# Patient Record
Sex: MALE | Race: WHITE | NOT HISPANIC OR LATINO | Employment: OTHER | RURAL
[De-identification: names, ages, dates, MRNs, and addresses within clinical notes are randomized per-mention and may not be internally consistent; named-entity substitution may affect disease eponyms.]

---

## 2017-01-17 ENCOUNTER — OFFICE VISIT (OUTPATIENT)
Dept: FAMILY MEDICINE CLINIC | Facility: CLINIC | Age: 72
End: 2017-01-17

## 2017-01-17 ENCOUNTER — TELEPHONE (OUTPATIENT)
Dept: FAMILY MEDICINE CLINIC | Facility: CLINIC | Age: 72
End: 2017-01-17

## 2017-01-17 VITALS
BODY MASS INDEX: 24.72 KG/M2 | TEMPERATURE: 97.8 F | WEIGHT: 163.1 LBS | DIASTOLIC BLOOD PRESSURE: 76 MMHG | HEART RATE: 80 BPM | SYSTOLIC BLOOD PRESSURE: 123 MMHG | HEIGHT: 68 IN

## 2017-01-17 DIAGNOSIS — M54.42 CHRONIC LEFT-SIDED LOW BACK PAIN WITH LEFT-SIDED SCIATICA: Primary | ICD-10-CM

## 2017-01-17 DIAGNOSIS — E11.9 TYPE 2 DIABETES MELLITUS WITHOUT COMPLICATION, WITHOUT LONG-TERM CURRENT USE OF INSULIN (HCC): ICD-10-CM

## 2017-01-17 DIAGNOSIS — G89.29 CHRONIC LEFT-SIDED LOW BACK PAIN WITH LEFT-SIDED SCIATICA: Primary | ICD-10-CM

## 2017-01-17 PROCEDURE — 99213 OFFICE O/P EST LOW 20 MIN: CPT | Performed by: FAMILY MEDICINE

## 2017-01-17 RX ORDER — NAPROXEN 500 MG/1
500 TABLET ORAL 2 TIMES DAILY WITH MEALS
Qty: 60 TABLET | Refills: 3 | Status: SHIPPED | OUTPATIENT
Start: 2017-01-17 | End: 2017-02-13

## 2017-01-17 NOTE — TELEPHONE ENCOUNTER
Spoke with [facility] pharmacy they stated patient has several refills left didn't need sent at this time.

## 2017-01-17 NOTE — MR AVS SNAPSHOT
Jean Noriega   1/17/2017 1:20 PM   Office Visit    Dept Phone:  [phone]   Encounter #:  [id number]    Provider:  Demetrius Wilkerson MD   Department:  [facility] FAMILY MEDICINE                Your Full Care Plan              Today's Medication Changes          These changes are accurate as of: 1/17/17  2:51 PM.  If you have any questions, ask your nurse or doctor.               New Medication(s)Ordered:     naproxen 500 MG tablet   Commonly known as:  NAPROSYN   Take 1 tablet by mouth 2 (Two) Times a Day With Meals.   Started by:  Demetrius Wilkerson MD         Medication(s)that have changed:     metFORMIN 1000 MG tablet   Commonly known as:  GLUCOPHAGE   Take 1 tablet by mouth 2 (Two) Times a Day With Meals.   What changed:  when to take this   Changed by:  Demetrius Wilkerson MD         Stop taking medication(s)listed here:     nabumetone 750 MG tablet   Commonly known as:  RELAFEN   Stopped by:  Demetrius Wilkerson MD                Where to Get Your Medications      These medications were sent to [place] [id number] [place] [address] [place] [address]     Phone:  [phone]     metFORMIN 1000 MG tablet    naproxen 500 MG tablet                  Your Updated Medication List          This list is accurate as of: 1/17/17  2:51 PM.  Always use your most recent med list.                ACCU-CHEK COMPACT PLUS CONTROL VI       ACCU-CHEK SOFTCLIX LANCETS lancets       aspirin 81 MG EC tablet       atorvastatin 20 MG tablet   Commonly known as:  LIPITOR   Take 1 tablet by mouth Daily.       cyclobenzaprine 5 MG tablet   Commonly known as:  FLEXERIL   Take 1 tablet by mouth Daily.       metFORMIN 1000 MG tablet   Commonly known as:  GLUCOPHAGE   Take 1 tablet by mouth 2 (Two) Times a Day With Meals.       naproxen 500 MG tablet   Commonly known as:  NAPROSYN   Take 1 tablet by  "mouth 2 (Two) Times a Day With Meals.       pantoprazole 40 MG EC tablet   Commonly known as:  PROTONIX       ZENPEP 47183 UNITS capsule delayed-release particles capsule   Generic drug:  pancrelipase (Lip-Prot-Amyl)               You Were Diagnosed With        Codes Comments    Chronic left-sided low back pain with left-sided sciatica    -  Primary ICD-10-CM: M54.42, G89.29  ICD-9-CM: 724.2, 724.3, 338.29     Type 2 diabetes mellitus without complication, without long-term current use of insulin     ICD-10-CM: E11.9  ICD-9-CM: 250.00       Instructions     None    Patient Instructions History      Upcoming Appointments     Visit Type Date Time Department    OFFICE VISIT 1/17/2017  1:20 PM E  [facility]    LAB 2/6/2017  9:00 AM E  [facility]    OFFICE VISIT 2/13/2017 11:00 AM [facility]      MyChart Signup     Our records indicate that you have declined [employer] signup. If you would like to sign up for [employer], please email [email or web address] or call [phone] to obtain an activation code.             Other Info from Your Visit           Your Appointments     Feb 06, 2017  9:00 AM EST   Lab with LAB DALIA VENTURA   [facility] (--)    [address] [address]   [phone]            Feb 13, 2017 11:00 AM EST   Office Visit with Demetrius Wilkerson MD   [facility] (--)    [address] [place] [id number]   [phone]           Arrive 15 minutes prior to appointment.              Allergies     Prednisone Intolerance     Patient stated ran glucose high and rapid heart rate.      Reason for Visit     Back Pain Persistent    Med Refill Metformin      Vital Signs     Blood Pressure Pulse Temperature Height Weight Body Mass Index    123/76 (BP Location: Left arm, Patient Position: Sitting) 80 97.8 °F (36.6 °C) (Oral) 68\" (172.7 cm) 163 lb 1.6 oz (74 kg) 24.8 " kg/m2    Smoking Status                   Former Smoker           Problems and Diagnoses Noted     Lumbago with sciatica    Type 2 diabetes mellitus without complication, without long-term current use of insulin

## 2017-01-17 NOTE — PROGRESS NOTES
"Zander Noriega is a 71 y.o. male.     History of Present Illness persistence of the low left back discomfort.  Minimal radiating down to the hip.  Said no respiratory CDV GI  changes.  Does not feel like the Relafen was helpful.  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  Compliant with medications.    The following portions of the patient's history were reviewed and updated as appropriate: allergies, past family history, past medical history, past social history, past surgical history and problem list.    Review of Systems see the history of present illness    Objective   Physical Exam   Constitutional: He is oriented to person, place, and time. He appears well-developed and well-nourished.   HENT:   Head: Normocephalic.   Eyes: Conjunctivae and EOM are normal. Pupils are equal, round, and reactive to light.   Neck: Neck supple.   Cardiovascular: Normal rate, regular rhythm, normal heart sounds and intact distal pulses.    No murmur heard.  Pulmonary/Chest: Effort normal and breath sounds normal.   Abdominal: Soft. Bowel sounds are normal. There is no tenderness. There is no guarding.   Musculoskeletal: Normal range of motion. He exhibits no edema.   Tender along the left paralumbar soft tissue down toward the hip.  Skin was negative without rash.  Straight leg raise negative.  Reflexes symmetrical.   Neurological: He is alert and oriented to person, place, and time.   Psychiatric: He has a normal mood and affect.   Vitals reviewed.    Visit Vitals   • /76 (BP Location: Left arm, Patient Position: Sitting)   • Pulse 80   • Temp 97.8 °F (36.6 °C) (Oral)   • Ht 68\" (172.7 cm)   • Wt 163 lb 1.6 oz (74 kg)   • BMI 24.8 kg/m2     Assessment/Plan   Problems Addressed this Visit        Endocrine    Type 2 diabetes mellitus without complication, without long-term current use of insulin    Relevant Medications    metFORMIN (GLUCOPHAGE) 1000 MG tablet       Nervous and " Auditory    Low back pain with sciatica - Primary    Relevant Orders    MRI Lumbar Spine Without Contrast        Interestingly I note a MRI from 11 2014 that did show some degenerative changes and disc pathology.  You have done well since then but have not had complete resolution.  Today will try Naprosyn twice daily.  Will ask for soft tissue imaging.  Stay as active as comfortably possible.  Continue all other modalities regarding diabetes with heart healthy diabetic diet.  Follow-up as scheduled for metabolic screening.

## 2017-01-20 ENCOUNTER — HOSPITAL ENCOUNTER (OUTPATIENT)
Dept: MRI IMAGING | Facility: HOSPITAL | Age: 72
Discharge: HOME | End: 2017-01-20
Admitting: FAMILY MEDICINE

## 2017-01-20 DIAGNOSIS — M54.42 CHRONIC LEFT-SIDED LOW BACK PAIN WITH LEFT-SIDED SCIATICA: ICD-10-CM

## 2017-01-20 DIAGNOSIS — G89.29 CHRONIC LEFT-SIDED LOW BACK PAIN WITH LEFT-SIDED SCIATICA: ICD-10-CM

## 2017-01-20 PROCEDURE — 72148 MRI LUMBAR SPINE W/O DYE: CPT | Performed by: RADIOLOGY

## 2017-01-20 PROCEDURE — 72148 MRI LUMBAR SPINE W/O DYE: CPT

## 2017-01-20 RX ORDER — CYCLOBENZAPRINE HCL 5 MG
5 TABLET ORAL DAILY
Qty: 30 TABLET | Refills: 1 | Status: SHIPPED | OUTPATIENT
Start: 2017-01-20 | End: 2017-03-16 | Stop reason: SDUPTHER

## 2017-01-23 DIAGNOSIS — G89.29 CHRONIC LEFT-SIDED LOW BACK PAIN WITH LEFT-SIDED SCIATICA: Primary | ICD-10-CM

## 2017-01-23 DIAGNOSIS — M54.42 CHRONIC LEFT-SIDED LOW BACK PAIN WITH LEFT-SIDED SCIATICA: Primary | ICD-10-CM

## 2017-02-02 ENCOUNTER — APPOINTMENT (OUTPATIENT)
Dept: PHYSICAL THERAPY | Facility: HOSPITAL | Age: 72
End: 2017-02-02

## 2017-02-06 ENCOUNTER — LAB (OUTPATIENT)
Dept: FAMILY MEDICINE CLINIC | Facility: CLINIC | Age: 72
End: 2017-02-06

## 2017-02-06 DIAGNOSIS — E78.2 MIXED HYPERLIPIDEMIA: ICD-10-CM

## 2017-02-06 DIAGNOSIS — E11.9 TYPE 2 DIABETES MELLITUS WITHOUT COMPLICATION, WITHOUT LONG-TERM CURRENT USE OF INSULIN (HCC): ICD-10-CM

## 2017-02-06 LAB
ALBUMIN SERPL-MCNC: 4 G/DL (ref 3.4–4.8)
ALBUMIN/GLOB SERPL: 1.6 G/DL (ref 1.5–2.5)
ALP SERPL-CCNC: 105 U/L (ref 46–116)
ALT SERPL W P-5'-P-CCNC: 46 U/L (ref 10–44)
ANION GAP SERPL CALCULATED.3IONS-SCNC: 2.3 MMOL/L (ref 3.6–11.2)
AST SERPL-CCNC: 29 U/L (ref 10–34)
BASOPHILS # BLD AUTO: 0.03 10*3/MM3 (ref 0–0.3)
BASOPHILS NFR BLD AUTO: 0.4 % (ref 0–2)
BILIRUB SERPL-MCNC: 0.4 MG/DL (ref 0.2–1.8)
BUN BLD-MCNC: 15 MG/DL (ref 7–21)
BUN/CREAT SERPL: 11.5 (ref 7–25)
CALCIUM SPEC-SCNC: 9.2 MG/DL (ref 7.7–10)
CHLORIDE SERPL-SCNC: 105 MMOL/L (ref 99–112)
CHOLEST SERPL-MCNC: 108 MG/DL (ref 0–200)
CO2 SERPL-SCNC: 30.7 MMOL/L (ref 24.3–31.9)
CREAT BLD-MCNC: 1.3 MG/DL (ref 0.43–1.29)
DEPRECATED RDW RBC AUTO: 45.1 FL (ref 37–54)
EOSINOPHIL # BLD AUTO: 0.17 10*3/MM3 (ref 0–0.7)
EOSINOPHIL NFR BLD AUTO: 2.2 % (ref 0–7)
ERYTHROCYTE [DISTWIDTH] IN BLOOD BY AUTOMATED COUNT: 13 % (ref 11.5–14.5)
GFR SERPL CREATININE-BSD FRML MDRD: 55 ML/MIN/1.73
GLOBULIN UR ELPH-MCNC: 2.5 GM/DL
GLUCOSE BLD-MCNC: 219 MG/DL (ref 70–110)
HBA1C MFR BLD: 8.2 % (ref 4.5–5.7)
HCT VFR BLD AUTO: 43.9 % (ref 42–52)
HDLC SERPL-MCNC: 44 MG/DL (ref 60–100)
HGB BLD-MCNC: 14 G/DL (ref 14–18)
IMM GRANULOCYTES # BLD: 0.04 10*3/MM3 (ref 0–0.03)
IMM GRANULOCYTES NFR BLD: 0.5 % (ref 0–0.5)
LDLC SERPL CALC-MCNC: 14 MG/DL (ref 0–100)
LDLC/HDLC SERPL: 0.32 {RATIO}
LYMPHOCYTES # BLD AUTO: 2.49 10*3/MM3 (ref 1–3)
LYMPHOCYTES NFR BLD AUTO: 32.6 % (ref 16–46)
MCH RBC QN AUTO: 31.2 PG (ref 27–33)
MCHC RBC AUTO-ENTMCNC: 31.9 G/DL (ref 33–37)
MCV RBC AUTO: 97.8 FL (ref 80–94)
MONOCYTES # BLD AUTO: 0.86 10*3/MM3 (ref 0.1–0.9)
MONOCYTES NFR BLD AUTO: 11.3 % (ref 0–12)
NEUTROPHILS # BLD AUTO: 4.04 10*3/MM3 (ref 1.4–6.5)
NEUTROPHILS NFR BLD AUTO: 53 % (ref 40–75)
OSMOLALITY SERPL CALC.SUM OF ELEC: 283.2 MOSM/KG (ref 273–305)
PLATELET # BLD AUTO: 237 10*3/MM3 (ref 130–400)
PMV BLD AUTO: 10.9 FL (ref 6–10)
POTASSIUM BLD-SCNC: 5.3 MMOL/L (ref 3.5–5.3)
PROT SERPL-MCNC: 6.5 G/DL (ref 6–8)
RBC # BLD AUTO: 4.49 10*6/MM3 (ref 4.7–6.1)
SODIUM BLD-SCNC: 138 MMOL/L (ref 135–153)
TRIGL SERPL-MCNC: 250 MG/DL (ref 0–150)
VLDLC SERPL-MCNC: 50 MG/DL
WBC NRBC COR # BLD: 7.63 10*3/MM3 (ref 4.5–12.5)

## 2017-02-06 PROCEDURE — 80053 COMPREHEN METABOLIC PANEL: CPT | Performed by: FAMILY MEDICINE

## 2017-02-06 PROCEDURE — 85025 COMPLETE CBC W/AUTO DIFF WBC: CPT | Performed by: FAMILY MEDICINE

## 2017-02-06 PROCEDURE — 36415 COLL VENOUS BLD VENIPUNCTURE: CPT | Performed by: FAMILY MEDICINE

## 2017-02-06 PROCEDURE — 83036 HEMOGLOBIN GLYCOSYLATED A1C: CPT | Performed by: FAMILY MEDICINE

## 2017-02-06 PROCEDURE — 80061 LIPID PANEL: CPT | Performed by: FAMILY MEDICINE

## 2017-02-09 ENCOUNTER — TELEPHONE (OUTPATIENT)
Dept: FAMILY MEDICINE CLINIC | Facility: CLINIC | Age: 72
End: 2017-02-09

## 2017-02-09 NOTE — TELEPHONE ENCOUNTER
PATIENT CALLED AND STATED HE WAS SET UP FOR AN APPOINTMENT AT [place] [facility]. HE HAD ANOTHER APPOINTMENT THE SAME DAY AND HE TRIED TO RESCHEDULE BUT WAS INSTRUCTED IT WOULD BE INTO MARCH. HE WOULD LIKE A PT REFERRAL TO [facility] IN [place]

## 2017-02-13 ENCOUNTER — OFFICE VISIT (OUTPATIENT)
Dept: FAMILY MEDICINE CLINIC | Facility: CLINIC | Age: 72
End: 2017-02-13

## 2017-02-13 VITALS
TEMPERATURE: 97.6 F | HEART RATE: 82 BPM | HEIGHT: 68 IN | SYSTOLIC BLOOD PRESSURE: 123 MMHG | WEIGHT: 161.7 LBS | DIASTOLIC BLOOD PRESSURE: 68 MMHG | BODY MASS INDEX: 24.51 KG/M2

## 2017-02-13 DIAGNOSIS — E11.9 TYPE 2 DIABETES MELLITUS WITHOUT COMPLICATION, WITHOUT LONG-TERM CURRENT USE OF INSULIN (HCC): Primary | ICD-10-CM

## 2017-02-13 DIAGNOSIS — G89.29 CHRONIC LEFT-SIDED LOW BACK PAIN WITH LEFT-SIDED SCIATICA: ICD-10-CM

## 2017-02-13 DIAGNOSIS — M54.42 CHRONIC LEFT-SIDED LOW BACK PAIN WITH LEFT-SIDED SCIATICA: ICD-10-CM

## 2017-02-13 PROCEDURE — 99213 OFFICE O/P EST LOW 20 MIN: CPT | Performed by: FAMILY MEDICINE

## 2017-02-13 RX ORDER — SULFAMETHOXAZOLE AND TRIMETHOPRIM 800; 160 MG/1; MG/1
TABLET ORAL
COMMUNITY
Start: 2017-02-02 | End: 2017-02-17

## 2017-02-13 RX ORDER — ATORVASTATIN CALCIUM 20 MG/1
10 TABLET, FILM COATED ORAL DAILY
Qty: 30 TABLET | Refills: 6 | Status: ON HOLD
Start: 2017-02-13 | End: 2017-03-19

## 2017-02-13 NOTE — PROGRESS NOTES
"Subjective   Jean Noriega is a 71 y.o. male.     History of Present Illness follow-up on recent labs.  Somewhat intolerant to 20 mg a atorvastatin.  Blood sugar has been staying a little higher less compliant with diet and activity.  Back pain is persistent has not started physical therapy yet.  Did have visit with urologist and diagnosed with prostatitis and is now on antibiotic may need to have follow-up there.  No other new problems.    The following portions of the patient's history were reviewed and updated as appropriate: allergies, current medications, past family history, past social history, past surgical history and problem list.    Review of Systems see the history of present illness    Objective   Physical Exam   Constitutional: He is oriented to person, place, and time. He appears well-developed and well-nourished.   Neurological: He is alert and oriented to person, place, and time.   Psychiatric: He has a normal mood and affect.   Vitals reviewed.    Visit Vitals   • /68 (BP Location: Left arm, Patient Position: Sitting)   • Pulse 82   • Temp 97.6 °F (36.4 °C) (Oral)   • Ht 68\" (172.7 cm)   • Wt 161 lb 11.2 oz (73.3 kg)   • BMI 24.59 kg/m2     Assessment/Plan   Jean was seen today for diabetes and back pain.    Diagnoses and all orders for this visit:    Type 2 diabetes mellitus without complication, without long-term current use of insulin  -     Comprehensive Metabolic Panel; Future  -     Hemoglobin A1c; Future    Chronic left-sided low back pain with left-sided sciatica    Other orders  -     atorvastatin (LIPITOR) 20 MG tablet; Take 0.5 tablets by mouth Daily.     We reviewed the recent metabolic parameters.  At this time I would decrease Lipitor to a half tablet daily.  Must get aggressive with diabetes control is not been good.  Keep follow-up with urologist as suggested.  Keep follow-up with physical therapy.  I think that will be the best approach for the back.  See us in a few months " routinely to monitor metabolic status.  Notify us in the meantime if problems.

## 2017-02-17 ENCOUNTER — OFFICE VISIT (OUTPATIENT)
Dept: FAMILY MEDICINE CLINIC | Facility: CLINIC | Age: 72
End: 2017-02-17

## 2017-02-17 ENCOUNTER — HOSPITAL ENCOUNTER (OUTPATIENT)
Dept: CT IMAGING | Facility: HOSPITAL | Age: 72
Discharge: HOME | End: 2017-02-17
Admitting: NURSE PRACTITIONER

## 2017-02-17 VITALS
TEMPERATURE: 97 F | DIASTOLIC BLOOD PRESSURE: 73 MMHG | WEIGHT: 163.3 LBS | HEART RATE: 70 BPM | OXYGEN SATURATION: 96 % | BODY MASS INDEX: 24.75 KG/M2 | HEIGHT: 68 IN | SYSTOLIC BLOOD PRESSURE: 134 MMHG

## 2017-02-17 DIAGNOSIS — R31.9 HEMATURIA: ICD-10-CM

## 2017-02-17 DIAGNOSIS — R10.9 LEFT FLANK PAIN: ICD-10-CM

## 2017-02-17 DIAGNOSIS — Z85.51 HISTORY OF BLADDER CANCER: ICD-10-CM

## 2017-02-17 DIAGNOSIS — R30.0 DYSURIA: Primary | ICD-10-CM

## 2017-02-17 DIAGNOSIS — R30.0 DYSURIA: ICD-10-CM

## 2017-02-17 LAB
BILIRUB BLD-MCNC: ABNORMAL MG/DL
CLARITY, POC: CLEAR
COLOR UR: ABNORMAL
GLUCOSE UR STRIP-MCNC: ABNORMAL MG/DL
KETONES UR QL: NEGATIVE
LEUKOCYTE EST, POC: NEGATIVE
NITRITE UR-MCNC: NEGATIVE MG/ML
PH UR: 6 [PH] (ref 5–8)
PROT UR STRIP-MCNC: NEGATIVE MG/DL
RBC # UR STRIP: ABNORMAL /UL
SP GR UR: 1.01 (ref 1–1.03)
UROBILINOGEN UR QL: ABNORMAL

## 2017-02-17 PROCEDURE — 74176 CT ABD & PELVIS W/O CONTRAST: CPT | Performed by: RADIOLOGY

## 2017-02-17 PROCEDURE — 74176 CT ABD & PELVIS W/O CONTRAST: CPT

## 2017-02-17 PROCEDURE — 99213 OFFICE O/P EST LOW 20 MIN: CPT | Performed by: NURSE PRACTITIONER

## 2017-02-17 PROCEDURE — 81002 URINALYSIS NONAUTO W/O SCOPE: CPT | Performed by: NURSE PRACTITIONER

## 2017-02-17 PROCEDURE — 87086 URINE CULTURE/COLONY COUNT: CPT | Performed by: NURSE PRACTITIONER

## 2017-02-17 RX ORDER — TAMSULOSIN HYDROCHLORIDE 0.4 MG/1
1 CAPSULE ORAL NIGHTLY
Qty: 30 CAPSULE | Refills: 0 | Status: SHIPPED | OUTPATIENT
Start: 2017-02-17 | End: 2017-03-17

## 2017-02-17 RX ORDER — SULFAMETHOXAZOLE AND TRIMETHOPRIM 800; 160 MG/1; MG/1
1 TABLET ORAL 2 TIMES DAILY
Qty: 20 TABLET | Refills: 0 | Status: SHIPPED | OUTPATIENT
Start: 2017-02-17 | End: 2017-02-27

## 2017-02-17 RX ORDER — NAPROXEN 500 MG/1
500 TABLET ORAL 2 TIMES DAILY WITH MEALS
COMMUNITY
End: 2017-03-17

## 2017-02-17 NOTE — PROGRESS NOTES
"Zander Noriega is a 72 y.o. male.   Chief Complaint   Patient presents with   • Back Pain     History of Present Illness     The patient presents today with c/o left flank pain. This has been intermittent for several months. The pain became severe last night and he was unable to sleep. Also noticed blood in his urine this morning. Has had no urgency, burning, or frequency. Denies fever and chills. He has a history of bladder cancer. Does see a neurologist in [place], Dr. Lawson. He did take some naproxen this morning which helped to decrease the pain. Has no known history of renal stones. Denies nausea, vomiting, and diarrhea. He does have chronic back pain and has had a recent MRI to evaluate this. However, he reports this pain has been much different than his usual chronic back pain. Overall, this is persistent.     The following portions of the patient's history were reviewed and updated as appropriate: allergies, current medications, past family history, past medical history, past social history, past surgical history and problem list.  Visit Vitals   • /73 (BP Location: Left arm, Patient Position: Sitting)   • Pulse 70   • Temp 97 °F (36.1 °C) (Oral)   • Ht 68\" (172.7 cm)   • Wt 163 lb 4.8 oz (74.1 kg)   • SpO2 96%  Comment: Room air   • BMI 24.83 kg/m2     Review of Systems   Constitutional: Negative for chills, fatigue and fever.   HENT: Negative for congestion, ear pain, rhinorrhea and sore throat.    Respiratory: Negative for cough, shortness of breath and wheezing.    Cardiovascular: Negative for chest pain, palpitations and leg swelling.   Gastrointestinal: Negative for abdominal pain, diarrhea, nausea and vomiting.   Genitourinary: Positive for flank pain and hematuria. Negative for decreased urine volume, difficulty urinating, dysuria, frequency, scrotal swelling and urgency.   Musculoskeletal: Positive for back pain. Negative for myalgias.   Skin: Negative for rash and wound. "   Neurological: Negative for dizziness, light-headedness and headaches.   Psychiatric/Behavioral: Negative for sleep disturbance. The patient is not nervous/anxious.        Objective   Physical Exam   Constitutional: He is oriented to person, place, and time. He appears well-developed and well-nourished.   HENT:   Head: Normocephalic and atraumatic.   Cardiovascular: Normal rate, regular rhythm and normal heart sounds.    Pulmonary/Chest: Effort normal and breath sounds normal.   Abdominal: Soft. Bowel sounds are normal.   Musculoskeletal: Normal range of motion.   Left CVA tenderness noted     Neurological: He is alert and oriented to person, place, and time.   Skin: Skin is warm and dry.   Psychiatric: He has a normal mood and affect. His behavior is normal.       Assessment/Plan   Jean was seen today for back pain.    Diagnoses and all orders for this visit:    Dysuria  -     POC Urinalysis Dipstick  -     CT abdomen pelvis stone protocol; Future  -     tamsulosin (FLOMAX) 0.4 MG capsule 24 hr capsule; Take 1 capsule by mouth Every Night.  -     sulfamethoxazole-trimethoprim (BACTRIM DS,SEPTRA DS) 800-160 MG per tablet; Take 1 tablet by mouth 2 (Two) Times a Day for 10 days.  -     Urine Culture    Left flank pain  -     CT abdomen pelvis stone protocol; Future  -     tamsulosin (FLOMAX) 0.4 MG capsule 24 hr capsule; Take 1 capsule by mouth Every Night.  -     sulfamethoxazole-trimethoprim (BACTRIM DS,SEPTRA DS) 800-160 MG per tablet; Take 1 tablet by mouth 2 (Two) Times a Day for 10 days.  -     Urine Culture    Hematuria  -     CT abdomen pelvis stone protocol; Future  -     tamsulosin (FLOMAX) 0.4 MG capsule 24 hr capsule; Take 1 capsule by mouth Every Night.  -     sulfamethoxazole-trimethoprim (BACTRIM DS,SEPTRA DS) 800-160 MG per tablet; Take 1 tablet by mouth 2 (Two) Times a Day for 10 days.  -     Urine Culture    History of bladder cancer      Urinalysis results noted. Will start Bactrim and Flomax.  Markedly increase fluid intake. Will order Stat CT with renal stone protocol to rule out renal stone. Will obtain lab. He has deferred referral to the ED today. Will send urine for culture. Concerning signs and symptoms that would prompt urgent evaluation discussed. Patient voiced understanding.

## 2017-02-19 LAB — BACTERIA SPEC AEROBE CULT: NO GROWTH

## 2017-03-16 RX ORDER — CYCLOBENZAPRINE HCL 5 MG
5 TABLET ORAL DAILY
Qty: 30 TABLET | Refills: 1 | Status: SHIPPED | OUTPATIENT
Start: 2017-03-16 | End: 2017-05-25 | Stop reason: SDUPTHER

## 2017-03-16 NOTE — TELEPHONE ENCOUNTER
NEEDS REFILL ON CYCLOBENZAPRINE SENT TO [facility]. ALSO WANTED TO LET DR BROWNE HIS BACK PAIN WAS A KIDNEY STONE. HAD TO HAVE STENT PUT IN AND THEN  WAS FINALLY ABLE TO HAVE REMOVED Monday  AND IS FEELING A LOT BETTER.  HIS NUMBER  [id number].

## 2017-03-17 ENCOUNTER — HOSPITAL ENCOUNTER (OUTPATIENT)
Dept: CT IMAGING | Facility: HOSPITAL | Age: 72
Discharge: HOME | End: 2017-03-17
Admitting: NURSE PRACTITIONER

## 2017-03-17 ENCOUNTER — OFFICE VISIT (OUTPATIENT)
Dept: FAMILY MEDICINE CLINIC | Facility: CLINIC | Age: 72
End: 2017-03-17

## 2017-03-17 ENCOUNTER — HOSPITAL ENCOUNTER (EMERGENCY)
Facility: HOSPITAL | Age: 72
Discharge: HOME | End: 2017-03-17
Attending: EMERGENCY MEDICINE | Admitting: EMERGENCY MEDICINE

## 2017-03-17 VITALS
TEMPERATURE: 97.7 F | WEIGHT: 147 LBS | DIASTOLIC BLOOD PRESSURE: 63 MMHG | HEIGHT: 69 IN | HEART RATE: 63 BPM | OXYGEN SATURATION: 98 % | RESPIRATION RATE: 20 BRPM | SYSTOLIC BLOOD PRESSURE: 157 MMHG | BODY MASS INDEX: 21.77 KG/M2

## 2017-03-17 VITALS
OXYGEN SATURATION: 99 % | TEMPERATURE: 97.1 F | BODY MASS INDEX: 22.84 KG/M2 | SYSTOLIC BLOOD PRESSURE: 128 MMHG | WEIGHT: 150.7 LBS | HEART RATE: 104 BPM | HEIGHT: 68 IN | DIASTOLIC BLOOD PRESSURE: 66 MMHG

## 2017-03-17 DIAGNOSIS — R17 JAUNDICE: Primary | ICD-10-CM

## 2017-03-17 DIAGNOSIS — R79.89 ELEVATED LFTS: ICD-10-CM

## 2017-03-17 DIAGNOSIS — R73.9 HYPERGLYCEMIA: ICD-10-CM

## 2017-03-17 DIAGNOSIS — R63.4 UNINTENTIONAL WEIGHT LOSS: ICD-10-CM

## 2017-03-17 DIAGNOSIS — R17 JAUNDICE: ICD-10-CM

## 2017-03-17 DIAGNOSIS — K21.9 GASTROESOPHAGEAL REFLUX DISEASE WITHOUT ESOPHAGITIS: ICD-10-CM

## 2017-03-17 DIAGNOSIS — R11.0 NAUSEA: ICD-10-CM

## 2017-03-17 DIAGNOSIS — D50.0 ANEMIA DUE TO BLOOD LOSS: ICD-10-CM

## 2017-03-17 DIAGNOSIS — E87.1 HYPONATREMIA: ICD-10-CM

## 2017-03-17 DIAGNOSIS — E11.9 TYPE 2 DIABETES MELLITUS WITHOUT COMPLICATION, WITHOUT LONG-TERM CURRENT USE OF INSULIN (HCC): ICD-10-CM

## 2017-03-17 LAB
ALBUMIN SERPL-MCNC: 3.7 G/DL (ref 3.4–4.8)
ALBUMIN SERPL-MCNC: 3.8 G/DL (ref 3.2–4.8)
ALBUMIN/GLOB SERPL: 1.3 G/DL (ref 1.5–2.5)
ALBUMIN/GLOB SERPL: 1.4 G/DL (ref 1.5–2.5)
ALP SERPL-CCNC: 738 U/L (ref 25–100)
ALP SERPL-CCNC: 751 U/L (ref 40–129)
ALT SERPL W P-5'-P-CCNC: 165 U/L (ref 7–40)
ALT SERPL W P-5'-P-CCNC: 185 U/L (ref 10–44)
AMYLASE SERPL-CCNC: 47 U/L (ref 28–100)
ANION GAP SERPL CALCULATED.3IONS-SCNC: 5.9 MMOL/L (ref 3.6–11.2)
ANION GAP SERPL CALCULATED.3IONS-SCNC: 6 MMOL/L (ref 3–11)
APTT PPP: 26 SECONDS (ref 24.4–31)
AST SERPL-CCNC: 100 U/L (ref 0–33)
AST SERPL-CCNC: 133 U/L (ref 10–34)
BASOPHILS # BLD AUTO: 0.02 10*3/MM3 (ref 0–0.3)
BASOPHILS # BLD AUTO: 0.04 10*3/MM3 (ref 0–0.2)
BASOPHILS NFR BLD AUTO: 0.3 % (ref 0–2)
BASOPHILS NFR BLD AUTO: 0.6 % (ref 0–1)
BILIRUB CONJ SERPL-MCNC: 3.3 MG/DL (ref 0–0.2)
BILIRUB SERPL-MCNC: 4.1 MG/DL (ref 0.3–1.2)
BILIRUB SERPL-MCNC: 5 MG/DL (ref 0.2–1.8)
BILIRUB UR QL STRIP: NEGATIVE
BUN BLD-MCNC: 11 MG/DL (ref 7–21)
BUN BLD-MCNC: 12 MG/DL (ref 9–23)
BUN/CREAT SERPL: 10.8 (ref 7–25)
BUN/CREAT SERPL: 12 (ref 7–25)
CALCIUM SPEC-SCNC: 9.3 MG/DL (ref 7.7–10)
CALCIUM SPEC-SCNC: 9.7 MG/DL (ref 8.7–10.4)
CHLORIDE SERPL-SCNC: 95 MMOL/L (ref 99–109)
CHLORIDE SERPL-SCNC: 95 MMOL/L (ref 99–112)
CLARITY UR: CLEAR
CO2 SERPL-SCNC: 28.1 MMOL/L (ref 24.3–31.9)
CO2 SERPL-SCNC: 29 MMOL/L (ref 20–31)
COLOR UR: YELLOW
CREAT BLD-MCNC: 1 MG/DL (ref 0.6–1.3)
CREAT BLD-MCNC: 1.02 MG/DL (ref 0.43–1.29)
DEPRECATED RDW RBC AUTO: 50.3 FL (ref 37–54)
DEPRECATED RDW RBC AUTO: 50.5 FL (ref 37–54)
DEVELOPER EXPIRATION DATE: NORMAL
DEVELOPER LOT NUMBER: NORMAL
EOSINOPHIL # BLD AUTO: 0.06 10*3/MM3 (ref 0–0.7)
EOSINOPHIL # BLD AUTO: 0.07 10*3/MM3 (ref 0.1–0.3)
EOSINOPHIL NFR BLD AUTO: 0.9 % (ref 0–7)
EOSINOPHIL NFR BLD AUTO: 1 % (ref 0–3)
ERYTHROCYTE [DISTWIDTH] IN BLOOD BY AUTOMATED COUNT: 13.9 % (ref 11.3–14.5)
ERYTHROCYTE [DISTWIDTH] IN BLOOD BY AUTOMATED COUNT: 14 % (ref 11.5–14.5)
EXPIRATION DATE: NORMAL
FECAL OCCULT BLOOD SCREEN, POC: NEGATIVE
FERRITIN SERPL-MCNC: 355 NG/ML (ref 21.9–321.7)
FOLATE SERPL-MCNC: >24 NG/ML (ref 5.4–20)
GFR SERPL CREATININE-BSD FRML MDRD: 72 ML/MIN/1.73
GFR SERPL CREATININE-BSD FRML MDRD: 74 ML/MIN/1.73
GLOBULIN UR ELPH-MCNC: 2.8 GM/DL
GLOBULIN UR ELPH-MCNC: 2.8 GM/DL
GLUCOSE BLD-MCNC: 305 MG/DL (ref 70–100)
GLUCOSE BLD-MCNC: 405 MG/DL (ref 70–110)
GLUCOSE UR STRIP-MCNC: ABNORMAL MG/DL
HAV IGM SERPL QL IA: NORMAL
HBV SURFACE AG SERPL QL IA: NORMAL
HCT VFR BLD AUTO: 33.8 % (ref 42–52)
HCT VFR BLD AUTO: 34.4 % (ref 38.9–50.9)
HCV AB SER DONR QL: NORMAL
HGB BLD-MCNC: 10.9 G/DL (ref 14–18)
HGB BLD-MCNC: 11.4 G/DL (ref 13.1–17.5)
HGB UR QL STRIP.AUTO: NEGATIVE
IMM GRANULOCYTES # BLD: 0.22 10*3/MM3 (ref 0–0.03)
IMM GRANULOCYTES # BLD: 0.28 10*3/MM3 (ref 0–0.03)
IMM GRANULOCYTES NFR BLD: 3.2 % (ref 0–0.5)
IMM GRANULOCYTES NFR BLD: 3.9 % (ref 0–0.6)
INR PPP: 0.91 (ref 0.8–1.1)
IRON 24H UR-MRATE: 86 MCG/DL (ref 53–167)
IRON SATN MFR SERPL: 35 %
KETONES UR QL STRIP: NEGATIVE
LEUKOCYTE ESTERASE UR QL STRIP.AUTO: NEGATIVE
LIPASE SERPL-CCNC: 15 U/L (ref 6–51)
LIPASE SERPL-CCNC: 16 U/L (ref 13–60)
LYMPHOCYTES # BLD AUTO: 1.34 10*3/MM3 (ref 1–3)
LYMPHOCYTES # BLD AUTO: 1.94 10*3/MM3 (ref 0.6–4.8)
LYMPHOCYTES NFR BLD AUTO: 19.3 % (ref 16–46)
LYMPHOCYTES NFR BLD AUTO: 26.8 % (ref 24–44)
Lab: NORMAL
MCH RBC QN AUTO: 31.8 PG (ref 27–33)
MCH RBC QN AUTO: 32.9 PG (ref 27–31)
MCHC RBC AUTO-ENTMCNC: 32.2 G/DL (ref 33–37)
MCHC RBC AUTO-ENTMCNC: 33.1 G/DL (ref 32–36)
MCV RBC AUTO: 98.5 FL (ref 80–94)
MCV RBC AUTO: 99.4 FL (ref 80–99)
MONOCYTES # BLD AUTO: 0.72 10*3/MM3 (ref 0–1)
MONOCYTES # BLD AUTO: 0.95 10*3/MM3 (ref 0.1–0.9)
MONOCYTES NFR BLD AUTO: 13.7 % (ref 0–12)
MONOCYTES NFR BLD AUTO: 9.9 % (ref 0–12)
NEGATIVE CONTROL: NEGATIVE
NEUTROPHILS # BLD AUTO: 4.19 10*3/MM3 (ref 1.5–8.3)
NEUTROPHILS # BLD AUTO: 4.36 10*3/MM3 (ref 1.4–6.5)
NEUTROPHILS NFR BLD AUTO: 57.8 % (ref 41–71)
NEUTROPHILS NFR BLD AUTO: 62.6 % (ref 40–75)
NITRITE UR QL STRIP: NEGATIVE
OSMOLALITY SERPL CALC.SUM OF ELEC: 275.4 MOSM/KG (ref 273–305)
PH UR STRIP.AUTO: 5.5 [PH] (ref 5–8)
PLATELET # BLD AUTO: 380 10*3/MM3 (ref 130–400)
PLATELET # BLD AUTO: 406 10*3/MM3 (ref 150–450)
PMV BLD AUTO: 10.7 FL (ref 6–12)
PMV BLD AUTO: 11.6 FL (ref 6–10)
POSITIVE CONTROL: POSITIVE
POTASSIUM BLD-SCNC: 4 MMOL/L (ref 3.5–5.3)
POTASSIUM BLD-SCNC: 4.1 MMOL/L (ref 3.5–5.5)
PROT SERPL-MCNC: 6.5 G/DL (ref 6–8)
PROT SERPL-MCNC: 6.6 G/DL (ref 5.7–8.2)
PROT UR QL STRIP: NEGATIVE
PROTHROMBIN TIME: 10 SECONDS (ref 9.8–11.9)
RBC # BLD AUTO: 3.43 10*6/MM3 (ref 4.7–6.1)
RBC # BLD AUTO: 3.46 10*6/MM3 (ref 4.2–5.76)
SODIUM BLD-SCNC: 129 MMOL/L (ref 135–153)
SODIUM BLD-SCNC: 130 MMOL/L (ref 132–146)
SP GR UR STRIP: 1.02 (ref 1–1.03)
TIBC SERPL-MCNC: 245 MCG/DL (ref 241–421)
UROBILINOGEN UR QL STRIP: ABNORMAL
WBC NRBC COR # BLD: 6.95 10*3/MM3 (ref 4.5–12.5)
WBC NRBC COR # BLD: 7.24 10*3/MM3 (ref 3.5–10.8)

## 2017-03-17 PROCEDURE — 99214 OFFICE O/P EST MOD 30 MIN: CPT | Performed by: NURSE PRACTITIONER

## 2017-03-17 PROCEDURE — 96360 HYDRATION IV INFUSION INIT: CPT

## 2017-03-17 PROCEDURE — 85025 COMPLETE CBC W/AUTO DIFF WBC: CPT | Performed by: EMERGENCY MEDICINE

## 2017-03-17 PROCEDURE — 81003 URINALYSIS AUTO W/O SCOPE: CPT | Performed by: EMERGENCY MEDICINE

## 2017-03-17 PROCEDURE — 74177 CT ABD & PELVIS W/CONTRAST: CPT | Performed by: RADIOLOGY

## 2017-03-17 PROCEDURE — 36415 COLL VENOUS BLD VENIPUNCTURE: CPT | Performed by: NURSE PRACTITIONER

## 2017-03-17 PROCEDURE — 99283 EMERGENCY DEPT VISIT LOW MDM: CPT

## 2017-03-17 PROCEDURE — 83690 ASSAY OF LIPASE: CPT | Performed by: EMERGENCY MEDICINE

## 2017-03-17 PROCEDURE — 80053 COMPREHEN METABOLIC PANEL: CPT | Performed by: EMERGENCY MEDICINE

## 2017-03-17 RX ORDER — HYDROCODONE BITARTRATE AND ACETAMINOPHEN 5; 325 MG/1; MG/1
TABLET ORAL
COMMUNITY
Start: 2017-02-18 | End: 2017-03-17

## 2017-03-17 RX ORDER — SODIUM CHLORIDE 0.9 % (FLUSH) 0.9 %
10 SYRINGE (ML) INJECTION AS NEEDED
Status: DISCONTINUED | OUTPATIENT
Start: 2017-03-17 | End: 2017-03-18 | Stop reason: HOSPADM

## 2017-03-17 RX ORDER — NITROFURANTOIN 25; 75 MG/1; MG/1
CAPSULE ORAL
COMMUNITY
Start: 2017-03-04 | End: 2017-03-17

## 2017-03-17 RX ORDER — CIPROFLOXACIN 500 MG/1
TABLET, FILM COATED ORAL
COMMUNITY
Start: 2017-03-09 | End: 2017-03-17

## 2017-03-17 RX ADMIN — SODIUM CHLORIDE 1000 ML: 9 INJECTION, SOLUTION INTRAVENOUS at 21:58

## 2017-03-17 RX ADMIN — IOPAMIDOL 100 ML: 612 INJECTION, SOLUTION INTRAVENOUS at 13:31

## 2017-03-17 NOTE — PROGRESS NOTES
"Zander Noriega is a 72 y.o. male.   Chief Complaint   Patient presents with   • Rash   • Jaundice     History of Present Illness     The patient presents today with c/o Jaundice. This started one week ago and has progressively worsened. Has had associated chills and malaise. Has had an erythematous rash on his chest with associated pruritus. Has been nauseated with decreased appetite. Denies vomiting and constipation. Has had some diarrhea. Denies abdominal pain. Has lost 14 lbs over the past month. He has a history of duodenal cancer and bladder cancer. He does see his oncologist, routinely. He had a recent CT of the chest, that was unremarkable. Also had a recent CT stone protocol, demonstrating a left renal stone and hydronephrosis. He ultimately had stent placement and removal and passed the stone. He reports a large amount of hematuria during this time. He reports he has had a whipple and cholecystectomy. He has taken Lipitor, but discontinued the medication when he noticed the jaundice. Has avoided tylenol and alcohol. He has been diabetic since the whipple procedure. His glucose has been elevated over the past few weeks. Overall, this is worsening.    The following portions of the patient's history were reviewed and updated as appropriate: allergies, current medications, past family history, past medical history, past social history, past surgical history and problem list.  Visit Vitals   • /66 (BP Location: Left arm, Patient Position: Sitting)   • Pulse 104   • Temp 97.1 °F (36.2 °C) (Oral)   • Ht 68\" (172.7 cm)   • Wt 150 lb 11.2 oz (68.4 kg)   • SpO2 99%   • BMI 22.91 kg/m2     Review of Systems   Constitutional: Positive for appetite change, chills, fatigue and unexpected weight change. Negative for fever.   HENT: Negative for congestion, ear pain, rhinorrhea and sore throat.    Respiratory: Negative for cough, shortness of breath and wheezing.    Cardiovascular: Negative for chest pain, " palpitations and leg swelling.   Gastrointestinal: Positive for diarrhea and nausea. Negative for abdominal pain, blood in stool and vomiting.   Genitourinary: Positive for hematuria. Negative for dysuria.   Musculoskeletal: Negative for back pain and myalgias.   Skin: Positive for color change and rash. Negative for wound.   Neurological: Negative for dizziness, light-headedness and headaches.   Psychiatric/Behavioral: Negative for sleep disturbance. The patient is not nervous/anxious.        Objective   Physical Exam   Constitutional: He is oriented to person, place, and time. He appears well-developed and well-nourished.   HENT:   Head: Normocephalic and atraumatic.   Mouth/Throat: Oropharynx is clear and moist.   Eyes: Pupils are equal, round, and reactive to light. Scleral icterus is present.   Cardiovascular: Normal rate, regular rhythm and normal heart sounds.    Pulmonary/Chest: Effort normal and breath sounds normal.   Abdominal: Soft. Bowel sounds are normal. He exhibits no distension and no mass. There is no tenderness. There is no guarding.   Musculoskeletal: Normal range of motion.   Neurological: He is alert and oriented to person, place, and time.   Skin: Skin is warm and dry.   Yellow discoloration of skin consistent with jaundice   Psychiatric: He has a normal mood and affect. His behavior is normal.       Assessment/Plan   Jean was seen today for rash and jaundice.    Diagnoses and all orders for this visit:    Jaundice  -     CBC & Differential  -     Comprehensive Metabolic Panel  -     Iron Profile  -     Folate  -     Ferritin  -     Amylase  -     Lipase  -     Hepatitis A Antibody, IgM  -     APTT  -     Protime-INR  -     Cancel: Hepatic Function Panel  -     CT Abdomen Pelvis With Contrast; Future  -     CBC Auto Differential  -     Bilirubin, Direct; Future  -     Bilirubin, Direct  -     Osmolality, Calculated; Future  -     Osmolality, Calculated    Unintentional weight loss  -     CBC &  Differential  -     Comprehensive Metabolic Panel  -     Iron Profile  -     Folate  -     Ferritin  -     Amylase  -     Lipase  -     Hepatitis A Antibody, IgM  -     APTT  -     Protime-INR  -     Cancel: Hepatic Function Panel  -     CT Abdomen Pelvis With Contrast; Future  -     CBC Auto Differential  -     Bilirubin, Direct; Future  -     Bilirubin, Direct  -     Osmolality, Calculated; Future  -     Osmolality, Calculated    Nausea  -     CBC & Differential  -     Comprehensive Metabolic Panel  -     Iron Profile  -     Folate  -     Ferritin  -     Amylase  -     Lipase  -     Hepatitis A Antibody, IgM  -     APTT  -     Protime-INR  -     Cancel: Hepatic Function Panel  -     CBC Auto Differential  -     Bilirubin, Direct; Future  -     Bilirubin, Direct  -     Osmolality, Calculated; Future  -     Osmolality, Calculated    Type 2 diabetes mellitus without complication, without long-term current use of insulin  -     Iron Profile  -     Ferritin    Gastroesophageal reflux disease without esophagitis  -     Iron Profile  -     Ferritin      I have discussed this with Dr. Wilkerson. The symptoms are very concerning. He does have a PMH of cancer. Will order lab workup as noted. Will order CT abdomen and pelvis with contrast. We have discussed evaluation in the ED, he has deferred. Avoid Statin, Tylenol, and alcohol. Monitor glucose at home. Concerning signs and symptoms that would prompt urgent evaluation discussed. Patient voiced understanding.  Follow up pending.

## 2017-03-18 ENCOUNTER — APPOINTMENT (OUTPATIENT)
Dept: CT IMAGING | Facility: HOSPITAL | Age: 72
End: 2017-03-18

## 2017-03-18 ENCOUNTER — APPOINTMENT (OUTPATIENT)
Dept: GENERAL RADIOLOGY | Facility: HOSPITAL | Age: 72
End: 2017-03-18

## 2017-03-18 ENCOUNTER — HOSPITAL ENCOUNTER (EMERGENCY)
Facility: HOSPITAL | Age: 72
Discharge: LEFT BEFORE DISCHARGE | End: 2017-03-18
Attending: EMERGENCY MEDICINE | Admitting: EMERGENCY MEDICINE

## 2017-03-18 VITALS
TEMPERATURE: 98.7 F | HEART RATE: 85 BPM | BODY MASS INDEX: 21.77 KG/M2 | DIASTOLIC BLOOD PRESSURE: 73 MMHG | OXYGEN SATURATION: 98 % | WEIGHT: 147 LBS | HEIGHT: 69 IN | RESPIRATION RATE: 18 BRPM | SYSTOLIC BLOOD PRESSURE: 131 MMHG

## 2017-03-18 DIAGNOSIS — R79.89 ELEVATED LFTS: ICD-10-CM

## 2017-03-18 DIAGNOSIS — R17 SCLERAL ICTERUS: ICD-10-CM

## 2017-03-18 DIAGNOSIS — R17 JAUNDICE: Primary | ICD-10-CM

## 2017-03-18 LAB
ALBUMIN SERPL-MCNC: 3.3 G/DL (ref 3.4–4.8)
ALBUMIN/GLOB SERPL: 1.3 G/DL (ref 1.5–2.5)
ALP SERPL-CCNC: 697 U/L (ref 40–129)
ALT SERPL W P-5'-P-CCNC: 158 U/L (ref 10–44)
AMYLASE SERPL-CCNC: 41 U/L (ref 28–100)
ANION GAP SERPL CALCULATED.3IONS-SCNC: 6.9 MMOL/L (ref 3.6–11.2)
AST SERPL-CCNC: 154 U/L (ref 10–34)
BASOPHILS # BLD AUTO: 0.02 10*3/MM3 (ref 0–0.3)
BASOPHILS NFR BLD AUTO: 0.1 % (ref 0–2)
BILIRUB SERPL-MCNC: 4.9 MG/DL (ref 0.2–1.8)
BILIRUB UR QL STRIP: ABNORMAL
BUN BLD-MCNC: 15 MG/DL (ref 7–21)
BUN/CREAT SERPL: 12.7 (ref 7–25)
CALCIUM SPEC-SCNC: 8.5 MG/DL (ref 7.7–10)
CHLORIDE SERPL-SCNC: 97 MMOL/L (ref 99–112)
CLARITY UR: CLEAR
CO2 SERPL-SCNC: 25.1 MMOL/L (ref 24.3–31.9)
COLOR UR: ABNORMAL
CREAT BLD-MCNC: 1.18 MG/DL (ref 0.43–1.29)
D-LACTATE SERPL-SCNC: 1.2 MMOL/L (ref 0.5–2)
DEPRECATED RDW RBC AUTO: 50.3 FL (ref 37–54)
EOSINOPHIL # BLD AUTO: 0 10*3/MM3 (ref 0–0.7)
EOSINOPHIL NFR BLD AUTO: 0 % (ref 0–7)
ERYTHROCYTE [DISTWIDTH] IN BLOOD BY AUTOMATED COUNT: 14.5 % (ref 11.5–14.5)
GFR SERPL CREATININE-BSD FRML MDRD: 61 ML/MIN/1.73
GLOBULIN UR ELPH-MCNC: 2.6 GM/DL
GLUCOSE BLD-MCNC: 383 MG/DL (ref 70–110)
GLUCOSE BLDC GLUCOMTR-MCNC: 253 MG/DL (ref 70–130)
GLUCOSE UR STRIP-MCNC: ABNORMAL MG/DL
HCT VFR BLD AUTO: 34.6 % (ref 42–52)
HGB BLD-MCNC: 10.9 G/DL (ref 14–18)
HGB UR QL STRIP.AUTO: NEGATIVE
HOLD SPECIMEN: NORMAL
HOLD SPECIMEN: NORMAL
IMM GRANULOCYTES # BLD: 0.18 10*3/MM3 (ref 0–0.03)
IMM GRANULOCYTES NFR BLD: 0.9 % (ref 0–0.5)
KETONES UR QL STRIP: NEGATIVE
LEUKOCYTE ESTERASE UR QL STRIP.AUTO: NEGATIVE
LIPASE SERPL-CCNC: 14 U/L (ref 13–60)
LYMPHOCYTES # BLD AUTO: 0.69 10*3/MM3 (ref 1–3)
LYMPHOCYTES NFR BLD AUTO: 3.4 % (ref 16–46)
MCH RBC QN AUTO: 31.4 PG (ref 27–33)
MCHC RBC AUTO-ENTMCNC: 31.5 G/DL (ref 33–37)
MCV RBC AUTO: 99.7 FL (ref 80–94)
MONOCYTES # BLD AUTO: 1.71 10*3/MM3 (ref 0.1–0.9)
MONOCYTES NFR BLD AUTO: 8.4 % (ref 0–12)
NEUTROPHILS # BLD AUTO: 17.78 10*3/MM3 (ref 1.4–6.5)
NEUTROPHILS NFR BLD AUTO: 87.2 % (ref 40–75)
NITRITE UR QL STRIP: NEGATIVE
OSMOLALITY SERPL CALC.SUM OF ELEC: 275.6 MOSM/KG (ref 273–305)
PH UR STRIP.AUTO: 6.5 [PH] (ref 5–8)
PLATELET # BLD AUTO: 372 10*3/MM3 (ref 130–400)
PMV BLD AUTO: 10.7 FL (ref 6–10)
POTASSIUM BLD-SCNC: 4.1 MMOL/L (ref 3.5–5.3)
PROT SERPL-MCNC: 5.9 G/DL (ref 6–8)
PROT UR QL STRIP: NEGATIVE
RBC # BLD AUTO: 3.47 10*6/MM3 (ref 4.7–6.1)
SODIUM BLD-SCNC: 129 MMOL/L (ref 135–153)
SP GR UR STRIP: 1.02 (ref 1–1.03)
UROBILINOGEN UR QL STRIP: ABNORMAL
WBC NRBC COR # BLD: 20.38 10*3/MM3 (ref 4.5–12.5)
WHOLE BLOOD HOLD SPECIMEN: NORMAL
WHOLE BLOOD HOLD SPECIMEN: NORMAL

## 2017-03-18 PROCEDURE — 71020 XR CHEST 2 VW: CPT | Performed by: RADIOLOGY

## 2017-03-18 PROCEDURE — 74176 CT ABD & PELVIS W/O CONTRAST: CPT | Performed by: RADIOLOGY

## 2017-03-18 RX ORDER — SODIUM CHLORIDE 0.9 % (FLUSH) 0.9 %
10 SYRINGE (ML) INJECTION AS NEEDED
Status: DISCONTINUED | OUTPATIENT
Start: 2017-03-18 | End: 2017-03-19 | Stop reason: HOSPADM

## 2017-03-18 RX ORDER — SODIUM CHLORIDE 9 MG/ML
125 INJECTION, SOLUTION INTRAVENOUS CONTINUOUS
Status: DISCONTINUED | OUTPATIENT
Start: 2017-03-18 | End: 2017-03-19 | Stop reason: HOSPADM

## 2017-03-18 RX ADMIN — HUMAN INSULIN 8 UNITS: 100 INJECTION, SOLUTION SUBCUTANEOUS at 21:19

## 2017-03-18 RX ADMIN — VANCOMYCIN HYDROCHLORIDE 1250 MG: 5 INJECTION, POWDER, LYOPHILIZED, FOR SOLUTION INTRAVENOUS at 19:25

## 2017-03-18 RX ADMIN — TAZOBACTAM SODIUM AND PIPERACILLIN SODIUM 4.5 G: .5; 4 INJECTION, POWDER, LYOPHILIZED, FOR SOLUTION INTRAVENOUS at 18:48

## 2017-03-18 RX ADMIN — SODIUM CHLORIDE 500 ML: 9 INJECTION, SOLUTION INTRAVENOUS at 17:57

## 2017-03-18 RX ADMIN — SODIUM CHLORIDE 125 ML/HR: 9 INJECTION, SOLUTION INTRAVENOUS at 17:58

## 2017-03-18 NOTE — ED PROVIDER NOTES
Subjective   HPI Comments: 72 y.o. male presents to the ED w/ c/o jaundice starting about 1 week ago. Pt with recent left-sided kidney stone. Had stent placed by Dr. Pedro 3 weeks ago, with removal of stone 4 days ago. He did have some hematuria following this procedure. Over the last week he has had progressive diffuse jaundice with yellowing of sclera. He has had diffuse pruritis with erythematous rash across the chest and abdomen. Seen by PCP today and had negative CT A/P. Had blood work showing elevated LFTs, elevated bilirubin, hyponatremia, hyperglycemia, and anemia, and was advised to present to the ED for further evaluation.    Pt was placed on Pyridium last week which he took for 2 days, prior to onset of current symptoms.    Prior history of bladder cancer and duodenal cancer, in remission since 2006. He is seen by oncology regularly.    Patient is a 72 y.o. male presenting with general illness.   History provided by:  Patient and spouse  Illness   Location:  Diffuse Jaundice  Severity:  Moderate  Onset quality:  Gradual  Duration:  1 week  Timing:  Constant  Chronicity:  New  Relieved by:  Nothing  Worsened by:  Nothing  Ineffective treatments:  None tried  Associated symptoms: rash    Associated symptoms: no abdominal pain and no fever    Rash:     Location:  Chest and abdomen    Quality: itchiness      Severity:  Mild    Onset quality:  Gradual      Review of Systems   Constitutional: Negative for fever.   Gastrointestinal: Negative for abdominal pain.   Genitourinary: Positive for hematuria.   Skin: Positive for color change (jaundice) and rash.   All other systems reviewed and are negative.      Past Medical History   Diagnosis Date   • Cancer    • Diabetes mellitus    • Hyperlipidemia    • Kidney stone    • Pneumonia        Allergies   Allergen Reactions   • Prednisone      Patient stated ran glucose high and rapid heart rate.       Past Surgical History   Procedure Laterality Date   • Hernia  repair     • Whipple procedure         Family History   Problem Relation Age of Onset   • Heart disease Father    • Hypertension Brother    • Diabetes Brother        Social History     Social History   • Marital status:      Spouse name: N/A   • Number of children: N/A   • Years of education: N/A     Social History Main Topics   • Smoking status: Former Smoker     Quit date: 1975   • Smokeless tobacco: Former User   • Alcohol use No   • Drug use: No   • Sexual activity: Not Asked     Other Topics Concern   • None     Social History Narrative         Objective   Physical Exam   Constitutional: He is oriented to person, place, and time. He appears well-developed and well-nourished. No distress.   HENT:   Head: Normocephalic and atraumatic.   Eyes: Pupils are equal, round, and reactive to light. Scleral icterus is present.   Neck: Normal range of motion. Neck supple.   Cardiovascular: Normal rate, regular rhythm and normal heart sounds.    Pulmonary/Chest: Effort normal and breath sounds normal. No respiratory distress. He exhibits no tenderness.   Abdominal: Soft.   Genitourinary: Rectal exam shows guaiac negative stool.   Genitourinary Comments: Normal rectal exam. Normal rectal tone.   Musculoskeletal: He exhibits no edema.   Neurological: He is alert and oriented to person, place, and time.   Skin: Skin is warm and dry. He is not diaphoretic.   Jaundiced.   Psychiatric: He has a normal mood and affect. His behavior is normal.   Nursing note and vitals reviewed.      Procedures         ED Course  ED Course       Recent Results (from the past 24 hour(s))   Comprehensive Metabolic Panel    Collection Time: 03/17/17 11:41 AM   Result Value Ref Range    Glucose 405 (H) 70 - 110 mg/dL    BUN 11 7 - 21 mg/dL    Creatinine 1.02 0.43 - 1.29 mg/dL    Sodium 129 (L) 135 - 153 mmol/L    Potassium 4.0 3.5 - 5.3 mmol/L    Chloride 95 (L) 99 - 112 mmol/L    CO2 28.1 24.3 - 31.9 mmol/L    Calcium 9.3 7.7 - 10.0 mg/dL     Total Protein 6.5 6.0 - 8.0 g/dL    Albumin 3.70 3.40 - 4.80 g/dL    ALT (SGPT) 185 (H) 10 - 44 U/L    AST (SGOT) 133 (H) 10 - 34 U/L    Alkaline Phosphatase 751 (H) 40 - 129 U/L    Total Bilirubin 5.0 (H) 0.2 - 1.8 mg/dL    eGFR Non African Amer 72 >60 mL/min/1.73    Globulin 2.8 gm/dL    A/G Ratio 1.3 (L) 1.5 - 2.5 g/dL    BUN/Creatinine Ratio 10.8 7.0 - 25.0    Anion Gap 5.9 3.6 - 11.2 mmol/L   Iron Profile    Collection Time: 03/17/17 11:41 AM   Result Value Ref Range    Iron 86 53 - 167 mcg/dL    TIBC 245 241 - 421 mcg/dL    Iron Saturation 35 %   Folate    Collection Time: 03/17/17 11:41 AM   Result Value Ref Range    Folate >24.00 (H) 5.40 - 20.00 ng/mL   Ferritin    Collection Time: 03/17/17 11:41 AM   Result Value Ref Range    Ferritin 355.00 (H) 21.90 - 321.70 ng/mL   Amylase    Collection Time: 03/17/17 11:41 AM   Result Value Ref Range    Amylase 47 28 - 100 U/L   Lipase    Collection Time: 03/17/17 11:41 AM   Result Value Ref Range    Lipase 16 13 - 60 U/L   Hepatitis A Antibody, IgM    Collection Time: 03/17/17 11:41 AM   Result Value Ref Range    Hep A IgM Non-Reactive Non-Reactive   APTT    Collection Time: 03/17/17 11:41 AM   Result Value Ref Range    PTT 26.0 24.4 - 31.0 seconds   Protime-INR    Collection Time: 03/17/17 11:41 AM   Result Value Ref Range    Protime 10.0 9.8 - 11.9 Seconds    INR 0.91 0.80 - 1.10   CBC Auto Differential    Collection Time: 03/17/17 11:41 AM   Result Value Ref Range    WBC 6.95 4.50 - 12.50 10*3/mm3    RBC 3.43 (L) 4.70 - 6.10 10*6/mm3    Hemoglobin 10.9 (L) 14.0 - 18.0 g/dL    Hematocrit 33.8 (L) 42.0 - 52.0 %    MCV 98.5 (H) 80.0 - 94.0 fL    MCH 31.8 27.0 - 33.0 pg    MCHC 32.2 (L) 33.0 - 37.0 g/dL    RDW 14.0 11.5 - 14.5 %    RDW-SD 50.5 37.0 - 54.0 fl    MPV 11.6 (H) 6.0 - 10.0 fL    Platelets 380 130 - 400 10*3/mm3    Neutrophil % 62.6 40.0 - 75.0 %    Lymphocyte % 19.3 16.0 - 46.0 %    Monocyte % 13.7 (H) 0.0 - 12.0 %    Eosinophil % 0.9 0.0 - 7.0 %     Basophil % 0.3 0.0 - 2.0 %    Immature Grans % 3.2 (H) 0.0 - 0.5 %    Neutrophils, Absolute 4.36 1.40 - 6.50 10*3/mm3    Lymphocytes, Absolute 1.34 1.00 - 3.00 10*3/mm3    Monocytes, Absolute 0.95 (H) 0.10 - 0.90 10*3/mm3    Eosinophils, Absolute 0.06 0.00 - 0.70 10*3/mm3    Basophils, Absolute 0.02 0.00 - 0.30 10*3/mm3    Immature Grans, Absolute 0.22 (H) 0.00 - 0.03 10*3/mm3   Bilirubin, Direct    Collection Time: 03/17/17 11:41 AM   Result Value Ref Range    Bilirubin, Direct 3.3 (H) 0.0 - 0.2 mg/dL   Osmolality, Calculated    Collection Time: 03/17/17 11:41 AM   Result Value Ref Range    Osmolality Calc 275.4 273.0 - 305.0 mOsm/kg   Hepatitis B Surface Antigen    Collection Time: 03/17/17 11:41 AM   Result Value Ref Range    Hepatitis B Surface Ag Non-Reactive Non-Reactive   Hepatitis C Antibody    Collection Time: 03/17/17 11:41 AM   Result Value Ref Range    Hepatitis C Ab Non-Reactive Non-Reactive   Urinalysis With / Culture If Indicated    Collection Time: 03/17/17  7:54 PM   Result Value Ref Range    Color, UA Yellow Yellow, Straw    Appearance, UA Clear Clear    pH, UA 5.5 5.0 - 8.0    Specific Gravity, UA 1.019 1.001 - 1.030    Glucose, UA >=1000 mg/dL (3+) (A) Negative    Ketones, UA Negative Negative    Bilirubin, UA Negative Negative    Blood, UA Negative Negative    Protein, UA Negative Negative    Leuk Esterase, UA Negative Negative    Nitrite, UA Negative Negative    Urobilinogen, UA 1.0 E.U./dL 0.2 - 1.0 E.U./dL   Comprehensive Metabolic Panel    Collection Time: 03/17/17  8:01 PM   Result Value Ref Range    Glucose 305 (H) 70 - 100 mg/dL    BUN 12 9 - 23 mg/dL    Creatinine 1.00 0.60 - 1.30 mg/dL    Sodium 130 (L) 132 - 146 mmol/L    Potassium 4.1 3.5 - 5.5 mmol/L    Chloride 95 (L) 99 - 109 mmol/L    CO2 29.0 20.0 - 31.0 mmol/L    Calcium 9.7 8.7 - 10.4 mg/dL    Total Protein 6.6 5.7 - 8.2 g/dL    Albumin 3.80 3.20 - 4.80 g/dL    ALT (SGPT) 165 (H) 7 - 40 U/L    AST (SGOT) 100 (H) 0 - 33 U/L     Alkaline Phosphatase 738 (H) 25 - 100 U/L    Total Bilirubin 4.1 (H) 0.3 - 1.2 mg/dL    eGFR Non African Amer 74 >60 mL/min/1.73    Globulin 2.8 gm/dL    A/G Ratio 1.4 (L) 1.5 - 2.5 g/dL    BUN/Creatinine Ratio 12.0 7.0 - 25.0    Anion Gap 6.0 3.0 - 11.0 mmol/L   Lipase    Collection Time: 03/17/17  8:01 PM   Result Value Ref Range    Lipase 15 6 - 51 U/L   CBC Auto Differential    Collection Time: 03/17/17  8:01 PM   Result Value Ref Range    WBC 7.24 3.50 - 10.80 10*3/mm3    RBC 3.46 (L) 4.20 - 5.76 10*6/mm3    Hemoglobin 11.4 (L) 13.1 - 17.5 g/dL    Hematocrit 34.4 (L) 38.9 - 50.9 %    MCV 99.4 (H) 80.0 - 99.0 fL    MCH 32.9 (H) 27.0 - 31.0 pg    MCHC 33.1 32.0 - 36.0 g/dL    RDW 13.9 11.3 - 14.5 %    RDW-SD 50.3 37.0 - 54.0 fl    MPV 10.7 6.0 - 12.0 fL    Platelets 406 150 - 450 10*3/mm3    Neutrophil % 57.8 41.0 - 71.0 %    Lymphocyte % 26.8 24.0 - 44.0 %    Monocyte % 9.9 0.0 - 12.0 %    Eosinophil % 1.0 0.0 - 3.0 %    Basophil % 0.6 0.0 - 1.0 %    Immature Grans % 3.9 (H) 0.0 - 0.6 %    Neutrophils, Absolute 4.19 1.50 - 8.30 10*3/mm3    Lymphocytes, Absolute 1.94 0.60 - 4.80 10*3/mm3    Monocytes, Absolute 0.72 0.00 - 1.00 10*3/mm3    Eosinophils, Absolute 0.07 (L) 0.10 - 0.30 10*3/mm3    Basophils, Absolute 0.04 0.00 - 0.20 10*3/mm3    Immature Grans, Absolute 0.28 (H) 0.00 - 0.03 10*3/mm3   POCT Occult Blood, stool    Collection Time: 03/17/17  9:41 PM   Result Value Ref Range    Fecal Occult Blood Negative Negative    Lot Number [id number]     Expiration Date 12/18     DEVELOPER LOT NUMBER [id number]     DEVELOPER EXPIRATION DATE 8/19     Positive Control Positive Positive    Negative Control Negative Negative     Note: In addition to lab results from this visit, the labs listed above may include labs taken at another facility or during a different encounter within the last 24 hours. Please correlate lab times with ED admission and discharge times for further clarification of the services performed during this  visit.    No orders to display     Vitals:    03/17/17 2039 03/17/17 2040 03/17/17 2109 03/17/17 2158   BP: 126/77  129/72 156/73   Pulse: 66 72 66 66   Resp:       Temp:       TempSrc:       SpO2: 96% 96% 95% 96%   Weight:       Height:         Medications   sodium chloride 0.9 % flush 10 mL (not administered)   sodium chloride 0.9 % bolus 1,000 mL (1,000 mL Intravenous New Bag 3/17/17 2158)     ECG/EMG Results (last 24 hours)     ** No results found for the last 24 hours. **                      MDM  Number of Diagnoses or Management Options  Anemia due to blood loss: new and requires workup  Elevated LFTs: new and requires workup  Hyperglycemia: new and requires workup  Hyponatremia: new and requires workup  Jaundice: new and requires workup  Diagnosis management comments: Patient has jaundice with scleral icterus, but besides mild itching, has not other compalints. Abdomen soft and nontender. Recent medication use, including pyridium could be contributor.     Patient has anemia compared to 1 month ago, but patient was passing significant amount of blood when he had a ureteral stone, but this has been resolved and patient is no longer passing blood in urine, and occult stool test here today is negative.     CT abdomen pelvis performed in [place] showed no acute abnormalities, including no mass in liver. Gallbladder previously removed. Abdomen nontender.     Hyponatremia is mild, have given IV fluids.     Hyperglycemia is chronic, have given IV fluids, advise patient to check blood sugar on regular basis, followup outpatient for further treatment.     Discussed admission with patient but he prefers to followup outpatient and appears and reports feeling well. Other studies that may be of benefit shoulde be performed outpatient.     Patient advised to followup with GI, Dr. Cuellar, and heme/once, Dr. Olson.     [place] home.        Amount and/or Complexity of Data Reviewed  Clinical lab tests: ordered and reviewed  Tests  in the radiology section of CPT®: ordered and reviewed  Decide to obtain previous medical records or to obtain history from someone other than the patient: yes  Obtain history from someone other than the patient: yes  Review and summarize past medical records: yes  Discuss the patient with other providers: yes  Independent visualization of images, tracings, or specimens: yes    Patient Progress  Patient progress: stable      Final diagnoses:   Jaundice   Hyponatremia   Hyperglycemia   Elevated LFTs   Anemia due to blood loss       Documentation assistance provided by isabel Garvin.  Information recorded by the scribe was done at my direction and has been verified and validated by me.     Mic Garvin  03/17/17 2151       Mic Garvin  03/17/17 2203       Marylu Belcher MD  03/17/17 2227

## 2017-03-19 ENCOUNTER — APPOINTMENT (OUTPATIENT)
Dept: GENERAL RADIOLOGY | Facility: HOSPITAL | Age: 72
End: 2017-03-19

## 2017-03-19 ENCOUNTER — TELEPHONE (OUTPATIENT)
Dept: EMERGENCY DEPT | Facility: HOSPITAL | Age: 72
End: 2017-03-19

## 2017-03-19 ENCOUNTER — HOSPITAL ENCOUNTER (INPATIENT)
Facility: HOSPITAL | Age: 72
LOS: 3 days | Discharge: HOME | End: 2017-03-22
Attending: EMERGENCY MEDICINE | Admitting: INTERNAL MEDICINE

## 2017-03-19 DIAGNOSIS — R78.81 BACTEREMIA: Primary | ICD-10-CM

## 2017-03-19 DIAGNOSIS — E11.9 TYPE 2 DIABETES MELLITUS WITHOUT COMPLICATION, WITHOUT LONG-TERM CURRENT USE OF INSULIN (HCC): ICD-10-CM

## 2017-03-19 PROBLEM — A41.9 SEPSIS: Status: ACTIVE | Noted: 2017-03-19

## 2017-03-19 LAB
ALBUMIN SERPL-MCNC: 3.4 G/DL (ref 3.4–4.8)
ALBUMIN/GLOB SERPL: 1.4 G/DL (ref 1.5–2.5)
ALP SERPL-CCNC: 597 U/L (ref 40–129)
ALT SERPL W P-5'-P-CCNC: 116 U/L (ref 10–44)
AMPHET+METHAMPHET UR QL: NEGATIVE
ANION GAP SERPL CALCULATED.3IONS-SCNC: 4.2 MMOL/L (ref 3.6–11.2)
AST SERPL-CCNC: 73 U/L (ref 10–34)
BARBITURATES UR QL SCN: NEGATIVE
BASOPHILS # BLD AUTO: 0.04 10*3/MM3 (ref 0–0.3)
BASOPHILS NFR BLD AUTO: 0.3 % (ref 0–2)
BENZODIAZ UR QL SCN: NEGATIVE
BILIRUB SERPL-MCNC: 4.5 MG/DL (ref 0.2–1.8)
BILIRUB UR QL STRIP: ABNORMAL
BUN BLD-MCNC: 14 MG/DL (ref 7–21)
BUN/CREAT SERPL: 15.4 (ref 7–25)
CALCIUM SPEC-SCNC: 8.8 MG/DL (ref 7.7–10)
CANNABINOIDS SERPL QL: NEGATIVE
CHLORIDE SERPL-SCNC: 102 MMOL/L (ref 99–112)
CLARITY UR: CLEAR
CO2 SERPL-SCNC: 26.8 MMOL/L (ref 24.3–31.9)
COCAINE UR QL: NEGATIVE
COLOR UR: ABNORMAL
CREAT BLD-MCNC: 0.91 MG/DL (ref 0.43–1.29)
CRP SERPL-MCNC: 11.28 MG/DL (ref 0–0.99)
D-LACTATE SERPL-SCNC: 0.8 MMOL/L (ref 0.5–2)
DEPRECATED RDW RBC AUTO: 49.8 FL (ref 37–54)
EOSINOPHIL # BLD AUTO: 0.05 10*3/MM3 (ref 0–0.7)
EOSINOPHIL NFR BLD AUTO: 0.4 % (ref 0–7)
ERYTHROCYTE [DISTWIDTH] IN BLOOD BY AUTOMATED COUNT: 13.8 % (ref 11.5–14.5)
FOLATE SERPL-MCNC: 15.98 NG/ML (ref 5.4–20)
GFR SERPL CREATININE-BSD FRML MDRD: 82 ML/MIN/1.73
GLOBULIN UR ELPH-MCNC: 2.5 GM/DL
GLUCOSE BLD-MCNC: 285 MG/DL (ref 70–110)
GLUCOSE BLDC GLUCOMTR-MCNC: 241 MG/DL (ref 70–130)
GLUCOSE BLDC GLUCOMTR-MCNC: 253 MG/DL (ref 70–130)
GLUCOSE BLDC GLUCOMTR-MCNC: 282 MG/DL (ref 70–130)
GLUCOSE UR STRIP-MCNC: ABNORMAL MG/DL
HBA1C MFR BLD: 7.8 % (ref 4.5–5.7)
HCT VFR BLD AUTO: 32.8 % (ref 42–52)
HGB BLD-MCNC: 10 G/DL (ref 14–18)
HGB UR QL STRIP.AUTO: NEGATIVE
IMM GRANULOCYTES # BLD: 0.26 10*3/MM3 (ref 0–0.03)
IMM GRANULOCYTES NFR BLD: 1.9 % (ref 0–0.5)
INR PPP: 0.95 (ref 0.8–1.1)
KETONES UR QL STRIP: ABNORMAL
LEUKOCYTE ESTERASE UR QL STRIP.AUTO: NEGATIVE
LYMPHOCYTES # BLD AUTO: 1.65 10*3/MM3 (ref 1–3)
LYMPHOCYTES NFR BLD AUTO: 11.8 % (ref 16–46)
MAGNESIUM SERPL-MCNC: 1.8 MG/DL (ref 1.7–2.6)
MCH RBC QN AUTO: 31.2 PG (ref 27–33)
MCHC RBC AUTO-ENTMCNC: 30.5 G/DL (ref 33–37)
MCV RBC AUTO: 102.2 FL (ref 80–94)
METHADONE UR QL SCN: NEGATIVE
MONOCYTES # BLD AUTO: 1.38 10*3/MM3 (ref 0.1–0.9)
MONOCYTES NFR BLD AUTO: 9.9 % (ref 0–12)
NEUTROPHILS # BLD AUTO: 10.61 10*3/MM3 (ref 1.4–6.5)
NEUTROPHILS NFR BLD AUTO: 75.7 % (ref 40–75)
NITRITE UR QL STRIP: NEGATIVE
OPIATES UR QL: NEGATIVE
OSMOLALITY SERPL CALC.SUM OF ELEC: 277.2 MOSM/KG (ref 273–305)
PCP UR QL SCN: NEGATIVE
PH UR STRIP.AUTO: 6 [PH] (ref 5–8)
PHOSPHATE SERPL-MCNC: 3.1 MG/DL (ref 2.7–4.5)
PLATELET # BLD AUTO: 395 10*3/MM3 (ref 130–400)
PMV BLD AUTO: 11.1 FL (ref 6–10)
POTASSIUM BLD-SCNC: 4.2 MMOL/L (ref 3.5–5.3)
PROPOXYPH UR QL: NEGATIVE
PROT SERPL-MCNC: 5.9 G/DL (ref 6–8)
PROT UR QL STRIP: NEGATIVE
PROTHROMBIN TIME: 10.5 SECONDS (ref 9.8–11.9)
RBC # BLD AUTO: 3.21 10*6/MM3 (ref 4.7–6.1)
SODIUM BLD-SCNC: 133 MMOL/L (ref 135–153)
SP GR UR STRIP: 1.02 (ref 1–1.03)
TSH SERPL DL<=0.05 MIU/L-ACNC: 0.74 MIU/ML (ref 0.55–4.78)
UROBILINOGEN UR QL STRIP: ABNORMAL
VIT B12 BLD-MCNC: 492 PG/ML (ref 211–911)
WBC NRBC COR # BLD: 13.99 10*3/MM3 (ref 4.5–12.5)

## 2017-03-19 PROCEDURE — 87086 URINE CULTURE/COLONY COUNT: CPT | Performed by: PHYSICIAN ASSISTANT

## 2017-03-19 PROCEDURE — 25010000002 PIPERACILLIN-TAZOBACTAM: Performed by: PHYSICIAN ASSISTANT

## 2017-03-19 PROCEDURE — 80307 DRUG TEST PRSMV CHEM ANLYZR: CPT | Performed by: PHYSICIAN ASSISTANT

## 2017-03-19 PROCEDURE — 86140 C-REACTIVE PROTEIN: CPT | Performed by: PHYSICIAN ASSISTANT

## 2017-03-19 PROCEDURE — 81003 URINALYSIS AUTO W/O SCOPE: CPT | Performed by: PHYSICIAN ASSISTANT

## 2017-03-19 PROCEDURE — 84100 ASSAY OF PHOSPHORUS: CPT | Performed by: PHYSICIAN ASSISTANT

## 2017-03-19 PROCEDURE — 63710000001 INSULIN REGULAR HUMAN PER 5 UNITS: Performed by: PHYSICIAN ASSISTANT

## 2017-03-19 PROCEDURE — 85610 PROTHROMBIN TIME: CPT | Performed by: PHYSICIAN ASSISTANT

## 2017-03-19 PROCEDURE — 82607 VITAMIN B-12: CPT | Performed by: PHYSICIAN ASSISTANT

## 2017-03-19 PROCEDURE — 63710000001 INSULIN ASPART PER 5 UNITS: Performed by: PHYSICIAN ASSISTANT

## 2017-03-19 PROCEDURE — 36415 COLL VENOUS BLD VENIPUNCTURE: CPT

## 2017-03-19 PROCEDURE — 83605 ASSAY OF LACTIC ACID: CPT | Performed by: PHYSICIAN ASSISTANT

## 2017-03-19 PROCEDURE — 83735 ASSAY OF MAGNESIUM: CPT | Performed by: PHYSICIAN ASSISTANT

## 2017-03-19 PROCEDURE — 93010 ELECTROCARDIOGRAM REPORT: CPT | Performed by: INTERNAL MEDICINE

## 2017-03-19 PROCEDURE — 82962 GLUCOSE BLOOD TEST: CPT

## 2017-03-19 PROCEDURE — 82746 ASSAY OF FOLIC ACID SERUM: CPT | Performed by: PHYSICIAN ASSISTANT

## 2017-03-19 PROCEDURE — 83036 HEMOGLOBIN GLYCOSYLATED A1C: CPT | Performed by: FAMILY MEDICINE

## 2017-03-19 PROCEDURE — 25010000002 VANCOMYCIN PER 500 MG: Performed by: PHYSICIAN ASSISTANT

## 2017-03-19 PROCEDURE — 99223 1ST HOSP IP/OBS HIGH 75: CPT | Performed by: INTERNAL MEDICINE

## 2017-03-19 PROCEDURE — 84443 ASSAY THYROID STIM HORMONE: CPT | Performed by: PHYSICIAN ASSISTANT

## 2017-03-19 PROCEDURE — 93005 ELECTROCARDIOGRAM TRACING: CPT | Performed by: PHYSICIAN ASSISTANT

## 2017-03-19 PROCEDURE — 85025 COMPLETE CBC W/AUTO DIFF WBC: CPT | Performed by: PHYSICIAN ASSISTANT

## 2017-03-19 PROCEDURE — 99284 EMERGENCY DEPT VISIT MOD MDM: CPT

## 2017-03-19 PROCEDURE — 80053 COMPREHEN METABOLIC PANEL: CPT | Performed by: PHYSICIAN ASSISTANT

## 2017-03-19 PROCEDURE — 87040 BLOOD CULTURE FOR BACTERIA: CPT | Performed by: PHYSICIAN ASSISTANT

## 2017-03-19 RX ORDER — SODIUM CHLORIDE 0.9 % (FLUSH) 0.9 %
1-10 SYRINGE (ML) INJECTION AS NEEDED
Status: DISCONTINUED | OUTPATIENT
Start: 2017-03-19 | End: 2017-03-22 | Stop reason: HOSPADM

## 2017-03-19 RX ORDER — NITROGLYCERIN 0.4 MG/1
0.4 TABLET SUBLINGUAL
Status: DISCONTINUED | OUTPATIENT
Start: 2017-03-19 | End: 2017-03-22 | Stop reason: HOSPADM

## 2017-03-19 RX ORDER — CALCIUM CARBONATE 200(500)MG
1 TABLET,CHEWABLE ORAL 3 TIMES DAILY PRN
Status: DISCONTINUED | OUTPATIENT
Start: 2017-03-19 | End: 2017-03-22 | Stop reason: HOSPADM

## 2017-03-19 RX ORDER — NICOTINE POLACRILEX 4 MG
15 LOZENGE BUCCAL
Status: DISCONTINUED | OUTPATIENT
Start: 2017-03-19 | End: 2017-03-22 | Stop reason: HOSPADM

## 2017-03-19 RX ORDER — HEPARIN SODIUM 5000 [USP'U]/ML
5000 INJECTION, SOLUTION INTRAVENOUS; SUBCUTANEOUS EVERY 12 HOURS SCHEDULED
Status: DISCONTINUED | OUTPATIENT
Start: 2017-03-19 | End: 2017-03-22 | Stop reason: HOSPADM

## 2017-03-19 RX ORDER — CYCLOBENZAPRINE HCL 10 MG
5 TABLET ORAL NIGHTLY
Status: DISCONTINUED | OUTPATIENT
Start: 2017-03-19 | End: 2017-03-22 | Stop reason: HOSPADM

## 2017-03-19 RX ORDER — MULTIVITAMIN
1 TABLET ORAL DAILY
Status: DISCONTINUED | OUTPATIENT
Start: 2017-03-19 | End: 2017-03-22 | Stop reason: HOSPADM

## 2017-03-19 RX ORDER — PANTOPRAZOLE SODIUM 40 MG/1
40 TABLET, DELAYED RELEASE ORAL
Status: DISCONTINUED | OUTPATIENT
Start: 2017-03-19 | End: 2017-03-22 | Stop reason: HOSPADM

## 2017-03-19 RX ORDER — SODIUM CHLORIDE 9 MG/ML
75 INJECTION, SOLUTION INTRAVENOUS CONTINUOUS
Status: DISCONTINUED | OUTPATIENT
Start: 2017-03-19 | End: 2017-03-20

## 2017-03-19 RX ORDER — DEXTROSE MONOHYDRATE 25 G/50ML
25 INJECTION, SOLUTION INTRAVENOUS
Status: DISCONTINUED | OUTPATIENT
Start: 2017-03-19 | End: 2017-03-22 | Stop reason: HOSPADM

## 2017-03-19 RX ADMIN — HUMAN INSULIN 5 UNITS: 100 INJECTION, SOLUTION SUBCUTANEOUS at 14:29

## 2017-03-19 RX ADMIN — PANTOPRAZOLE SODIUM 40 MG: 40 TABLET, DELAYED RELEASE ORAL at 20:57

## 2017-03-19 RX ADMIN — CYCLOBENZAPRINE HYDROCHLORIDE 5 MG: 10 TABLET, FILM COATED ORAL at 22:22

## 2017-03-19 RX ADMIN — VANCOMYCIN HYDROCHLORIDE 1500 MG: 5 INJECTION, POWDER, LYOPHILIZED, FOR SOLUTION INTRAVENOUS at 14:26

## 2017-03-19 RX ADMIN — PIPERACILLIN SODIUM,TAZOBACTAM SODIUM 3.38 G: 3; .375 INJECTION, POWDER, FOR SOLUTION INTRAVENOUS at 21:02

## 2017-03-19 RX ADMIN — SODIUM CHLORIDE 75 ML/HR: 9 INJECTION, SOLUTION INTRAVENOUS at 18:26

## 2017-03-19 RX ADMIN — INSULIN ASPART 3 UNITS: 100 INJECTION, SOLUTION INTRAVENOUS; SUBCUTANEOUS at 21:20

## 2017-03-19 RX ADMIN — Medication 1 TABLET: at 20:57

## 2017-03-19 RX ADMIN — INSULIN ASPART 4 UNITS: 100 INJECTION, SOLUTION INTRAVENOUS; SUBCUTANEOUS at 17:42

## 2017-03-19 RX ADMIN — SODIUM CHLORIDE 1000 ML: 9 INJECTION, SOLUTION INTRAVENOUS at 13:08

## 2017-03-19 RX ADMIN — PIPERACILLIN SODIUM,TAZOBACTAM SODIUM 3.38 G: 3; .375 INJECTION, POWDER, FOR SOLUTION INTRAVENOUS at 13:54

## 2017-03-20 LAB
ALBUMIN SERPL-MCNC: 3 G/DL (ref 3.4–4.8)
ALBUMIN/GLOB SERPL: 1.3 G/DL (ref 1.5–2.5)
ALP SERPL-CCNC: 540 U/L (ref 40–129)
ALT SERPL W P-5'-P-CCNC: 108 U/L (ref 10–44)
ANION GAP SERPL CALCULATED.3IONS-SCNC: 3.4 MMOL/L (ref 3.6–11.2)
AST SERPL-CCNC: 122 U/L (ref 10–34)
BASOPHILS # BLD AUTO: 0.03 10*3/MM3 (ref 0–0.3)
BASOPHILS NFR BLD AUTO: 0.3 % (ref 0–2)
BILIRUB SERPL-MCNC: 4.6 MG/DL (ref 0.2–1.8)
BUN BLD-MCNC: 11 MG/DL (ref 7–21)
BUN/CREAT SERPL: 14.1 (ref 7–25)
CALCIUM SPEC-SCNC: 8.3 MG/DL (ref 7.7–10)
CHLORIDE SERPL-SCNC: 106 MMOL/L (ref 99–112)
CO2 SERPL-SCNC: 24.6 MMOL/L (ref 24.3–31.9)
CREAT BLD-MCNC: 0.78 MG/DL (ref 0.43–1.29)
CRP SERPL-MCNC: 9.03 MG/DL (ref 0–0.99)
DEPRECATED RDW RBC AUTO: 48.8 FL (ref 37–54)
EOSINOPHIL # BLD AUTO: 0.06 10*3/MM3 (ref 0–0.7)
EOSINOPHIL NFR BLD AUTO: 0.6 % (ref 0–7)
ERYTHROCYTE [DISTWIDTH] IN BLOOD BY AUTOMATED COUNT: 13.8 % (ref 11.5–14.5)
GFR SERPL CREATININE-BSD FRML MDRD: 98 ML/MIN/1.73
GGT SERPL-CCNC: 1141 U/L (ref 11–50)
GLOBULIN UR ELPH-MCNC: 2.3 GM/DL
GLUCOSE BLD-MCNC: 229 MG/DL (ref 70–110)
GLUCOSE BLDC GLUCOMTR-MCNC: 220 MG/DL (ref 70–130)
GLUCOSE BLDC GLUCOMTR-MCNC: 238 MG/DL (ref 70–130)
GLUCOSE BLDC GLUCOMTR-MCNC: 241 MG/DL (ref 70–130)
GLUCOSE BLDC GLUCOMTR-MCNC: 276 MG/DL (ref 70–130)
HCT VFR BLD AUTO: 30.2 % (ref 42–52)
HGB BLD-MCNC: 9.4 G/DL (ref 14–18)
IMM GRANULOCYTES # BLD: 0.27 10*3/MM3 (ref 0–0.03)
IMM GRANULOCYTES NFR BLD: 2.9 % (ref 0–0.5)
INR PPP: 0.94 (ref 0.8–1.1)
LYMPHOCYTES # BLD AUTO: 1.76 10*3/MM3 (ref 1–3)
LYMPHOCYTES NFR BLD AUTO: 18.7 % (ref 16–46)
MAGNESIUM SERPL-MCNC: 1.9 MG/DL (ref 1.7–2.6)
MCH RBC QN AUTO: 31.4 PG (ref 27–33)
MCHC RBC AUTO-ENTMCNC: 31.1 G/DL (ref 33–37)
MCV RBC AUTO: 101 FL (ref 80–94)
MONOCYTES # BLD AUTO: 1.17 10*3/MM3 (ref 0.1–0.9)
MONOCYTES NFR BLD AUTO: 12.4 % (ref 0–12)
NEUTROPHILS # BLD AUTO: 6.13 10*3/MM3 (ref 1.4–6.5)
NEUTROPHILS NFR BLD AUTO: 65.1 % (ref 40–75)
OSMOLALITY SERPL CALC.SUM OF ELEC: 274.9 MOSM/KG (ref 273–305)
PLATELET # BLD AUTO: 400 10*3/MM3 (ref 130–400)
PMV BLD AUTO: 11.1 FL (ref 6–10)
POTASSIUM BLD-SCNC: 3.9 MMOL/L (ref 3.5–5.3)
PROT SERPL-MCNC: 5.3 G/DL (ref 6–8)
PROTHROMBIN TIME: 10.4 SECONDS (ref 9.8–11.9)
RBC # BLD AUTO: 2.99 10*6/MM3 (ref 4.7–6.1)
SODIUM BLD-SCNC: 134 MMOL/L (ref 135–153)
WBC NRBC COR # BLD: 9.42 10*3/MM3 (ref 4.5–12.5)

## 2017-03-20 PROCEDURE — 83735 ASSAY OF MAGNESIUM: CPT | Performed by: INTERNAL MEDICINE

## 2017-03-20 PROCEDURE — 25010000002 PIPERACILLIN-TAZOBACTAM: Performed by: PHYSICIAN ASSISTANT

## 2017-03-20 PROCEDURE — 82977 ASSAY OF GGT: CPT | Performed by: INTERNAL MEDICINE

## 2017-03-20 PROCEDURE — 85610 PROTHROMBIN TIME: CPT | Performed by: INTERNAL MEDICINE

## 2017-03-20 PROCEDURE — 99222 1ST HOSP IP/OBS MODERATE 55: CPT | Performed by: INTERNAL MEDICINE

## 2017-03-20 PROCEDURE — 25010000002 VANCOMYCIN PER 500 MG: Performed by: PHYSICIAN ASSISTANT

## 2017-03-20 PROCEDURE — 25010000002 HEPARIN (PORCINE) PER 1000 UNITS: Performed by: INTERNAL MEDICINE

## 2017-03-20 PROCEDURE — 94799 UNLISTED PULMONARY SVC/PX: CPT

## 2017-03-20 PROCEDURE — 86140 C-REACTIVE PROTEIN: CPT | Performed by: PHYSICIAN ASSISTANT

## 2017-03-20 PROCEDURE — 82962 GLUCOSE BLOOD TEST: CPT

## 2017-03-20 PROCEDURE — 80053 COMPREHEN METABOLIC PANEL: CPT | Performed by: PHYSICIAN ASSISTANT

## 2017-03-20 PROCEDURE — 99233 SBSQ HOSP IP/OBS HIGH 50: CPT | Performed by: INTERNAL MEDICINE

## 2017-03-20 PROCEDURE — 63710000001 INSULIN ASPART PER 5 UNITS: Performed by: PHYSICIAN ASSISTANT

## 2017-03-20 PROCEDURE — 85025 COMPLETE CBC W/AUTO DIFF WBC: CPT | Performed by: PHYSICIAN ASSISTANT

## 2017-03-20 RX ORDER — FAMOTIDINE 20 MG/1
20 TABLET, FILM COATED ORAL NIGHTLY
Status: DISCONTINUED | OUTPATIENT
Start: 2017-03-20 | End: 2017-03-22 | Stop reason: HOSPADM

## 2017-03-20 RX ORDER — ALUMINA, MAGNESIA, AND SIMETHICONE 2400; 2400; 240 MG/30ML; MG/30ML; MG/30ML
10 SUSPENSION ORAL EVERY 6 HOURS PRN
Status: DISCONTINUED | OUTPATIENT
Start: 2017-03-20 | End: 2017-03-22 | Stop reason: HOSPADM

## 2017-03-20 RX ORDER — TRAZODONE HYDROCHLORIDE 50 MG/1
50 TABLET ORAL NIGHTLY PRN
Status: DISCONTINUED | OUTPATIENT
Start: 2017-03-20 | End: 2017-03-22 | Stop reason: HOSPADM

## 2017-03-20 RX ORDER — ASPIRIN 325 MG
325 TABLET, DELAYED RELEASE (ENTERIC COATED) ORAL ONCE
Status: COMPLETED | OUTPATIENT
Start: 2017-03-20 | End: 2017-03-20

## 2017-03-20 RX ADMIN — INSULIN ASPART 4 UNITS: 100 INJECTION, SOLUTION INTRAVENOUS; SUBCUTANEOUS at 11:53

## 2017-03-20 RX ADMIN — INSULIN ASPART 3 UNITS: 100 INJECTION, SOLUTION INTRAVENOUS; SUBCUTANEOUS at 21:24

## 2017-03-20 RX ADMIN — INSULIN ASPART 3 UNITS: 100 INJECTION, SOLUTION INTRAVENOUS; SUBCUTANEOUS at 17:06

## 2017-03-20 RX ADMIN — FAMOTIDINE 20 MG: 20 TABLET, FILM COATED ORAL at 21:24

## 2017-03-20 RX ADMIN — INSULIN ASPART 3 UNITS: 100 INJECTION, SOLUTION INTRAVENOUS; SUBCUTANEOUS at 09:06

## 2017-03-20 RX ADMIN — Medication 1 TABLET: at 09:05

## 2017-03-20 RX ADMIN — PANTOPRAZOLE SODIUM 40 MG: 40 TABLET, DELAYED RELEASE ORAL at 06:41

## 2017-03-20 RX ADMIN — PANTOPRAZOLE SODIUM 40 MG: 40 TABLET, DELAYED RELEASE ORAL at 17:06

## 2017-03-20 RX ADMIN — SODIUM CHLORIDE 75 ML/HR: 9 INJECTION, SOLUTION INTRAVENOUS at 09:05

## 2017-03-20 RX ADMIN — CYCLOBENZAPRINE HYDROCHLORIDE 5 MG: 10 TABLET, FILM COATED ORAL at 21:25

## 2017-03-20 RX ADMIN — PIPERACILLIN SODIUM,TAZOBACTAM SODIUM 3.38 G: 3; .375 INJECTION, POWDER, FOR SOLUTION INTRAVENOUS at 09:05

## 2017-03-20 RX ADMIN — TRAZODONE HYDROCHLORIDE 50 MG: 50 TABLET ORAL at 21:25

## 2017-03-20 RX ADMIN — PIPERACILLIN SODIUM,TAZOBACTAM SODIUM 3.38 G: 3; .375 INJECTION, POWDER, FOR SOLUTION INTRAVENOUS at 21:24

## 2017-03-20 RX ADMIN — Medication 1 TABLET: at 00:33

## 2017-03-20 RX ADMIN — VANCOMYCIN HYDROCHLORIDE 1250 MG: 5 INJECTION, POWDER, LYOPHILIZED, FOR SOLUTION INTRAVENOUS at 06:40

## 2017-03-20 RX ADMIN — PIPERACILLIN SODIUM,TAZOBACTAM SODIUM 3.38 G: 3; .375 INJECTION, POWDER, FOR SOLUTION INTRAVENOUS at 05:00

## 2017-03-20 RX ADMIN — PIPERACILLIN SODIUM,TAZOBACTAM SODIUM 3.38 G: 3; .375 INJECTION, POWDER, FOR SOLUTION INTRAVENOUS at 17:06

## 2017-03-20 RX ADMIN — ASPIRIN 325 MG: 325 TABLET, COATED ORAL at 10:35

## 2017-03-21 ENCOUNTER — APPOINTMENT (OUTPATIENT)
Dept: MRI IMAGING | Facility: HOSPITAL | Age: 72
End: 2017-03-21

## 2017-03-21 LAB
ALBUMIN SERPL-MCNC: 2.8 G/DL (ref 3.4–4.8)
ALBUMIN/GLOB SERPL: 1.1 G/DL (ref 1.5–2.5)
ALP SERPL-CCNC: 600 U/L (ref 40–129)
ALT SERPL W P-5'-P-CCNC: 156 U/L (ref 10–44)
ANION GAP SERPL CALCULATED.3IONS-SCNC: 3.9 MMOL/L (ref 3.6–11.2)
AST SERPL-CCNC: 186 U/L (ref 10–34)
BACTERIA SPEC AEROBE CULT: ABNORMAL
BACTERIA SPEC AEROBE CULT: ABNORMAL
BACTERIA SPEC AEROBE CULT: NO GROWTH
BACTERIA SPEC AEROBE CULT: NO GROWTH
BASOPHILS # BLD AUTO: 0.04 10*3/MM3 (ref 0–0.3)
BASOPHILS NFR BLD AUTO: 0.6 % (ref 0–2)
BILIRUB SERPL-MCNC: 4.2 MG/DL (ref 0.2–1.8)
BUN BLD-MCNC: 11 MG/DL (ref 7–21)
BUN/CREAT SERPL: 12.1 (ref 7–25)
CALCIUM SPEC-SCNC: 8.4 MG/DL (ref 7.7–10)
CHLORIDE SERPL-SCNC: 107 MMOL/L (ref 99–112)
CO2 SERPL-SCNC: 26.1 MMOL/L (ref 24.3–31.9)
CREAT BLD-MCNC: 0.91 MG/DL (ref 0.43–1.29)
CRP SERPL-MCNC: 4.86 MG/DL (ref 0–0.99)
DEPRECATED RDW RBC AUTO: 48.9 FL (ref 37–54)
EOSINOPHIL # BLD AUTO: 0.09 10*3/MM3 (ref 0–0.7)
EOSINOPHIL NFR BLD AUTO: 1.2 % (ref 0–7)
ERYTHROCYTE [DISTWIDTH] IN BLOOD BY AUTOMATED COUNT: 13.9 % (ref 11.5–14.5)
GFR SERPL CREATININE-BSD FRML MDRD: 82 ML/MIN/1.73
GLOBULIN UR ELPH-MCNC: 2.5 GM/DL
GLUCOSE BLD-MCNC: 140 MG/DL (ref 70–110)
GLUCOSE BLDC GLUCOMTR-MCNC: 122 MG/DL (ref 70–130)
GLUCOSE BLDC GLUCOMTR-MCNC: 136 MG/DL (ref 70–130)
GLUCOSE BLDC GLUCOMTR-MCNC: 322 MG/DL (ref 70–130)
GLUCOSE BLDC GLUCOMTR-MCNC: 412 MG/DL (ref 70–130)
GRAM STN SPEC: ABNORMAL
GRAM STN SPEC: ABNORMAL
HCT VFR BLD AUTO: 29.7 % (ref 42–52)
HGB BLD-MCNC: 9.3 G/DL (ref 14–18)
IMM GRANULOCYTES # BLD: 0.24 10*3/MM3 (ref 0–0.03)
IMM GRANULOCYTES NFR BLD: 3.3 % (ref 0–0.5)
ISOLATED FROM: ABNORMAL
ISOLATED FROM: ABNORMAL
LYMPHOCYTES # BLD AUTO: 2.17 10*3/MM3 (ref 1–3)
LYMPHOCYTES NFR BLD AUTO: 29.8 % (ref 16–46)
MCH RBC QN AUTO: 31.5 PG (ref 27–33)
MCHC RBC AUTO-ENTMCNC: 31.3 G/DL (ref 33–37)
MCV RBC AUTO: 100.7 FL (ref 80–94)
MONOCYTES # BLD AUTO: 0.79 10*3/MM3 (ref 0.1–0.9)
MONOCYTES NFR BLD AUTO: 10.9 % (ref 0–12)
NEUTROPHILS # BLD AUTO: 3.94 10*3/MM3 (ref 1.4–6.5)
NEUTROPHILS NFR BLD AUTO: 54.2 % (ref 40–75)
OSMOLALITY SERPL CALC.SUM OF ELEC: 275.5 MOSM/KG (ref 273–305)
PLATELET # BLD AUTO: 418 10*3/MM3 (ref 130–400)
PMV BLD AUTO: 10.3 FL (ref 6–10)
POTASSIUM BLD-SCNC: 3.8 MMOL/L (ref 3.5–5.3)
PROT SERPL-MCNC: 5.3 G/DL (ref 6–8)
RBC # BLD AUTO: 2.95 10*6/MM3 (ref 4.7–6.1)
SODIUM BLD-SCNC: 137 MMOL/L (ref 135–153)
WBC NRBC COR # BLD: 7.27 10*3/MM3 (ref 4.5–12.5)

## 2017-03-21 PROCEDURE — 63710000001 INSULIN ASPART PER 5 UNITS: Performed by: PHYSICIAN ASSISTANT

## 2017-03-21 PROCEDURE — 86140 C-REACTIVE PROTEIN: CPT | Performed by: INTERNAL MEDICINE

## 2017-03-21 PROCEDURE — 74181 MRI ABDOMEN W/O CONTRAST: CPT | Performed by: RADIOLOGY

## 2017-03-21 PROCEDURE — 85025 COMPLETE CBC W/AUTO DIFF WBC: CPT | Performed by: INTERNAL MEDICINE

## 2017-03-21 PROCEDURE — 80053 COMPREHEN METABOLIC PANEL: CPT | Performed by: INTERNAL MEDICINE

## 2017-03-21 PROCEDURE — 74183 MRI ABD W/O CNTR FLWD CNTR: CPT

## 2017-03-21 PROCEDURE — 25010000002 CEFTRIAXONE: Performed by: INTERNAL MEDICINE

## 2017-03-21 PROCEDURE — 99233 SBSQ HOSP IP/OBS HIGH 50: CPT | Performed by: INTERNAL MEDICINE

## 2017-03-21 PROCEDURE — 25010000002 HEPARIN (PORCINE) PER 1000 UNITS: Performed by: INTERNAL MEDICINE

## 2017-03-21 PROCEDURE — 25010000002 PIPERACILLIN-TAZOBACTAM: Performed by: PHYSICIAN ASSISTANT

## 2017-03-21 PROCEDURE — 99231 SBSQ HOSP IP/OBS SF/LOW 25: CPT | Performed by: INTERNAL MEDICINE

## 2017-03-21 PROCEDURE — 82962 GLUCOSE BLOOD TEST: CPT

## 2017-03-21 PROCEDURE — 94799 UNLISTED PULMONARY SVC/PX: CPT

## 2017-03-21 RX ADMIN — INSULIN ASPART 5 UNITS: 100 INJECTION, SOLUTION INTRAVENOUS; SUBCUTANEOUS at 20:28

## 2017-03-21 RX ADMIN — PANTOPRAZOLE SODIUM 40 MG: 40 TABLET, DELAYED RELEASE ORAL at 17:17

## 2017-03-21 RX ADMIN — TRAZODONE HYDROCHLORIDE 50 MG: 50 TABLET ORAL at 21:47

## 2017-03-21 RX ADMIN — FAMOTIDINE 20 MG: 20 TABLET, FILM COATED ORAL at 20:27

## 2017-03-21 RX ADMIN — CEFTRIAXONE 2 G: 2 INJECTION, POWDER, FOR SOLUTION INTRAMUSCULAR; INTRAVENOUS at 10:34

## 2017-03-21 RX ADMIN — INSULIN ASPART 7 UNITS: 100 INJECTION, SOLUTION INTRAVENOUS; SUBCUTANEOUS at 17:17

## 2017-03-21 RX ADMIN — HEPARIN SODIUM 5000 UNITS: 5000 INJECTION, SOLUTION INTRAVENOUS; SUBCUTANEOUS at 20:27

## 2017-03-21 RX ADMIN — CYCLOBENZAPRINE HYDROCHLORIDE 5 MG: 10 TABLET, FILM COATED ORAL at 21:47

## 2017-03-21 RX ADMIN — PIPERACILLIN SODIUM,TAZOBACTAM SODIUM 3.38 G: 3; .375 INJECTION, POWDER, FOR SOLUTION INTRAVENOUS at 04:07

## 2017-03-22 VITALS
OXYGEN SATURATION: 96 % | RESPIRATION RATE: 18 BRPM | BODY MASS INDEX: 21.77 KG/M2 | HEIGHT: 69 IN | DIASTOLIC BLOOD PRESSURE: 64 MMHG | HEART RATE: 63 BPM | TEMPERATURE: 97.9 F | WEIGHT: 147 LBS | SYSTOLIC BLOOD PRESSURE: 131 MMHG

## 2017-03-22 LAB
ALBUMIN SERPL-MCNC: 2.9 G/DL (ref 3.4–4.8)
ALBUMIN/GLOB SERPL: 1.2 G/DL (ref 1.5–2.5)
ALP SERPL-CCNC: 577 U/L (ref 40–129)
ALT SERPL W P-5'-P-CCNC: 117 U/L (ref 10–44)
ANION GAP SERPL CALCULATED.3IONS-SCNC: 3.5 MMOL/L (ref 3.6–11.2)
AST SERPL-CCNC: 133 U/L (ref 10–34)
BASOPHILS # BLD AUTO: 0.08 10*3/MM3 (ref 0–0.3)
BASOPHILS NFR BLD AUTO: 1.1 % (ref 0–2)
BILIRUB SERPL-MCNC: 3.2 MG/DL (ref 0.2–1.8)
BUN BLD-MCNC: 10 MG/DL (ref 7–21)
BUN/CREAT SERPL: 11.5 (ref 7–25)
CALCIUM SPEC-SCNC: 8.7 MG/DL (ref 7.7–10)
CHLORIDE SERPL-SCNC: 105 MMOL/L (ref 99–112)
CO2 SERPL-SCNC: 25.5 MMOL/L (ref 24.3–31.9)
CREAT BLD-MCNC: 0.87 MG/DL (ref 0.43–1.29)
CREAT BLDA-MCNC: 0.8 MG/DL (ref 0.6–1.3)
CRP SERPL-MCNC: 3.29 MG/DL (ref 0–0.99)
DEPRECATED RDW RBC AUTO: 52.7 FL (ref 37–54)
EOSINOPHIL # BLD AUTO: 0.11 10*3/MM3 (ref 0–0.7)
EOSINOPHIL NFR BLD AUTO: 1.5 % (ref 0–7)
ERYTHROCYTE [DISTWIDTH] IN BLOOD BY AUTOMATED COUNT: 14.3 % (ref 11.5–14.5)
GFR SERPL CREATININE-BSD FRML MDRD: 87 ML/MIN/1.73
GLOBULIN UR ELPH-MCNC: 2.5 GM/DL
GLUCOSE BLD-MCNC: 294 MG/DL (ref 70–110)
GLUCOSE BLDC GLUCOMTR-MCNC: 211 MG/DL (ref 70–130)
GLUCOSE BLDC GLUCOMTR-MCNC: 218 MG/DL (ref 70–130)
HCT VFR BLD AUTO: 32.3 % (ref 42–52)
HGB BLD-MCNC: 10 G/DL (ref 14–18)
IMM GRANULOCYTES # BLD: 0.32 10*3/MM3 (ref 0–0.03)
IMM GRANULOCYTES NFR BLD: 4.4 % (ref 0–0.5)
LYMPHOCYTES # BLD AUTO: 2.53 10*3/MM3 (ref 1–3)
LYMPHOCYTES NFR BLD AUTO: 34.8 % (ref 16–46)
MCH RBC QN AUTO: 31.4 PG (ref 27–33)
MCHC RBC AUTO-ENTMCNC: 31 G/DL (ref 33–37)
MCV RBC AUTO: 101.6 FL (ref 80–94)
MONOCYTES # BLD AUTO: 0.72 10*3/MM3 (ref 0.1–0.9)
MONOCYTES NFR BLD AUTO: 9.9 % (ref 0–12)
NEUTROPHILS # BLD AUTO: 3.5 10*3/MM3 (ref 1.4–6.5)
NEUTROPHILS NFR BLD AUTO: 48.3 % (ref 40–75)
OSMOLALITY SERPL CALC.SUM OF ELEC: 278.1 MOSM/KG (ref 273–305)
PLATELET # BLD AUTO: 432 10*3/MM3 (ref 130–400)
PMV BLD AUTO: 10.7 FL (ref 6–10)
POTASSIUM BLD-SCNC: 4.4 MMOL/L (ref 3.5–5.3)
PROT SERPL-MCNC: 5.4 G/DL (ref 6–8)
RBC # BLD AUTO: 3.18 10*6/MM3 (ref 4.7–6.1)
SODIUM BLD-SCNC: 134 MMOL/L (ref 135–153)
WBC NRBC COR # BLD: 7.26 10*3/MM3 (ref 4.5–12.5)

## 2017-03-22 PROCEDURE — 86140 C-REACTIVE PROTEIN: CPT | Performed by: INTERNAL MEDICINE

## 2017-03-22 PROCEDURE — 63710000001 INSULIN ASPART PER 5 UNITS: Performed by: PHYSICIAN ASSISTANT

## 2017-03-22 PROCEDURE — 80053 COMPREHEN METABOLIC PANEL: CPT | Performed by: INTERNAL MEDICINE

## 2017-03-22 PROCEDURE — 85025 COMPLETE CBC W/AUTO DIFF WBC: CPT | Performed by: INTERNAL MEDICINE

## 2017-03-22 PROCEDURE — 25010000002 CEFTRIAXONE: Performed by: INTERNAL MEDICINE

## 2017-03-22 PROCEDURE — 82962 GLUCOSE BLOOD TEST: CPT

## 2017-03-22 PROCEDURE — 99239 HOSP IP/OBS DSCHRG MGMT >30: CPT | Performed by: INTERNAL MEDICINE

## 2017-03-22 RX ORDER — CEFPODOXIME PROXETIL 200 MG/1
200 TABLET, FILM COATED ORAL EVERY 12 HOURS
Qty: 20 TABLET | Refills: 0 | Status: SHIPPED | OUTPATIENT
Start: 2017-03-22 | End: 2017-04-03 | Stop reason: HOSPADM

## 2017-03-22 RX ADMIN — INSULIN ASPART 3 UNITS: 100 INJECTION, SOLUTION INTRAVENOUS; SUBCUTANEOUS at 08:31

## 2017-03-22 RX ADMIN — PANTOPRAZOLE SODIUM 40 MG: 40 TABLET, DELAYED RELEASE ORAL at 08:33

## 2017-03-22 RX ADMIN — Medication 1 TABLET: at 08:33

## 2017-03-22 RX ADMIN — INSULIN ASPART 3 UNITS: 100 INJECTION, SOLUTION INTRAVENOUS; SUBCUTANEOUS at 12:40

## 2017-03-22 RX ADMIN — CEFTRIAXONE 2 G: 2 INJECTION, POWDER, FOR SOLUTION INTRAMUSCULAR; INTRAVENOUS at 10:56

## 2017-03-24 LAB
BACTERIA SPEC AEROBE CULT: NORMAL
BACTERIA SPEC AEROBE CULT: NORMAL

## 2017-03-27 ENCOUNTER — OFFICE VISIT (OUTPATIENT)
Dept: FAMILY MEDICINE CLINIC | Facility: CLINIC | Age: 72
End: 2017-03-27

## 2017-03-27 VITALS
WEIGHT: 148 LBS | DIASTOLIC BLOOD PRESSURE: 71 MMHG | OXYGEN SATURATION: 97 % | TEMPERATURE: 97.2 F | HEIGHT: 69 IN | HEART RATE: 71 BPM | SYSTOLIC BLOOD PRESSURE: 116 MMHG | BODY MASS INDEX: 21.92 KG/M2

## 2017-03-27 DIAGNOSIS — E11.9 TYPE 2 DIABETES MELLITUS WITHOUT COMPLICATION, WITHOUT LONG-TERM CURRENT USE OF INSULIN (HCC): ICD-10-CM

## 2017-03-27 DIAGNOSIS — R30.0 DYSURIA: ICD-10-CM

## 2017-03-27 DIAGNOSIS — R17 JAUNDICE: Primary | ICD-10-CM

## 2017-03-27 LAB
ALBUMIN SERPL-MCNC: 4 G/DL (ref 3.4–4.8)
ALP SERPL-CCNC: 623 U/L (ref 40–129)
ALT SERPL W P-5'-P-CCNC: 116 U/L (ref 10–44)
ANION GAP SERPL CALCULATED.3IONS-SCNC: 4.7 MMOL/L (ref 3.6–11.2)
AST SERPL-CCNC: 63 U/L (ref 10–34)
BASOPHILS # BLD AUTO: 0.03 10*3/MM3 (ref 0–0.3)
BASOPHILS NFR BLD AUTO: 0.4 % (ref 0–2)
BILIRUB CONJ SERPL-MCNC: 2.7 MG/DL (ref 0–0.2)
BILIRUB INDIRECT SERPL-MCNC: 1 MG/DL
BILIRUB SERPL-MCNC: 3.7 MG/DL (ref 0.2–1.8)
BILIRUB UR QL STRIP: ABNORMAL
BUN BLD-MCNC: 13 MG/DL (ref 7–21)
BUN/CREAT SERPL: 15.5 (ref 7–25)
CALCIUM SPEC-SCNC: 9.6 MG/DL (ref 7.7–10)
CHLORIDE SERPL-SCNC: 98 MMOL/L (ref 99–112)
CLARITY UR: CLEAR
CO2 SERPL-SCNC: 29.3 MMOL/L (ref 24.3–31.9)
COLOR UR: ABNORMAL
CREAT BLD-MCNC: 0.84 MG/DL (ref 0.43–1.29)
CRP SERPL-MCNC: 4.84 MG/DL (ref 0–0.99)
DEPRECATED RDW RBC AUTO: 58.3 FL (ref 37–54)
EOSINOPHIL # BLD AUTO: 0.11 10*3/MM3 (ref 0–0.7)
EOSINOPHIL NFR BLD AUTO: 1.4 % (ref 0–7)
ERYTHROCYTE [DISTWIDTH] IN BLOOD BY AUTOMATED COUNT: 15.9 % (ref 11.5–14.5)
GFR SERPL CREATININE-BSD FRML MDRD: 90 ML/MIN/1.73
GLUCOSE BLD-MCNC: 379 MG/DL (ref 70–110)
GLUCOSE UR STRIP-MCNC: ABNORMAL MG/DL
HCT VFR BLD AUTO: 34 % (ref 42–52)
HGB BLD-MCNC: 11 G/DL (ref 14–18)
HGB UR QL STRIP.AUTO: NEGATIVE
IMM GRANULOCYTES # BLD: 0.13 10*3/MM3 (ref 0–0.03)
IMM GRANULOCYTES NFR BLD: 1.6 % (ref 0–0.5)
KETONES UR QL STRIP: NEGATIVE
LEUKOCYTE ESTERASE UR QL STRIP.AUTO: NEGATIVE
LYMPHOCYTES # BLD AUTO: 1.75 10*3/MM3 (ref 1–3)
LYMPHOCYTES NFR BLD AUTO: 22.2 % (ref 16–46)
MCH RBC QN AUTO: 32.5 PG (ref 27–33)
MCHC RBC AUTO-ENTMCNC: 32.4 G/DL (ref 33–37)
MCV RBC AUTO: 100.6 FL (ref 80–94)
MONOCYTES # BLD AUTO: 0.9 10*3/MM3 (ref 0.1–0.9)
MONOCYTES NFR BLD AUTO: 11.4 % (ref 0–12)
NEUTROPHILS # BLD AUTO: 4.98 10*3/MM3 (ref 1.4–6.5)
NEUTROPHILS NFR BLD AUTO: 63 % (ref 40–75)
NITRITE UR QL STRIP: NEGATIVE
OSMOLALITY SERPL CALC.SUM OF ELEC: 280.2 MOSM/KG (ref 273–305)
PH UR STRIP.AUTO: 5.5 [PH] (ref 5–8)
PLATELET # BLD AUTO: 364 10*3/MM3 (ref 130–400)
PMV BLD AUTO: 11.1 FL (ref 6–10)
POTASSIUM BLD-SCNC: 4.6 MMOL/L (ref 3.5–5.3)
PROT SERPL-MCNC: 6.6 G/DL (ref 6–8)
PROT UR QL STRIP: NEGATIVE
RBC # BLD AUTO: 3.38 10*6/MM3 (ref 4.7–6.1)
SODIUM BLD-SCNC: 132 MMOL/L (ref 135–153)
SP GR UR STRIP: >=1.03 (ref 1–1.03)
UROBILINOGEN UR QL STRIP: ABNORMAL
WBC NRBC COR # BLD: 7.9 10*3/MM3 (ref 4.5–12.5)

## 2017-03-27 PROCEDURE — 80076 HEPATIC FUNCTION PANEL: CPT | Performed by: FAMILY MEDICINE

## 2017-03-27 PROCEDURE — 36415 COLL VENOUS BLD VENIPUNCTURE: CPT | Performed by: FAMILY MEDICINE

## 2017-03-27 PROCEDURE — 99214 OFFICE O/P EST MOD 30 MIN: CPT | Performed by: FAMILY MEDICINE

## 2017-03-27 PROCEDURE — 85025 COMPLETE CBC W/AUTO DIFF WBC: CPT | Performed by: FAMILY MEDICINE

## 2017-03-27 PROCEDURE — 86140 C-REACTIVE PROTEIN: CPT | Performed by: FAMILY MEDICINE

## 2017-03-27 PROCEDURE — 80048 BASIC METABOLIC PNL TOTAL CA: CPT | Performed by: FAMILY MEDICINE

## 2017-03-27 PROCEDURE — 81003 URINALYSIS AUTO W/O SCOPE: CPT | Performed by: FAMILY MEDICINE

## 2017-03-27 NOTE — PROGRESS NOTES
Subjective   Jean Noriega is a 72 y.o. male.     History of Present Illness follow-up regarding recent hospitalization secondary to jaundice/sepsis.  Appears to have been precipitated somewhat by the renal stone and the stent placement and removal.  Had evaluation at ER in [place] then evaluation at ER locally then retrieved back to ER locally secondary to Escherichia coli bacteremia.  Was admitted and treated with IV antibiotics now on course of Cefpodoxime.  During the admission monitored blood sugar of course as well as hepatic function enzymes.  Had imaging done including MRCP which unfortunately was unrewarding.  GI consultation was obtained and was not enthusiastic to attempt ERCP secondary to the unknown post surgical anatomy.  Will be following with primary gastroenterologist soon.  Has urologist available also.  No definitive reason for the jaundice but of course the possibilities of obstruction cholestasis from medications inflammatory or infectious etiology was all evaluated and considered.  The decision it seems was to proceed with outpatient observation and follow-up in an outpatient setting.  Has had a couple of episodes of non-persistent dysuria.  No frequency.  No fever chills.  Appetite is improving.  Energy is slowly improving.   Has had no nausea vomiting diarrhea.  No chest pain shortness of breath.  Has resumed the metformin.  Blood sugar is better but still staying a little elevated.  Has had the persistent weight loss of course but that has stabilized.  The following portions of the patient's history were reviewed and updated as appropriate: allergies, current medications, past medical history, past social history, past surgical history and problem list.    Review of Systems see the history of present illness    Objective   Physical Exam   Constitutional: He is oriented to person, place, and time. He appears well-developed and well-nourished.   HENT:   Head: Normocephalic.   Right Ear:  "External ear normal.   Left Ear: External ear normal.   Mouth/Throat: Oropharynx is clear and moist.   Eyes: Conjunctivae and EOM are normal. Pupils are equal, round, and reactive to light.   Sclera still mildly icteric   Neck: Normal range of motion. Neck supple. No tracheal deviation present. No thyromegaly present.   Cardiovascular: Normal rate, regular rhythm, normal heart sounds and intact distal pulses.    No murmur heard.  Pulmonary/Chest: Effort normal and breath sounds normal.   Abdominal: Soft. There is no tenderness.   Musculoskeletal: He exhibits no edema.   Lymphadenopathy:     He has no cervical adenopathy.   Neurological: He is alert and oriented to person, place, and time.   Skin: Skin is warm and dry.   Psychiatric: He has a normal mood and affect.   Vitals reviewed.    /71 (BP Location: Left arm, Patient Position: Sitting, Cuff Size: Adult)  Pulse 71  Temp 97.2 °F (36.2 °C) (Oral)   Ht 69\" (175.3 cm)  Wt 148 lb (67.1 kg)  SpO2 97%  BMI 21.86 kg/m2  Assessment/Plan   Jean was seen today for follow-up.    Diagnoses and all orders for this visit:    Jaundice  -     CBC & Differential  -     Hepatic Function Panel  -     C-reactive Protein  -     CBC Auto Differential    Type 2 diabetes mellitus without complication, without long-term current use of insulin  -     Basic Metabolic Panel    Dysuria  -     CBC & Differential  -     Urinalysis With / Culture If Indicated  -     C-reactive Protein  -     CBC Auto Differential      It seems you have improved.  Hopefully that will continue.  Keep follow-up of course with GI.  Today will check labs and notify you of results.  Continue to liberalize diet.  I expect you to have poor energy for a while.  Will ask you to recheck here in 2 months or as needed.  Further recommendations pending of course.  Do not hesitate to follow-up if symptoms change recur or new problems.         "

## 2017-03-28 RX ORDER — GLIMEPIRIDE 1 MG/1
0.5 TABLET ORAL
Qty: 15 TABLET | Refills: 3 | Status: SHIPPED | OUTPATIENT
Start: 2017-03-28 | End: 2017-04-24 | Stop reason: SDUPTHER

## 2017-03-29 ENCOUNTER — TELEPHONE (OUTPATIENT)
Dept: FAMILY MEDICINE CLINIC | Facility: CLINIC | Age: 72
End: 2017-03-29

## 2017-03-29 NOTE — TELEPHONE ENCOUNTER
SPOKE WITH DR BROWNE AND DR ANNE OFFICE AS WELL AS WIFE.  PT WILL KEEP APPT HERE TOMORROW WITH DR BROWNE AND CATIA AT DR RIVAS OFFICE WILL CHECK WITH HIM AND SEE IF HE CAN SEE HIM SOONER AND CALL US BACK

## 2017-03-30 ENCOUNTER — APPOINTMENT (OUTPATIENT)
Dept: CT IMAGING | Facility: HOSPITAL | Age: 72
End: 2017-03-30

## 2017-03-30 ENCOUNTER — HOSPITAL ENCOUNTER (INPATIENT)
Facility: HOSPITAL | Age: 72
LOS: 4 days | Discharge: HOME | End: 2017-04-03
Attending: EMERGENCY MEDICINE | Admitting: INTERNAL MEDICINE

## 2017-03-30 DIAGNOSIS — D72.829 LEUKOCYTOSIS, UNSPECIFIED TYPE: ICD-10-CM

## 2017-03-30 DIAGNOSIS — R10.9 ACUTE ABDOMINAL PAIN: Primary | ICD-10-CM

## 2017-03-30 DIAGNOSIS — R79.89 ELEVATED LIVER FUNCTION TESTS: ICD-10-CM

## 2017-03-30 DIAGNOSIS — R17 JAUNDICE: ICD-10-CM

## 2017-03-30 PROBLEM — D64.9 ANEMIA: Status: ACTIVE | Noted: 2017-03-30

## 2017-03-30 PROBLEM — Z87.442 HISTORY OF NEPHROLITHIASIS: Status: ACTIVE | Noted: 2017-03-30

## 2017-03-30 PROBLEM — R63.4 UNINTENDED WEIGHT LOSS: Status: ACTIVE | Noted: 2017-03-30

## 2017-03-30 PROBLEM — R78.81 BACTEREMIA DUE TO ESCHERICHIA COLI: Status: ACTIVE | Noted: 2017-03-30

## 2017-03-30 PROBLEM — B96.20 BACTEREMIA DUE TO ESCHERICHIA COLI: Status: ACTIVE | Noted: 2017-03-30

## 2017-03-30 PROBLEM — R50.9 PERSISTENT FEVER: Status: ACTIVE | Noted: 2017-03-30

## 2017-03-30 LAB
ALBUMIN SERPL-MCNC: 3.8 G/DL (ref 3.2–4.8)
ALBUMIN/GLOB SERPL: 1.3 G/DL (ref 1.5–2.5)
ALP SERPL-CCNC: 646 U/L (ref 25–100)
ALT SERPL W P-5'-P-CCNC: 109 U/L (ref 7–40)
AMYLASE SERPL-CCNC: 47 U/L (ref 30–118)
ANION GAP SERPL CALCULATED.3IONS-SCNC: 8 MMOL/L (ref 3–11)
AST SERPL-CCNC: 96 U/L (ref 0–33)
BASOPHILS # BLD AUTO: 0.02 10*3/MM3 (ref 0–0.2)
BASOPHILS NFR BLD AUTO: 0.1 % (ref 0–1)
BILIRUB CONJ SERPL-MCNC: 7 MG/DL (ref 0–0.2)
BILIRUB SERPL-MCNC: 9.4 MG/DL (ref 0.3–1.2)
BILIRUB UR QL STRIP: ABNORMAL
BUN BLD-MCNC: 12 MG/DL (ref 9–23)
BUN/CREAT SERPL: 13.3 (ref 7–25)
CALCIUM SPEC-SCNC: 9.3 MG/DL (ref 8.7–10.4)
CHLORIDE SERPL-SCNC: 97 MMOL/L (ref 99–109)
CLARITY UR: CLEAR
CO2 SERPL-SCNC: 27 MMOL/L (ref 20–31)
COLOR UR: ABNORMAL
CREAT BLD-MCNC: 0.9 MG/DL (ref 0.6–1.3)
CRP SERPL-MCNC: 73.8 MG/DL (ref 0–10)
D-LACTATE SERPL-SCNC: 1.4 MMOL/L (ref 0.5–2)
DEPRECATED RDW RBC AUTO: 56.2 FL (ref 37–54)
EOSINOPHIL # BLD AUTO: 0.01 10*3/MM3 (ref 0.1–0.3)
EOSINOPHIL NFR BLD AUTO: 0 % (ref 0–3)
ERYTHROCYTE [DISTWIDTH] IN BLOOD BY AUTOMATED COUNT: 15.5 % (ref 11.3–14.5)
ERYTHROCYTE [SEDIMENTATION RATE] IN BLOOD: 38 MM/HR (ref 0–20)
GFR SERPL CREATININE-BSD FRML MDRD: 83 ML/MIN/1.73
GLOBULIN UR ELPH-MCNC: 2.9 GM/DL
GLUCOSE BLD-MCNC: 319 MG/DL (ref 70–100)
GLUCOSE BLDC GLUCOMTR-MCNC: 193 MG/DL (ref 70–130)
GLUCOSE BLDC GLUCOMTR-MCNC: 240 MG/DL (ref 70–130)
GLUCOSE BLDC GLUCOMTR-MCNC: 284 MG/DL (ref 70–130)
GLUCOSE UR STRIP-MCNC: ABNORMAL MG/DL
HCT VFR BLD AUTO: 34.8 % (ref 38.9–50.9)
HGB BLD-MCNC: 11.8 G/DL (ref 13.1–17.5)
HGB UR QL STRIP.AUTO: NEGATIVE
HOLD SPECIMEN: NORMAL
IMM GRANULOCYTES # BLD: 0.11 10*3/MM3 (ref 0–0.03)
IMM GRANULOCYTES NFR BLD: 0.5 % (ref 0–0.6)
INR PPP: 0.99
KETONES UR QL STRIP: ABNORMAL
LDH SERPL-CCNC: 169 U/L (ref 120–246)
LEUKOCYTE ESTERASE UR QL STRIP.AUTO: NEGATIVE
LIPASE SERPL-CCNC: 14 U/L (ref 6–51)
LYMPHOCYTES # BLD AUTO: 0.99 10*3/MM3 (ref 0.6–4.8)
LYMPHOCYTES NFR BLD AUTO: 4.2 % (ref 24–44)
MCH RBC QN AUTO: 33.5 PG (ref 27–31)
MCHC RBC AUTO-ENTMCNC: 33.9 G/DL (ref 32–36)
MCV RBC AUTO: 98.9 FL (ref 80–99)
MONOCYTES # BLD AUTO: 1.93 10*3/MM3 (ref 0–1)
MONOCYTES NFR BLD AUTO: 8.1 % (ref 0–12)
NEUTROPHILS # BLD AUTO: 20.68 10*3/MM3 (ref 1.5–8.3)
NEUTROPHILS NFR BLD AUTO: 87.1 % (ref 41–71)
NITRITE UR QL STRIP: NEGATIVE
PH UR STRIP.AUTO: 6 [PH] (ref 5–8)
PLATELET # BLD AUTO: 300 10*3/MM3 (ref 150–450)
PMV BLD AUTO: 11.3 FL (ref 6–12)
POTASSIUM BLD-SCNC: 4.5 MMOL/L (ref 3.5–5.5)
PROCALCITONIN SERPL-MCNC: 6.63 NG/ML
PROT SERPL-MCNC: 6.7 G/DL (ref 5.7–8.2)
PROT UR QL STRIP: NEGATIVE
PROTHROMBIN TIME: 10.8 SECONDS (ref 9.6–11.5)
RBC # BLD AUTO: 3.52 10*6/MM3 (ref 4.2–5.76)
SODIUM BLD-SCNC: 132 MMOL/L (ref 132–146)
SP GR UR STRIP: 1.04 (ref 1–1.03)
UROBILINOGEN UR QL STRIP: ABNORMAL
WBC NRBC COR # BLD: 23.74 10*3/MM3 (ref 3.5–10.8)
WHOLE BLOOD HOLD SPECIMEN: NORMAL
WHOLE BLOOD HOLD SPECIMEN: NORMAL

## 2017-03-30 PROCEDURE — 63710000001 INSULIN LISPRO (HUMAN) PER 5 UNITS: Performed by: NURSE PRACTITIONER

## 2017-03-30 PROCEDURE — 99221 1ST HOSP IP/OBS SF/LOW 40: CPT | Performed by: INTERNAL MEDICINE

## 2017-03-30 PROCEDURE — 99284 EMERGENCY DEPT VISIT MOD MDM: CPT

## 2017-03-30 PROCEDURE — 85652 RBC SED RATE AUTOMATED: CPT | Performed by: EMERGENCY MEDICINE

## 2017-03-30 PROCEDURE — 82962 GLUCOSE BLOOD TEST: CPT

## 2017-03-30 PROCEDURE — 63710000001 INSULIN REGULAR HUMAN PER 5 UNITS: Performed by: EMERGENCY MEDICINE

## 2017-03-30 PROCEDURE — 81003 URINALYSIS AUTO W/O SCOPE: CPT | Performed by: EMERGENCY MEDICINE

## 2017-03-30 PROCEDURE — 83615 LACTATE (LD) (LDH) ENZYME: CPT | Performed by: EMERGENCY MEDICINE

## 2017-03-30 PROCEDURE — 25010000002 PIPERACILLIN SOD-TAZOBACTAM PER 1 G: Performed by: NURSE PRACTITIONER

## 2017-03-30 PROCEDURE — 25010000002 PIPERACILLIN-TAZOBACTAM: Performed by: EMERGENCY MEDICINE

## 2017-03-30 PROCEDURE — 82248 BILIRUBIN DIRECT: CPT | Performed by: NURSE PRACTITIONER

## 2017-03-30 PROCEDURE — 25010000002 HEPARIN (PORCINE) PER 1000 UNITS: Performed by: NURSE PRACTITIONER

## 2017-03-30 PROCEDURE — 25010000002 HYDROMORPHONE PER 4 MG: Performed by: EMERGENCY MEDICINE

## 2017-03-30 PROCEDURE — 99232 SBSQ HOSP IP/OBS MODERATE 35: CPT | Performed by: RADIOLOGY

## 2017-03-30 PROCEDURE — 82150 ASSAY OF AMYLASE: CPT | Performed by: EMERGENCY MEDICINE

## 2017-03-30 PROCEDURE — 83690 ASSAY OF LIPASE: CPT | Performed by: EMERGENCY MEDICINE

## 2017-03-30 PROCEDURE — 87150 DNA/RNA AMPLIFIED PROBE: CPT | Performed by: EMERGENCY MEDICINE

## 2017-03-30 PROCEDURE — 74177 CT ABD & PELVIS W/CONTRAST: CPT

## 2017-03-30 PROCEDURE — 85025 COMPLETE CBC W/AUTO DIFF WBC: CPT | Performed by: EMERGENCY MEDICINE

## 2017-03-30 PROCEDURE — 84145 PROCALCITONIN (PCT): CPT | Performed by: EMERGENCY MEDICINE

## 2017-03-30 PROCEDURE — 25010000002 ONDANSETRON PER 1 MG: Performed by: EMERGENCY MEDICINE

## 2017-03-30 PROCEDURE — 85610 PROTHROMBIN TIME: CPT | Performed by: EMERGENCY MEDICINE

## 2017-03-30 PROCEDURE — 83605 ASSAY OF LACTIC ACID: CPT | Performed by: EMERGENCY MEDICINE

## 2017-03-30 PROCEDURE — 99223 1ST HOSP IP/OBS HIGH 75: CPT | Performed by: INTERNAL MEDICINE

## 2017-03-30 PROCEDURE — 80053 COMPREHEN METABOLIC PANEL: CPT | Performed by: EMERGENCY MEDICINE

## 2017-03-30 PROCEDURE — 87040 BLOOD CULTURE FOR BACTERIA: CPT | Performed by: EMERGENCY MEDICINE

## 2017-03-30 PROCEDURE — 0 IOPAMIDOL PER 1 ML: Performed by: EMERGENCY MEDICINE

## 2017-03-30 PROCEDURE — 25010000002 VANCOMYCIN HCL IN NACL 1.25-0.9 GM/250ML-% SOLUTION: Performed by: EMERGENCY MEDICINE

## 2017-03-30 PROCEDURE — 71275 CT ANGIOGRAPHY CHEST: CPT

## 2017-03-30 PROCEDURE — 87186 SC STD MICRODIL/AGAR DIL: CPT | Performed by: EMERGENCY MEDICINE

## 2017-03-30 PROCEDURE — 86140 C-REACTIVE PROTEIN: CPT | Performed by: EMERGENCY MEDICINE

## 2017-03-30 RX ORDER — HYDROMORPHONE HYDROCHLORIDE 1 MG/ML
0.5 INJECTION, SOLUTION INTRAMUSCULAR; INTRAVENOUS; SUBCUTANEOUS EVERY 4 HOURS PRN
Status: DISCONTINUED | OUTPATIENT
Start: 2017-03-30 | End: 2017-04-03 | Stop reason: HOSPADM

## 2017-03-30 RX ORDER — ONDANSETRON 2 MG/ML
4 INJECTION INTRAMUSCULAR; INTRAVENOUS ONCE
Status: COMPLETED | OUTPATIENT
Start: 2017-03-30 | End: 2017-03-30

## 2017-03-30 RX ORDER — NICOTINE POLACRILEX 4 MG
15 LOZENGE BUCCAL
Status: DISCONTINUED | OUTPATIENT
Start: 2017-03-30 | End: 2017-04-03 | Stop reason: HOSPADM

## 2017-03-30 RX ORDER — PANTOPRAZOLE SODIUM 40 MG/10ML
40 INJECTION, POWDER, LYOPHILIZED, FOR SOLUTION INTRAVENOUS
Status: DISCONTINUED | OUTPATIENT
Start: 2017-03-31 | End: 2017-04-03 | Stop reason: HOSPADM

## 2017-03-30 RX ORDER — BISACODYL 10 MG
10 SUPPOSITORY, RECTAL RECTAL DAILY PRN
Status: DISCONTINUED | OUTPATIENT
Start: 2017-03-30 | End: 2017-04-03 | Stop reason: HOSPADM

## 2017-03-30 RX ORDER — HYDROMORPHONE HYDROCHLORIDE 1 MG/ML
0.5 INJECTION, SOLUTION INTRAMUSCULAR; INTRAVENOUS; SUBCUTANEOUS
Status: DISCONTINUED | OUTPATIENT
Start: 2017-03-30 | End: 2017-03-30

## 2017-03-30 RX ORDER — ACETAMINOPHEN 325 MG/1
650 TABLET ORAL EVERY 6 HOURS PRN
Status: DISCONTINUED | OUTPATIENT
Start: 2017-03-30 | End: 2017-04-03 | Stop reason: HOSPADM

## 2017-03-30 RX ORDER — CYCLOBENZAPRINE HCL 10 MG
5 TABLET ORAL ONCE
Status: COMPLETED | OUTPATIENT
Start: 2017-03-30 | End: 2017-03-31

## 2017-03-30 RX ORDER — NALOXONE HCL 0.4 MG/ML
0.4 VIAL (ML) INJECTION
Status: DISCONTINUED | OUTPATIENT
Start: 2017-03-30 | End: 2017-04-03 | Stop reason: HOSPADM

## 2017-03-30 RX ORDER — VANCOMYCIN HYDROCHLORIDE
20 ONCE
Status: COMPLETED | OUTPATIENT
Start: 2017-03-30 | End: 2017-03-30

## 2017-03-30 RX ORDER — SIMETHICONE 80 MG
80 TABLET,CHEWABLE ORAL 4 TIMES DAILY PRN
Status: DISCONTINUED | OUTPATIENT
Start: 2017-03-30 | End: 2017-04-03 | Stop reason: HOSPADM

## 2017-03-30 RX ORDER — SODIUM CHLORIDE 0.9 % (FLUSH) 0.9 %
10 SYRINGE (ML) INJECTION AS NEEDED
Status: DISCONTINUED | OUTPATIENT
Start: 2017-03-30 | End: 2017-04-03 | Stop reason: HOSPADM

## 2017-03-30 RX ORDER — HEPARIN SODIUM 5000 [USP'U]/ML
5000 INJECTION, SOLUTION INTRAVENOUS; SUBCUTANEOUS EVERY 8 HOURS SCHEDULED
Status: DISPENSED | OUTPATIENT
Start: 2017-03-30 | End: 2017-03-31

## 2017-03-30 RX ORDER — BISACODYL 5 MG/1
5 TABLET, DELAYED RELEASE ORAL DAILY PRN
Status: DISCONTINUED | OUTPATIENT
Start: 2017-03-30 | End: 2017-04-03 | Stop reason: HOSPADM

## 2017-03-30 RX ORDER — VANCOMYCIN HYDROCHLORIDE
1250 EVERY 24 HOURS
Status: DISCONTINUED | OUTPATIENT
Start: 2017-03-31 | End: 2017-04-01

## 2017-03-30 RX ORDER — SODIUM CHLORIDE 9 MG/ML
75 INJECTION, SOLUTION INTRAVENOUS CONTINUOUS
Status: ACTIVE | OUTPATIENT
Start: 2017-03-31 | End: 2017-03-31

## 2017-03-30 RX ORDER — SENNA AND DOCUSATE SODIUM 50; 8.6 MG/1; MG/1
2 TABLET, FILM COATED ORAL 2 TIMES DAILY
Status: DISCONTINUED | OUTPATIENT
Start: 2017-03-30 | End: 2017-04-03 | Stop reason: HOSPADM

## 2017-03-30 RX ORDER — HYDROCODONE BITARTRATE AND ACETAMINOPHEN 5; 325 MG/1; MG/1
1 TABLET ORAL EVERY 4 HOURS PRN
Status: DISCONTINUED | OUTPATIENT
Start: 2017-03-30 | End: 2017-04-03 | Stop reason: HOSPADM

## 2017-03-30 RX ORDER — DEXTROSE MONOHYDRATE 25 G/50ML
25 INJECTION, SOLUTION INTRAVENOUS
Status: DISCONTINUED | OUTPATIENT
Start: 2017-03-30 | End: 2017-04-03 | Stop reason: HOSPADM

## 2017-03-30 RX ORDER — ONDANSETRON 2 MG/ML
4 INJECTION INTRAMUSCULAR; INTRAVENOUS EVERY 6 HOURS PRN
Status: DISCONTINUED | OUTPATIENT
Start: 2017-03-30 | End: 2017-04-03 | Stop reason: HOSPADM

## 2017-03-30 RX ORDER — SODIUM CHLORIDE 0.9 % (FLUSH) 0.9 %
1-10 SYRINGE (ML) INJECTION AS NEEDED
Status: DISCONTINUED | OUTPATIENT
Start: 2017-03-30 | End: 2017-04-03 | Stop reason: HOSPADM

## 2017-03-30 RX ADMIN — HEPARIN SODIUM 5000 UNITS: 5000 INJECTION, SOLUTION INTRAVENOUS; SUBCUTANEOUS at 13:29

## 2017-03-30 RX ADMIN — INSULIN HUMAN 10 UNITS: 100 INJECTION, SOLUTION PARENTERAL at 12:32

## 2017-03-30 RX ADMIN — HYDROMORPHONE HYDROCHLORIDE 0.5 MG: 1 INJECTION, SOLUTION INTRAMUSCULAR; INTRAVENOUS; SUBCUTANEOUS at 10:36

## 2017-03-30 RX ADMIN — VANCOMYCIN HYDROCHLORIDE 1250 MG: 1 INJECTION, POWDER, LYOPHILIZED, FOR SOLUTION INTRAVENOUS at 12:16

## 2017-03-30 RX ADMIN — INSULIN LISPRO 4 UNITS: 100 INJECTION, SOLUTION INTRAVENOUS; SUBCUTANEOUS at 20:47

## 2017-03-30 RX ADMIN — Medication 2 TABLET: at 17:10

## 2017-03-30 RX ADMIN — IOPAMIDOL 95 ML: 755 INJECTION, SOLUTION INTRAVENOUS at 11:25

## 2017-03-30 RX ADMIN — HYDROMORPHONE HYDROCHLORIDE 0.5 MG: 1 INJECTION, SOLUTION INTRAMUSCULAR; INTRAVENOUS; SUBCUTANEOUS at 12:12

## 2017-03-30 RX ADMIN — INSULIN LISPRO 2 UNITS: 100 INJECTION, SOLUTION INTRAVENOUS; SUBCUTANEOUS at 16:52

## 2017-03-30 RX ADMIN — ACETAMINOPHEN 650 MG: 325 TABLET, FILM COATED ORAL at 18:06

## 2017-03-30 RX ADMIN — SODIUM CHLORIDE 1947 ML: 9 INJECTION, SOLUTION INTRAVENOUS at 10:24

## 2017-03-30 RX ADMIN — ONDANSETRON 4 MG: 2 INJECTION INTRAMUSCULAR; INTRAVENOUS at 10:26

## 2017-03-30 RX ADMIN — TAZOBACTAM SODIUM AND PIPERACILLIN SODIUM 4.5 G: .5; 4 INJECTION, POWDER, LYOPHILIZED, FOR SOLUTION INTRAVENOUS at 11:40

## 2017-03-30 RX ADMIN — TAZOBACTAM SODIUM AND PIPERACILLIN SODIUM 3.38 G: 375; 3 INJECTION, SOLUTION INTRAVENOUS at 17:10

## 2017-03-31 LAB
ALBUMIN SERPL-MCNC: 3 G/DL (ref 3.2–4.8)
ALBUMIN/GLOB SERPL: 1.2 G/DL (ref 1.5–2.5)
ALP SERPL-CCNC: 435 U/L (ref 25–100)
ALT SERPL W P-5'-P-CCNC: 68 U/L (ref 7–40)
ANION GAP SERPL CALCULATED.3IONS-SCNC: 7 MMOL/L (ref 3–11)
AST SERPL-CCNC: 54 U/L (ref 0–33)
BACTERIA BLD CULT: ABNORMAL
BASOPHILS # BLD MANUAL: 0 10*3/MM3 (ref 0–0.2)
BASOPHILS NFR BLD AUTO: 0 % (ref 0–1)
BILIRUB SERPL-MCNC: 10.2 MG/DL (ref 0.3–1.2)
BUN BLD-MCNC: 11 MG/DL (ref 9–23)
BUN/CREAT SERPL: 13.8 (ref 7–25)
CALCIUM SPEC-SCNC: 8.7 MG/DL (ref 8.7–10.4)
CHLORIDE SERPL-SCNC: 104 MMOL/L (ref 99–109)
CO2 SERPL-SCNC: 23 MMOL/L (ref 20–31)
CREAT BLD-MCNC: 0.8 MG/DL (ref 0.6–1.3)
DEPRECATED RDW RBC AUTO: 57.8 FL (ref 37–54)
EOSINOPHIL # BLD MANUAL: 0 10*3/MM3 (ref 0.1–0.3)
EOSINOPHIL NFR BLD MANUAL: 0 % (ref 0–3)
ERYTHROCYTE [DISTWIDTH] IN BLOOD BY AUTOMATED COUNT: 15.8 % (ref 11.3–14.5)
GFR SERPL CREATININE-BSD FRML MDRD: 96 ML/MIN/1.73
GLOBULIN UR ELPH-MCNC: 2.6 GM/DL
GLUCOSE BLD-MCNC: 147 MG/DL (ref 70–100)
GLUCOSE BLDC GLUCOMTR-MCNC: 104 MG/DL (ref 70–130)
GLUCOSE BLDC GLUCOMTR-MCNC: 120 MG/DL (ref 70–130)
GLUCOSE BLDC GLUCOMTR-MCNC: 198 MG/DL (ref 70–130)
GLUCOSE BLDC GLUCOMTR-MCNC: 289 MG/DL (ref 70–130)
HCT VFR BLD AUTO: 29.7 % (ref 38.9–50.9)
HGB BLD-MCNC: 10.2 G/DL (ref 13.1–17.5)
INR PPP: 1.09
LARGE PLATELETS: ABNORMAL
LYMPHOCYTES # BLD MANUAL: 1.7 10*3/MM3 (ref 0.6–4.8)
LYMPHOCYTES NFR BLD MANUAL: 11 % (ref 0–12)
LYMPHOCYTES NFR BLD MANUAL: 12 % (ref 24–44)
MCH RBC QN AUTO: 34.1 PG (ref 27–31)
MCHC RBC AUTO-ENTMCNC: 34.3 G/DL (ref 32–36)
MCV RBC AUTO: 99.3 FL (ref 80–99)
MONOCYTES # BLD AUTO: 1.56 10*3/MM3 (ref 0–1)
NEUTROPHILS # BLD AUTO: 10.91 10*3/MM3 (ref 1.5–8.3)
NEUTROPHILS NFR BLD MANUAL: 77 % (ref 41–71)
PLATELET # BLD AUTO: 219 10*3/MM3 (ref 150–450)
PMV BLD AUTO: 11.2 FL (ref 6–12)
POTASSIUM BLD-SCNC: 3.9 MMOL/L (ref 3.5–5.5)
PROT SERPL-MCNC: 5.6 G/DL (ref 5.7–8.2)
PROTHROMBIN TIME: 11.9 SECONDS (ref 9.6–11.5)
RBC # BLD AUTO: 2.99 10*6/MM3 (ref 4.2–5.76)
SODIUM BLD-SCNC: 134 MMOL/L (ref 132–146)
STOMATOCYTES BLD QL SMEAR: ABNORMAL
WBC MORPH BLD: NORMAL
WBC NRBC COR # BLD: 14.17 10*3/MM3 (ref 3.5–10.8)

## 2017-03-31 PROCEDURE — C1729 CATH, DRAINAGE: HCPCS | Performed by: RADIOLOGY

## 2017-03-31 PROCEDURE — 99232 SBSQ HOSP IP/OBS MODERATE 35: CPT | Performed by: INTERNAL MEDICINE

## 2017-03-31 PROCEDURE — 0F9940Z DRAINAGE OF COMMON BILE DUCT WITH DRAINAGE DEVICE, PERCUTANEOUS ENDOSCOPIC APPROACH: ICD-10-PCS | Performed by: RADIOLOGY

## 2017-03-31 PROCEDURE — C1894 INTRO/SHEATH, NON-LASER: HCPCS | Performed by: RADIOLOGY

## 2017-03-31 PROCEDURE — 25010000002 HYDROMORPHONE PER 4 MG: Performed by: NURSE PRACTITIONER

## 2017-03-31 PROCEDURE — 25010000002 MIDAZOLAM PER 1 MG: Performed by: RADIOLOGY

## 2017-03-31 PROCEDURE — 85027 COMPLETE CBC AUTOMATED: CPT | Performed by: NURSE PRACTITIONER

## 2017-03-31 PROCEDURE — 80053 COMPREHEN METABOLIC PANEL: CPT | Performed by: NURSE PRACTITIONER

## 2017-03-31 PROCEDURE — C1887 CATHETER, GUIDING: HCPCS | Performed by: RADIOLOGY

## 2017-03-31 PROCEDURE — 82962 GLUCOSE BLOOD TEST: CPT

## 2017-03-31 PROCEDURE — 85610 PROTHROMBIN TIME: CPT | Performed by: NURSE PRACTITIONER

## 2017-03-31 PROCEDURE — C1894 INTRO/SHEATH, NON-LASER: HCPCS

## 2017-03-31 PROCEDURE — 25010000002 VANCOMYCIN HCL IN NACL 1.25-0.9 GM/250ML-% SOLUTION

## 2017-03-31 PROCEDURE — 4A02X4A MEASUREMENT OF CARDIAC ELECTRICAL ACTIVITY, GUIDANCE, EXTERNAL APPROACH: ICD-10-PCS | Performed by: INTERNAL MEDICINE

## 2017-03-31 PROCEDURE — 85007 BL SMEAR W/DIFF WBC COUNT: CPT | Performed by: NURSE PRACTITIONER

## 2017-03-31 PROCEDURE — 02HV33Z INSERTION OF INFUSION DEVICE INTO SUPERIOR VENA CAVA, PERCUTANEOUS APPROACH: ICD-10-PCS | Performed by: INTERNAL MEDICINE

## 2017-03-31 PROCEDURE — C1751 CATH, INF, PER/CENT/MIDLINE: HCPCS

## 2017-03-31 PROCEDURE — BF10YZZ FLUOROSCOPY OF BILE DUCTS USING OTHER CONTRAST: ICD-10-PCS | Performed by: RADIOLOGY

## 2017-03-31 PROCEDURE — 25010000002 FENTANYL CITRATE (PF) 100 MCG/2ML SOLUTION: Performed by: RADIOLOGY

## 2017-03-31 PROCEDURE — 0 IODIXANOL PER 1 ML: Performed by: RADIOLOGY

## 2017-03-31 PROCEDURE — C1769 GUIDE WIRE: HCPCS | Performed by: RADIOLOGY

## 2017-03-31 PROCEDURE — 25010000002 PIPERACILLIN SOD-TAZOBACTAM PER 1 G: Performed by: NURSE PRACTITIONER

## 2017-03-31 PROCEDURE — G0108 DIAB MANAGE TRN  PER INDIV: HCPCS

## 2017-03-31 RX ORDER — FENTANYL CITRATE 50 UG/ML
INJECTION, SOLUTION INTRAMUSCULAR; INTRAVENOUS AS NEEDED
Status: DISCONTINUED | OUTPATIENT
Start: 2017-03-31 | End: 2017-03-31 | Stop reason: HOSPADM

## 2017-03-31 RX ORDER — IODIXANOL 320 MG/ML
INJECTION, SOLUTION INTRAVASCULAR AS NEEDED
Status: DISCONTINUED | OUTPATIENT
Start: 2017-03-31 | End: 2017-03-31 | Stop reason: HOSPADM

## 2017-03-31 RX ORDER — DIPHENHYDRAMINE HYDROCHLORIDE, ZINC ACETATE 2; .1 G/100G; G/100G
CREAM TOPICAL 3 TIMES DAILY PRN
Status: DISCONTINUED | OUTPATIENT
Start: 2017-03-31 | End: 2017-04-03 | Stop reason: HOSPADM

## 2017-03-31 RX ORDER — LIDOCAINE HYDROCHLORIDE 10 MG/ML
INJECTION, SOLUTION INFILTRATION; PERINEURAL AS NEEDED
Status: DISCONTINUED | OUTPATIENT
Start: 2017-03-31 | End: 2017-03-31 | Stop reason: HOSPADM

## 2017-03-31 RX ORDER — SODIUM CHLORIDE 0.9 % (FLUSH) 0.9 %
10-20 SYRINGE (ML) INJECTION AS NEEDED
Status: DISCONTINUED | OUTPATIENT
Start: 2017-03-31 | End: 2017-04-03 | Stop reason: HOSPADM

## 2017-03-31 RX ORDER — HYDROXYZINE HYDROCHLORIDE 25 MG/1
25 TABLET, FILM COATED ORAL 2 TIMES DAILY PRN
Status: DISCONTINUED | OUTPATIENT
Start: 2017-03-31 | End: 2017-04-03 | Stop reason: HOSPADM

## 2017-03-31 RX ORDER — MIDAZOLAM HYDROCHLORIDE 1 MG/ML
INJECTION INTRAMUSCULAR; INTRAVENOUS AS NEEDED
Status: DISCONTINUED | OUTPATIENT
Start: 2017-03-31 | End: 2017-03-31 | Stop reason: HOSPADM

## 2017-03-31 RX ADMIN — CYCLOBENZAPRINE HYDROCHLORIDE 5 MG: 10 TABLET, FILM COATED ORAL at 00:18

## 2017-03-31 RX ADMIN — INSULIN LISPRO 2 UNITS: 100 INJECTION, SOLUTION INTRAVENOUS; SUBCUTANEOUS at 11:49

## 2017-03-31 RX ADMIN — DIPHENHYDRAMINE HYDROCHLORIDE, ZINC ACETATE 1 APPLICATION: 2; .1 CREAM TOPICAL at 21:55

## 2017-03-31 RX ADMIN — VANCOMYCIN HYDROCHLORIDE 1250 MG: 1 INJECTION, POWDER, LYOPHILIZED, FOR SOLUTION INTRAVENOUS at 06:15

## 2017-03-31 RX ADMIN — HYDROXYZINE HYDROCHLORIDE 25 MG: 25 TABLET, FILM COATED ORAL at 21:55

## 2017-03-31 RX ADMIN — PANTOPRAZOLE SODIUM 40 MG: 40 INJECTION, POWDER, FOR SOLUTION INTRAVENOUS at 06:15

## 2017-03-31 RX ADMIN — INSULIN LISPRO 4 UNITS: 100 INJECTION, SOLUTION INTRAVENOUS; SUBCUTANEOUS at 20:11

## 2017-03-31 RX ADMIN — TAZOBACTAM SODIUM AND PIPERACILLIN SODIUM 3.38 G: 375; 3 INJECTION, SOLUTION INTRAVENOUS at 20:06

## 2017-03-31 RX ADMIN — TAZOBACTAM SODIUM AND PIPERACILLIN SODIUM 3.38 G: 375; 3 INJECTION, SOLUTION INTRAVENOUS at 00:21

## 2017-03-31 RX ADMIN — SODIUM CHLORIDE 75 ML/HR: 9 INJECTION, SOLUTION INTRAVENOUS at 00:19

## 2017-03-31 RX ADMIN — TAZOBACTAM SODIUM AND PIPERACILLIN SODIUM 3.38 G: 375; 3 INJECTION, SOLUTION INTRAVENOUS at 08:29

## 2017-03-31 RX ADMIN — TAZOBACTAM SODIUM AND PIPERACILLIN SODIUM 3.38 G: 375; 3 INJECTION, SOLUTION INTRAVENOUS at 11:52

## 2017-03-31 RX ADMIN — HYDROCODONE BITARTRATE AND ACETAMINOPHEN 1 TABLET: 5; 325 TABLET ORAL at 23:14

## 2017-03-31 RX ADMIN — HYDROMORPHONE HYDROCHLORIDE 0.5 MG: 1 INJECTION, SOLUTION INTRAMUSCULAR; INTRAVENOUS; SUBCUTANEOUS at 20:19

## 2017-03-31 RX ADMIN — HYDROCODONE BITARTRATE AND ACETAMINOPHEN 1 TABLET: 5; 325 TABLET ORAL at 17:37

## 2017-04-01 LAB
ALBUMIN SERPL-MCNC: 3.2 G/DL (ref 3.2–4.8)
ALBUMIN/GLOB SERPL: 1.2 G/DL (ref 1.5–2.5)
ALP SERPL-CCNC: 407 U/L (ref 25–100)
ALT SERPL W P-5'-P-CCNC: 80 U/L (ref 7–40)
ANION GAP SERPL CALCULATED.3IONS-SCNC: 6 MMOL/L (ref 3–11)
AST SERPL-CCNC: 95 U/L (ref 0–33)
BILIRUB SERPL-MCNC: 8.6 MG/DL (ref 0.3–1.2)
BUN BLD-MCNC: 9 MG/DL (ref 9–23)
BUN/CREAT SERPL: 11.3 (ref 7–25)
CALCIUM SPEC-SCNC: 9.4 MG/DL (ref 8.7–10.4)
CHLORIDE SERPL-SCNC: 100 MMOL/L (ref 99–109)
CO2 SERPL-SCNC: 26 MMOL/L (ref 20–31)
CREAT BLD-MCNC: 0.8 MG/DL (ref 0.6–1.3)
DEPRECATED RDW RBC AUTO: 54.5 FL (ref 37–54)
ERYTHROCYTE [DISTWIDTH] IN BLOOD BY AUTOMATED COUNT: 15.3 % (ref 11.3–14.5)
GFR SERPL CREATININE-BSD FRML MDRD: 96 ML/MIN/1.73
GLOBULIN UR ELPH-MCNC: 2.6 GM/DL
GLUCOSE BLD-MCNC: 164 MG/DL (ref 70–100)
GLUCOSE BLDC GLUCOMTR-MCNC: 142 MG/DL (ref 70–130)
GLUCOSE BLDC GLUCOMTR-MCNC: 199 MG/DL (ref 70–130)
GLUCOSE BLDC GLUCOMTR-MCNC: 239 MG/DL (ref 70–130)
GLUCOSE BLDC GLUCOMTR-MCNC: 314 MG/DL (ref 70–130)
HCT VFR BLD AUTO: 30.7 % (ref 38.9–50.9)
HGB BLD-MCNC: 10.6 G/DL (ref 13.1–17.5)
MCH RBC QN AUTO: 33.8 PG (ref 27–31)
MCHC RBC AUTO-ENTMCNC: 34.5 G/DL (ref 32–36)
MCV RBC AUTO: 97.8 FL (ref 80–99)
PLATELET # BLD AUTO: 228 10*3/MM3 (ref 150–450)
PMV BLD AUTO: 11 FL (ref 6–12)
POTASSIUM BLD-SCNC: 3.7 MMOL/L (ref 3.5–5.5)
PROT SERPL-MCNC: 5.8 G/DL (ref 5.7–8.2)
RBC # BLD AUTO: 3.14 10*6/MM3 (ref 4.2–5.76)
SODIUM BLD-SCNC: 132 MMOL/L (ref 132–146)
VANCOMYCIN TROUGH SERPL-MCNC: 4.5 MCG/ML (ref 10–20)
WBC NRBC COR # BLD: 7.69 10*3/MM3 (ref 3.5–10.8)

## 2017-04-01 PROCEDURE — 80202 ASSAY OF VANCOMYCIN: CPT

## 2017-04-01 PROCEDURE — 25010000002 PIPERACILLIN SOD-TAZOBACTAM PER 1 G: Performed by: NURSE PRACTITIONER

## 2017-04-01 PROCEDURE — 99231 SBSQ HOSP IP/OBS SF/LOW 25: CPT | Performed by: RADIOLOGY

## 2017-04-01 PROCEDURE — 25010000002 VANCOMYCIN HCL IN NACL 1.25-0.9 GM/250ML-% SOLUTION

## 2017-04-01 PROCEDURE — 80053 COMPREHEN METABOLIC PANEL: CPT | Performed by: INTERNAL MEDICINE

## 2017-04-01 PROCEDURE — 25010000002 HYDROMORPHONE PER 4 MG: Performed by: NURSE PRACTITIONER

## 2017-04-01 PROCEDURE — 82962 GLUCOSE BLOOD TEST: CPT

## 2017-04-01 PROCEDURE — 85027 COMPLETE CBC AUTOMATED: CPT | Performed by: INTERNAL MEDICINE

## 2017-04-01 PROCEDURE — 25010000002 VANCOMYCIN PER 500 MG

## 2017-04-01 PROCEDURE — 99233 SBSQ HOSP IP/OBS HIGH 50: CPT | Performed by: INTERNAL MEDICINE

## 2017-04-01 RX ORDER — VANCOMYCIN HYDROCHLORIDE 1 G/200ML
1000 INJECTION, SOLUTION INTRAVENOUS EVERY 12 HOURS
Status: DISCONTINUED | OUTPATIENT
Start: 2017-04-01 | End: 2017-04-03

## 2017-04-01 RX ORDER — HEPARIN SODIUM 5000 [USP'U]/ML
5000 INJECTION, SOLUTION INTRAVENOUS; SUBCUTANEOUS EVERY 12 HOURS SCHEDULED
Status: DISCONTINUED | OUTPATIENT
Start: 2017-04-02 | End: 2017-04-03 | Stop reason: HOSPADM

## 2017-04-01 RX ADMIN — PANTOPRAZOLE SODIUM 40 MG: 40 INJECTION, POWDER, FOR SOLUTION INTRAVENOUS at 05:45

## 2017-04-01 RX ADMIN — Medication 2 TABLET: at 17:32

## 2017-04-01 RX ADMIN — TAZOBACTAM SODIUM AND PIPERACILLIN SODIUM 3.38 G: 375; 3 INJECTION, SOLUTION INTRAVENOUS at 17:31

## 2017-04-01 RX ADMIN — HYDROCODONE BITARTRATE AND ACETAMINOPHEN 1 TABLET: 5; 325 TABLET ORAL at 18:31

## 2017-04-01 RX ADMIN — TAZOBACTAM SODIUM AND PIPERACILLIN SODIUM 3.38 G: 375; 3 INJECTION, SOLUTION INTRAVENOUS at 13:07

## 2017-04-01 RX ADMIN — INSULIN LISPRO 5 UNITS: 100 INJECTION, SOLUTION INTRAVENOUS; SUBCUTANEOUS at 20:55

## 2017-04-01 RX ADMIN — VANCOMYCIN HYDROCHLORIDE 1000 MG: 1 INJECTION, SOLUTION INTRAVENOUS at 18:15

## 2017-04-01 RX ADMIN — INSULIN LISPRO 4 UNITS: 100 INJECTION, SOLUTION INTRAVENOUS; SUBCUTANEOUS at 13:07

## 2017-04-01 RX ADMIN — HYDROCODONE BITARTRATE AND ACETAMINOPHEN 1 TABLET: 5; 325 TABLET ORAL at 06:27

## 2017-04-01 RX ADMIN — HYDROCODONE BITARTRATE AND ACETAMINOPHEN 1 TABLET: 5; 325 TABLET ORAL at 02:56

## 2017-04-01 RX ADMIN — HYDROXYZINE HYDROCHLORIDE 25 MG: 25 TABLET, FILM COATED ORAL at 05:51

## 2017-04-01 RX ADMIN — HYDROMORPHONE HYDROCHLORIDE 0.5 MG: 1 INJECTION, SOLUTION INTRAMUSCULAR; INTRAVENOUS; SUBCUTANEOUS at 05:45

## 2017-04-01 RX ADMIN — TAZOBACTAM SODIUM AND PIPERACILLIN SODIUM 3.38 G: 375; 3 INJECTION, SOLUTION INTRAVENOUS at 02:48

## 2017-04-01 RX ADMIN — TAZOBACTAM SODIUM AND PIPERACILLIN SODIUM 3.38 G: 375; 3 INJECTION, SOLUTION INTRAVENOUS at 05:45

## 2017-04-01 RX ADMIN — HYDROCODONE BITARTRATE AND ACETAMINOPHEN 1 TABLET: 5; 325 TABLET ORAL at 13:10

## 2017-04-01 RX ADMIN — DIPHENHYDRAMINE HYDROCHLORIDE, ZINC ACETATE: 2; .1 CREAM TOPICAL at 05:51

## 2017-04-01 RX ADMIN — TAZOBACTAM SODIUM AND PIPERACILLIN SODIUM 3.38 G: 375; 3 INJECTION, SOLUTION INTRAVENOUS at 23:34

## 2017-04-01 NOTE — PROGRESS NOTES
"NAME: BARB IBARRA  : 1945  PCP: Demetrius Wilkerson MD  ADMITTING PHYSICIAN: Aron Dalton MD    DATE OF ADMISSION:  3/30/2017  DATE OF SERVICE: 2017    HOSPITAL DAY:  3 days    HISTORY OF PRESENT ILLNESS:  72 y.o. male who is status post \"Whipple\" procedure in  4 duodenal cancer. He's had a several week history of progressive abdominal pain and progressive jaundice. He has a CT of the abdomen and pelvis which demonstrates intra-and extrahepatic biliary ductal dilatation consistent with obstruction. Given his history of prior \"Whipple\", and altered anatomy, he underwent percutaneous PTC with placement of an internal/external biliary drain on 3/31/2017.    REVIEW OF IMAGING:  PTC from 3/31/2017 demonstrates 2 sizable gallstones, with then an obstructing the common bile duct.  These were \"dislodged\" into the more proximal bile duct/biliary system during placement of the internal/external drain.    LABS:  Lab Results   Component Value Date    WBC 7.69 2017    HGB 10.6 (L) 2017    HCT 30.7 (L) 2017    MCV 97.8 2017     2017     Lab Results   Component Value Date    GLUCOSE 164 (H) 2017    CALCIUM 9.4 2017     2017    K 3.7 2017    CO2 26.0 2017     2017    BUN 9 2017    CREATININE 0.80 2017    EGFRIFNONA 96 2017    BCR 11.3 2017    ANIONGAP 6.0 2017       Results for BARB IBARRA (MRN [id number]) as of 2017 09:31   Ref. Range 3/22/2017 01:43 3/27/2017 11:49 3/30/2017 08:06 3/31/2017 04:45 2017 05:51   Total Bilirubin Latest Ref Range: 0.3 - 1.2 mg/dL 3.2 (H) 3.7 (H) 9.4 (H) 10.2 (H) 8.6 (H)       CURRENT MEDS:  Current Facility-Administered Medications   Medication Dose Route Frequency Provider Last Rate Last Dose   • acetaminophen (TYLENOL) tablet 650 mg  650 mg Oral Q6H PRN JADE Erickson   650 mg at 17 6197   • bisacodyl (DULCOLAX) EC tablet 5 mg  5 mg " Oral Daily PRN Courtney Fugate, APRN       • bisacodyl (DULCOLAX) suppository 10 mg  10 mg Rectal Daily PRN Courtney Fugate, APRN       • dextrose (D50W) solution 25 g  25 g Intravenous Q15 Min PRN Courtney Fugate, APRN       • dextrose (GLUTOSE) oral gel 15 g  15 g Oral Q15 Min PRN Courtney Fugate, APRN       • diphenhydrAMINE-zinc acetate 2-0.1 % cream   Topical TID PRN Kat Michel, APRN       • glucagon (GLUCAGEN) injection 1 mg  1 mg Subcutaneous Q15 Min PRN Courtney Fugate, APRN       • HYDROcodone-acetaminophen (NORCO) 5-325 MG per tablet 1 tablet  1 tablet Oral Q4H PRN Courtney Fugate, APRN   1 tablet at 04/01/17 0627   • HYDROmorphone (DILAUDID) injection 0.5 mg  0.5 mg Intravenous Q4H PRN Courtney Fugate, APRN   0.5 mg at 04/01/17 0545    And   • naloxone (NARCAN) injection 0.4 mg  0.4 mg Intravenous Q5 Min PRN Courtney Fugate, APRN       • hydrOXYzine (ATARAX) tablet 25 mg  25 mg Oral BID PRN Kat Michel, APRN   25 mg at 04/01/17 0551   • insulin lispro (humaLOG) injection 0-7 Units  0-7 Units Subcutaneous 4x Daily AC & at Bedtime Marie Fugate, APRN   4 Units at 03/31/17 2011   • ondansetron (ZOFRAN) injection 4 mg  4 mg Intravenous Q6H PRN Marie Fugate, APRN       • pantoprazole (PROTONIX) injection 40 mg  40 mg Intravenous Q AM Ena Duckworth MD   40 mg at 04/01/17 0545   • Pharmacy to dose vancomycin   Does not apply Continuous PRN Courtney Fugate, APRN       • piperacillin-tazobactam (ZOSYN) 3.375 g in iso-osmotic dextrose 50 ml (premix)  3.375 g Intravenous Q6H Courtney Fugate, APRN   3.375 g at 04/01/17 0545   • sennosides-docusate sodium (SENOKOT-S) 8.6-50 MG tablet 2 tablet  2 tablet Oral BID JADE Erickson   2 tablet at 03/30/17 1710   • simethicone (MYLICON) chewable tablet 80 mg  80 mg Oral 4x Daily PRN Ena Duckworth MD       • sodium chloride 0.9 % flush 1-10 mL  1-10 mL Intravenous PRN JADE Erickson       • sodium chloride 0.9 % flush 10 mL  10 mL Intravenous  "PRN Oleksandr Morrison MD       • sodium chloride 0.9 % flush 10-20 mL  10-20 mL Intracatheter PRN Christopher Robledo MD       • vancomycin (VANCOCIN) in iso-osmotic dextrose IVPB 1 g (premix) 200 mL  1,000 mg [facility]venous [facility]12H [facility]       • [START ON 4/3/2017] Vancomycin trough 4/3 @0700, hold 0800 dose if level >20   Does not apply Once Gonzalo Mir [facility]           PHYSICAL EXAM:  Vitals:    04/01/17 0600   BP:    Pulse:    Resp:    Temp:    SpO2: 97%      Alert and oriented ×3.  Nonfocal neurologic exam.  Some tenderness to palpation in the right midaxillary region, at the site of drain placement, but no significant abdominal pain.    ASSESSMENT/PLAN:  Mr. Noriega is a 72 y.o. male with history of \"Whipple\" procedure for duodenal cancer in 2004.  He presented with obstructive jaundice, and underwent successful PTCA with internal/external biliary drain placed.  The source of his obstruction is deemed to be too sizable gallstones (status post cholecystectomy), one of which was lodged in the distal common bile duct, at the site of anastomosis.  His bilirubin is starting to trend down, and I have \"turned off\" the external aspect of his drainage catheter, to hopefully permit internal drainage of bile.  We will recheck his hepatic function in the morning, but he would be okay for discharge home from an interventional radiology standpoint thereafter, to follow-up with GI for further management of his gallstones.                "

## 2017-04-01 NOTE — PROGRESS NOTES
"    [facility] Medicine Services  INPATIENT PROGRESS NOTE    Date of Admission: 3/30/2017  Length of Stay: 2  Primary Care Physician: Demetrius Wilkerson MD    Subjective   CC: cholangitis    HPI:  Feels a lot better  More energetic  abd pain resolved  No new issues    Review Of Systems:   Review of Systems  No cp or SOA  Ate bkfast with permission of dr contreras    Objective      Temp:  [97.3 °F (36.3 °C)-98.6 °F (37 °C)] 97.3 °F (36.3 °C)  Heart Rate:  [66-91] 91  Resp:  [16-18] 18  BP: (128-145)/(71-92) 141/71  Physical Exam  Gen:  Mild juandice; ox4, nontoxic appearing, alert NAD, family present  Neuro: alert and oriented, clear speech, follows commands, grossly nonfocal  HEENT:  NC/AT PERRL, OP benign  Neck:  Supple, no LAD  Heart RRR no murmur, rub, or gallop  Lungs CTA nonlabord no w r r  Abd:  Soft, mild mid epigastric tenderness, nondistended (no rebound, no guarding)  Extrem:  No c/c/e      Results Review:    I have reviewed the labs, radiology results and diagnostic studies.      Results from last 7 days  Lab Units 04/01/17  0551   WBC 10*3/mm3 7.69   HEMOGLOBIN g/dL 10.6*   PLATELETS 10*3/mm3 228       Results from last 7 days  Lab Units 04/01/17  0551   SODIUM mmol/L 132   POTASSIUM mmol/L 3.7   CHLORIDE mmol/L 100   TOTAL CO2 mmol/L 26.0   BUN mg/dL 9   CREATININE mg/dL 0.80   GLUCOSE mg/dL 164*   CALCIUM mg/dL 9.4       Culture Data: Cultures:    Blood Culture   Date Value Ref Range Status   03/30/2017 No growth at 24 hours  Preliminary   03/30/2017 Abnormal Stain (A)  Preliminary       Radiology Data:  reviewed    I have reviewed the medications.    Assessment/Plan     Problem List  Principal Problem:    Sepsis  Active Problems:    Epigastric pain    Gastroesophageal reflux disease    Type 2 diabetes mellitus without complication, without long-term current use of insulin    Low back pain    Hx of duodenal cancer, s/p Whipple 2004    Hx of bladder cancer, s/p \"scraping\", 2004   " " Leukocytosis    Elevated LFTs    Jaundice    Persistent fever    Recent bacteremia due to Escherichia coli    History of recent left nephrolithiasis s/p ureteral stent placement     Anemia, recent acute blood loss    Unintended weight loss       72 yr old man with Whipple 2004, Ecoli bacteremia in Feb in setting of ureteral stone, now progressive jaundice and biliary obstruction.     Assessment/Plan:    *Sepsis  *Enterococcus Bacteremia (due to below)  *Obstructive juandice w/ Ascending cholangitis  *Choledocholithiasis (2 gallstones \"lodged in common bile duct)   -s/p percutaneous internal/external biliary drain placed by dr. Wall 3/31/17 (dr. Wall \"turned off\" the external aspect of his drainage catheter 4/1/17)   -dr. Ena Karimi (gi) following (she spoke to dr. Ford, and advanced endoscopist) at ; plan to establish follow up w/ wayne or dr. chen at  within next week (to call to confirm Monday; dr. peterson's note says to contact agueda at [phone])  *Hx prior \"Whipple\" surgery for duodenal surgery (2004 dr. dagile)  *Hx prior bladder ca  *hx right ureteral stent w/ subsequent removal mid-march 2017 (dr. Lawson)  *Hx recent e.coli bacteremia  *DM2    Plan:  -Appreciate ID (mariano and sarthak), GI (nicholas), and Dr. Wall assistance  -Continue empiric vanc/zosyn for now, follow susceptibility of enterococcus for final abx plans (? Ampicillin if susceptible); picc line in place  -cbc, cmp in a.m.; if bilirubin continues to come down, dr. Wall will likely sign off (able to be discharged with the current drainage system in place with further input per [facility])  -plan likely d/c early in week (? Monday); Need to ensure follow up at  advance endoscopy set up within a week or so; plan to touch base w/ dr. peterson Monday 4/3/17         DVT prophylaxis: sq heparin  Discharge Planning: I expect patient to be discharged to home in 3 days.    Aron Dalton MD   04/01/17   3:00 PM    Please note that portions of this " note may have been completed with a voice recognition program. Efforts were made to edit the dictations, but occasionally words are mistranscribed.

## 2017-04-01 NOTE — PROGRESS NOTES
Pharmacokinetic Consult - Vancomycin Dosing    Jean Noriega is a 72 y.o. male who has been consulted for vancomycin dosing for sepsis .    Current Antimicrobials:       * Vancomycin 1250mg q24h       * Zosyn 3.375gm q6h    Relevant clinical data and objective history reviewed:    Estimated Creatinine Clearance: 78.9 mL/min (by C-G formula based on Cr of 0.8).  Weight: 143 lb (64.9 kg)  97.8 °F (36.6 °C) (Oral)     Lab Results   Component Value Date    VANCOTROUGH 4.50 (L) 04/01/2017   Vancomycin trough drawn approximately 23 hours following 2nd dose      Results from last 7 days     Lab Units 03/31/17  0445 03/30/17  0806 03/27/17  1149   WBC 10*3/mm3 14.17* 23.74* 7.90   HEMOGLOBIN g/dL 10.2* 11.8* 11.0*   HEMATOCRIT % 29.7* 34.8* 34.0*   PLATELETS 10*3/mm3 219 300 364      Results from last 7 days     Lab Units 03/31/17  0445 03/30/17  0806 03/27/17  1149   SODIUM mmol/L 134 132 132*   POTASSIUM mmol/L 3.9 4.5 4.6   CHLORIDE mmol/L 104 97* 98*   TOTAL CO2 mmol/L 23.0 27.0 29.3   BUN mg/dL 11 12 13   CREATININE mg/dL 0.80 0.90 0.84   CALCIUM mg/dL 8.7 9.3 9.6   BILIRUBIN mg/dL 10.2* 9.4* 3.7*   ALK PHOS U/L 435* 646* 623*   ALT (SGPT) U/L 68* 109* 116*   AST (SGOT) U/L 54* 96* 63*   GLUCOSE mg/dL 147* 319* 379*      Blood Culture ID, PCR [[id number]] (Abnormal) Collected: 03/30/17 0950        Specimen: Blood from Arm, Right Updated: 03/31/17 0648        BCID, PCR Enterococcus spp, not VRE.      Recent E. Coli bacteremia    Assessment/Plan    4/1 vancomycin trough - 4.5 @0550  Vancomycin level drawn 23 hours following 2nd dose, non steady state concentration.  Expect further vancomycin accumulation with continued dosing  Goal trough: 15 mcg/ml    Will increase to Vancomycin 1gm (15.4 mg/kg) q12h 4/1  Obtain vancomycin trough 4/3 prior to 0800 dose to determine further dosing  Will follow,    Thanks,  Gonzalo Mir RPH  4/1/2017  7:25 AM          Gonzalo Mir RPH

## 2017-04-01 NOTE — PROGRESS NOTES
"[facility]    INPATIENT PROGRESS NOTE  2017      PATIENT NAME: Jean Noriega  :  1945  MRN:  [id number]  Date of Admission:  3/30/2017      Antimicrobials:  IV Anti-Infectives     Ordered     Dose/Rate Route Frequency Start Stop    17 0728  vancomycin (VANCOCIN) in iso-osmotic dextrose IVPB 1 g (premix) 200 mL     Ordering Provider:  Gonzalo Mir RPH    1,000 mg  over 60 Minutes Intravenous Every 12 Hours 17 1800      17 1152  piperacillin-tazobactam (ZOSYN) 3.375 g in iso-osmotic dextrose 50 ml (premix)     Comments:  First dose given in er   Ordering Provider:  JADE Erickson    3.375 g Intravenous Every 6 Hours 17 1200      17 1152  Pharmacy to dose vancomycin     Ordering Provider:  JADE Erickson     Does not apply Continuous PRN 17 1150      17 1034  vancomycin IVPB 1250 mg in 250 mL NS (premix)     Ordering Provider:  Oleksandr Morrison MD    20 mg/kg × 64.9 kg Intravenous Once 17 1045 17 1216    17 1009  piperacillin-tazobactam (ZOSYN) 4.5 g/100 mL 0.9% NS IVPB (mbp)     Ordering Provider:  Oleksandr Morrison MD    4.5 g Intravenous Once 17 1011 17 1140          MAR reviewed.    Chief Complaint   Patient presents with   • Abdominal Pain   • Abnormal Lab       Reason for consultation:  Enterococcal and E. coli bacteremia, cholangitis    Interval history:  Patient doing well today.  No f/c overnight.  No n/v.  No diarrhea.  RUQ pain improved.  C/o itching, no rash.  Jaundice a bit better.  PICC placed yesterday.    PTC from 3/31/2017:  2 sizable gallstones, with then an obstructing the common bile duct. These were \"dislodged\" into the more proximal bile duct/biliary system during placement of the internal/external drain.    ROS:  No fevers/chills overnight.  No new rashes.  No SOB or chest pain.  No nausea/vomiting/diarrhea.  Denies side effects from " "antimicrobials.    Objective:  Temp (24hrs), Av.8 °F (36.6 °C), Min:97.3 °F (36.3 °C), Max:98.6 °F (37 °C)    /71  Pulse 91  Temp 97.3 °F (36.3 °C)  Resp 18  Ht 69\" (175.3 cm)  Wt 143 lb (64.9 kg)  SpO2 97%  BMI 21.12 kg/m2    Physical Examination:  LINES:  Right UE PICC in place.  GENERAL: Awake and alert, in no acute distress.   HEENT: + icterus.  HEART: RRR; No murmur, rubs, gallops.   LUNGS: Clear to auscultation bilaterally without wheezing, rales, rhonchi. Normal respiratory effort. Nonlabored. No dullness.  ABDOMEN: Soft, mild TTP RUQ, nondistended. Positive bowel sounds. No rebound or guarding. NO mass or HSM.  EXT: No cyanosis, clubbing or edema.  MSK: FROM without joint effusions noted arms/legs.   SKIN: Warm and dry +Jaundice   NEURO: Oriented to PPT. No focal deficits on motor/sensory exam at arms/legs.  PSYCHIATRIC: Normal insight and judgement. Cooperative with PE    Laboratory Data:      Results from last 7 days  Lab Units 17  0551 17  0445 17  0806   WBC 10*3/mm3 7.69 14.17* 23.74*   HEMOGLOBIN g/dL 10.6* 10.2* 11.8*   HEMATOCRIT % 30.7* 29.7* 34.8*   PLATELETS 10*3/mm3 228 219 300       Results from last 7 days  Lab Units 17  0551   SODIUM mmol/L 132   POTASSIUM mmol/L 3.7   CHLORIDE mmol/L 100   TOTAL CO2 mmol/L 26.0   BUN mg/dL 9   CREATININE mg/dL 0.80   GLUCOSE mg/dL 164*   CALCIUM mg/dL 9.4       Results from last 7 days  Lab Units 17  0551  17  0806   ALK PHOS U/L 407*  < > 646*   BILIRUBIN mg/dL 8.6*  < > 9.4*   BILIRUBIN DIRECT mg/dL  --   --  7.0*   ALT (SGPT) U/L 80*  < > 109*   AST (SGOT) U/L 95*  < > 96*   < > = values in this interval not displayed.    Results from last 7 days  Lab Units 17  0806   SED RATE mm/hr 38*       Results from last 7 days  Lab Units 17  0950   CRP mg/dL 73.80*     Estimated Creatinine Clearance: 78.9 mL/min (by C-G formula based on Cr of 0.8).    Results from last 7 days  Lab Units 17  0950 "   LACTATE mmol/L 1.4           Results from last 7 days  Lab Units 04/01/17  0551   VANCOMYCIN TR mcg/mL 4.50*           Microbiology:     Blood Culture [[id number]] (Normal) Collected: 03/30/17 0955       Lab Status: Preliminary result Specimen: Blood from Arm, Left Updated: 04/01/17 1001        Blood Culture No growth at 2 days       Blood Culture [[id number]] (Abnormal) Collected: 03/30/17 0950       Lab Status: Preliminary result Specimen: Blood from Arm, Right Updated: 03/31/17 0510        Blood Culture Abnormal Stain (A)        Gram Stain Result Aerobic Bottle Gram positive cocci in chains and clusters       Blood Culture ID, PCR [[id number]] (Abnormal) Collected: 03/30/17 0950       Lab Status: Final result Specimen: Blood from Arm, Right Updated: 03/31/17 0648        BCID, PCR Enterococcus spp, not VRE. Identification by BCID PCR. (A)       Blood Culture [[id number]] (Normal) Collected: 03/19/17 1553       Lab Status: Final result Specimen: Blood from Arm, Left Updated: 03/24/17 1701        Blood Culture No growth at 5 days       Blood Culture [[id number]] (Normal) Collected: 03/19/17 1523       Lab Status: Final result Specimen: Blood from Arm, Left Updated: 03/24/17 1601        Blood Culture No growth at 5 days       Urine Culture [[id number]] (Normal) Collected: 03/19/17 1347       Lab Status: Final result Specimen: Urine from Urine, Clean Catch Updated: 03/21/17 0911        Urine Culture No growth       Urine Culture [[id number]] (Normal) Collected: 03/18/17 1915       Lab Status: Final result Specimen: Urine from Urine, Clean Catch Updated: 03/21/17 0911        Urine Culture No growth       Blood Culture [[id number]] (Abnormal)  Collected: 03/18/17 1840       Lab Status: Final result Specimen: Blood from Arm, Right Updated: 03/21/17 0757        Blood Culture Escherichia coli (A)        Isolated from --        Gram Stain Result Gram negative bacilli         Susceptibility         Escherichia coli         MAAME          Amikacin <=16  Susceptible         Amoxicillin + Clavulanate <=8/4  Susceptible         Ampicillin >16  Resistant         Ampicillin + Sulbactam 16/8  Intermediate         Aztreonam <=8  Susceptible         Cefazolin <=8  Susceptible         Ciprofloxacin >2  Resistant         Doripenem <=0.5  Susceptible         Ertapenem <=1  Susceptible         Gentamicin <=4  Susceptible         Imipenem <=1  Susceptible         Levofloxacin >4  Resistant         Piperacillin + Tazobactam <=16  Susceptible         Tetracycline >8  Resistant         Tobramycin <=4  Susceptible         Trimethoprim + Sulfamethoxazole >2/38  Resistant                               Radiology:  None new        DISCUSSION:  72 y.o. male who presented to the ED with complaints of epigastric pain, jaundice and fever. He was recently admitted in [place], and found to have Ecoli bacteremia, thought to be secondary to a urinary source due to his recent ureteral stent/removal. He was also found to have elevated liver enzymes,and upon discharge was to follow up with GI. He was also discharged on Vantin, which was not completed at the time of admission. He continued to have pain, and fevers up to 104 degrees and came to the ED. An abdominal CT revealed a new moderate to marked intrahepatic biliary ductal dilatation. Admitting labs revealing a leukocytosis of 23,700, ALT of 109, TBili of 9.4. Blood cultures have now become positive for Enterococcus, not VRE by biofire. He was started on Vancomycin, and Zosyn, and we were consulted for antibiotic management.    PROBLEM LIST:   1. Sepsis-with bacteremia/cholangitis, leukocytosis, elevated procalcitonin; improving.  2. Cholangitis-source of his sepsis, with recent Escherichia coli bacteremia and now enterococcal bacteremia.   3. Bacteremia-he had recent Escherichia coli bacteremia and now has enterococcal bacteremia, both of which are most likely secondary to cholangitis.  S/p PTC 3/31/2017:  2 sizable gallstones,  "with then an obstructing the common bile duct. These were \"dislodged\" into the more proximal bile duct/biliary system during placement of the internal/external drain.  4. History of Whipple, 2004   5 Leukocytosis/neutrophilia, secondary to sepsis; improved.  6. Biliary obstruction-secondary to stone.  LFT's improving.  S/p PTC 3/31/2017.  7. Pruritis, secondary to hyperbilirubinemia; improving.    PLAN:  1.  Continue vancomycin and Zosyn for now.  2.  Await final ID and susceptibility of Enterococcus; will be able to drop vancomycin if ampicillin susceptible.  3.  PICC in place.  4.  He will need evaluation at  for advance endoscopy for definitive stone removal.  5.  Except patient to be discharged early next week on IV abx.    Plan discussed with patient.    Tee Rios MD  4/1/2017  11:37 AM      Portions of this note may have been created with a voice recognition program.  Efforts were made to edit the dictations.  However, words are occassionally mistranscribed and sound alike errors may occur.    "

## 2017-04-01 NOTE — PLAN OF CARE
Problem: Patient Care Overview (Adult)  Goal: Plan of Care Review  Outcome: Ongoing (interventions implemented as appropriate)    04/01/17 0606   Coping/Psychosocial Response Interventions   Plan Of Care Reviewed With patient   Patient Care Overview   Progress improving       Goal: Adult Individualization and Mutuality  Outcome: Ongoing (interventions implemented as appropriate)    04/01/17 0606   Individualization   Patient Specific Goals Patient will remain comfortable and tolerate PO intake.         Problem: Pain, Acute (Adult)  Goal: Identify Related Risk Factors and Signs and Symptoms  Outcome: Ongoing (interventions implemented as appropriate)    04/01/17 0606   Pain, Acute   Related Risk Factors (Acute Pain) disease process;procedure/treatment   Signs and Symptoms (Acute Pain) verbalization of pain descriptors;questions meaning of pain       Goal: Acceptable Pain Control/Comfort Level  Outcome: Ongoing (interventions implemented as appropriate)    04/01/17 0606   Pain, Acute (Adult)   Acceptable Pain Control/Comfort Level making progress toward outcome

## 2017-04-02 LAB
ALBUMIN SERPL-MCNC: 3.1 G/DL (ref 3.2–4.8)
ALBUMIN/GLOB SERPL: 1.2 G/DL (ref 1.5–2.5)
ALP SERPL-CCNC: 381 U/L (ref 25–100)
ALT SERPL W P-5'-P-CCNC: 56 U/L (ref 7–40)
ANION GAP SERPL CALCULATED.3IONS-SCNC: 7 MMOL/L (ref 3–11)
AST SERPL-CCNC: 31 U/L (ref 0–33)
BILIRUB CONJ SERPL-MCNC: 3.6 MG/DL (ref 0–0.2)
BILIRUB SERPL-MCNC: 4.5 MG/DL (ref 0.3–1.2)
BUN BLD-MCNC: 7 MG/DL (ref 9–23)
BUN/CREAT SERPL: 7 (ref 7–25)
CALCIUM SPEC-SCNC: 9 MG/DL (ref 8.7–10.4)
CHLORIDE SERPL-SCNC: 100 MMOL/L (ref 99–109)
CO2 SERPL-SCNC: 26 MMOL/L (ref 20–31)
CREAT BLD-MCNC: 1 MG/DL (ref 0.6–1.3)
DEPRECATED RDW RBC AUTO: 56.4 FL (ref 37–54)
ERYTHROCYTE [DISTWIDTH] IN BLOOD BY AUTOMATED COUNT: 15.4 % (ref 11.3–14.5)
GFR SERPL CREATININE-BSD FRML MDRD: 74 ML/MIN/1.73
GLOBULIN UR ELPH-MCNC: 2.6 GM/DL
GLUCOSE BLD-MCNC: 293 MG/DL (ref 70–100)
GLUCOSE BLDC GLUCOMTR-MCNC: 267 MG/DL (ref 70–130)
GLUCOSE BLDC GLUCOMTR-MCNC: 296 MG/DL (ref 70–130)
GLUCOSE BLDC GLUCOMTR-MCNC: 310 MG/DL (ref 70–130)
GLUCOSE BLDC GLUCOMTR-MCNC: 313 MG/DL (ref 70–130)
HCT VFR BLD AUTO: 30.1 % (ref 38.9–50.9)
HGB BLD-MCNC: 9.9 G/DL (ref 13.1–17.5)
MCH RBC QN AUTO: 33.1 PG (ref 27–31)
MCHC RBC AUTO-ENTMCNC: 32.9 G/DL (ref 32–36)
MCV RBC AUTO: 100.7 FL (ref 80–99)
PLATELET # BLD AUTO: 240 10*3/MM3 (ref 150–450)
PMV BLD AUTO: 11.4 FL (ref 6–12)
POTASSIUM BLD-SCNC: 4.2 MMOL/L (ref 3.5–5.5)
PROT SERPL-MCNC: 5.7 G/DL (ref 5.7–8.2)
RBC # BLD AUTO: 2.99 10*6/MM3 (ref 4.2–5.76)
SODIUM BLD-SCNC: 133 MMOL/L (ref 132–146)
WBC NRBC COR # BLD: 5.97 10*3/MM3 (ref 3.5–10.8)

## 2017-04-02 PROCEDURE — 80053 COMPREHEN METABOLIC PANEL: CPT | Performed by: INTERNAL MEDICINE

## 2017-04-02 PROCEDURE — 25010000002 HEPARIN (PORCINE) PER 1000 UNITS: Performed by: INTERNAL MEDICINE

## 2017-04-02 PROCEDURE — 99231 SBSQ HOSP IP/OBS SF/LOW 25: CPT | Performed by: RADIOLOGY

## 2017-04-02 PROCEDURE — 25010000002 PIPERACILLIN SOD-TAZOBACTAM PER 1 G: Performed by: NURSE PRACTITIONER

## 2017-04-02 PROCEDURE — 94799 UNLISTED PULMONARY SVC/PX: CPT

## 2017-04-02 PROCEDURE — 99232 SBSQ HOSP IP/OBS MODERATE 35: CPT | Performed by: NURSE PRACTITIONER

## 2017-04-02 PROCEDURE — 25010000002 VANCOMYCIN PER 500 MG

## 2017-04-02 PROCEDURE — 82962 GLUCOSE BLOOD TEST: CPT

## 2017-04-02 PROCEDURE — 82248 BILIRUBIN DIRECT: CPT | Performed by: RADIOLOGY

## 2017-04-02 PROCEDURE — 85027 COMPLETE CBC AUTOMATED: CPT | Performed by: INTERNAL MEDICINE

## 2017-04-02 RX ORDER — DOCUSATE SODIUM 100 MG/1
100 CAPSULE, LIQUID FILLED ORAL 2 TIMES DAILY PRN
Status: DISCONTINUED | OUTPATIENT
Start: 2017-04-02 | End: 2017-04-03 | Stop reason: HOSPADM

## 2017-04-02 RX ADMIN — HEPARIN SODIUM 5000 UNITS: 5000 INJECTION, SOLUTION INTRAVENOUS; SUBCUTANEOUS at 22:13

## 2017-04-02 RX ADMIN — INSULIN LISPRO 5 UNITS: 100 INJECTION, SOLUTION INTRAVENOUS; SUBCUTANEOUS at 22:14

## 2017-04-02 RX ADMIN — INSULIN LISPRO 4 UNITS: 100 INJECTION, SOLUTION INTRAVENOUS; SUBCUTANEOUS at 12:29

## 2017-04-02 RX ADMIN — TAZOBACTAM SODIUM AND PIPERACILLIN SODIUM 3.38 G: 375; 3 INJECTION, SOLUTION INTRAVENOUS at 06:46

## 2017-04-02 RX ADMIN — TAZOBACTAM SODIUM AND PIPERACILLIN SODIUM 3.38 G: 375; 3 INJECTION, SOLUTION INTRAVENOUS at 17:53

## 2017-04-02 RX ADMIN — Medication 10 ML: at 12:24

## 2017-04-02 RX ADMIN — HYDROXYZINE HYDROCHLORIDE 25 MG: 25 TABLET, FILM COATED ORAL at 08:36

## 2017-04-02 RX ADMIN — HYDROCODONE BITARTRATE AND ACETAMINOPHEN 1 TABLET: 5; 325 TABLET ORAL at 00:22

## 2017-04-02 RX ADMIN — TAZOBACTAM SODIUM AND PIPERACILLIN SODIUM 3.38 G: 375; 3 INJECTION, SOLUTION INTRAVENOUS at 22:42

## 2017-04-02 RX ADMIN — HEPARIN SODIUM 5000 UNITS: 5000 INJECTION, SOLUTION INTRAVENOUS; SUBCUTANEOUS at 08:36

## 2017-04-02 RX ADMIN — TAZOBACTAM SODIUM AND PIPERACILLIN SODIUM 3.38 G: 375; 3 INJECTION, SOLUTION INTRAVENOUS at 12:24

## 2017-04-02 RX ADMIN — VANCOMYCIN HYDROCHLORIDE 1000 MG: 1 INJECTION, SOLUTION INTRAVENOUS at 07:47

## 2017-04-02 RX ADMIN — INSULIN LISPRO 4 UNITS: 100 INJECTION, SOLUTION INTRAVENOUS; SUBCUTANEOUS at 08:37

## 2017-04-02 RX ADMIN — PANTOPRAZOLE SODIUM 40 MG: 40 INJECTION, POWDER, FOR SOLUTION INTRAVENOUS at 06:46

## 2017-04-02 RX ADMIN — Medication 2 TABLET: at 08:36

## 2017-04-02 RX ADMIN — HYDROCODONE BITARTRATE AND ACETAMINOPHEN 1 TABLET: 5; 325 TABLET ORAL at 22:42

## 2017-04-02 RX ADMIN — DOCUSATE SODIUM 100 MG: 100 CAPSULE, LIQUID FILLED ORAL at 22:14

## 2017-04-02 RX ADMIN — VANCOMYCIN HYDROCHLORIDE 1000 MG: 1 INJECTION, SOLUTION INTRAVENOUS at 17:55

## 2017-04-02 RX ADMIN — Medication 2 TABLET: at 17:52

## 2017-04-02 RX ADMIN — INSULIN LISPRO 4 UNITS: 100 INJECTION, SOLUTION INTRAVENOUS; SUBCUTANEOUS at 17:51

## 2017-04-02 NOTE — PROGRESS NOTES
"NAME: BARB IBARRA  : 1945  PCP: Demetrius Wilkerson MD  ADMITTING PHYSICIAN: Aron Dalton MD    DATE OF ADMISSION:  3/30/2017  DATE OF SERVICE: 2017    HOSPITAL DAY:  4 days    HISTORY OF PRESENT ILLNESS:  72 y.o. male who is status post \"Whipple\" procedure in  4 duodenal cancer. He's had a several week history of progressive abdominal pain and progressive jaundice. He has a CT of the abdomen and pelvis which demonstrates intra-and extrahepatic biliary ductal dilatation consistent with obstruction. Given his history of prior \"Whipple\", and altered anatomy, he underwent percutaneous PTC with placement of an internal/external biliary drain on 3/31/2017.    REVIEW OF IMAGING:  No new imaging    LABS:  Lab Results   Component Value Date    WBC 5.97 2017    HGB 9.9 (L) 2017    HCT 30.1 (L) 2017    .7 (H) 2017     2017     Lab Results   Component Value Date    GLUCOSE 293 (H) 2017    CALCIUM 9.0 2017     2017    K 4.2 2017    CO2 26.0 2017     2017    BUN 7 (L) 2017    CREATININE 1.00 2017    EGFRIFNONA 74 2017    BCR 7.0 2017    ANIONGAP 7.0 2017     Results for BARB IBARRA (MRN [id number]) as of 2017 13:42   Ref. Range 3/27/2017 11:49 3/30/2017 08:06 3/31/2017 04:45 2017 05:51 2017 06:45   Total Bilirubin Latest Ref Range: 0.3 - 1.2 mg/dL 3.7 (H) 9.4 (H) 10.2 (H) 8.6 (H) 4.5 (H)       CURRENT MEDS:  Current Facility-Administered Medications   Medication Dose Route Frequency Provider Last Rate Last Dose   • acetaminophen (TYLENOL) tablet 650 mg  650 mg Oral Q6H PRN JADE Erickson   650 mg at 17 1806   • bisacodyl (DULCOLAX) EC tablet 5 mg  5 mg Oral Daily PRN Marie Teran, APRN       • bisacodyl (DULCOLAX) suppository 10 mg  10 mg Rectal Daily PRN Marie Teran, APRN       • dextrose (D50W) solution 25 g  25 g Intravenous Q15 Min PRN " Marie Bentonate, APRN       • dextrose (GLUTOSE) oral gel 15 g  15 g Oral Q15 Min PRN Marie Bentonate, APRN       • diphenhydrAMINE-zinc acetate 2-0.1 % cream   Topical TID PRN Kat Michel APRROSEMARY       • glucagon (GLUCAGEN) injection 1 mg  1 mg Subcutaneous Q15 Min PRN Marie Fugate, APRN       • heparin (porcine) 5000 UNIT/ML injection 5,000 Units  5,000 Units Subcutaneous Q12H Aron Dalton MD   5,000 Units at 04/02/17 0836   • HYDROcodone-acetaminophen (NORCO) 5-325 MG per tablet 1 tablet  1 tablet Oral Q4H PRN Marie Parul, APRN   1 tablet at 04/02/17 0022   • HYDROmorphone (DILAUDID) injection 0.5 mg  0.5 mg Intravenous Q4H PRN Marie Bentonate, APRN   0.5 mg at 04/01/17 0545    And   • naloxone (NARCAN) injection 0.4 mg  0.4 mg Intravenous Q5 Min PRN Marie Teran, APRN       • hydrOXYzine (ATARAX) tablet 25 mg  25 mg Oral BID PRN Kat Michel, APRN   25 mg at 04/02/17 0836   • insulin lispro (humaLOG) injection 0-7 Units  0-7 Units Subcutaneous 4x Daily AC & at Bedtime Marie Teran, APRN   4 Units at 04/02/17 1229   • ondansetron (ZOFRAN) injection 4 mg  4 mg Intravenous Q6H PRN Marie Bentonate, APRN       • pantoprazole (PROTONIX) injection 40 mg  40 mg Intravenous Q AM Ena Duckworth MD   40 mg at 04/02/17 0646   • Pharmacy to dose vancomycin   Does not apply Continuous PRN Marie Fugate, APRN       • piperacillin-tazobactam (ZOSYN) 3.375 g in iso-osmotic dextrose 50 ml (premix)  3.375 g Intravenous Q6H Marie Parul, APRN   3.375 g at 04/02/17 1224   • sennosides-docusate sodium (SENOKOT-S) 8.6-50 MG tablet 2 tablet  2 tablet Oral BID Marie Teran, APRN   2 tablet at 04/02/17 0836   • simethicone (MYLICON) chewable tablet 80 mg  80 mg Oral 4x Daily PRN Ena Duckworth MD       • sodium chloride 0.9 % flush 1-10 mL  1-10 mL Intravenous PRN JADE Erickson   10 mL at 04/02/17 1224   • sodium chloride 0.9 % flush 10 mL  10 mL Intravenous PRN Oleksandr Morrison MD       •  "sodium chloride 0.9 % flush 10-20 mL  10-20 mL Intracatheter PRN Christopher Robledo MD       • vancomycin (VANCOCIN) in iso-osmotic dextrose IVPB 1 g (premix) 200 mL  1,000 mg Intravenous Q12H Gonzalo TRIVEDI Adeola [facility]   1,000 mg at 04/02/17 0747   • [START ON 4/3/2017] Vancomycin trough 4/3 @0700, hold 0800 dose if level >20   Does not apply Once Gonzalo Mir [facility]           PHYSICAL EXAM:  Vitals:    04/02/17 1100   BP:    Pulse: 69   Resp:    Temp:    SpO2:       Mr. Noriega is sitting in chair eating lunch and comfortable.  Right midaxillary access site is clean and dry.  He continues to have some local \"discomfort\" at the midaxillary access site, but had a much better night last night.  No intra-abdominal pain.    ASSESSMENT/PLAN:  Mr. Noriega is a 72 y.o. male with history of \"Whipple\" procedure for duodenal cancer in 2004. He presented with obstructive jaundice, and underwent successful PTC with internal/external biliary drain placement. The source of his obstruction is deemed to be 2 sizable gallstones (status post cholecystectomy), one of which was lodged in the distal common bile duct, at the site of anastomosis.  Please note that both gallstones remain present, but were \"dislodged\" into the more proximal biliary tree during drain placement.    The \"external\" aspect of his drain has been turned off for over 24 hours, and his bilirubin continues to markedly decrease, indicating patent \"internal\" drainage.  He is okay for discharge home from an interventional radiology standpoint thereafter, to follow-up with GI for further management of his gallstones.  I did provide the patient/family with my cell phone number, and they will contact me if there are any questions/concerns regarding his biliary drain during the interim with GI follow-up.                "

## 2017-04-02 NOTE — PLAN OF CARE
Problem: Patient Care Overview (Adult)  Goal: Plan of Care Review  Outcome: Ongoing (interventions implemented as appropriate)    04/02/17 1539   Coping/Psychosocial Response Interventions   Plan Of Care Reviewed With patient;spouse   Patient Care Overview   Progress no change       Goal: Adult Individualization and Mutuality  Outcome: Ongoing (interventions implemented as appropriate)    04/02/17 1539   Individualization   Patient Specific Preferences saline lock inbetween antibiotics   Patient Specific Goals maintain VSWNL   Patient Specific Interventions monitor VS, saline lock after IV meds       Goal: Discharge Needs Assessment  Outcome: Ongoing (interventions implemented as appropriate)    04/02/17 1539   Discharge Needs Assessment   Concerns To Be Addressed denies needs/concerns at this time

## 2017-04-02 NOTE — PLAN OF CARE
Problem: Patient Care Overview (Adult)  Goal: Plan of Care Review  Outcome: Ongoing (interventions implemented as appropriate)    04/01/17 0606 04/01/17 2000   Coping/Psychosocial Response Interventions   Plan Of Care Reviewed With --  patient;son   Patient Care Overview   Progress improving --        Goal: Adult Individualization and Mutuality  Outcome: Ongoing (interventions implemented as appropriate)    04/01/17 0606   Individualization   Patient Specific Goals Patient will remain comfortable and tolerate PO intake.         Problem: Pain, Acute (Adult)  Goal: Identify Related Risk Factors and Signs and Symptoms  Outcome: Ongoing (interventions implemented as appropriate)    04/01/17 0606   Pain, Acute   Related Risk Factors (Acute Pain) disease process;procedure/treatment   Signs and Symptoms (Acute Pain) verbalization of pain descriptors;questions meaning of pain       Goal: Acceptable Pain Control/Comfort Level  Outcome: Ongoing (interventions implemented as appropriate)    04/02/17 0513   Pain, Acute (Adult)   Acceptable Pain Control/Comfort Level making progress toward outcome

## 2017-04-02 NOTE — PLAN OF CARE
Problem: Pain, Acute (Adult)  Goal: Identify Related Risk Factors and Signs and Symptoms  Outcome: Ongoing (interventions implemented as appropriate)    04/02/17 1538   Pain, Acute   Related Risk Factors (Acute Pain) disease process   Signs and Symptoms (Acute Pain) sleep pattern alteration       Goal: Acceptable Pain Control/Comfort Level  Outcome: Ongoing (interventions implemented as appropriate)    04/02/17 1538   Pain, Acute (Adult)   Acceptable Pain Control/Comfort Level making progress toward outcome

## 2017-04-02 NOTE — PROGRESS NOTES
[facility] Medicine Services  INPATIENT PROGRESS NOTE    Date of Admission: 3/30/2017  Length of Stay: 3  Primary Care Physician: Demetrius Wilkerson MD  Subjective   CC: Cholangitis    HPI:  Feels a lot better  More energetic  abd pain resolved  Sitting up, ready to eat  No new issues    Review Of Systems:   Review of Systems  No cp or SOA  Ate bkfast with permission of dr contreras    Objective    Temp:  [97.2 °F (36.2 °C)-99.1 °F (37.3 °C)] 98 °F (36.7 °C)  Heart Rate:  [60-83] 69  Resp:  [18] 18  BP: (129-145)/(68-79) 142/79  Physical Exam  Gen:  Alert, well-developed, well-nourished male in NAD, wife present   Head: Normocephalic atraumatic   Eyes: PERRLA, EOMI, mildly icteric   ENT: Pink, moist mucous membranes  Neck: Supple, nontender, trachea midline without JVD, lymphadenopathy or nuchal rigidity   Heart: RRR, no M/R/G   Lungs:  Unlabored, symmetrical chest expansion, CTAB  Abd:  Soft, mild mid epigastric tenderness, nondistended (no rebound, no guarding)  Extrem:  No E/C/C, +2 DP pulses bilaterally  Neuro: oriented to person, place, time and situation., clear speech, follows commands, grossly nonfocal  Psych: Pleasant and cooperative: Normal affect    Results Review:    I have reviewed the labs, radiology results and diagnostic studies.    Results from last 7 days  Lab Units 04/02/17  0645   WBC 10*3/mm3 5.97   HEMOGLOBIN g/dL 9.9*   PLATELETS 10*3/mm3 240       Results from last 7 days  Lab Units 04/02/17  0645   SODIUM mmol/L 133   POTASSIUM mmol/L 4.2   CHLORIDE mmol/L 100   TOTAL CO2 mmol/L 26.0   BUN mg/dL 7*   CREATININE mg/dL 1.00   GLUCOSE mg/dL 293*   CALCIUM mg/dL 9.0     Culture Data: Cultures:  Blood Culture   Date Value Ref Range Status   03/30/2017 No growth at 24 hours  Preliminary   03/30/2017 Abnormal Stain (A)  Preliminary     Radiology Data:  Reviewed  Imaging Results (most recent)     Procedure Component Value Units Date/Time    CT Abdomen Pelvis With Contrast  [[id number]] Collected:  03/30/17 1226     Updated:  03/30/17 2234    Narrative:       EXAMINATION: CT ABDOMEN AND PELVIS WITH CONTRAST-03/30/2017:      INDICATION: Abdominal pain and jaundice, sp whipple remotely;  R10.9-Unspecified abdominal pain; R79.89-Other specified abnormal  findings of blood chemistry; D72.829-Elevated white blood cell count,  unspecified.         TECHNIQUE: Spiral acquisition post-IV contrast 5 mm portal venous phase  and delayed venous phase images through the abdomen and pelvis.     The radiation dose reduction device was turned on for each scan per the  ALARA (As Low as Reasonably Achievable) protocol.     COMPARISON: CT images from the 01/02/2008 PET CT exam.     FINDINGS: New since the 2008 exam is moderate-to-marked intrahepatic  biliary ductal dilatation. The right and left main biliary radicals are  seen to be dilated, as is the proximal common hepatic duct. There are  multiple surgical clips in this region and the patient's anatomy is  uncertain. Presumably, there is a choledochoduodenostomy. There is no  obvious mass or inflammatory change at the apparent level of  obstruction, seen between axial images 25 and 26 of the initial scan and  also the delayed study. No liver masses are identified.     Elsewhere, numerous clips are seen in the expected location of the head  and uncinate process of the pancreas. The body and tail of the pancreas  appear atrophic. No peripancreatic inflammatory change is seen. The  spleen is not enlarged. Adrenal glands appear grossly normal. The  kidneys appear normal except for a few small cysts, under 1 cm in  diameter. No upper abdominal free air, ascites, or adenopathy is  appreciated. There is good contrast opacification of the superior  mesenteric vein, portal veins, and hepatic veins.     Regarding the lower abdomen and pelvis, the bowel loops are normal in  caliber and grossly normal in appearance. The bladder is  [id number] distended,  but appears normal. No ascites or  inflammatory focus is appreciated in the pelvis.       Impression:       1. New moderate-to-marked intrahepatic biliary ductal dilatation, and  dilatation of the proximal extrahepatic biliary duct. No visible  underlying mass or inflammatory focus.  2. No other evidence of acute abdominal or pelvic disease.  3. Distended bladder.     D:  03/30/2017  E:  03/30/2017           This report was finalized on 3/30/2017 10:32 PM by DR. Demond Moeller MD.       CT Angiogram Chest With Contrast [[id number]] Collected:  03/30/17 1221     Updated:  03/31/17 2206    Narrative:       EXAMINATION: CT ANGIOGRAM CHEST W CONTRAST- 03/30/2017     INDICATION: R10.9-Unspecified abdominal pain; R79.89-Other specified  abnormal findings of blood chemistry; D72.829-Elevated white blood cell  count, unspecified         TECHNIQUE: Spiral acquisition 3 mm post-IV contrast images through the  chest with coronal 10 mm 2-D reconstructions.     The radiation dose reduction device was turned on for each scan per the  ALARA (As Low as Reasonably Achievable) protocol.     COMPARISON: No previous chest CT scan. Previous PET/CT study of  01/02/2008.     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     There is good contrast opacification of the pulmonary arteries. There  are no visible filling defects to suggest pulmonary embolic disease.  There is no evidence of thoracic aortic aneurysm or dissection,  pericardial or pleural effusion, or mediastinal adenopathy.     Lung window images show minimal atelectasis of the posterior lower lobes  and trace left apical lung scarring. Lungs otherwise appear clear. Bony  structures appear intact.       Impression:       No evidence of pulmonary embolus or other active chest  disease.     D:  03/30/2017  E:  03/30/2017        This report was finalized on 3/31/2017 10:04 PM by DR. Demond Moeller MD.           I have reviewed the  "medications  Assessment/Plan   Problem List  Principal Problem:    Sepsis  Active Problems:    Epigastric pain    Gastroesophageal reflux disease    Type 2 diabetes mellitus without complication, without long-term current use of insulin    Low back pain    Hx of duodenal cancer, s/p Whipple 2004    Hx of bladder cancer, s/p \"scraping\", 2004    Leukocytosis    Elevated LFTs    Jaundice    Persistent fever    Recent bacteremia due to Escherichia coli    History of recent left nephrolithiasis s/p ureteral stent placement     Anemia, recent acute blood loss    Unintended weight loss     72 yr old man with Whipple 2004, Ecoli bacteremia in Feb in setting of ureteral stone, now progressive jaundice and biliary obstruction.     Assessment/Plan:  *Sepsis  *Enterococcus Bacteremia (due to below)  *Obstructive juandice w/ Ascending cholangitis  *Choledocholithiasis (2 gallstones \"lodged in common bile duct)   -s/p percutaneous internal/external biliary drain placed by dr. Wall 3/31/17 (dr. Wall \"turned off\" the external aspect of his drainage catheter 4/1/17)   -dr. Ena Karimi (gi) following (she spoke to dr. Ford, and advanced endoscopist) at ; plan to establish follow up w/ he or dr. chen at  within next week (to call to confirm Monday; dr. peterson's note says to contact agueda at [phone])  *Hx prior \"Whipple\" surgery for duodenal surgery (2004 dr. daigle)  *Hx prior bladder ca  *hx right ureteral stent w/ subsequent removal mid-march 2017 (dr. Lawson)  *Hx recent e.coli bacteremia  *DM2    Plan:  -Appreciate ID (miedler and de león), GI (nicholas), and Dr. Wall assistance  -Continue empiric vanc/zosyn for now, follow susceptibility of enterococcus for final abx plans (? Ampicillin if susceptible); picc line in place  -cbc, cmp in a.m.; if bilirubin continues to come down, dr. Wall will likely sign off (able to be discharged with the current drainage system in place with further input per [facility])  -plan likely d/c " early in week (? Monday); Need to ensure follow up at  advance endoscopy set up within a week or so; plan to touch base w/ dr. peterson Monday 4/3/17  -Patient bilirubin and LFTs have improved significantly since yesterday.  -Plan on wife being educated on PICC and IV administration prior to discharge  -Incentive spirometer ordered    DVT prophylaxis: sq heparin  Discharge Planning: I expect patient to be discharged to home in 3 days.    Rocío Barbosa, JADE   04/02/17   12:26 PM    Please note that portions of this note may have been completed with a voice recognition program. Efforts were made to edit the dictations, but occasionally words are mistranscribed.

## 2017-04-02 NOTE — PROGRESS NOTES
"[facility]    INPATIENT PROGRESS NOTE  2017      PATIENT NAME: Jean Noriega  :  1945  MRN:  [id number]  Date of Admission:  3/30/2017      Antimicrobials:  IV Anti-Infectives     Ordered     Dose/Rate Route Frequency Start Stop    17 0728  vancomycin (VANCOCIN) in iso-osmotic dextrose IVPB 1 g (premix) 200 mL     Ordering Provider:  Gonzalo Mir RPH    1,000 mg  over 60 Minutes Intravenous Every 12 Hours 17 1800      17 1152  piperacillin-tazobactam (ZOSYN) 3.375 g in iso-osmotic dextrose 50 ml (premix)     Comments:  First dose given in er   Ordering Provider:  JADE Erickson    3.375 g Intravenous Every 6 Hours 17 1200      17 1152  Pharmacy to dose vancomycin     Ordering Provider:  JADE Erickson     Does not apply Continuous PRN 17 1150      17 1034  vancomycin IVPB 1250 mg in 250 mL NS (premix)     Ordering Provider:  Oleksandr Morrison MD    20 mg/kg × 64.9 kg Intravenous Once 17 1045 17 1216    17 1009  piperacillin-tazobactam (ZOSYN) 4.5 g/100 mL 0.9% NS IVPB (mbp)     Ordering Provider:  Oleksandr Morrison MD    4.5 g Intravenous Once 17 1011 17 1140          MAR reviewed.    Chief Complaint   Patient presents with   • Abdominal Pain   • Abnormal Lab       Reason for consultation:  Enterococcal and E. coli bacteremia, cholangitis    Interval history:  Patient doing well today.  Ambulating without shortness of breath or chest pain.  Continues to have some pleuritic type of pain around the biliary drain.  No fevers or chills.  No rashes or diarrhea.    ROS:  No fevers/chills overnight.  No new rashes.  No SOB or chest pain.  No nausea/vomiting/diarrhea.  Denies side effects from antimicrobials.     Objective:  Temp (24hrs), Av.9 °F (36.6 °C), Min:97.2 °F (36.2 °C), Max:98.4 °F (36.9 °C)    /81  Pulse 65  Temp 98.4 °F (36.9 °C)  Resp 18  Ht 69\" (175.3 cm)  Wt 145 lb " 3.2 oz (65.9 kg)  SpO2 96%  BMI 21.44 kg/m2    Physical Examination:  LINES: Right UE PICC in place.  GENERAL: Awake and alert, in no acute distress.   HEENT: + icterus resolving.  HEART: RRR; No murmur, rubs, gallops.   LUNGS: Clear to auscultation bilaterally without wheezing, rales, rhonchi. Normal respiratory effort. Nonlabored. No dullness.  ABDOMEN: Soft, mild TTP RUQ, nondistended. Positive bowel sounds. No rebound or guarding. Right sided biliary external drain.  EXT: No cyanosis, clubbing or edema.  SKIN: Warm and dry +Jaundice resolving  NEURO: Oriented to PPT. No focal deficits on motor/sensory exam at arms/legs.  PSYCHIATRIC: Normal insight and judgement. Cooperative with PE    Laboratory Data:      Results from last 7 days  Lab Units 04/02/17  0645 04/01/17  0551 03/31/17  0445   WBC 10*3/mm3 5.97 7.69 14.17*   HEMOGLOBIN g/dL 9.9* 10.6* 10.2*   HEMATOCRIT % 30.1* 30.7* 29.7*   PLATELETS 10*3/mm3 240 228 219       Results from last 7 days  Lab Units 04/02/17  0645   SODIUM mmol/L 133   POTASSIUM mmol/L 4.2   CHLORIDE mmol/L 100   TOTAL CO2 mmol/L 26.0   BUN mg/dL 7*   CREATININE mg/dL 1.00   GLUCOSE mg/dL 293*   CALCIUM mg/dL 9.0       Results from last 7 days  Lab Units 04/02/17  0645   ALK PHOS U/L 381*   BILIRUBIN mg/dL 4.5*   BILIRUBIN DIRECT mg/dL 3.6*   ALT (SGPT) U/L 56*   AST (SGOT) U/L 31       Results from last 7 days  Lab Units 04/01/17  0551   VANCOMYCIN TR mcg/mL 4.50*           Microbiology:     Blood Culture [[id number]] (Normal) Collected: 03/30/17 0955       Lab Status: Preliminary result Specimen: Blood from Arm, Left Updated: 04/02/17 1001        Blood Culture No growth at 3 days       Blood Culture [[id number]] (Abnormal) Collected: 03/30/17 0950       Lab Status: Preliminary result Specimen: Blood from Arm, Right Updated: 04/02/17 1341        Blood Culture Enterococcus faecium (A)        Isolated from Aerobic Bottle        Gram Stain Result Aerobic Bottle Gram positive cocci in chains  and clusters       Blood Culture ID, PCR [[id number]] (Abnormal) Collected: 03/30/17 0950       Lab Status: Final result Specimen: Blood from Arm, Right Updated: 03/31/17 0648        BCID, PCR Enterococcus spp, not VRE. Identification by BCID PCR. (A)       Blood Culture [[id number]] (Normal) Collected: 03/19/17 1553       Lab Status: Final result Specimen: Blood from Arm, Left Updated: 03/24/17 1701        Blood Culture No growth at 5 days       Blood Culture [[id number]] (Normal) Collected: 03/19/17 1523       Lab Status: Final result Specimen: Blood from Arm, Left Updated: 03/24/17 1601        Blood Culture No growth at 5 days        Radiology:  None new    DISCUSSION:  72 y.o. male who presented to the ED with complaints of epigastric pain, jaundice and fever. He was recently admitted in [place], and found to have Ecoli bacteremia, thought to be secondary to a urinary source due to his recent ureteral stent/removal. He was also found to have elevated liver enzymes,and upon discharge was to follow up with GI. He was also discharged on Vantin, which was not completed at the time of admission. He continued to have pain, and fevers up to 104 degrees and came to the ED. An abdominal CT revealed a new moderate to marked intrahepatic biliary ductal dilatation. Admitting labs revealing a leukocytosis of 23,700, ALT of 109, TBili of 9.4. Blood cultures have now become positive for Enterococcus, not VRE by [employer]. He was started on Vancomycin, and Zosyn, and we were consulted for antibiotic management.     PROBLEM LIST:   1. Sepsis-with bacteremia/cholangitis, leukocytosis, elevated procalcitonin; much improved.  2. Cholangitis-source of his sepsis, with recent Escherichia coli bacteremia and now enterococcal bacteremia.   3. Bacteremia-he had recent Escherichia coli bacteremia and now has enterococcal bacteremia, both of which are most likely secondary to cholangitis. S/p PTC 3/31/2017: 2 sizable gallstones, with then an  "obstructing the common bile duct. These were \"dislodged\" into the more proximal bile duct/biliary system during placement of the internal/external drain.  4. History of Whipple, 2004   5 Leukocytosis/neutrophilia, secondary to sepsis; improved.  6. Biliary obstruction-secondary to stone. LFT's improving. S/p PTC 3/31/2017.  7. Pruritis, secondary to hyperbilirubinemia; improving.     PLAN:  1. Continue vancomycin and Zosyn for now.  2. Await final susceptibility of Enterococcus for final antibiotic decisions.  3. PICC in place.  4. He will need evaluation at  for advance endoscopy for definitive stone removal.  5. Expect patient to be discharged tomorrow on IV abx.    Plan discussed with patient/family.  Dr. Robledo back tomorrow.    Tee Rios MD  4/2/2017  6:23 PM      Portions of this note may have been created with a voice recognition program.  Efforts were made to edit the dictations.  However, words are occassionally mistranscribed and sound alike errors may occur.    "

## 2017-04-03 VITALS
TEMPERATURE: 97.3 F | HEART RATE: 71 BPM | RESPIRATION RATE: 20 BRPM | SYSTOLIC BLOOD PRESSURE: 130 MMHG | HEIGHT: 69 IN | OXYGEN SATURATION: 96 % | BODY MASS INDEX: 21.45 KG/M2 | DIASTOLIC BLOOD PRESSURE: 80 MMHG | WEIGHT: 144.8 LBS

## 2017-04-03 PROBLEM — D72.829 LEUKOCYTOSIS: Status: RESOLVED | Noted: 2017-03-30 | Resolved: 2017-04-03

## 2017-04-03 PROBLEM — D64.9 ANEMIA: Status: RESOLVED | Noted: 2017-03-30 | Resolved: 2017-04-03

## 2017-04-03 PROBLEM — R78.81 BACTEREMIA DUE TO ESCHERICHIA COLI: Status: RESOLVED | Noted: 2017-03-30 | Resolved: 2017-04-03

## 2017-04-03 PROBLEM — R50.9 PERSISTENT FEVER: Status: RESOLVED | Noted: 2017-03-30 | Resolved: 2017-04-03

## 2017-04-03 PROBLEM — B96.20 BACTEREMIA DUE TO ESCHERICHIA COLI: Status: RESOLVED | Noted: 2017-03-30 | Resolved: 2017-04-03

## 2017-04-03 LAB
ALBUMIN SERPL-MCNC: 3.3 G/DL (ref 3.2–4.8)
ALBUMIN/GLOB SERPL: 1.2 G/DL (ref 1.5–2.5)
ALP SERPL-CCNC: 349 U/L (ref 25–100)
ALT SERPL W P-5'-P-CCNC: 43 U/L (ref 7–40)
ANION GAP SERPL CALCULATED.3IONS-SCNC: 4 MMOL/L (ref 3–11)
AST SERPL-CCNC: 19 U/L (ref 0–33)
BILIRUB SERPL-MCNC: 3.8 MG/DL (ref 0.3–1.2)
BUN BLD-MCNC: 8 MG/DL (ref 9–23)
BUN/CREAT SERPL: 8 (ref 7–25)
CALCIUM SPEC-SCNC: 9.4 MG/DL (ref 8.7–10.4)
CHLORIDE SERPL-SCNC: 99 MMOL/L (ref 99–109)
CO2 SERPL-SCNC: 31 MMOL/L (ref 20–31)
CREAT BLD-MCNC: 1 MG/DL (ref 0.6–1.3)
DEPRECATED RDW RBC AUTO: 55.8 FL (ref 37–54)
ERYTHROCYTE [DISTWIDTH] IN BLOOD BY AUTOMATED COUNT: 15.4 % (ref 11.3–14.5)
GFR SERPL CREATININE-BSD FRML MDRD: 74 ML/MIN/1.73
GLOBULIN UR ELPH-MCNC: 2.7 GM/DL
GLUCOSE BLD-MCNC: 207 MG/DL (ref 70–100)
GLUCOSE BLDC GLUCOMTR-MCNC: 205 MG/DL (ref 70–130)
GLUCOSE BLDC GLUCOMTR-MCNC: 298 MG/DL (ref 70–130)
HCT VFR BLD AUTO: 31 % (ref 38.9–50.9)
HGB BLD-MCNC: 10.3 G/DL (ref 13.1–17.5)
MCH RBC QN AUTO: 33.6 PG (ref 27–31)
MCHC RBC AUTO-ENTMCNC: 33.2 G/DL (ref 32–36)
MCV RBC AUTO: 101 FL (ref 80–99)
PLATELET # BLD AUTO: 247 10*3/MM3 (ref 150–450)
PMV BLD AUTO: 10.4 FL (ref 6–12)
POTASSIUM BLD-SCNC: 3.8 MMOL/L (ref 3.5–5.5)
PROT SERPL-MCNC: 6 G/DL (ref 5.7–8.2)
RBC # BLD AUTO: 3.07 10*6/MM3 (ref 4.2–5.76)
SODIUM BLD-SCNC: 134 MMOL/L (ref 132–146)
VANCOMYCIN TROUGH SERPL-MCNC: 10.9 MCG/ML (ref 10–20)
WBC NRBC COR # BLD: 5.34 10*3/MM3 (ref 3.5–10.8)

## 2017-04-03 PROCEDURE — 80202 ASSAY OF VANCOMYCIN: CPT

## 2017-04-03 PROCEDURE — 80053 COMPREHEN METABOLIC PANEL: CPT | Performed by: INTERNAL MEDICINE

## 2017-04-03 PROCEDURE — 25010000002 PIPERACILLIN SOD-TAZOBACTAM PER 1 G: Performed by: NURSE PRACTITIONER

## 2017-04-03 PROCEDURE — 82962 GLUCOSE BLOOD TEST: CPT

## 2017-04-03 PROCEDURE — 85027 COMPLETE CBC AUTOMATED: CPT | Performed by: INTERNAL MEDICINE

## 2017-04-03 PROCEDURE — 25010000002 VANCOMYCIN PER 500 MG

## 2017-04-03 PROCEDURE — 25010000002 HEPARIN (PORCINE) PER 1000 UNITS: Performed by: INTERNAL MEDICINE

## 2017-04-03 PROCEDURE — 99239 HOSP IP/OBS DSCHRG MGMT >30: CPT | Performed by: NURSE PRACTITIONER

## 2017-04-03 RX ORDER — HYDROCODONE BITARTRATE AND ACETAMINOPHEN 5; 325 MG/1; MG/1
1 TABLET ORAL EVERY 6 HOURS PRN
Qty: 12 TABLET | Refills: 0
Start: 2017-04-03 | End: 2017-04-24 | Stop reason: SDUPTHER

## 2017-04-03 RX ORDER — HYDROXYZINE HYDROCHLORIDE 25 MG/1
25 TABLET, FILM COATED ORAL 2 TIMES DAILY PRN
Qty: 40 TABLET | Refills: 0 | Status: SHIPPED | OUTPATIENT
Start: 2017-04-03 | End: 2017-04-24

## 2017-04-03 RX ADMIN — HEPARIN SODIUM 5000 UNITS: 5000 INJECTION, SOLUTION INTRAVENOUS; SUBCUTANEOUS at 08:26

## 2017-04-03 RX ADMIN — Medication: at 06:43

## 2017-04-03 RX ADMIN — Medication 2 TABLET: at 08:26

## 2017-04-03 RX ADMIN — VANCOMYCIN HYDROCHLORIDE 1000 MG: 1 INJECTION, SOLUTION INTRAVENOUS at 06:42

## 2017-04-03 RX ADMIN — INSULIN LISPRO 4 UNITS: 100 INJECTION, SOLUTION INTRAVENOUS; SUBCUTANEOUS at 12:20

## 2017-04-03 RX ADMIN — INSULIN LISPRO 3 UNITS: 100 INJECTION, SOLUTION INTRAVENOUS; SUBCUTANEOUS at 08:26

## 2017-04-03 RX ADMIN — TAZOBACTAM SODIUM AND PIPERACILLIN SODIUM 3.38 G: 375; 3 INJECTION, SOLUTION INTRAVENOUS at 12:00

## 2017-04-03 RX ADMIN — PANTOPRAZOLE SODIUM 40 MG: 40 INJECTION, POWDER, FOR SOLUTION INTRAVENOUS at 05:48

## 2017-04-03 RX ADMIN — TAZOBACTAM SODIUM AND PIPERACILLIN SODIUM 3.38 G: 375; 3 INJECTION, SOLUTION INTRAVENOUS at 05:48

## 2017-04-03 NOTE — DISCHARGE SUMMARY
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]    • Hx of bladder cancer, s/p \"scraping\", 2004 [C67.9]    • Gastroesophageal reflux disease [K21.9] 07/06/2016   • Type 2 diabetes mellitus without complication, without long-term current use of insulin [E11.9] 07/06/2016   • Epigastric pain [R10.13] 07/06/2016   • Low back pain [M54.5] 07/06/2016      Resolved Hospital Problems    Diagnosis Date Noted Date Resolved   • Leukocytosis [D72.829] 03/30/2017 04/03/2017   • Persistent fever [R50.9] 03/30/2017 04/03/2017   • Recent bacteremia due to Escherichia coli [R78.81] 03/30/2017 04/03/2017   • Anemia, recent acute blood loss [D64.9] 03/30/2017 04/03/2017     Presenting Problem/History of Present Illness  Acute abdominal pain [R10.9]    Chief Complaint on Day of Discharge:   Patient is lying in bed very ready for discharge.  Patient has no complaints except a little bit of back pain where the PTC and drain is in place.  Patient states that his feeling much better.    Hospital Course  Mr. Noriega is a 72-year-old man with a past history of a Whipple in 2004 (duodenal CA), GERD, left nephrolithiasis, mixed hyperlipidemia, low back pain, T2DM who presented to [facility] ED on 3/30/17 with weeks of progressive abdominal pain and jaundice.  Patient was recently seen by Dr. Barnett for GI symptoms and had an MRCP that didn't visualize the CBD.  Apparently after eating the previous evening, patient's " abdominal pain got markedly worse, so patient presented to [facility] ED for further evaluation.  Patient had a left ureteral stone with hydronephrosis and underwent stent placement on 2/17/17 with removal on 3/13/17.  Patient reports significant hematuria and anemia with this.    Patient reports ongoing fevers and chills since February, with the longest reprieve being 4 days.  These symptoms have continued in the presence of antibiotic therapy.  Patient was on Bactrim, Macrobid which causes a rash and subsequently with Cipro, but the patient continued to have fever/chills and jaundice.  Patient was evaluated 3/17 and 3/18 at [facility] and [facility] [place] respectively.  Patient left [facility] [place] AMA, but returned the next day when he was notified of positive blood culture results.  Patient was admitted through 3/22 and received broad-spectrum antibiotics and was then discharged 10 days course of Vantin for Escherichia coli bacteremia, which was presumed to be a urologic source.  During this admission MRCP was obtained, which revealed poor visualization of the CBD and recommendations for an ERCP were made, but in the setting of patient's Whipple procedure, GI was hesitant to perform the ERCP secondary to unknown postsurgical anatomy.  Patient was improving clinically and discharged to follow-up with his PCP and GI doctors.  Patient states he continued to feel poorly, but continued on his Vantin but had worsening abdominal pain and jaundice with a fever up to 103.7.  Patient's CRP climbed from 4-73.8.  CT of the abdomen showed moderate to marked intrahepatic biliary ductal dilatation.  Patient was admitted to [facility] for further evaluation and treatment.  JOAQUIN Stover, followed the patient throughout his hospital stay.    Dr. Wall placed a PTC with internal/external biliary drain placement on 3/31/17 to remove biliary fluid.  Patient's liver enzymes, abdominal pain and fevers improved.  Dr. wall states that the source of the patient's obstruction was  "deemed to be too sizable gallstones (status post cholecystectomy), one of which was lodged in the distal common bile duct, at the site of anastomosis.  Please note that both gallstones remain present, but were dislodged into the more proximal biliary tree during drain placement.  Dr. contreras one on to say that the \"external\" aspect of his drain has been turned off and his bilirubin continues to markedly decreased, indicating patent \"internal\" drainage.  Dr. contreras wants patient to follow-up with GI for further management of his gallstones, but he did give the patient/family history of phone number for any concerns or questions.    Infectious disease was consulted and patient was started broad spectrum antibiotics.  Patient will be discharged home with IV Zosyn, which will be administered by patient's wife after she is instructed by [facility].  Dr. Duckworth has referred the patient to  to see advanced endoscopist Dr. Uribe or Dr. Ford.  Medical records has sent the patient's records from [facility], patient has Whipple in 2004 at [facility], some Evita at  is requesting those records.  The patient's nurse is to fax the discharge summary and any other information Dr Duckworth requested be sent.  I spoke directly with Evita at  who will put together a packet for Dr. Uribe or Dr. Ford who will review it and then schedule an appointment.  She assured me that she will call the patient and his wife tomorrow with a scheduled appointment.  Patient is ready for discharge after [facility] training his wife.    Produres Performed  Procedure(s):  PTC & Drain placement     Consults:   Consults     Date and Time Order Name Status Description    3/30/2017 1307 Inpatient Consult to Infectious Diseases Completed     3/30/2017 1152 Inpatient Consult to Neurosurgery Completed     3/30/2017 1152 Inpatient Consult to Gastroenterology Completed     3/19/2017 2008 Inpatient Consult to Gastroenterology Completed  "    3/19/2017 1700 Inpatient Consult to Infectious Diseases Completed         Pertinent Test Results:  Imaging Results (most recent)     Procedure Component Value Units Date/Time    CT Abdomen Pelvis With Contrast [[id number]] Collected:  03/30/17 1226     Updated:  03/30/17 2234    Narrative:       EXAMINATION: CT ABDOMEN AND PELVIS WITH CONTRAST-03/30/2017:      INDICATION: Abdominal pain and jaundice, sp whipple remotely;  R10.9-Unspecified abdominal pain; R79.89-Other specified abnormal  findings of blood chemistry; D72.829-Elevated white blood cell count,  unspecified.         TECHNIQUE: Spiral acquisition post-IV contrast 5 mm portal venous phase  and delayed venous phase images through the abdomen and pelvis.     The radiation dose reduction device was turned on for each scan per the  ALARA (As Low as Reasonably Achievable) protocol.     COMPARISON: CT images from the 01/02/2008 PET CT exam.     FINDINGS: New since the 2008 exam is moderate-to-marked intrahepatic  biliary ductal dilatation. The right and left main biliary radicals are  seen to be dilated, as is the proximal common hepatic duct. There are  multiple surgical clips in this region and the patient's anatomy is  uncertain. Presumably, there is a choledochoduodenostomy. There is no  obvious mass or inflammatory change at the apparent level of  obstruction, seen between axial images 25 and 26 of the initial scan and  also the delayed study. No liver masses are identified.     Elsewhere, numerous clips are seen in the expected location of the head  and uncinate process of the pancreas. The body and tail of the pancreas  appear atrophic. No peripancreatic inflammatory change is seen. The  spleen is not enlarged. Adrenal glands appear grossly normal. The  kidneys appear normal except for a few small cysts, under 1 cm in  diameter. No upper abdominal free air, ascites, or adenopathy is  appreciated. There is good contrast opacification of the  superior  mesenteric vein, portal veins, and hepatic veins.     Regarding the lower abdomen and pelvis, the bowel loops are normal in  caliber and grossly normal in appearance. The bladder is  mrgtatwuvz-qj-vszmnlce distended, but appears normal. No ascites or  inflammatory focus is appreciated in the pelvis.       Impression:       1. New moderate-to-marked intrahepatic biliary ductal dilatation, and  dilatation of the proximal extrahepatic biliary duct. No visible  underlying mass or inflammatory focus.  2. No other evidence of acute abdominal or pelvic disease.  3. Distended bladder.     D:  03/30/2017  E:  03/30/2017           This report was finalized on 3/30/2017 10:32 PM by DR. Demond Moeller MD.       CT Angiogram Chest With Contrast [[id number]] Collected:  03/30/17 1221     Updated:  03/31/17 2206    Narrative:       EXAMINATION: CT ANGIOGRAM CHEST W CONTRAST- 03/30/2017     INDICATION: R10.9-Unspecified abdominal pain; R79.89-Other specified  abnormal findings of blood chemistry; D72.829-Elevated white blood cell  count, unspecified         TECHNIQUE: Spiral acquisition 3 mm post-IV contrast images through the  chest with coronal 10 mm 2-D reconstructions.     The radiation dose reduction device was turned on for each scan per the  ALARA (As Low as Reasonably Achievable) protocol.     COMPARISON: No previous chest CT scan. Previous PET/CT study of  01/02/2008.     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     There is good contrast opacification of the pulmonary arteries. There  are no visible filling defects to suggest pulmonary embolic disease.  There is no evidence of thoracic aortic aneurysm or dissection,  pericardial or pleural effusion, or mediastinal adenopathy.     Lung window images show minimal atelectasis of the posterior lower lobes  and trace left apical lung scarring. Lungs otherwise appear clear. Bony  structures appear intact.       Impression:        No evidence of pulmonary embolus or other active chest  disease.     D:  03/30/2017  E:  03/30/2017        This report was finalized on 3/31/2017 10:04 PM by DR. Demond Moeller MD.           Lab Results (most recent)     Procedure Component Value Units Date/Time    CBC Auto Differential [[id number]]  (Abnormal) Collected:  03/30/17 0806    Specimen:  Blood Updated:  03/30/17 0850     WBC 23.74 (H) 10*3/mm3      RBC 3.52 (L) 10*6/mm3      Hemoglobin 11.8 (L) g/dL      Hematocrit 34.8 (L) %      MCV 98.9 fL      MCH 33.5 (H) pg      MCHC 33.9 g/dL      RDW 15.5 (H) %      RDW-SD 56.2 (H) fl      MPV 11.3 fL      Platelets 300 10*3/mm3      Neutrophil % 87.1 (H) %      Lymphocyte % 4.2 (L) %      Monocyte % 8.1 %      Eosinophil % 0.0 %      Basophil % 0.1 %      Immature Grans % 0.5 %      Neutrophils, Absolute 20.68 (H) 10*3/mm3      Lymphocytes, Absolute 0.99 10*3/mm3      Monocytes, Absolute 1.93 (H) 10*3/mm3      Eosinophils, Absolute 0.01 (L) 10*3/mm3      Basophils, Absolute 0.02 10*3/mm3      Immature Grans, Absolute 0.11 (H) 10*3/mm3     Amylase [[id number]]  (Normal) Collected:  03/30/17 0806    Specimen:  Blood Updated:  03/30/17 0921     Amylase 47 U/L     Lipase [[id number]]  (Normal) Collected:  03/30/17 0806    Specimen:  Blood Updated:  03/30/17 0921     Lipase 14 U/L     Comprehensive Metabolic Panel [[id number]]  (Abnormal) Collected:  03/30/17 0806    Specimen:  Blood Updated:  03/30/17 0921     Glucose 319 (H) mg/dL      BUN 12 mg/dL      Creatinine 0.90 mg/dL      Sodium 132 mmol/L      Potassium 4.5 mmol/L      Chloride 97 (L) mmol/L      CO2 27.0 mmol/L      Calcium 9.3 mg/dL      Total Protein 6.7 g/dL      Albumin 3.80 g/dL      ALT (SGPT) 109 (H) U/L      AST (SGOT) 96 (H) U/L      Alkaline Phosphatase 646 (H) U/L      Total Bilirubin 9.4 (H) mg/dL      eGFR Non African Amer 83 mL/min/1.73      Globulin 2.9 gm/dL      A/G Ratio 1.3 (L) g/dL      BUN/Creatinine Ratio 13.3     Anion Gap 8.0 mmol/L      Lactate Dehydrogenase [[id number]]  (Normal) Collected:  03/30/17 0806    Specimen:  Blood Updated:  03/30/17 0921      U/L     Sedimentation Rate [[id number]]  (Abnormal) Collected:  03/30/17 0806    Specimen:  Blood Updated:  03/30/17 0940     Sed Rate 38 (H) mm/hr     Protime-INR [[id number]] Collected:  03/30/17 0900    Specimen:  Blood Updated:  03/30/17 0944     Protime 10.8 Seconds      INR 0.99    Urinalysis With / Culture If Indicated [[id number]]  (Abnormal) Collected:  03/30/17 0902    Specimen:  Urine from Urine, Clean Catch Updated:  03/30/17 1004     Color, UA Orange (A)     Appearance, UA Clear     pH, UA 6.0     Specific Gravity, UA 1.038 (H)     Glucose, UA >=1000 mg/dL (3+) (A)     Ketones, UA Trace (A)     Bilirubin, UA Large (3+) (A)     Blood, UA Negative     Protein, UA Negative     Leuk Esterase, UA Negative     Nitrite, UA Negative     Urobilinogen, UA 1.0 E.U./dL    Narrative:       Urine microscopic not indicated.    Lactic Acid, Plasma [[id number]]  (Normal) Collected:  03/30/17 0950    Specimen:  Blood from Arm, Right Updated:  03/30/17 1018     Lactate 1.4 mmol/L       Falsely depressed results may occur on samples drawn from patients receiving N-Acetylcysteine (NAC) or Metamizole.       C-reactive Protein [[id number]]  (Abnormal) Collected:  03/30/17 0950    Specimen:  Blood from Arm, Right Updated:  03/30/17 1023     C-Reactive Protein 73.80 (H) mg/dL     Procalcitonin [[id number]] Collected:  03/30/17 0950    Specimen:  Blood from Arm, Right Updated:  03/30/17 1046     Procalcitonin 6.63 ng/mL     Narrative:       As a Marker for Sepsis (Non-Neonates):   1. <0.5 ng/mL represents a low risk of severe sepsis and/or septic shock.  2. >2 ng/mL represents a high risk of severe sepsis and/or septic shock.    As a Marker for Lower Respiratory Tract Infections that require antibiotic therapy:    PCT on Admission     Antibiotic Therapy       6-12 Hrs later  > 0.5                Strongly Recommended              >0.25 - <0.5         Recommended  0.1 - 0.25           Discouraged              Remeasure/reassess PCT  <0.1                 Strongly Discouraged     Remeasure/reassess PCT                     PCT values of < 0.5 ng/mL do not exclude an infection, because localized infections (without systemic signs) may be associated with such low concentrations, or a systemic infection in its initial stages (< 6 hours). Furthermore, increased PCT can occur without infection. PCT concentrations between 0.5 and 2.0 ng/mL should be interpreted taking into account the patient's history. It is recommended to retest PCT within 6-24 hours if any concentrations < 2 ng/mL are obtained.    POC Glucose Fingerstick [[id number]]  (Abnormal) Collected:  03/30/17 1225    Specimen:  Blood Updated:  03/30/17 1226     Glucose 240 (H) mg/dL     Narrative:       Bilirubin, Direct [[id number]]  (Abnormal) Collected:  03/30/17 0806    Specimen:  Blood Updated:  03/30/17 1313     Bilirubin, Direct 7.0 (H) mg/dL     POC Glucose Fingerstick [[id number]]  (Abnormal) Collected:  03/30/17 1621    Specimen:  Blood Updated:  03/30/17 1623     Glucose 193 (H) mg/dL     Narrative:       Meter: [id number] : [id number] Tarik Mathew    POC Glucose Fingerstick [[id number]]  (Abnormal) Collected:  03/30/17 2010    Specimen:  Blood Updated:  03/30/17 2012     Glucose 284 (H) mg/dL     Narrative:       Meter: [id number] : [id number] Nathaniel Pena    LSAC Slide Creation [[id number]] Collected:  03/31/17 0445    Specimen:  Blood Updated:  03/31/17 0523    CBC Auto Differential [[id number]]  (Abnormal) Collected:  03/31/17 0445    Specimen:  Blood Updated:  03/31/17 0538     WBC 14.17 (H) 10*3/mm3      RBC 2.99 (L) 10*6/mm3      Hemoglobin 10.2 (L) g/dL      Hematocrit 29.7 (L) %      MCV 99.3 (H) fL      MCH 34.1 (H) pg      MCHC 34.3 g/dL      RDW 15.8 (H) %      RDW-SD 57.8 (H) fl      MPV 11.2 fL      Platelets 219 10*3/mm3     Protime-INR [[id number]]  (Abnormal)  Collected:  03/31/17 0445    Specimen:  Blood Updated:  03/31/17 0541     Protime 11.9 (H) Seconds      INR 1.09    Comprehensive Metabolic Panel [[id number]]  (Abnormal) Collected:  03/31/17 0445    Specimen:  Blood Updated:  03/31/17 0604     Glucose 147 (H) mg/dL      BUN 11 mg/dL      Creatinine 0.80 mg/dL      Sodium 134 mmol/L      Potassium 3.9 mmol/L      Chloride 104 mmol/L      CO2 23.0 mmol/L      Calcium 8.7 mg/dL      Total Protein 5.6 (L) g/dL      Albumin 3.00 (L) g/dL      ALT (SGPT) 68 (H) U/L      AST (SGOT) 54 (H) U/L      Alkaline Phosphatase 435 (H) U/L      Total Bilirubin 10.2 (H) mg/dL      eGFR Non African Amer 96 mL/min/1.73      Globulin 2.6 gm/dL      A/G Ratio 1.2 (L) g/dL      BUN/Creatinine Ratio 13.8     Anion Gap 7.0 mmol/L     Blood Culture ID, PCR [[id number]]  (Abnormal) Collected:  03/30/17 0950    Specimen:  Blood from Arm, Right Updated:  03/31/17 0648     BCID, PCR Enterococcus spp, not VRE. Identification by BCID PCR. (A)    POC Glucose Fingerstick [[id number]]  (Normal) Collected:  03/31/17 0709    Specimen:  Blood Updated:  03/31/17 0711     Glucose 120 mg/dL     Manual Differential [[id number]]  (Abnormal) Collected:  03/31/17 0445    Specimen:  Blood Updated:  03/31/17 0919     Neutrophil % 77.0 (H) %      Lymphocyte % 12.0 (L) %      Monocyte % 11.0 %      Eosinophil % 0.0 %      Basophil % 0.0 %      Neutrophils Absolute 10.91 (H) 10*3/mm3      Lymphocytes Absolute 1.70 10*3/mm3      Monocytes Absolute 1.56 (H) 10*3/mm3      Eosinophils Absolute 0.00 (L) 10*3/mm3      Basophils Absolute 0.00 10*3/mm3      Stomatocytes Slight/1+     WBC Morphology Normal     Large Platelets Slight/1+    POC Glucose Fingerstick [[id number]]  (Abnormal) Collected:  03/31/17 1103    Specimen:  Blood Updated:  03/31/17 1104     Glucose 198 (H) mg/dL     Narrative:       Meter: [id number] : [id number] Raheel Baca    POC Glucose Fingerstick [[id number]]  (Normal) Collected:  03/31/17 1709     Specimen:  Blood Updated:  03/31/17 1710     Glucose 104 mg/dL     Narrative:       Meter: [id number] : [id number] Deisy Almendarez    POC Glucose Fingerstick [[id number]]  (Abnormal) Collected:  03/31/17 2009    Specimen:  Blood Updated:  03/31/17 2010     Glucose 289 (H) mg/dL     Narrative:       Meter: [id number] : [id number] Alexandre Mcconnell    Vancomycin, Trough Hold for trough > 22. [[id number]]  (Abnormal) Collected:  04/01/17 0551    Specimen:  Blood Updated:  04/01/17 0710     Vancomycin Trough 4.50 (L) mcg/mL     CBC (No Diff) [[id number]]  (Abnormal) Collected:  04/01/17 0551    Specimen:  Blood Updated:  04/01/17 0728     WBC 7.69 10*3/mm3      RBC 3.14 (L) 10*6/mm3      Hemoglobin 10.6 (L) g/dL      Hematocrit 30.7 (L) %      MCV 97.8 fL      MCH 33.8 (H) pg      MCHC 34.5 g/dL      RDW 15.3 (H) %      RDW-SD 54.5 (H) fl      MPV 11.0 fL      Platelets 228 10*3/mm3     Comprehensive Metabolic Panel [[id number]]  (Abnormal) Collected:  04/01/17 0551    Specimen:  Blood Updated:  04/01/17 0729     Glucose 164 (H) mg/dL      BUN 9 mg/dL      Creatinine 0.80 mg/dL      Sodium 132 mmol/L      Potassium 3.7 mmol/L      Chloride 100 mmol/L      CO2 26.0 mmol/L      Calcium 9.4 mg/dL      Total Protein 5.8 g/dL      Albumin 3.20 g/dL      ALT (SGPT) 80 (H) U/L      AST (SGOT) 95 (H) U/L      Alkaline Phosphatase 407 (H) U/L      Total Bilirubin 8.6 (H) mg/dL      eGFR Non African Amer 96 mL/min/1.73      Globulin 2.6 gm/dL      A/G Ratio 1.2 (L) g/dL      BUN/Creatinine Ratio 11.3     Anion Gap 6.0 mmol/L     POC Glucose Fingerstick [[id number]]  (Abnormal) Collected:  04/01/17 0754    Specimen:  Blood Updated:  04/01/17 0802     Glucose 142 (H) mg/dL     Narrative:       Meter: [id number] : [id number] Nikhil Livingston    POC Glucose Fingerstick [[id number]]  (Abnormal) Collected:  04/01/17 1201    Specimen:  Blood Updated:  04/01/17 1208     Glucose 239 (H) mg/dL     Narrative:       Meter: [id number] :  [id number] Accu[employer]inia    POC Glucose Fingerstick [[id number]]  (Abnormal) Collected:  04/01/17 1738    Specimen:  Blood Updated:  04/01/17 1749     Glucose 199 (H) mg/dL     Narrative:       Meter: [id number] : [id number] NcSWITCH Materialsannamarie Yara    POC Glucose Fingerstick [[id number]]  (Abnormal) Collected:  04/01/17 2028    Specimen:  Blood Updated:  04/01/17 2033     Glucose 314 (H) mg/dL     Narrative:       Meter: [id number] : [id number] Alexandre Mcconnell    CBC (No Diff) [[id number]]  (Abnormal) Collected:  04/02/17 0645    Specimen:  Blood Updated:  04/02/17 0747     WBC 5.97 10*3/mm3      RBC 2.99 (L) 10*6/mm3      Hemoglobin 9.9 (L) g/dL      Hematocrit 30.1 (L) %      .7 (H) fL      MCH 33.1 (H) pg      MCHC 32.9 g/dL      RDW 15.4 (H) %      RDW-SD 56.4 (H) fl      MPV 11.4 fL      Platelets 240 10*3/mm3     Bilirubin, Direct [[id number]]  (Abnormal) Collected:  04/02/17 0645    Specimen:  Blood Updated:  04/02/17 0802     Bilirubin, Direct 3.6 (H) mg/dL     Comprehensive Metabolic Panel [[id number]]  (Abnormal) Collected:  04/02/17 0645    Specimen:  Blood Updated:  04/02/17 0814     Glucose 293 (H) mg/dL      BUN 7 (L) mg/dL      Creatinine 1.00 mg/dL      Sodium 133 mmol/L      Potassium 4.2 mmol/L      Chloride 100 mmol/L      CO2 26.0 mmol/L      Calcium 9.0 mg/dL      Total Protein 5.7 g/dL      Albumin 3.10 (L) g/dL      ALT (SGPT) 56 (H) U/L      AST (SGOT) 31 U/L      Alkaline Phosphatase 381 (H) U/L      Total Bilirubin 4.5 (H) mg/dL      eGFR Non African Amer 74 mL/min/1.73      Globulin 2.6 gm/dL      A/G Ratio 1.2 (L) g/dL      BUN/Creatinine Ratio 7.0     Anion Gap 7.0 mmol/L     POC Glucose Fingerstick [[id number]]  (Abnormal) Collected:  04/02/17 0823    Specimen:  Blood Updated:  04/02/17 0852     Glucose 313 (H) mg/dL     Narrative:       Meter: [id number] : [id number] Nikhil Livingston    POC Glucose Fingerstick [[id number]]  (Abnormal) Collected:  04/02/17 1231    Specimen:  Blood Updated:   04/02/17 1246     Glucose 296 (H) mg/dL     Narrative:       Meter: [id number] : [id number] NcNewHoundgue Yara    POC Glucose Fingerstick [[id number]]  (Abnormal) Collected:  04/02/17 1714    Specimen:  Blood Updated:  04/02/17 1737     Glucose 267 (H) mg/dL     Narrative:       Meter: [id number] : [id number] Nchangue Yara    POC Glucose Fingerstick [[id number]]  (Abnormal) Collected:  04/02/17 2115    Specimen:  Blood Updated:  04/02/17 2117     Glucose 310 (H) mg/dL     Narrative:       Meter: [id number] : [id number] Larissa Arrieta    CBC (No Diff) [[id number]]  (Abnormal) Collected:  04/03/17 0446    Specimen:  Blood Updated:  04/03/17 0506     WBC 5.34 10*3/mm3      RBC 3.07 (L) 10*6/mm3      Hemoglobin 10.3 (L) g/dL      Hematocrit 31.0 (L) %      .0 (H) fL      MCH 33.6 (H) pg      MCHC 33.2 g/dL      RDW 15.4 (H) %      RDW-SD 55.8 (H) fl      MPV 10.4 fL      Platelets 247 10*3/mm3     Comprehensive Metabolic Panel [[id number]]  (Abnormal) Collected:  04/03/17 0446    Specimen:  Blood Updated:  04/03/17 0532     Glucose 207 (H) mg/dL      BUN 8 (L) mg/dL      Creatinine 1.00 mg/dL      Sodium 134 mmol/L      Potassium 3.8 mmol/L      Chloride 99 mmol/L      CO2 31.0 mmol/L      Calcium 9.4 mg/dL      Total Protein 6.0 g/dL      Albumin 3.30 g/dL      ALT (SGPT) 43 (H) U/L      AST (SGOT) 19 U/L      Alkaline Phosphatase 349 (H) U/L      Total Bilirubin 3.8 (H) mg/dL      eGFR Non African Amer 74 mL/min/1.73      Globulin 2.7 gm/dL      A/G Ratio 1.2 (L) g/dL      BUN/Creatinine Ratio 8.0     Anion Gap 4.0 mmol/L     Vancomycin, Trough Obtain level prior to 0600 dose 4/3 . Please do not administer dose until level is evaluated for potential dose adjustments [[id number]]  (Normal) Collected:  04/03/17 0446    Specimen:  Blood Updated:  04/03/17 0532     Vancomycin Trough 10.90 mcg/mL     POC Glucose Fingerstick [[id number]]  (Abnormal) Collected:  04/03/17 0810    Specimen:  Blood Updated:  04/03/17 0811      "Glucose 205 (H) mg/dL     Narrative:       Meter: [id number] : [id number] Deysi Cantu    Blood Culture [[id number]]  (Abnormal)  (Susceptibility) Collected:  03/30/17 0950    Specimen:  Blood from Arm, Right Updated:  04/03/17 0911     Blood Culture Enterococcus faecium (A)     Isolated from Aerobic Bottle     Gram Stain Result Aerobic Bottle Gram positive cocci in chains and clusters    Susceptibility      Enterococcus faecium     MAAME     Ampicillin <=2 ug/ml Susceptible     Gentamicin High Level Synergy <=500 ug/ml Susceptible     Linezolid 2 ug/ml Susceptible     Penicillin G 2 ug/ml Susceptible     Streptomycin High Level Synergy <=1000 ug/ml Susceptible     Vancomycin 0.5 ug/ml Susceptible                    Blood Culture [[id number]]  (Normal) Collected:  03/30/17 0955    Specimen:  Blood from Arm, Left Updated:  04/03/17 1001     Blood Culture No growth at 4 days    POC Glucose Fingerstick [[id number]]  (Abnormal) Collected:  04/03/17 1205    Specimen:  Blood Updated:  04/03/17 1208     Glucose 298 (H) mg/dL     Narrative:       Meter: [id number] : [id number] Deysi Cantu        Condition on Discharge:  Stable    Physical Exam on Discharge:/80  Pulse 71  Temp 97.3 °F (36.3 °C) (Oral)   Resp 20  Ht 69\" (175.3 cm)  Wt 144 lb 12.8 oz (65.7 kg)  SpO2 96%  BMI 21.38 kg/m2  Physical Exam  Gen: Alert, well-developed, well-nourished male in NAD, wife present   Head: Normocephalic atraumatic   Eyes: PERRLA, EOMI, mildly icteric   ENT: Pink, moist mucous membranes  Neck: Supple, nontender, trachea midline without JVD, lymphadenopathy or nuchal rigidity   Heart: RRR, no M/R/G   Lungs: Nonlabored, symmetrical chest expansion, CTAB  Abd: Soft, mild mid epigastric tenderness, nondistended (no rebound, no guarding)  Back: Drain coming out of right flank area and surrounded with tape  Extrem: No E/C/C, +2 DP pulses bilaterally  Neuro: oriented to person, place, time and situation., clear speech, follows " commands, recent remote memory intact.  Strength 5/5 in UE and LE bilaterally  Psych: Pleasant and cooperative: Normal affect; joking around with wife    Discharge Disposition  Home or Self Care    Discharge Medications   Jean Noriega   Home Medication Instructions JASVIR:[id number]    Printed on:04/03/17 1524   Medication Information                      cyclobenzaprine (FLEXERIL) 5 MG tablet  Take 1 tablet by mouth Daily.             glimepiride (AMARYL) 1 MG tablet  Take 0.5 tablets by mouth Every Morning Before Breakfast.             Hydrocodone-Acetaminophen (LORTAB PO)  Take 1 tablet by mouth Every 6 (Six) Hours.             HYDROcodone-acetaminophen (NORCO) 5-325 MG per tablet  Take 1 tablet by mouth Every 6 (Six) Hours As Needed for Severe Pain (7-10).             hydrOXYzine (ATARAX) 25 MG tablet  Take 1 tablet by mouth 2 (Two) Times a Day As Needed for Itching.             metFORMIN (GLUCOPHAGE) 1000 MG tablet  Take 1 tablet by mouth 2 (Two) Times a Day With Meals.             MULTIPLE VITAMIN PO  Take 1 tablet by mouth Daily.             pancrelipase, Lip-Prot-Amyl, (ZENPEP) 77131 UNITS capsule delayed-release particles capsule  Take 20,000 units of lipase by mouth 3 (Three) Times a Day With Meals.             pantoprazole (PROTONIX) 40 MG EC tablet  Take 40 mg by mouth 2 (Two) Times a Day.             piperacillin-tazobactam (ZOSYN) 3-0.375 GM/50ML IVPB  Infuse 50 mL into a venous catheter Every 6 (Six) Hours. Indications: Infection Within the Abdomen               Discharge Diet:   Diet Instructions     Diet: Regular, Cardiac; Thin Liquids, No Restrictions       Discharge Diet:   Regular  Cardiac      Fluid Consistency:  Thin Liquids, No Restrictions               Discharge Care Plan / Instructions:  Activity at Discharge:   Activity Instructions     Activity as Tolerated                   Follow-up Appointments  Future Appointments  Date Time Provider Department Center   4/10/2017 10:20 AM Demetrius  MD GRACIE Tamez [facility] None   4/11/2017 1:00 PM MD GRACIE Conley [facility] None   4/11/2017 1:30 PM CHAIR 15 [facility]   5/15/2017 9:00 AM Demetrius Wilkerson MD SAR[facility] None     Additional Instructions for the Follow-ups that You Need to Schedule     Additional Discharge Follow-Up (Specify Provider)    As directed    To:  Evita from  will call with an appointment to see advanced endoscopist Dr. Uribe or Dr. Ford.   Follow Up Details:  She should call tomorrow with appointment   Has the patient’s follow-up appointment been scheduled and documented in the discharge navigator?:  Patient to schedule, documented in the follow-up section       Discharge Follow-Up With Specified Provider    As directed    To:  Dr Hearn   Follow Up:  1 Week   Has the patient’s follow-up appointment been scheduled and documented in the discharge navigator?:  Yes, documented in the appointment section       Discharge Follow-up with PCP    As directed    Follow Up Details:  Follow-up with Demetrius Wilkerson MD within 1 week of discharge from the hospital   Has the patient’s follow-up appointment been scheduled and documented in the discharge navigator?:  Yes, documented in the appointment section       Discharge Follow-up with Specified Provider    As directed    To:  [facility] Dr. Ford or Rony   Follow Up:  1 Week   Follow Up Details:  Patient s/p whipple had biliary obstruction with 8 mm gallstones now with 8 FR drain placed by Dr. Wall.  Please fax Dr. Wall report to  and discharge summary       Discharge Follow-up with Specified Provider    As directed    To:   advanced endoscopy phone [phone]               Test Results Pending at Discharge Order Current Status    Blood Culture Preliminary result    Blood Culture Preliminary result        Rocío Barbosa, JADE 04/03/17 3:27 PM    Time: Discharge 60 min    Please note that portions of this note may  have been completed with a voice recognition program. Efforts were made to edit the dictations, but occasionally words are mistranscribed.  I supervised care of the patient on day of discharge with direct care provided by the advanced care provider (APC).

## 2017-04-03 NOTE — PROGRESS NOTES
Continued Stay Note  [facility]     Patient Name: Jaen Noriega  MRN: [id number]  Today's Date: 4/3/2017    Admit Date: 3/30/2017          Discharge Plan       04/03/17 1124    Case Management/Social Work Plan    Plan Home    Patient/Family In Agreement With Plan yes    Additional Comments Spoke with pt and wife at bedside.  Discussed going home with IV abx.  They are agreeable to [facility] to provide abx.  Spoke with Alondra and related pt's co-pay amt for abx thru 4/11 is $370.  Pt is agreeable with cost.  [facility] will provide meds and pt teaching in pt's room upon receiving DC orders.  Informed pt of appt with Dr. Robledo at [facility] on 4/11 and with OP onc for PICC dressing change at 1330.  Per pt and wife, they are able to self care for biliary drain.  Spoke with JADE Alford, who will speak with Dr. Duckworth to arrange F/U appt with .  No other immediate needs identified.  CM will cont to follow.                Discharge Codes     None        Expected Discharge Date and Time     Expected Discharge Date Expected Discharge Time    Apr 4, 2017             Porsha Case

## 2017-04-03 NOTE — PROGRESS NOTES
[facility] Progress Note    Admission Date: 3/30/2017    Jean Noriega  1945  [id number]    Date: 4/3/2017    Meds:    IV Anti-Infectives     Ordered     Dose/Rate Route Frequency Start Stop    17 1152  piperacillin-tazobactam (ZOSYN) 3.375 g in iso-osmotic dextrose 50 ml (premix)     Comments:  First dose given in er   Ordering Provider:  JADE Erickson    3.375 g Intravenous Every 6 Hours 17 1200      17 1034  vancomycin IVPB 1250 mg in 250 mL NS (premix)     Ordering Provider:  Oleksandr Morrison MD    20 mg/kg × 64.9 kg Intravenous Once 17 1045 17 1216    17 1009  piperacillin-tazobactam (ZOSYN) 4.5 g/100 mL 0.9% NS IVPB (mbp)     Ordering Provider:  Oleksandr Morrison MD    4.5 g Intravenous Once 17 1011 17 1140          CC:Sepsis       SUBJECTIVE: 17:72 y.o. male who presented to the ED with complaints of epigastric pain, jaundice and fever. He was recently admitted in [place], and found to have Ecoli bacteremia, thought to be secondary to a urinary source due to his recent ureteral stent/removal. He was also found to have elevated liver enzymes,and upon discharge was to follow up with GI. He was also discharged on Vantin, which was not completed at the time of admission. He continued to have pain, and fevers up to 104 degrees and came to the ED. An abdominal CT revealed a new moderate to marked intrahepatic biliary ductal dilatation. Admitting labs revealing a leukocytosis of 23,700, ALT of 109, TBili of 9.4. Blood cultures have now become positive for Enterococcus, not VRE by biofire. He was started on Vancomycin, and Zosyn, and we were consulted for antibiotic management.  4/3/17:  He has remained afebrile.  He has an external biliary drain in place.  He denies abdominal pain.  No complaints of n/v/d.  Ready to go home.     ROS:  No f/c/s. No n/v/d. No rash. No new ADR to Abx.     PE:   Vital Signs  Temp (24hrs), Av.9 °F (36.6 °C), Min:97.3 °F (36.3  °C), Max:98.4 °F (36.9 °C)    Temp  Min: 97.3 °F (36.3 °C)  Max: 98.4 °F (36.9 °C)  BP  Min: 123/70  Max: 152/81  Pulse  Min: 56  Max: 80  Resp  Min: 18  Max: 22  No Data Recorded    GENERAL: Awake and alert, in no acute distress.   HEENT:  No conjunctival injection. No icterus. Oropharynx clear without evidence of thrush or exudate.  NECK: Supple, no JVD     HEART: RRR; No murmur, rubs, gallops.   LUNGS: Clear to auscultation bilaterally without wheezing, rales, rhonchi. Normal respiratory effort. Nonlabored. No dullness.  ABDOMEN: Soft, nontender, nondistended. Positive bowel sounds. No rebound or guarding. NO mass or HSM.  EXT:  No cyanosis, clubbing or edema. No cord.  : Genitalia generally unremarkable.  Without Gamble catheter.  SKIN: Warm and dry  Right flank drain in place     NEURO: Alert and oriented x 3, Motor 5/5 bilaterally  Right PICC without redness tenderness     Laboratory Data      Results from last 7 days  Lab Units 04/03/17  0446 04/02/17  0645 04/01/17  0551   WBC 10*3/mm3 5.34 5.97 7.69   HEMOGLOBIN g/dL 10.3* 9.9* 10.6*   HEMATOCRIT % 31.0* 30.1* 30.7*   PLATELETS 10*3/mm3 247 240 228       Results from last 7 days  Lab Units 04/03/17  0446   SODIUM mmol/L 134   POTASSIUM mmol/L 3.8   CHLORIDE mmol/L 99   TOTAL CO2 mmol/L 31.0   BUN mg/dL 8*   CREATININE mg/dL 1.00   GLUCOSE mg/dL 207*   CALCIUM mg/dL 9.4       Results from last 7 days  Lab Units 04/03/17  0446 04/02/17  0645   ALK PHOS U/L 349* 381*   BILIRUBIN mg/dL 3.8* 4.5*   BILIRUBIN DIRECT mg/dL  --  3.6*   ALT (SGPT) U/L 43* 56*   AST (SGOT) U/L 19 31       Results from last 7 days  Lab Units 03/30/17  0806   SED RATE mm/hr 38*       Results from last 7 days  Lab Units 03/30/17  0950   CRP mg/dL 73.80*       Results from last 7 days  Lab Units 03/30/17  0950   LACTATE mmol/L 1.4           Results from last 7 days  Lab Units 04/03/17  0446 04/01/17  0551   VANCOMYCIN TR mcg/mL 10.90 4.50*     Estimated Creatinine Clearance: 63.9 mL/min  (by C-G formula based on Cr of 1).    Microbiology:  Blood Culture   Date Value Ref Range Status   03/30/2017 No growth at 3 days  Preliminary     BCID, PCR   Date Value Ref Range Status   03/30/2017 (A) Negative by BCID PCR. Culture to Follow. Final    Enterococcus spp, not VRE. Identification by BCID PCR.       Blood culture:  E. Faecium sensitive to ampicillin by verbal report                                  Ecoli: sensitive to Ertapenem, Zosyn                    Radiology:  Imaging Results (last 72 hours)     Procedure Component Value Units Date/Time    CT Angiogram Chest With Contrast [[id number]] Collected:  03/30/17 1221     Updated:  03/31/17 2206    Narrative:       EXAMINATION: CT ANGIOGRAM CHEST W CONTRAST- 03/30/2017     INDICATION: R10.9-Unspecified abdominal pain; R79.89-Other specified  abnormal findings of blood chemistry; D72.829-Elevated white blood cell  count, unspecified         TECHNIQUE: Spiral acquisition 3 mm post-IV contrast images through the  chest with coronal 10 mm 2-D reconstructions.     The radiation dose reduction device was turned on for each scan per the  ALARA (As Low as Reasonably Achievable) protocol.     COMPARISON: No previous chest CT scan. Previous PET/CT study of  01/02/2008.     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     There is good contrast opacification of the pulmonary arteries. There  are no visible filling defects to suggest pulmonary embolic disease.  There is no evidence of thoracic aortic aneurysm or dissection,  pericardial or pleural effusion, or mediastinal adenopathy.     Lung window images show minimal atelectasis of the posterior lower lobes  and trace left apical lung scarring. Lungs otherwise appear clear. Bony  structures appear intact.       Impression:       No evidence of pulmonary embolus or other active chest  disease.     D:  03/30/2017  E:  03/30/2017        This report was finalized on 3/31/2017  "10:04 PM by DR. Demond Moeller MD.             I personally read the radiographic studies     IMPRESSION:  1. Sepsis-with bacteremia/cholangitis, leukocytosis, elevated procalcitonin; much improved.  2. Cholangitis-source of his sepsis  3. Bacteremia-he had recent Escherichia coli bacteremia and now has enterococcal bacteremia, both of which are most likely secondary to cholangitis. S/p PTC 3/31/2017: 2 sizable gallstones, with then an obstructing the common bile duct. These were \"dislodged\" into the more proximal bile duct/biliary system during placement of the internal/external drain.  4. History of Whipple, 2004   5 Leukocytosis/neutrophilia, secondary to sepsis; improved.  6. Biliary obstruction-secondary to stone. LFT's improving. S/p PTC 3/31/2017.  He will require further intervention at  and currently only has an external drain in place.      PLAN:  1.Continue  Zosyn  2. He will need evaluation at  for advance endoscopy for definitive stone removal.  3.  Discontinue vancomycin  4.  Discharge to home today    UM/ELLIOTT: I coordinated his care today.  The nursing staff will change his PICC line dressing prior to discharge.  He will discharge home on Zosyn 4.5 g IV every 8 hours to be provided by [facility].  I will see him in follow-up on Tuesday for/11 at 1200 with stat labs.  Dr. Karimi will arrange his follow-up at  for further intervention for his biliary stones.  I discussed his situation with Dr. Karimi today.  I also discussed his discharge with the  and Dr Dalton.  I discussed outpatient infusion in detail with both him and his wife.    I spent over 35 min on his care today.   Dr. Robledo has obtained the history, performed the physical exam and formulated the above treatment plan.       Christopher Robledo MD  4/3/2017  9:44 AM      "

## 2017-04-04 LAB — BACTERIA SPEC AEROBE CULT: NORMAL

## 2017-04-05 LAB
BACTERIA SPEC AEROBE CULT: ABNORMAL
GRAM STN SPEC: ABNORMAL
ISOLATED FROM: ABNORMAL

## 2017-04-11 ENCOUNTER — INFUSION (OUTPATIENT)
Dept: ONCOLOGY | Facility: HOSPITAL | Age: 72
End: 2017-04-11

## 2017-04-11 ENCOUNTER — LAB (OUTPATIENT)
Dept: LAB | Facility: HOSPITAL | Age: 72
End: 2017-04-11

## 2017-04-11 ENCOUNTER — TRANSCRIBE ORDERS (OUTPATIENT)
Dept: LAB | Facility: HOSPITAL | Age: 72
End: 2017-04-11

## 2017-04-11 VITALS
BODY MASS INDEX: 21.48 KG/M2 | HEART RATE: 99 BPM | SYSTOLIC BLOOD PRESSURE: 143 MMHG | RESPIRATION RATE: 20 BRPM | DIASTOLIC BLOOD PRESSURE: 81 MMHG | WEIGHT: 145 LBS | TEMPERATURE: 98.5 F | HEIGHT: 69 IN

## 2017-04-11 DIAGNOSIS — D72.823 NEUTROPHILIC LEUKEMOID REACTION: ICD-10-CM

## 2017-04-11 DIAGNOSIS — R10.13 EPIGASTRIC PAIN: ICD-10-CM

## 2017-04-11 DIAGNOSIS — A41.81 SEPTICEMIA DUE TO ENTEROCOCCUS (HCC): ICD-10-CM

## 2017-04-11 DIAGNOSIS — K80.33 CALCULUS OF BILE DUCT WITH ACUTE CHOLANGITIS WITH OBSTRUCTION: ICD-10-CM

## 2017-04-11 DIAGNOSIS — Z87.442 HISTORY OF NEPHROLITHIASIS: ICD-10-CM

## 2017-04-11 DIAGNOSIS — R17 JAUNDICE: ICD-10-CM

## 2017-04-11 DIAGNOSIS — A41.81 SEPTICEMIA DUE TO ENTEROCOCCUS (HCC): Primary | ICD-10-CM

## 2017-04-11 DIAGNOSIS — E13.8 DIABETES MELLITUS OF OTHER TYPE WITH COMPLICATION: ICD-10-CM

## 2017-04-11 LAB
ALBUMIN SERPL-MCNC: 4.1 G/DL (ref 3.2–4.8)
ALBUMIN/GLOB SERPL: 1.5 G/DL (ref 1.5–2.5)
ALP SERPL-CCNC: 263 U/L (ref 25–100)
ALT SERPL W P-5'-P-CCNC: 26 U/L (ref 7–40)
ANION GAP SERPL CALCULATED.3IONS-SCNC: 4 MMOL/L (ref 3–11)
AST SERPL-CCNC: 30 U/L (ref 0–33)
BASOPHILS # BLD AUTO: 0.05 10*3/MM3 (ref 0–0.2)
BASOPHILS NFR BLD AUTO: 0.4 % (ref 0–1)
BILIRUB SERPL-MCNC: 2.4 MG/DL (ref 0.3–1.2)
BUN BLD-MCNC: 11 MG/DL (ref 9–23)
BUN/CREAT SERPL: 12.2 (ref 7–25)
CALCIUM SPEC-SCNC: 10.1 MG/DL (ref 8.7–10.4)
CHLORIDE SERPL-SCNC: 99 MMOL/L (ref 99–109)
CO2 SERPL-SCNC: 34 MMOL/L (ref 20–31)
CREAT BLD-MCNC: 0.9 MG/DL (ref 0.6–1.3)
CRP SERPL-MCNC: 1.13 MG/DL (ref 0–1)
DEPRECATED RDW RBC AUTO: 56.4 FL (ref 37–54)
EOSINOPHIL # BLD AUTO: 0.16 10*3/MM3 (ref 0.1–0.3)
EOSINOPHIL NFR BLD AUTO: 1.4 % (ref 0–3)
ERYTHROCYTE [DISTWIDTH] IN BLOOD BY AUTOMATED COUNT: 15.3 % (ref 11.3–14.5)
ERYTHROCYTE [SEDIMENTATION RATE] IN BLOOD: 42 MM/HR (ref 0–20)
GFR SERPL CREATININE-BSD FRML MDRD: 83 ML/MIN/1.73
GLOBULIN UR ELPH-MCNC: 2.7 GM/DL
GLUCOSE BLD-MCNC: 158 MG/DL (ref 70–100)
HCT VFR BLD AUTO: 38.6 % (ref 38.9–50.9)
HGB BLD-MCNC: 12.7 G/DL (ref 13.1–17.5)
IMM GRANULOCYTES # BLD: 0.16 10*3/MM3 (ref 0–0.03)
IMM GRANULOCYTES NFR BLD: 1.4 % (ref 0–0.6)
LYMPHOCYTES # BLD AUTO: 3.03 10*3/MM3 (ref 0.6–4.8)
LYMPHOCYTES NFR BLD AUTO: 25.6 % (ref 24–44)
MCH RBC QN AUTO: 33.2 PG (ref 27–31)
MCHC RBC AUTO-ENTMCNC: 32.9 G/DL (ref 32–36)
MCV RBC AUTO: 101 FL (ref 80–99)
MONOCYTES # BLD AUTO: 0.7 10*3/MM3 (ref 0–1)
MONOCYTES NFR BLD AUTO: 5.9 % (ref 0–12)
NEUTROPHILS # BLD AUTO: 7.73 10*3/MM3 (ref 1.5–8.3)
NEUTROPHILS NFR BLD AUTO: 65.3 % (ref 41–71)
PLATELET # BLD AUTO: 469 10*3/MM3 (ref 150–450)
PMV BLD AUTO: 9.8 FL (ref 6–12)
POTASSIUM BLD-SCNC: 5.5 MMOL/L (ref 3.5–5.5)
PROT SERPL-MCNC: 6.8 G/DL (ref 5.7–8.2)
RBC # BLD AUTO: 3.82 10*6/MM3 (ref 4.2–5.76)
SODIUM BLD-SCNC: 137 MMOL/L (ref 132–146)
WBC NRBC COR # BLD: 11.83 10*3/MM3 (ref 3.5–10.8)

## 2017-04-11 PROCEDURE — 85652 RBC SED RATE AUTOMATED: CPT

## 2017-04-11 PROCEDURE — G0463 HOSPITAL OUTPT CLINIC VISIT: HCPCS

## 2017-04-11 PROCEDURE — 86140 C-REACTIVE PROTEIN: CPT

## 2017-04-11 PROCEDURE — 85025 COMPLETE CBC W/AUTO DIFF WBC: CPT

## 2017-04-11 PROCEDURE — 36415 COLL VENOUS BLD VENIPUNCTURE: CPT

## 2017-04-11 PROCEDURE — 80053 COMPREHEN METABOLIC PANEL: CPT

## 2017-04-14 DIAGNOSIS — K83.1: Primary | ICD-10-CM

## 2017-04-18 ENCOUNTER — APPOINTMENT (OUTPATIENT)
Dept: ONCOLOGY | Facility: HOSPITAL | Age: 72
End: 2017-04-18

## 2017-04-18 ENCOUNTER — APPOINTMENT (OUTPATIENT)
Dept: LAB | Facility: HOSPITAL | Age: 72
End: 2017-04-18

## 2017-04-18 ENCOUNTER — TRANSCRIBE ORDERS (OUTPATIENT)
Dept: LAB | Facility: HOSPITAL | Age: 72
End: 2017-04-18

## 2017-04-18 DIAGNOSIS — N39.0 ESCHERICHIA COLI URINARY TRACT INFECTION: Primary | ICD-10-CM

## 2017-04-18 DIAGNOSIS — B96.20 ESCHERICHIA COLI URINARY TRACT INFECTION: Primary | ICD-10-CM

## 2017-04-18 DIAGNOSIS — A41.81 SEPTICEMIA DUE TO ENTEROCOCCUS (HCC): ICD-10-CM

## 2017-04-18 LAB
ALBUMIN SERPL-MCNC: 3.7 G/DL (ref 3.2–4.8)
ALBUMIN/GLOB SERPL: 1.4 G/DL (ref 1.5–2.5)
ALP SERPL-CCNC: 149 U/L (ref 25–100)
ALT SERPL W P-5'-P-CCNC: 22 U/L (ref 7–40)
ANION GAP SERPL CALCULATED.3IONS-SCNC: 3 MMOL/L (ref 3–11)
AST SERPL-CCNC: 27 U/L (ref 0–33)
BASOPHILS # BLD AUTO: 0.03 10*3/MM3 (ref 0–0.2)
BASOPHILS NFR BLD AUTO: 0.5 % (ref 0–1)
BILIRUB SERPL-MCNC: 1.5 MG/DL (ref 0.3–1.2)
BUN BLD-MCNC: 9 MG/DL (ref 9–23)
BUN/CREAT SERPL: 9 (ref 7–25)
CALCIUM SPEC-SCNC: 9.3 MG/DL (ref 8.7–10.4)
CHLORIDE SERPL-SCNC: 100 MMOL/L (ref 99–109)
CO2 SERPL-SCNC: 35 MMOL/L (ref 20–31)
CREAT BLD-MCNC: 1 MG/DL (ref 0.6–1.3)
CRP SERPL-MCNC: 0.7 MG/DL (ref 0–1)
DEPRECATED RDW RBC AUTO: 54.4 FL (ref 37–54)
EOSINOPHIL # BLD AUTO: 0.2 10*3/MM3 (ref 0.1–0.3)
EOSINOPHIL NFR BLD AUTO: 3.1 % (ref 0–3)
ERYTHROCYTE [DISTWIDTH] IN BLOOD BY AUTOMATED COUNT: 14.9 % (ref 11.3–14.5)
ERYTHROCYTE [SEDIMENTATION RATE] IN BLOOD: 10 MM/HR (ref 0–20)
GFR SERPL CREATININE-BSD FRML MDRD: 74 ML/MIN/1.73
GLOBULIN UR ELPH-MCNC: 2.6 GM/DL
GLUCOSE BLD-MCNC: 249 MG/DL (ref 70–100)
HCT VFR BLD AUTO: 38.6 % (ref 38.9–50.9)
HGB BLD-MCNC: 12.5 G/DL (ref 13.1–17.5)
IMM GRANULOCYTES # BLD: 0.05 10*3/MM3 (ref 0–0.03)
IMM GRANULOCYTES NFR BLD: 0.8 % (ref 0–0.6)
LYMPHOCYTES # BLD AUTO: 1.57 10*3/MM3 (ref 0.6–4.8)
LYMPHOCYTES NFR BLD AUTO: 24 % (ref 24–44)
MCH RBC QN AUTO: 32.1 PG (ref 27–31)
MCHC RBC AUTO-ENTMCNC: 32.4 G/DL (ref 32–36)
MCV RBC AUTO: 99 FL (ref 80–99)
MONOCYTES # BLD AUTO: 0.61 10*3/MM3 (ref 0–1)
MONOCYTES NFR BLD AUTO: 9.3 % (ref 0–12)
NEUTROPHILS # BLD AUTO: 4.07 10*3/MM3 (ref 1.5–8.3)
NEUTROPHILS NFR BLD AUTO: 62.3 % (ref 41–71)
PLATELET # BLD AUTO: 257 10*3/MM3 (ref 150–450)
PMV BLD AUTO: 10 FL (ref 6–12)
POTASSIUM BLD-SCNC: 4.6 MMOL/L (ref 3.5–5.5)
PROT SERPL-MCNC: 6.3 G/DL (ref 5.7–8.2)
RBC # BLD AUTO: 3.9 10*6/MM3 (ref 4.2–5.76)
SODIUM BLD-SCNC: 138 MMOL/L (ref 132–146)
WBC NRBC COR # BLD: 6.53 10*3/MM3 (ref 3.5–10.8)

## 2017-04-18 PROCEDURE — 85652 RBC SED RATE AUTOMATED: CPT | Performed by: INTERNAL MEDICINE

## 2017-04-18 PROCEDURE — 86140 C-REACTIVE PROTEIN: CPT | Performed by: INTERNAL MEDICINE

## 2017-04-18 PROCEDURE — 85025 COMPLETE CBC W/AUTO DIFF WBC: CPT | Performed by: INTERNAL MEDICINE

## 2017-04-18 PROCEDURE — 36415 COLL VENOUS BLD VENIPUNCTURE: CPT | Performed by: INTERNAL MEDICINE

## 2017-04-18 PROCEDURE — 80053 COMPREHEN METABOLIC PANEL: CPT | Performed by: INTERNAL MEDICINE

## 2017-04-24 ENCOUNTER — OFFICE VISIT (OUTPATIENT)
Dept: FAMILY MEDICINE CLINIC | Facility: CLINIC | Age: 72
End: 2017-04-24

## 2017-04-24 VITALS
BODY MASS INDEX: 21.67 KG/M2 | TEMPERATURE: 98.3 F | SYSTOLIC BLOOD PRESSURE: 130 MMHG | HEIGHT: 69 IN | HEART RATE: 74 BPM | WEIGHT: 146.3 LBS | DIASTOLIC BLOOD PRESSURE: 84 MMHG

## 2017-04-24 DIAGNOSIS — E11.9 TYPE 2 DIABETES MELLITUS WITHOUT COMPLICATION, WITHOUT LONG-TERM CURRENT USE OF INSULIN (HCC): Primary | ICD-10-CM

## 2017-04-24 DIAGNOSIS — K80.51 CALCULUS OF BILE DUCT WITHOUT CHOLANGITIS WITH OBSTRUCTION: ICD-10-CM

## 2017-04-24 PROBLEM — R63.4 UNINTENDED WEIGHT LOSS: Status: RESOLVED | Noted: 2017-03-30 | Resolved: 2017-04-24

## 2017-04-24 PROBLEM — A41.9 SEPSIS (HCC): Status: RESOLVED | Noted: 2017-03-19 | Resolved: 2017-04-24

## 2017-04-24 PROBLEM — R17 JAUNDICE: Status: RESOLVED | Noted: 2017-03-30 | Resolved: 2017-04-24

## 2017-04-24 PROCEDURE — 99213 OFFICE O/P EST LOW 20 MIN: CPT | Performed by: FAMILY MEDICINE

## 2017-04-24 RX ORDER — EPINEPHRINE 0.3 MG/.3ML
INJECTION SUBCUTANEOUS
COMMUNITY
Start: 2017-04-03 | End: 2017-08-21

## 2017-04-24 RX ORDER — PIPERACILLIN SODIUM, TAZOBACTAM SODIUM 4; .5 G/20ML; G/20ML
INJECTION, POWDER, LYOPHILIZED, FOR SOLUTION INTRAVENOUS
COMMUNITY
Start: 2017-04-11 | End: 2017-04-24

## 2017-04-24 RX ORDER — GLIMEPIRIDE 1 MG/1
1 TABLET ORAL
Qty: 30 TABLET | Refills: 6 | Status: SHIPPED | OUTPATIENT
Start: 2017-04-24 | End: 2017-11-28 | Stop reason: SDUPTHER

## 2017-04-24 RX ORDER — HYDROCODONE BITARTRATE AND ACETAMINOPHEN 5; 325 MG/1; MG/1
1 TABLET ORAL EVERY 6 HOURS PRN
Qty: 30 TABLET | Refills: 0
Start: 2017-04-24 | End: 2017-08-21

## 2017-04-24 NOTE — PROGRESS NOTES
Subjective   Jean Noriega is a 72 y.o. male.     History of Present Illness follow-up regarding diabetes and also the recent significant problems that have occurred relative to the common bile duct obstruction.  [facility].  Was eventually determined to have to common bile duct stones that were obstructing in leading to the hyperbilirubinemia and pain.  These were dislodged and one was definitely removed.  Has stent still in place.  Did receive IV antibiotics none in a while.  Will be following with infectious disease as well as GI in very near future.  Blood sugars have fluctuated as expected.  Since the intervention was successful appetite has markedly improved and is very liberal at this time.  Pain does persist somewhat but markedly less.  Has had no fever chills SOB CP palpitations cough nausea vomiting diarrhea or urinary symptoms.  Reconciled medications.    The following portions of the patient's history were reviewed and updated as appropriate: allergies, current medications, past medical history, past social history, past surgical history and problem list.    Review of Systems see the history of present illness    Objective   Physical Exam   Constitutional: He is oriented to person, place, and time. He appears well-developed and well-nourished.   HENT:   Head: Normocephalic.   Mouth/Throat: Oropharynx is clear and moist.   Eyes: Conjunctivae and EOM are normal. Pupils are equal, round, and reactive to light.   Neck: Normal range of motion. Neck supple.   Cardiovascular: Normal rate, regular rhythm and normal heart sounds.    No murmur heard.  Pulmonary/Chest: Effort normal and breath sounds normal.   Musculoskeletal: He exhibits no edema.   Neurological: He is alert and oriented to person, place, and time.   Skin: Skin is warm and dry.   Psychiatric: He has a normal mood and affect.   Vitals reviewed.    /84 (BP Location: Left arm, Patient  "Position: Sitting)  Pulse 74  Temp 98.3 °F (36.8 °C) (Oral)   Ht 69\" (175.3 cm)  Wt 146 lb 4.8 oz (66.4 kg)  BMI 21.6 kg/m2  Assessment/Plan   Jean was seen today for follow-up.    Diagnoses and all orders for this visit:    Type 2 diabetes mellitus without complication, without long-term current use of insulin  -     glimepiride (AMARYL) 1 MG tablet; Take 1 tablet by mouth Every Morning Before Breakfast.    Calculus of bile duct without cholangitis with obstruction  -     HYDROcodone-acetaminophen (NORCO) 5-325 MG per tablet; Take 1 tablet by mouth Every 6 (Six) Hours As Needed for Severe Pain (7-10).     We reviewed history.  You have had a very remarkable recent course.  Weight seems to have stabilized.  Will increase glimepiride to 1 mg each a.m. continuing metformin.  Continue to liberalize diet but judiciously trying to avoid concentrated sweets.  Increase activity level as allowed per GI.  Looked at recent labs markedly improved.  Today I renewed hydrocodone 5 #30 to take every 6 hours when necessary pain.  Stay well-hydrated of course.  I encourage you to avoid crowds. BONNIE reviewed.  Recheck in 2 months or as needed.           "

## 2017-05-25 RX ORDER — CYCLOBENZAPRINE HCL 5 MG
5 TABLET ORAL NIGHTLY
Qty: 30 TABLET | Refills: 5 | Status: SHIPPED | OUTPATIENT
Start: 2017-05-25 | End: 2017-11-13 | Stop reason: SDUPTHER

## 2017-06-12 ENCOUNTER — TELEPHONE (OUTPATIENT)
Dept: FAMILY MEDICINE CLINIC | Facility: CLINIC | Age: 72
End: 2017-06-12

## 2017-06-12 NOTE — TELEPHONE ENCOUNTER
PATIENT WIFE CALLED AND STATES THAT HE HAS AN APPOINTMENT ON 6/26/17. AFTER ALL THE RECENT ISSUES HE HAS HAD, HE HAS A LAB APPOINTMENT ON 6/19/17. CAN YOU PLEASE ORDER LABS?

## 2017-06-13 DIAGNOSIS — E11.9 TYPE 2 DIABETES MELLITUS WITHOUT COMPLICATION, WITHOUT LONG-TERM CURRENT USE OF INSULIN (HCC): ICD-10-CM

## 2017-06-13 DIAGNOSIS — E78.2 MIXED HYPERLIPIDEMIA: Primary | ICD-10-CM

## 2017-06-19 ENCOUNTER — LAB (OUTPATIENT)
Dept: FAMILY MEDICINE CLINIC | Facility: CLINIC | Age: 72
End: 2017-06-19

## 2017-06-19 DIAGNOSIS — E11.9 TYPE 2 DIABETES MELLITUS WITHOUT COMPLICATION, WITHOUT LONG-TERM CURRENT USE OF INSULIN (HCC): ICD-10-CM

## 2017-06-19 DIAGNOSIS — E78.2 MIXED HYPERLIPIDEMIA: ICD-10-CM

## 2017-06-19 LAB
ALBUMIN SERPL-MCNC: 3.7 G/DL (ref 3.4–4.8)
ALBUMIN/GLOB SERPL: 1.4 G/DL (ref 1.5–2.5)
ALP SERPL-CCNC: 88 U/L (ref 40–129)
ALT SERPL W P-5'-P-CCNC: 20 U/L (ref 10–44)
ANION GAP SERPL CALCULATED.3IONS-SCNC: 6.5 MMOL/L (ref 3.6–11.2)
AST SERPL-CCNC: 27 U/L (ref 10–34)
BASOPHILS # BLD AUTO: 0.02 10*3/MM3 (ref 0–0.3)
BASOPHILS NFR BLD AUTO: 0.3 % (ref 0–2)
BILIRUB SERPL-MCNC: 0.3 MG/DL (ref 0.2–1.8)
BUN BLD-MCNC: 13 MG/DL (ref 7–21)
BUN/CREAT SERPL: 12.3 (ref 7–25)
CALCIUM SPEC-SCNC: 9 MG/DL (ref 7.7–10)
CHLORIDE SERPL-SCNC: 107 MMOL/L (ref 99–112)
CHOLEST SERPL-MCNC: 131 MG/DL (ref 0–200)
CO2 SERPL-SCNC: 30.5 MMOL/L (ref 24.3–31.9)
CREAT BLD-MCNC: 1.06 MG/DL (ref 0.43–1.29)
CRP SERPL-MCNC: <0.5 MG/DL (ref 0–0.99)
DEPRECATED RDW RBC AUTO: 49.1 FL (ref 37–54)
EOSINOPHIL # BLD AUTO: 0.23 10*3/MM3 (ref 0–0.7)
EOSINOPHIL NFR BLD AUTO: 3.2 % (ref 0–7)
ERYTHROCYTE [DISTWIDTH] IN BLOOD BY AUTOMATED COUNT: 15 % (ref 11.5–14.5)
GFR SERPL CREATININE-BSD FRML MDRD: 69 ML/MIN/1.73
GLOBULIN UR ELPH-MCNC: 2.6 GM/DL
GLUCOSE BLD-MCNC: 197 MG/DL (ref 70–110)
HBA1C MFR BLD: 7.7 % (ref 4.5–5.7)
HCT VFR BLD AUTO: 40.6 % (ref 42–52)
HDLC SERPL-MCNC: 45 MG/DL (ref 60–100)
HGB BLD-MCNC: 13.2 G/DL (ref 14–18)
IMM GRANULOCYTES # BLD: 0.03 10*3/MM3 (ref 0–0.03)
IMM GRANULOCYTES NFR BLD: 0.4 % (ref 0–0.5)
LDLC SERPL CALC-MCNC: 43 MG/DL (ref 0–100)
LDLC/HDLC SERPL: 0.96 {RATIO}
LYMPHOCYTES # BLD AUTO: 2.59 10*3/MM3 (ref 1–3)
LYMPHOCYTES NFR BLD AUTO: 36.3 % (ref 16–46)
MCH RBC QN AUTO: 30.1 PG (ref 27–33)
MCHC RBC AUTO-ENTMCNC: 32.5 G/DL (ref 33–37)
MCV RBC AUTO: 92.7 FL (ref 80–94)
MONOCYTES # BLD AUTO: 0.84 10*3/MM3 (ref 0.1–0.9)
MONOCYTES NFR BLD AUTO: 11.8 % (ref 0–12)
NEUTROPHILS # BLD AUTO: 3.43 10*3/MM3 (ref 1.4–6.5)
NEUTROPHILS NFR BLD AUTO: 48 % (ref 40–75)
OSMOLALITY SERPL CALC.SUM OF ELEC: 292.4 MOSM/KG (ref 273–305)
PLATELET # BLD AUTO: 221 10*3/MM3 (ref 130–400)
PMV BLD AUTO: 10.7 FL (ref 6–10)
POTASSIUM BLD-SCNC: 4.4 MMOL/L (ref 3.5–5.3)
PROT SERPL-MCNC: 6.3 G/DL (ref 6–8)
RBC # BLD AUTO: 4.38 10*6/MM3 (ref 4.7–6.1)
SODIUM BLD-SCNC: 144 MMOL/L (ref 135–153)
TRIGL SERPL-MCNC: 213 MG/DL (ref 0–150)
TSH SERPL DL<=0.05 MIU/L-ACNC: 3.36 MIU/ML (ref 0.55–4.78)
VLDLC SERPL-MCNC: 42.6 MG/DL
WBC NRBC COR # BLD: 7.14 10*3/MM3 (ref 4.5–12.5)

## 2017-06-19 PROCEDURE — 80053 COMPREHEN METABOLIC PANEL: CPT | Performed by: FAMILY MEDICINE

## 2017-06-19 PROCEDURE — 80061 LIPID PANEL: CPT | Performed by: FAMILY MEDICINE

## 2017-06-19 PROCEDURE — 86140 C-REACTIVE PROTEIN: CPT | Performed by: FAMILY MEDICINE

## 2017-06-19 PROCEDURE — 85025 COMPLETE CBC W/AUTO DIFF WBC: CPT | Performed by: FAMILY MEDICINE

## 2017-06-19 PROCEDURE — 84443 ASSAY THYROID STIM HORMONE: CPT | Performed by: FAMILY MEDICINE

## 2017-06-19 PROCEDURE — 83036 HEMOGLOBIN GLYCOSYLATED A1C: CPT | Performed by: FAMILY MEDICINE

## 2017-06-19 PROCEDURE — 36415 COLL VENOUS BLD VENIPUNCTURE: CPT | Performed by: FAMILY MEDICINE

## 2017-06-26 ENCOUNTER — OFFICE VISIT (OUTPATIENT)
Dept: FAMILY MEDICINE CLINIC | Facility: CLINIC | Age: 72
End: 2017-06-26

## 2017-06-26 VITALS
HEIGHT: 69 IN | DIASTOLIC BLOOD PRESSURE: 83 MMHG | HEART RATE: 75 BPM | SYSTOLIC BLOOD PRESSURE: 160 MMHG | TEMPERATURE: 97.1 F | WEIGHT: 155 LBS | BODY MASS INDEX: 22.96 KG/M2

## 2017-06-26 DIAGNOSIS — E11.9 TYPE 2 DIABETES MELLITUS WITHOUT COMPLICATION, WITHOUT LONG-TERM CURRENT USE OF INSULIN (HCC): Primary | ICD-10-CM

## 2017-06-26 PROBLEM — R10.9 ACUTE ABDOMINAL PAIN: Status: RESOLVED | Noted: 2017-03-30 | Resolved: 2017-06-26

## 2017-06-26 PROBLEM — R79.89 ELEVATED LFTS: Status: RESOLVED | Noted: 2017-03-30 | Resolved: 2017-06-26

## 2017-06-26 PROCEDURE — 99213 OFFICE O/P EST LOW 20 MIN: CPT | Performed by: FAMILY MEDICINE

## 2017-06-26 NOTE — PROGRESS NOTES
"Subjective   Jean Noriega is a 72 y.o. male.     History of Present Illness follow-up regarding diabetes recent metabolic monitoring.  Has essentially normalized from the recent significant GI issues.  Did have retained bile duct stone.  Back on essentially all medications except the statin.  Appetite has normalized sleep patterns are improved energy has normalized.  Feels like blood sugar is doing better.  Has had no CP SOB palpitations GI  recent changes.  Will be visiting with urologist again soon    The following portions of the patient's history were reviewed and updated as appropriate: allergies, current medications, past medical history, past social history, past surgical history and problem list.    Review of Systems see the history of present illness    Objective   Physical Exam   Constitutional: He is oriented to person, place, and time. He appears well-developed and well-nourished.   HENT:   Head: Normocephalic.   Mouth/Throat: Oropharynx is clear and moist.   Eyes: Conjunctivae and EOM are normal. Pupils are equal, round, and reactive to light.   Neck: Normal range of motion. Neck supple. No tracheal deviation present. No thyromegaly present.   Cardiovascular: Normal rate, regular rhythm and normal heart sounds.    No murmur heard.  Pulmonary/Chest: Effort normal and breath sounds normal.   Musculoskeletal: He exhibits no edema.   Lymphadenopathy:     He has no cervical adenopathy.   Neurological: He is alert and oriented to person, place, and time.   Skin: Skin is warm and dry.   Psychiatric: He has a normal mood and affect.   Vitals reviewed.    /83 (BP Location: Left arm, Patient Position: Sitting)  Pulse 75  Temp 97.1 °F (36.2 °C) (Oral)   Ht 69\" (175.3 cm)  Wt 155 lb (70.3 kg)  BMI 22.89 kg/m2  Assessment/Plan   Jean was seen today for diabetes.    Diagnoses and all orders for this visit:    Type 2 diabetes mellitus without complication, without long-term current use of insulin  -     " Hemoglobin A1c; Future  -     Comprehensive Metabolic Panel; Future  -     CBC & Differential; Future      Globally things appear to be better.  Weight has stabilized.  We discussed recent metabolic parameters which have essentially all improved.  At this time I encourage persistent TLC including dietary restrictions.  Medication compliance.  Would ask you to recheck in about 4 months.  See us sooner if needed.  Avoid dehydration.  Avoid excessive salty sweet drinks.  Keep follow-ups elsewhere.

## 2017-07-11 DIAGNOSIS — E11.9 TYPE 2 DIABETES MELLITUS WITHOUT COMPLICATION, WITHOUT LONG-TERM CURRENT USE OF INSULIN (HCC): Primary | ICD-10-CM

## 2017-07-11 RX ORDER — LANCETS 33 GAUGE
EACH MISCELLANEOUS
Qty: 100 EACH | Refills: 12 | Status: SHIPPED | OUTPATIENT
Start: 2017-07-11 | End: 2018-11-19 | Stop reason: SDUPTHER

## 2017-07-11 NOTE — TELEPHONE ENCOUNTER
NEEDS REFILL ON ONE TOUCH TEST STRIPS AND THE LANCETS 33 GAUGE EXTRA FINE   CELESTINE DODD    HIS # [phone]

## 2017-07-12 DIAGNOSIS — E11.9 TYPE 2 DIABETES MELLITUS WITHOUT COMPLICATION, WITHOUT LONG-TERM CURRENT USE OF INSULIN (HCC): ICD-10-CM

## 2017-07-31 NOTE — TELEPHONE ENCOUNTER
PATIENT CALLED REQUESTING REFILL ON METFORMIN. SEND TO [facility].    LAST OV 6/26/17    RENEWED 1/17/17 #60 5 REFILLS

## 2017-08-13 ENCOUNTER — OFFICE VISIT (OUTPATIENT)
Dept: RETAIL CLINIC | Facility: CLINIC | Age: 72
End: 2017-08-13

## 2017-08-13 VITALS
TEMPERATURE: 98.1 F | HEART RATE: 71 BPM | BODY MASS INDEX: 23.27 KG/M2 | RESPIRATION RATE: 18 BRPM | OXYGEN SATURATION: 97 % | WEIGHT: 157.6 LBS

## 2017-08-13 DIAGNOSIS — R21 RASH: Primary | ICD-10-CM

## 2017-08-13 PROCEDURE — 99212 OFFICE O/P EST SF 10 MIN: CPT | Performed by: NURSE PRACTITIONER

## 2017-08-13 RX ORDER — TRIAMCINOLONE ACETONIDE 1 MG/G
CREAM TOPICAL 2 TIMES DAILY
Qty: 80 G | Refills: 0 | Status: SHIPPED | OUTPATIENT
Start: 2017-08-13 | End: 2017-09-13

## 2017-08-13 NOTE — PROGRESS NOTES
Subjective   Jean Noriega is a 72 y.o. male.     Chief Complaint   Patient presents with   • Rash     History of Present Illness   Rash  Patient presents for evaluation of a rash involving the lower extremity and lower abdomen and few scattered under right arm and posterior trunk. 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 Lesions are pink, and raised in texture. Rash has not changed over time. Rash is pruritic. Associated symptoms: itching. Patient denies: fever, myalgia and sore throat. Patient has had contacts with similar rash. Patient has had new exposures to outdoor plants and insects.     The following portions of the patient's history were reviewed and updated as appropriate: allergies, current medications, past family history, past medical history, past social history, past surgical history and problem list.    Current Outpatient Prescriptions:   •  cyclobenzaprine (FLEXERIL) 5 MG tablet, Take 1 tablet by mouth Every Night., Disp: 30 tablet, Rfl: 5  •  EPINEPHrine (EPIPEN) 0.3 MG/0.3ML solution auto-injector injection, , Disp: , Rfl:   •  glimepiride (AMARYL) 1 MG tablet, Take 1 tablet by mouth Every Morning Before Breakfast., Disp: 30 tablet, Rfl: 6  •  glucose blood (ONE TOUCH TEST STRIPS) test strip, Use 2 times a day and as needed., Disp: 100 each, Rfl: 12  •  HYDROcodone-acetaminophen (NORCO) 5-325 MG per tablet, Take 1 tablet by mouth Every 6 (Six) Hours As Needed for Severe Pain (7-10)., Disp: 30 tablet, Rfl: 0  •  metFORMIN (GLUCOPHAGE) 1000 MG tablet, Take 1 tablet by mouth 2 (Two) Times a Day With Meals., Disp: 60 tablet, Rfl: 5  •  MULTIPLE VITAMIN PO, Take 1 tablet by mouth Daily., Disp: , Rfl:   •  ONETOUCH DELICA LANCETS 33G misc, Use 2 times a day and as needed., Disp: 100 each, Rfl: 12  •  pancrelipase, Lip-Prot-Amyl, (ZENPEP) 80178 UNITS capsule delayed-release particles capsule, Take 20,000 units of lipase by mouth 3 (Three) Times a Day With Meals., Disp: , Rfl:   •   pantoprazole (PROTONIX) 40 MG EC tablet, Take 40 mg by mouth 2 (Two) Times a Day., Disp: , Rfl:   •  triamcinolone (KENALOG) 0.1 % cream, Apply  topically 2 (Two) Times a Day. To affected areas, Disp: 80 g, Rfl: 0    Pulse 71  Temp 98.1 °F (36.7 °C) (Temporal Artery )   Resp 18  Wt 157 lb 9.6 oz (71.5 kg)  SpO2 97%  BMI 23.27 kg/m2    Review of Systems   Constitutional: Negative for chills and fever.   HENT: Negative for sore throat.    Respiratory: Negative for cough and wheezing.    Skin: Positive for rash. Negative for color change.   Neurological: Negative for dizziness and headaches.      Allergies   Allergen Reactions   • Prednisone      Patient stated ran glucose high and rapid heart rate.     Physical Exam   Constitutional: He appears well-developed and well-nourished.   Cardiovascular: Normal rate and regular rhythm.    Pulmonary/Chest: Effort normal and breath sounds normal. He has no wheezes.   Neurological: He is alert.   Skin: Skin is warm and dry. Rash noted.        Pruritic erythema maculopapular rash noted on the bilateral ankles, scattered up the lower extremities, abdomen, left axilla and left posterior trunk. Negative for induration or drainage.      Assessment/Plan     Jean was seen today for rash.    Diagnoses and all orders for this visit:    Rash  -     triamcinolone (KENALOG) 0.1 % cream; Apply  topically 2 (Two) Times a Day. To affected areas         Discussed treatment plan. May use OTC Benadryl prn. Follow up with PCP or at the Urgent Care if symptoms worsen or fail to improve.  Patient teaching information discussed and provided to the patient. Patient verbalized understanding.      August 13, 2017 2:42 PM

## 2017-08-13 NOTE — PATIENT INSTRUCTIONS
Rash  A rash is a change in the color of the skin. A rash can also change the way your skin feels. There are many different conditions and factors that can cause a rash.  HOME CARE INSTRUCTIONS  Pay attention to any changes in your symptoms. Follow these instructions to help with your condition:   Medicine  Take or apply over-the-counter and prescription medicines only as told by your health care provider. These may include:  · Corticosteroid cream.  · Anti-itch lotions.  · Oral antihistamines.  Skin Care  · Apply cool compresses to the affected areas.  · Try taking a bath with:    Epsom salts. Follow the instructions on the packaging. You can get these at your local pharmacy or grocery store.    Baking soda. Pour a small amount into the bath as told by your health care provider.    Colloidal oatmeal. Follow the instructions on the packaging. You can get this at your local pharmacy or grocery store.  · Try applying baking soda paste to your skin. Stir water into baking soda until it reaches a paste-like consistency.  · Do not scratch or rub your skin.  · Avoid covering the rash. Make sure the rash is exposed to air as much as possible.  General Instructions  · Avoid hot showers or baths, which can make itching worse. A cold shower may help.  · Avoid scented soaps, detergents, and perfumes. Use gentle soaps, detergents, perfumes, and other cosmetic products.  · Avoid any substance that causes your rash. Keep a journal to help track what causes your rash. Write down:    What you eat.    What cosmetic products you use.    What you drink.    What you wear. This includes jewelry.  · Keep all follow-up visits as told by your health care provider. This is important.  SEEK MEDICAL CARE IF:  · You sweat at night.  · You lose weight.  · You urinate more than normal.  · You feel weak.  · You vomit.  · Your skin or the whites of your eyes look yellow (jaundice).  · Your skin:    Tingles.    Is numb.  · Your rash:    Does not go  "away after several days.    Gets worse.  · You are:    Unusually thirsty.    More tired than normal.  · You have:    New symptoms.    Pain in your abdomen.    A fever.    Diarrhea.  SEEK IMMEDIATE MEDICAL CARE IF:  · You develop a rash that covers all or most of your body. The rash may or may not be painful.  · You develop blisters that:    Are on top of the rash.    Grow larger or grow together.    Are painful.    Are inside your nose or mouth.  · You develop a rash that:    Looks like purple pinprick-sized spots all over your body.    Has a \"bull's eye\" or looks like a target.    Is not related to sun exposure, is red and painful, and causes your skin to peel.     This information is not intended to replace advice given to you by your health care provider. Make sure you discuss any questions you have with your health care provider.     Document Released: 12/08/2003 Document Revised: 04/10/2017 Document Reviewed: 05/04/2016  [employer] Interactive Patient Education ©2017 [employer]    "

## 2017-08-21 ENCOUNTER — OFFICE VISIT (OUTPATIENT)
Dept: FAMILY MEDICINE CLINIC | Facility: CLINIC | Age: 72
End: 2017-08-21

## 2017-08-21 VITALS
HEART RATE: 78 BPM | SYSTOLIC BLOOD PRESSURE: 147 MMHG | DIASTOLIC BLOOD PRESSURE: 80 MMHG | WEIGHT: 156.9 LBS | TEMPERATURE: 97.1 F | HEIGHT: 69 IN | BODY MASS INDEX: 23.24 KG/M2

## 2017-08-21 DIAGNOSIS — F41.9 ANXIETY: Primary | ICD-10-CM

## 2017-08-21 PROCEDURE — 99213 OFFICE O/P EST LOW 20 MIN: CPT | Performed by: FAMILY MEDICINE

## 2017-08-21 RX ORDER — SERTRALINE HYDROCHLORIDE 25 MG/1
25 TABLET, FILM COATED ORAL DAILY
Qty: 30 TABLET | Refills: 3 | Status: SHIPPED | OUTPATIENT
Start: 2017-08-21 | End: 2018-02-26 | Stop reason: SDDI

## 2017-08-21 NOTE — PROGRESS NOTES
"Subjective   Jean Noriega is a 72 y.o. male.     History of Present Illness concerned about some recent transient elevations of blood pressure.  Seems to be focused more so around anxiety.  The anxiety has gradually worsened over the last several months with significant increase since the eventful winter.  Denies SOB palpitations CP GI or current  symptoms.  Utilizing medications as noted.  States blood sugar is doing some better.  Sleep patterns are fractured.  Some increased irritability.  Has difficulty shutting mind off.    The following portions of the patient's history were reviewed and updated as appropriate: allergies, current medications, past medical history, past social history, past surgical history and problem list.    Review of Systems see the history of present illness    Objective   Physical Exam   Constitutional: He is oriented to person, place, and time. He appears well-developed and well-nourished.   HENT:   Head: Normocephalic.   Eyes: Conjunctivae and EOM are normal. Pupils are equal, round, and reactive to light.   Neck: Normal range of motion. Neck supple. No thyromegaly present.   Cardiovascular: Normal rate, regular rhythm, normal heart sounds and intact distal pulses.    No murmur heard.  Pulmonary/Chest: Effort normal and breath sounds normal.   Abdominal: Bowel sounds are normal.   Musculoskeletal: He exhibits no edema.   Neurological: He is alert and oriented to person, place, and time.   Skin: Skin is warm and dry.   Psychiatric: He has a normal mood and affect.   Vitals reviewed.    /80 (BP Location: Left arm, Patient Position: Sitting, Cuff Size: Adult)  Pulse 78  Temp 97.1 °F (36.2 °C) (Oral)   Ht 69\" (175.3 cm)  Wt 156 lb 14.4 oz (71.2 kg)  BMI 23.17 kg/m2  Assessment/Plan   Jean was seen today for hypertension.    Diagnoses and all orders for this visit:    Anxiety    Other orders  -     sertraline (ZOLOFT) 25 MG tablet; Take 1 tablet by mouth Daily.     I don't see " current evidence of blood pressure elevation although I believe what you have been experiencing.  I think it is worthwhile to continue to monitor that situation.  I do believe that anxiety is a comorbidity that you could benefit from treatment.  Will initiate low-dose SSRI.  Discussed expected benefit potential side effects.  Continue all other medications as reconciled ordered.  Counseled.  Wife present.  Recheck as scheduled and as needed

## 2017-09-13 ENCOUNTER — OFFICE VISIT (OUTPATIENT)
Dept: FAMILY MEDICINE CLINIC | Facility: CLINIC | Age: 72
End: 2017-09-13

## 2017-09-13 VITALS
HEART RATE: 76 BPM | DIASTOLIC BLOOD PRESSURE: 75 MMHG | WEIGHT: 160.9 LBS | BODY MASS INDEX: 23.83 KG/M2 | SYSTOLIC BLOOD PRESSURE: 146 MMHG | HEIGHT: 69 IN | TEMPERATURE: 98.4 F

## 2017-09-13 DIAGNOSIS — E11.9 TYPE 2 DIABETES MELLITUS WITHOUT COMPLICATION, WITHOUT LONG-TERM CURRENT USE OF INSULIN (HCC): ICD-10-CM

## 2017-09-13 DIAGNOSIS — I10 BENIGN ESSENTIAL HTN: Primary | ICD-10-CM

## 2017-09-13 PROCEDURE — 99213 OFFICE O/P EST LOW 20 MIN: CPT | Performed by: FAMILY MEDICINE

## 2017-09-13 RX ORDER — LOSARTAN POTASSIUM 25 MG/1
25 TABLET ORAL DAILY
Qty: 30 TABLET | Refills: 3 | Status: SHIPPED | OUTPATIENT
Start: 2017-09-13 | End: 2017-11-02 | Stop reason: SDUPTHER

## 2017-09-13 RX ORDER — TRIPROLIDINE/PSEUDOEPHEDRINE 2.5MG-60MG
TABLET ORAL
COMMUNITY
Start: 2017-08-30 | End: 2019-01-31

## 2017-09-13 RX ORDER — BESIFLOXACIN 6 MG/ML
SUSPENSION OPHTHALMIC
COMMUNITY
Start: 2017-08-30 | End: 2019-01-31

## 2017-09-13 RX ORDER — NEPAFENAC 0.3 %
SUSPENSION, DROPS(FINAL DOSAGE FORM)(ML) OPHTHALMIC (EYE)
COMMUNITY
Start: 2017-08-30 | End: 2019-01-31

## 2017-09-13 NOTE — PROGRESS NOTES
"Subjective   Jean Noirega is a 72 y.o. male.     History of Present Illness follow-up regarding blood pressure.  Has been monitoring at home.  Has noted persistent elevations at home.  Does feel like the Zoloft has been immensely helpful for anxiety and stress symptoms.  Has had no SOB palpitations CP  GI changes.  Energy level is pretty good.  No skin eruptions.  No new abdominal complaints compliant with all medications.  Unfortunately did start using smokeless tobacco again.    The following portions of the patient's history were reviewed and updated as appropriate: allergies, current medications, past medical history, past social history, past surgical history and problem list.    Review of Systems the history of present illness    Objective   Physical Exam   Constitutional: He is oriented to person, place, and time. He appears well-developed and well-nourished.   HENT:   Head: Normocephalic.   Eyes: Conjunctivae and EOM are normal. Pupils are equal, round, and reactive to light.   Neck: Normal range of motion. Neck supple. No thyromegaly present.   Cardiovascular: Normal rate, regular rhythm, normal heart sounds and intact distal pulses.    No murmur heard.  Pulmonary/Chest: Effort normal and breath sounds normal.   Musculoskeletal: He exhibits no edema.   Neurological: He is alert and oriented to person, place, and time.   Skin: Skin is warm and dry.   Psychiatric: He has a normal mood and affect.   Vitals reviewed.    /75 (BP Location: Left arm, Patient Position: Sitting, Cuff Size: Adult)  Pulse 76  Temp 98.4 °F (36.9 °C) (Oral)   Ht 69\" (175.3 cm)  Wt 160 lb 14.4 oz (73 kg)  BMI 23.76 kg/m2  Assessment/Plan   Jean was seen today for hypertension.    Diagnoses and all orders for this visit:    Benign essential HTN  -     losartan (COZAAR) 25 MG tablet; Take 1 tablet by mouth Daily.    Type 2 diabetes mellitus without complication, without long-term current use of insulin     Home blood pressure " readings noted.  Discussed goals.  Encourage stopping the smokeless tobacco.  Decrease caffeine if possible to maintain good aerobic activity.  Try to maintain heart healthy diabetic diet.  Continue the Zoloft.  Today will initiate losartan once daily.  Discussed side effects benefits.  Recheck as scheduled in just a few weeks.  Metabolically monitor at that time.  Flu vaccine soon.

## 2017-10-13 ENCOUNTER — OFFICE VISIT (OUTPATIENT)
Dept: FAMILY MEDICINE CLINIC | Facility: CLINIC | Age: 72
End: 2017-10-13

## 2017-10-13 VITALS
SYSTOLIC BLOOD PRESSURE: 139 MMHG | BODY MASS INDEX: 24.84 KG/M2 | HEART RATE: 65 BPM | HEIGHT: 69 IN | WEIGHT: 167.7 LBS | DIASTOLIC BLOOD PRESSURE: 77 MMHG | TEMPERATURE: 96.8 F

## 2017-10-13 DIAGNOSIS — J01.00 ACUTE NON-RECURRENT MAXILLARY SINUSITIS: Primary | ICD-10-CM

## 2017-10-13 PROCEDURE — 99213 OFFICE O/P EST LOW 20 MIN: CPT | Performed by: NURSE PRACTITIONER

## 2017-10-13 RX ORDER — AMOXICILLIN 875 MG/1
875 TABLET, COATED ORAL 2 TIMES DAILY
Qty: 20 TABLET | Refills: 0 | Status: SHIPPED | OUTPATIENT
Start: 2017-10-13 | End: 2017-10-23

## 2017-10-13 NOTE — PROGRESS NOTES
"Zander Noriega is a 72 y.o. male.   Chief Complaint   Patient presents with   • Sinus Problem     Sinus Problem   This is a new problem. The current episode started in the past 7 days. The problem has been waxing and waning since onset. There has been no fever. Associated symptoms include congestion and sinus pressure. Pertinent negatives include no chills, coughing, ear pain, headaches, neck pain, shortness of breath, sneezing or sore throat. Past treatments include saline sprays. The treatment provided no relief.        The following portions of the patient's history were reviewed and updated as appropriate: allergies, current medications, past family history, past medical history, past social history, past surgical history and problem list.  /77 (BP Location: Left arm, Patient Position: Sitting, Cuff Size: Adult)  Pulse 65  Temp 96.8 °F (36 °C) (Oral)   Ht 69\" (175.3 cm)  Wt 167 lb 11.2 oz (76.1 kg)  BMI 24.76 kg/m2  Review of Systems   Constitutional: Positive for fatigue. Negative for chills and fever.   HENT: Positive for congestion, postnasal drip and sinus pressure. Negative for ear pain, rhinorrhea, sneezing and sore throat.    Respiratory: Negative for cough, shortness of breath and wheezing.    Cardiovascular: Negative for chest pain, palpitations and leg swelling.   Gastrointestinal: Negative for abdominal pain, diarrhea, nausea and vomiting.   Genitourinary: Negative for dysuria and urgency.   Musculoskeletal: Negative for back pain, myalgias and neck pain.   Skin: Negative for rash and wound.   Neurological: Negative for dizziness, light-headedness and headaches.   Psychiatric/Behavioral: Negative for sleep disturbance. The patient is not nervous/anxious.        Objective   Physical Exam   Constitutional: He is oriented to person, place, and time. He appears well-developed and well-nourished.   HENT:   Head: Normocephalic and atraumatic.   Right Ear: External ear normal.   Left " Ear: External ear normal.   PND  Nasal turbinates erythematous, edematous   Cardiovascular: Normal rate, regular rhythm and normal heart sounds.    Pulmonary/Chest: Effort normal and breath sounds normal.   Abdominal: Soft. Bowel sounds are normal.   Lymphadenopathy:     He has no cervical adenopathy.   Neurological: He is alert and oriented to person, place, and time.   Skin: Skin is warm and dry.   Psychiatric: He has a normal mood and affect. His behavior is normal.       Assessment/Plan   Jean was seen today for sinus problem.    Diagnoses and all orders for this visit:    Acute non-recurrent maxillary sinusitis  -     amoxicillin (AMOXIL) 875 MG tablet; Take 1 tablet by mouth 2 (Two) Times a Day for 10 days.      Will treat with amoxicillin. Administration and SE discussed.  Stay hydrated. May continue the nasal sprays. Start a daily antihistamine. Supportive measures encouraged.  Follow up in 2-3 days if no improvement or if symptoms worsen.

## 2017-10-24 ENCOUNTER — LAB (OUTPATIENT)
Dept: FAMILY MEDICINE CLINIC | Facility: CLINIC | Age: 72
End: 2017-10-24

## 2017-10-24 DIAGNOSIS — E11.9 TYPE 2 DIABETES MELLITUS WITHOUT COMPLICATION, WITHOUT LONG-TERM CURRENT USE OF INSULIN (HCC): ICD-10-CM

## 2017-10-24 LAB
ALBUMIN SERPL-MCNC: 3.9 G/DL (ref 3.4–4.8)
ALBUMIN/GLOB SERPL: 1.6 G/DL (ref 1.5–2.5)
ALP SERPL-CCNC: 72 U/L (ref 40–129)
ALT SERPL W P-5'-P-CCNC: 26 U/L (ref 10–44)
ANION GAP SERPL CALCULATED.3IONS-SCNC: 4 MMOL/L (ref 3.6–11.2)
AST SERPL-CCNC: 29 U/L (ref 10–34)
BASOPHILS # BLD AUTO: 0.02 10*3/MM3 (ref 0–0.3)
BASOPHILS NFR BLD AUTO: 0.3 % (ref 0–2)
BILIRUB SERPL-MCNC: 0.4 MG/DL (ref 0.2–1.8)
BUN BLD-MCNC: 8 MG/DL (ref 7–21)
BUN/CREAT SERPL: 7.3 (ref 7–25)
CALCIUM SPEC-SCNC: 9.2 MG/DL (ref 7.7–10)
CHLORIDE SERPL-SCNC: 106 MMOL/L (ref 99–112)
CO2 SERPL-SCNC: 31 MMOL/L (ref 24.3–31.9)
CREAT BLD-MCNC: 1.1 MG/DL (ref 0.43–1.29)
DEPRECATED RDW RBC AUTO: 44.6 FL (ref 37–54)
EOSINOPHIL # BLD AUTO: 0.16 10*3/MM3 (ref 0–0.7)
EOSINOPHIL NFR BLD AUTO: 2.5 % (ref 0–7)
ERYTHROCYTE [DISTWIDTH] IN BLOOD BY AUTOMATED COUNT: 13.6 % (ref 11.5–14.5)
GFR SERPL CREATININE-BSD FRML MDRD: 66 ML/MIN/1.73
GLOBULIN UR ELPH-MCNC: 2.4 GM/DL
GLUCOSE BLD-MCNC: 148 MG/DL (ref 70–110)
HBA1C MFR BLD: 6.9 % (ref 4.5–5.7)
HCT VFR BLD AUTO: 39.8 % (ref 42–52)
HGB BLD-MCNC: 12.9 G/DL (ref 14–18)
IMM GRANULOCYTES # BLD: 0.05 10*3/MM3 (ref 0–0.03)
IMM GRANULOCYTES NFR BLD: 0.8 % (ref 0–0.5)
LYMPHOCYTES # BLD AUTO: 2.28 10*3/MM3 (ref 1–3)
LYMPHOCYTES NFR BLD AUTO: 35.1 % (ref 16–46)
MCH RBC QN AUTO: 30.5 PG (ref 27–33)
MCHC RBC AUTO-ENTMCNC: 32.4 G/DL (ref 33–37)
MCV RBC AUTO: 94.1 FL (ref 80–94)
MONOCYTES # BLD AUTO: 0.84 10*3/MM3 (ref 0.1–0.9)
MONOCYTES NFR BLD AUTO: 12.9 % (ref 0–12)
NEUTROPHILS # BLD AUTO: 3.14 10*3/MM3 (ref 1.4–6.5)
NEUTROPHILS NFR BLD AUTO: 48.4 % (ref 40–75)
OSMOLALITY SERPL CALC.SUM OF ELEC: 282.3 MOSM/KG (ref 273–305)
PLATELET # BLD AUTO: 200 10*3/MM3 (ref 130–400)
PMV BLD AUTO: 10.4 FL (ref 6–10)
POTASSIUM BLD-SCNC: 4.6 MMOL/L (ref 3.5–5.3)
PROT SERPL-MCNC: 6.3 G/DL (ref 6–8)
RBC # BLD AUTO: 4.23 10*6/MM3 (ref 4.7–6.1)
SODIUM BLD-SCNC: 141 MMOL/L (ref 135–153)
WBC NRBC COR # BLD: 6.49 10*3/MM3 (ref 4.5–12.5)

## 2017-10-24 PROCEDURE — 36415 COLL VENOUS BLD VENIPUNCTURE: CPT | Performed by: NURSE PRACTITIONER

## 2017-10-24 PROCEDURE — 80053 COMPREHEN METABOLIC PANEL: CPT | Performed by: FAMILY MEDICINE

## 2017-10-24 PROCEDURE — 83036 HEMOGLOBIN GLYCOSYLATED A1C: CPT | Performed by: FAMILY MEDICINE

## 2017-10-24 PROCEDURE — 85025 COMPLETE CBC W/AUTO DIFF WBC: CPT | Performed by: FAMILY MEDICINE

## 2017-10-25 ENCOUNTER — OFFICE VISIT (OUTPATIENT)
Dept: FAMILY MEDICINE CLINIC | Facility: CLINIC | Age: 72
End: 2017-10-25

## 2017-10-25 VITALS
DIASTOLIC BLOOD PRESSURE: 74 MMHG | TEMPERATURE: 97 F | HEIGHT: 69 IN | WEIGHT: 169.4 LBS | HEART RATE: 77 BPM | SYSTOLIC BLOOD PRESSURE: 133 MMHG | BODY MASS INDEX: 25.09 KG/M2

## 2017-10-25 DIAGNOSIS — Z23 FLU VACCINE NEED: ICD-10-CM

## 2017-10-25 DIAGNOSIS — E11.9 TYPE 2 DIABETES MELLITUS WITHOUT COMPLICATION, WITHOUT LONG-TERM CURRENT USE OF INSULIN (HCC): ICD-10-CM

## 2017-10-25 DIAGNOSIS — Z00.00 MEDICARE ANNUAL WELLNESS VISIT, INITIAL: Primary | ICD-10-CM

## 2017-10-25 PROCEDURE — 90662 IIV NO PRSV INCREASED AG IM: CPT | Performed by: FAMILY MEDICINE

## 2017-10-25 PROCEDURE — G0008 ADMIN INFLUENZA VIRUS VAC: HCPCS | Performed by: FAMILY MEDICINE

## 2017-10-25 PROCEDURE — G0438 PPPS, INITIAL VISIT: HCPCS | Performed by: FAMILY MEDICINE

## 2017-10-25 NOTE — PROGRESS NOTES
QUICK REFERENCE INFORMATION:  The ABCs of the Annual Wellness Visit    Initial Medicare Wellness Visit    HEALTH RISK ASSESSMENT    1945    Recent Hospitalizations:  Recently treated at the following:  [facility].        Current Medical Providers:  Patient Care Team:  Demetrius Wilkerson MD as PCP - General (Family Medicine)  Serjio Wall MD as Consulting Physician (Radiology)  Maurizio Cuellar MD as Consulting Physician (Gastroenterology)  Christopher Robledo MD as Consulting Physician (Infectious Diseases)        Smoking Status:  History   Smoking Status   • Former Smoker   • Types: Cigarettes, Cigars, Pipe   • Quit date: 1975   Smokeless Tobacco   • Former User       Alcohol Consumption:  History   Alcohol Use No       Depression Screen:   PHQ-2/PHQ-9 Depression Screening 10/25/2017   Little interest or pleasure in doing things 0   Feeling down, depressed, or hopeless 0   Total Score 0       Health Habits and Functional and Cognitive Screening:  Functional & Cognitive Status 10/25/2017   Do you have difficulty preparing food and eating? No   Do you have difficulty bathing yourself, getting dressed or grooming yourself? No   Do you have difficulty using the toilet? No   Do you have difficulty moving around from place to place? No   Do you have trouble with steps or getting out of a bed or a chair? No   In the past year have you fallen or experienced a near fall? No   Current Diet Frequent Junk Food   Dental Exam Up to date   Eye Exam Up to date   Exercise (times per week) 7 times per week   Current Exercise Activities Include Yard Work   Do you need help using the phone?  No   Are you deaf or do you have serious difficulty hearing?  No   Do you need help with transportation? No   Do you need help shopping? No   Do you need help preparing meals?  No   Do you need help with housework?  No   Do you need help with laundry? No   Do you need help taking your medications? No   Do you need help  managing money? No   Have you felt unusual stress, anger or loneliness in the last month? No   Who do you live with? Spouse   If you need help, do you have trouble finding someone available to you? No   Have you been bothered in the last four weeks by sexual problems? No   Do you have difficulty concentrating, remembering or making decisions? No           Does the patient have evidence of cognitive impairment? No    Asiprin use counseling: Taking ASA appropriately as indicated      Recent Lab Results:    Visual Acuity:  No exam data present    Age-appropriate Screening Schedule:  Refer to the list below for future screening recommendations based on patient's age, sex and/or medical conditions. Orders for these recommended tests are listed in the plan section. The patient has been provided with a written plan.    Health Maintenance   Topic Date Due   • URINE MICROALBUMIN  08/16/2016   • INFLUENZA VACCINE  08/01/2017   • DIABETIC FOOT EXAM  08/01/2017   • HEMOGLOBIN A1C  04/24/2018   • LIPID PANEL  06/19/2018   • DIABETIC EYE EXAM  09/25/2018   • TDAP/TD VACCINES (2 - Td) 01/01/2021   • COLONOSCOPY  01/01/2023   • PNEUMOCOCCAL VACCINES (65+ LOW/MEDIUM RISK)  Completed   • ZOSTER VACCINE  Completed        Subjective   History of Present Illness    Jean Noriega is a 72 y.o. male who presents for an Annual Wellness Visit.    The following portions of the patient's history were reviewed and updated as appropriate: allergies, past family history, past medical history, past social history, past surgical history and problem list.    Outpatient Medications Prior to Visit   Medication Sig Dispense Refill   • cyclobenzaprine (FLEXERIL) 5 MG tablet Take 1 tablet by mouth Every Night. 30 tablet 5   • glimepiride (AMARYL) 1 MG tablet Take 1 tablet by mouth Every Morning Before Breakfast. 30 tablet 6   • glucose blood (ONE TOUCH TEST STRIPS) test strip Use 2 times a day and as needed. 100 each 12   • losartan (COZAAR) 25 MG tablet  "Take 1 tablet by mouth Daily. 30 tablet 3   • metFORMIN (GLUCOPHAGE) 1000 MG tablet Take 1 tablet by mouth 2 (Two) Times a Day With Meals. 60 tablet 5   • MULTIPLE VITAMIN PO Take 1 tablet by mouth Daily.     • ONETOUCH DELICA LANCETS 33G misc Use 2 times a day and as needed. 100 each 12   • pancrelipase, Lip-Prot-Amyl, (ZENPEP) 52313 UNITS capsule delayed-release particles capsule Take 20,000 units of lipase by mouth 3 (Three) Times a Day With Meals.     • pantoprazole (PROTONIX) 40 MG EC tablet Take 40 mg by mouth 2 (Two) Times a Day.     • sertraline (ZOLOFT) 25 MG tablet Take 1 tablet by mouth Daily. 30 tablet 3   • BESIVANCE 0.6 % suspension ophthalmic suspension      • DUREZOL 0.05 % ophthalmic emulsion      • ILEVRO 0.3 % suspension        No facility-administered medications prior to visit.        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    Review of Systems    Compared to one year ago, the patient feels his physical health is better.  Compared to one year ago, the patient feels his mental health is better.    Objective     Physical Exam    Vitals:    10/25/17 0949   BP: 133/74   BP Location: Left arm   Patient Position: Sitting   Cuff Size: Adult   Pulse: 77   Temp: 97 °F (36.1 °C)   TempSrc: Oral   Weight: 169 lb 6.4 oz (76.8 kg)   Height: 69\" (175.3 cm)       Body mass index is 25.02 kg/(m^2).  Discussed the patient's BMI with him. The BMI " is in the acceptable range.    Assessment/Plan   Patient Self-Management and Personalized Health Advice  The patient has been provided with information about: diet, exercise, weight management and prevention of cardiac or vascular disease and preventive services including:   · Influenza vaccine.    Visit Diagnoses:  No diagnosis found.    No orders of the defined types were placed in this encounter.      Outpatient Encounter Prescriptions as of 10/25/2017   Medication Sig Dispense Refill   • cyclobenzaprine (FLEXERIL) 5 MG tablet Take 1 tablet by mouth Every Night. 30 tablet 5   • glimepiride (AMARYL) 1 MG tablet Take 1 tablet by mouth Every Morning Before Breakfast. 30 tablet 6   • glucose blood (ONE TOUCH TEST STRIPS) test strip Use 2 times a day and as needed. 100 each 12   • losartan (COZAAR) 25 MG tablet Take 1 tablet by mouth Daily. 30 tablet 3   • metFORMIN (GLUCOPHAGE) 1000 MG tablet Take 1 tablet by mouth 2 (Two) Times a Day With Meals. 60 tablet 5   • MULTIPLE VITAMIN PO Take 1 tablet by mouth Daily.     • ONETOUCH DELICA LANCETS 33G misc Use 2 times a day and as needed. 100 each 12   • pancrelipase, Lip-Prot-Amyl, (ZENPEP) 88613 UNITS capsule delayed-release particles capsule Take 20,000 units of lipase by mouth 3 (Three) Times a Day With Meals.     • pantoprazole (PROTONIX) 40 MG EC tablet Take 40 mg by mouth 2 (Two) Times a Day.     • sertraline (ZOLOFT) 25 MG tablet Take 1 tablet by mouth Daily. 30 tablet 3   • BESIVANCE 0.6 % suspension ophthalmic suspension      • DUREZOL 0.05 % ophthalmic emulsion      • ILEVRO 0.3 % suspension        No facility-administered encounter medications on file as of 10/25/2017.        Reviewed use of high risk medication in the elderly: not applicable  Reviewed for potential of harmful drug interactions in the elderly: not applicable    Follow Up:  No Follow-up on file.     An After Visit Summary and PPPS with all of these plans were given to the patient.          Globally things appear to be doing well.  You are current on PME.  After consent flu vaccine given today.  Will ask you to follow-up as noted for follow-up metabolic monitoring.  We reviewed recent metabolic parameters which are very acceptable.  Stay safely active.  Maintain heart healthy diabetic diet.

## 2017-11-02 ENCOUNTER — TELEPHONE (OUTPATIENT)
Dept: FAMILY MEDICINE CLINIC | Facility: CLINIC | Age: 72
End: 2017-11-02

## 2017-11-02 DIAGNOSIS — I10 BENIGN ESSENTIAL HTN: ICD-10-CM

## 2017-11-02 RX ORDER — LOSARTAN POTASSIUM 50 MG/1
50 TABLET ORAL DAILY
Qty: 30 TABLET | Refills: 6 | Status: SHIPPED | OUTPATIENT
Start: 2017-11-02 | End: 2018-09-12

## 2017-11-02 NOTE — TELEPHONE ENCOUNTER
Currently taking 25 mg of losartan wanted to know if you could increase the dosage maybe one in the morning and one in the evening. Stated that he just checked his BP and it is 141/92 and he took the pill this morning.

## 2017-11-04 ENCOUNTER — OFFICE VISIT (OUTPATIENT)
Dept: RETAIL CLINIC | Facility: CLINIC | Age: 72
End: 2017-11-04

## 2017-11-04 VITALS
OXYGEN SATURATION: 97 % | HEART RATE: 78 BPM | WEIGHT: 166.2 LBS | TEMPERATURE: 98.4 F | RESPIRATION RATE: 18 BRPM | BODY MASS INDEX: 24.54 KG/M2

## 2017-11-04 DIAGNOSIS — J06.9 ACUTE URI: Primary | ICD-10-CM

## 2017-11-04 PROCEDURE — 99213 OFFICE O/P EST LOW 20 MIN: CPT | Performed by: NURSE PRACTITIONER

## 2017-11-04 RX ORDER — AZITHROMYCIN 250 MG/1
TABLET, FILM COATED ORAL
Qty: 6 TABLET | Refills: 0 | Status: SHIPPED | OUTPATIENT
Start: 2017-11-04 | End: 2017-11-13

## 2017-11-04 NOTE — PROGRESS NOTES
Subjective   Jean Noriega is a 72 y.o. male.     Chief Complaint   Patient presents with   • URI      URI    This is a recurrent problem. The current episode started 1 to 4 weeks ago. The problem has been waxing and waning. There has been no fever. Associated symptoms include congestion, coughing, headaches, a plugged ear sensation, rhinorrhea and a sore throat. Pertinent negatives include no abdominal pain, diarrhea, ear pain, nausea, neck pain, rash, vomiting or wheezing.      Reports he was treated with antibiotic approximately 2 week ago. He started to feel better and didn't complete the antibiotic.     The following portions of the patient's history were reviewed and updated as appropriate: allergies, current medications, past family history, past medical history, past social history, past surgical history and problem list.      Current Outpatient Prescriptions:   •  BESIVANCE 0.6 % suspension ophthalmic suspension, , Disp: , Rfl:   •  cyclobenzaprine (FLEXERIL) 5 MG tablet, Take 1 tablet by mouth Every Night., Disp: 30 tablet, Rfl: 5  •  DUREZOL 0.05 % ophthalmic emulsion, , Disp: , Rfl:   •  glimepiride (AMARYL) 1 MG tablet, Take 1 tablet by mouth Every Morning Before Breakfast., Disp: 30 tablet, Rfl: 6  •  glucose blood (ONE TOUCH TEST STRIPS) test strip, Use 2 times a day and as needed., Disp: 100 each, Rfl: 12  •  ILEVRO 0.3 % suspension, , Disp: , Rfl:   •  losartan (COZAAR) 50 MG tablet, Take 1 tablet by mouth Daily., Disp: 30 tablet, Rfl: 6  •  metFORMIN (GLUCOPHAGE) 1000 MG tablet, Take 1 tablet by mouth 2 (Two) Times a Day With Meals., Disp: 60 tablet, Rfl: 5  •  MULTIPLE VITAMIN PO, Take 1 tablet by mouth Daily., Disp: , Rfl:   •  ONETOUCH DELICA LANCETS 33G misc, Use 2 times a day and as needed., Disp: 100 each, Rfl: 12  •  pancrelipase, Lip-Prot-Amyl, (ZENPEP) 32636 UNITS capsule delayed-release particles capsule, Take 20,000 units of lipase by mouth 3 (Three) Times a Day With Meals., Disp: , Rfl:    •  pantoprazole (PROTONIX) 40 MG EC tablet, Take 40 mg by mouth 2 (Two) Times a Day., Disp: , Rfl:   •  sertraline (ZOLOFT) 25 MG tablet, Take 1 tablet by mouth Daily., Disp: 30 tablet, Rfl: 3  •  azithromycin (ZITHROMAX Z-SANCHO) 250 MG tablet, Take 2 tablets the first day, then 1 tablet daily for 4 days., Disp: 6 tablet, Rfl: 0    Pulse 78  Temp 98.4 °F (36.9 °C) (Temporal Artery )   Resp 18  Wt 166 lb 3.2 oz (75.4 kg)  SpO2 97%  BMI 24.54 kg/m2    Review of Systems   Constitutional: Negative for chills, fatigue and fever.   HENT: Positive for congestion, rhinorrhea and sore throat. Negative for ear pain and trouble swallowing.    Respiratory: Positive for cough. Negative for wheezing.    Gastrointestinal: Negative for abdominal pain, diarrhea, nausea and vomiting.   Musculoskeletal: Negative for neck pain.   Skin: Negative for color change and rash.   Neurological: Positive for headaches. Negative for dizziness.   Psychiatric/Behavioral: Negative for sleep disturbance.     Allergies   Allergen Reactions   • Prednisone      Patient stated ran glucose high and rapid heart rate.     Physical Exam   Constitutional: He is oriented to person, place, and time. He appears well-developed and well-nourished.   HENT:   Head: Normocephalic.   Right Ear: Tympanic membrane is bulging. Tympanic membrane is not erythematous.   Left Ear: Tympanic membrane is bulging. Tympanic membrane is not erythematous.   Nose: Mucosal edema and rhinorrhea present.   Mouth/Throat: No uvula swelling. Posterior oropharyngeal erythema present. No tonsillar exudate.   Eyes: Conjunctivae are normal. Pupils are equal, round, and reactive to light.   Cardiovascular: Normal rate and regular rhythm.    Pulmonary/Chest: Effort normal and breath sounds normal. He has no wheezes.   Lymphadenopathy:     He has no cervical adenopathy.   Neurological: He is alert and oriented to person, place, and time.   Skin: Skin is warm and dry.      Assessment/Plan      Jean was seen today for uri.    Diagnoses and all orders for this visit:    Acute URI  -     azithromycin (ZITHROMAX Z-SANCHO) 250 MG tablet; Take 2 tablets the first day, then 1 tablet daily for 4 days.         Discussed PE findings and treatment plan.   Follow up with PCP or at the Urgent Care if symptoms worsen or fail to improve.  Patient teaching information discussed and provided to the patient. Patient verbalized understanding.      November 4, 2017 2:01 PM

## 2017-11-04 NOTE — PATIENT INSTRUCTIONS
"Upper Respiratory Infection, Adult  Most upper respiratory infections (URIs) are caused by a virus. A URI affects the nose, throat, and upper air passages. The most common type of URI is often called \"the common cold.\"  HOME CARE   · Take medicines only as told by your doctor.  · Gargle warm saltwater or take cough drops to comfort your throat as told by your doctor.  · Use a warm mist humidifier or inhale steam from a shower to increase air moisture. This may make it easier to breathe.  · Drink enough fluid to keep your pee (urine) clear or pale yellow.  · Eat soups and other clear broths.  · Have a healthy diet.  · Rest as needed.  · Go back to work when your fever is gone or your doctor says it is okay.  ¨ You may need to stay home longer to avoid giving your URI to others.  ¨ You can also wear a face mask and wash your hands often to prevent spread of the virus.  · Use your inhaler more if you have asthma.  · Do not use any tobacco products, including cigarettes, chewing tobacco, or electronic cigarettes. If you need help quitting, ask your doctor.  GET HELP IF:  · You are getting worse, not better.  · Your symptoms are not helped by medicine.  · You have chills.  · You are getting more short of breath.  · You have brown or red mucus.  · You have yellow or brown discharge from your nose.  · You have pain in your face, especially when you bend forward.  · You have a fever.  · You have puffy (swollen) neck glands.  · You have pain while swallowing.  · You have white areas in the back of your throat.  GET HELP RIGHT AWAY IF:   · You have very bad or constant:    Headache.    Ear pain.    Pain in your forehead, behind your eyes, and over your cheekbones (sinus pain).    Chest pain.  · You have long-lasting (chronic) lung disease and any of the following:    Wheezing.    Long-lasting cough.    Coughing up blood.    A change in your usual mucus.  · You have a stiff neck.  · You have changes in your:    Vision.   "  Hearing.    Thinking.    Mood.  MAKE SURE YOU:   · Understand these instructions.  · Will watch your condition.  · Will get help right away if you are not doing well or get worse.     This information is not intended to replace advice given to you by your health care provider. Make sure you discuss any questions you have with your health care provider.     Document Released: 06/05/2009 Document Revised: 05/03/2016 Document Reviewed: 03/25/2015  [employer] Interactive Patient Education ©2017 [employer]

## 2017-11-13 ENCOUNTER — TELEPHONE (OUTPATIENT)
Dept: FAMILY MEDICINE CLINIC | Facility: CLINIC | Age: 72
End: 2017-11-13

## 2017-11-13 RX ORDER — CYCLOBENZAPRINE HCL 5 MG
5 TABLET ORAL NIGHTLY
Qty: 30 TABLET | Refills: 5 | Status: SHIPPED | OUTPATIENT
Start: 2017-11-13 | End: 2018-03-26 | Stop reason: SDUPTHER

## 2017-11-13 RX ORDER — AMLODIPINE BESYLATE 5 MG/1
5 TABLET ORAL DAILY
Qty: 30 TABLET | Refills: 3 | Status: SHIPPED | OUTPATIENT
Start: 2017-11-13 | End: 2018-02-26 | Stop reason: SDUPTHER

## 2017-11-13 NOTE — TELEPHONE ENCOUNTER
Please advise.       Cyclobenzaprine last filled on 5/25/17 with 5 refills.     Last seen on 10/25/17 next yossi on 2/26/18.

## 2017-11-13 NOTE — TELEPHONE ENCOUNTER
Start taking the losartan once a day as originally recommended.  Also start taking the new medication amlodipine once daily.  Take the losartan in a.m. and the amlodipine in p.m.  Continue to monitor blood pressure for a while.

## 2017-11-13 NOTE — TELEPHONE ENCOUNTER
PATIENT STATES HE IS TAKING LOSARTAN 50MG BID, (WROTE FOR DAILY). HIS BP IS STILL RUNNING HIGH 154/77, 160/94, 142/83. PLEASE ADVISE. ALSO PATIENT REQUESTING REFILL ON CYCLOBENZAPRINE. SEND TO [facility].

## 2017-11-13 NOTE — TELEPHONE ENCOUNTER
PATIENT STATES HE IS TAKING LOSARTAN 50MG BID, (WROTE FOR DAILY). HIS BP IS STILL RUNNING HIGH 154/77, 160/94, 142/83.     Please advise.

## 2017-11-28 DIAGNOSIS — E11.9 TYPE 2 DIABETES MELLITUS WITHOUT COMPLICATION, WITHOUT LONG-TERM CURRENT USE OF INSULIN (HCC): ICD-10-CM

## 2017-11-28 RX ORDER — GLIMEPIRIDE 1 MG/1
1 TABLET ORAL
Qty: 30 TABLET | Refills: 6 | Status: SHIPPED | OUTPATIENT
Start: 2017-11-28 | End: 2018-02-26 | Stop reason: SDUPTHER

## 2018-02-19 ENCOUNTER — LAB (OUTPATIENT)
Dept: FAMILY MEDICINE CLINIC | Facility: CLINIC | Age: 73
End: 2018-02-19

## 2018-02-19 DIAGNOSIS — Z00.00 MEDICARE ANNUAL WELLNESS VISIT, INITIAL: ICD-10-CM

## 2018-02-19 DIAGNOSIS — E11.9 TYPE 2 DIABETES MELLITUS WITHOUT COMPLICATION, WITHOUT LONG-TERM CURRENT USE OF INSULIN (HCC): ICD-10-CM

## 2018-02-19 LAB
ALBUMIN SERPL-MCNC: 4.1 G/DL (ref 3.4–4.8)
ALBUMIN/GLOB SERPL: 1.8 G/DL (ref 1.5–2.5)
ALP SERPL-CCNC: 85 U/L (ref 40–129)
ALT SERPL W P-5'-P-CCNC: 44 U/L (ref 10–44)
ANION GAP SERPL CALCULATED.3IONS-SCNC: 6.3 MMOL/L (ref 3.6–11.2)
AST SERPL-CCNC: 54 U/L (ref 10–34)
BASOPHILS # BLD AUTO: 0.02 10*3/MM3 (ref 0–0.3)
BASOPHILS NFR BLD AUTO: 0.3 % (ref 0–2)
BILIRUB SERPL-MCNC: 0.3 MG/DL (ref 0.2–1.8)
BUN BLD-MCNC: 13 MG/DL (ref 7–21)
BUN/CREAT SERPL: 12.3 (ref 7–25)
CALCIUM SPEC-SCNC: 8.9 MG/DL (ref 7.7–10)
CHLORIDE SERPL-SCNC: 107 MMOL/L (ref 99–112)
CHOLEST SERPL-MCNC: 82 MG/DL (ref 0–200)
CO2 SERPL-SCNC: 28.7 MMOL/L (ref 24.3–31.9)
CREAT BLD-MCNC: 1.06 MG/DL (ref 0.43–1.29)
DEPRECATED RDW RBC AUTO: 46.9 FL (ref 37–54)
EOSINOPHIL # BLD AUTO: 0.16 10*3/MM3 (ref 0–0.7)
EOSINOPHIL NFR BLD AUTO: 2.3 % (ref 0–7)
ERYTHROCYTE [DISTWIDTH] IN BLOOD BY AUTOMATED COUNT: 13.8 % (ref 11.5–14.5)
GFR SERPL CREATININE-BSD FRML MDRD: 69 ML/MIN/1.73
GLOBULIN UR ELPH-MCNC: 2.3 GM/DL
GLUCOSE BLD-MCNC: 165 MG/DL (ref 70–110)
HBA1C MFR BLD: 7.1 % (ref 4.5–5.7)
HCT VFR BLD AUTO: 40.3 % (ref 42–52)
HDLC SERPL-MCNC: 35 MG/DL (ref 60–100)
HGB BLD-MCNC: 12.9 G/DL (ref 14–18)
IMM GRANULOCYTES # BLD: 0.02 10*3/MM3 (ref 0–0.03)
IMM GRANULOCYTES NFR BLD: 0.3 % (ref 0–0.5)
LDLC SERPL CALC-MCNC: 24 MG/DL (ref 0–100)
LDLC/HDLC SERPL: 0.7 {RATIO}
LYMPHOCYTES # BLD AUTO: 2.51 10*3/MM3 (ref 1–3)
LYMPHOCYTES NFR BLD AUTO: 36.6 % (ref 16–46)
MCH RBC QN AUTO: 30 PG (ref 27–33)
MCHC RBC AUTO-ENTMCNC: 32 G/DL (ref 33–37)
MCV RBC AUTO: 93.7 FL (ref 80–94)
MONOCYTES # BLD AUTO: 0.86 10*3/MM3 (ref 0.1–0.9)
MONOCYTES NFR BLD AUTO: 12.6 % (ref 0–12)
NEUTROPHILS # BLD AUTO: 3.28 10*3/MM3 (ref 1.4–6.5)
NEUTROPHILS NFR BLD AUTO: 47.9 % (ref 40–75)
OSMOLALITY SERPL CALC.SUM OF ELEC: 286.9 MOSM/KG (ref 273–305)
PLATELET # BLD AUTO: 209 10*3/MM3 (ref 130–400)
PMV BLD AUTO: 10.8 FL (ref 6–10)
POTASSIUM BLD-SCNC: 5 MMOL/L (ref 3.5–5.3)
PROT SERPL-MCNC: 6.4 G/DL (ref 6–8)
RBC # BLD AUTO: 4.3 10*6/MM3 (ref 4.7–6.1)
SODIUM BLD-SCNC: 142 MMOL/L (ref 135–153)
TRIGL SERPL-MCNC: 113 MG/DL (ref 0–150)
VLDLC SERPL-MCNC: 22.6 MG/DL
WBC NRBC COR # BLD: 6.85 10*3/MM3 (ref 4.5–12.5)

## 2018-02-19 PROCEDURE — 80061 LIPID PANEL: CPT | Performed by: FAMILY MEDICINE

## 2018-02-19 PROCEDURE — 83036 HEMOGLOBIN GLYCOSYLATED A1C: CPT | Performed by: FAMILY MEDICINE

## 2018-02-19 PROCEDURE — 80053 COMPREHEN METABOLIC PANEL: CPT | Performed by: FAMILY MEDICINE

## 2018-02-19 PROCEDURE — 85025 COMPLETE CBC W/AUTO DIFF WBC: CPT | Performed by: FAMILY MEDICINE

## 2018-02-19 PROCEDURE — 36415 COLL VENOUS BLD VENIPUNCTURE: CPT | Performed by: FAMILY MEDICINE

## 2018-02-26 ENCOUNTER — OFFICE VISIT (OUTPATIENT)
Dept: FAMILY MEDICINE CLINIC | Facility: CLINIC | Age: 73
End: 2018-02-26

## 2018-02-26 VITALS
BODY MASS INDEX: 24.42 KG/M2 | DIASTOLIC BLOOD PRESSURE: 70 MMHG | HEART RATE: 78 BPM | WEIGHT: 164.9 LBS | HEIGHT: 69 IN | SYSTOLIC BLOOD PRESSURE: 128 MMHG | TEMPERATURE: 97.1 F

## 2018-02-26 DIAGNOSIS — E78.2 MIXED HYPERLIPIDEMIA: ICD-10-CM

## 2018-02-26 DIAGNOSIS — E11.9 TYPE 2 DIABETES MELLITUS WITHOUT COMPLICATION, WITHOUT LONG-TERM CURRENT USE OF INSULIN (HCC): Primary | ICD-10-CM

## 2018-02-26 DIAGNOSIS — I10 BENIGN ESSENTIAL HTN: ICD-10-CM

## 2018-02-26 PROCEDURE — 99214 OFFICE O/P EST MOD 30 MIN: CPT | Performed by: FAMILY MEDICINE

## 2018-02-26 RX ORDER — AMLODIPINE BESYLATE 10 MG/1
TABLET ORAL
COMMUNITY
Start: 2018-01-29 | End: 2022-06-20 | Stop reason: ALTCHOICE

## 2018-02-26 RX ORDER — ATORVASTATIN CALCIUM 40 MG/1
40 TABLET, FILM COATED ORAL DAILY
COMMUNITY

## 2018-02-26 RX ORDER — GLIMEPIRIDE 1 MG/1
1.5 TABLET ORAL
Qty: 125 TABLET | Refills: 3 | Status: SHIPPED | OUTPATIENT
Start: 2018-02-26 | End: 2018-09-12 | Stop reason: SDUPTHER

## 2018-02-26 NOTE — PROGRESS NOTES
Subjective     Chief Complaint   Patient presents with   • Hypertension       Jean Noriega is a 73 y.o. male.     History of Present Illness follow-up regarding diabetes hypertension dyslipidemia.  Has visited some with cardiologist.  See the notes.  Medications have been adjusted.  Reconciled.  Generally having no significant new problems.  States blood sugar occasionally fluctuates but we discussed dietary neck size modification.  He does except that he is well aware of these interventions.  Very active but no regular exercise.  Has maintained eye checkups.  Denies chest CDV GI  changes.  Compliant with current medications.  Does monitor blood pressure and blood sugar at home.  Has had some mechanical nonradiating nonlimiting upper back discomfort.    The following portions of the patient's history were reviewed and updated as appropriate: allergies, past family history, past medical history, past social history, past surgical history and problem list.    Review of Systems see the history of present illness    Objective   Physical Exam   Constitutional: He is oriented to person, place, and time. He appears well-developed and well-nourished.   HENT:   Head: Normocephalic.   Mouth/Throat: Oropharynx is clear and moist.   Eyes: Conjunctivae and EOM are normal. Pupils are equal, round, and reactive to light.   Neck: Normal range of motion. Neck supple. No tracheal deviation present. No thyromegaly present.   Cardiovascular: Normal rate, regular rhythm, normal heart sounds and intact distal pulses.    No murmur heard.  Pulmonary/Chest: Effort normal and breath sounds normal.   Musculoskeletal: Normal range of motion. He exhibits no edema.   Lymphadenopathy:     He has no cervical adenopathy.   Neurological: He is alert and oriented to person, place, and time.   Skin: Skin is warm and dry.   Psychiatric: He has a normal mood and affect.   Vitals reviewed.    /70 (BP Location: Left arm, Patient Position:  "Sitting, Cuff Size: Adult)  Pulse 78  Temp 97.1 °F (36.2 °C) (Oral)   Ht 175.3 cm (69\")  Wt 74.8 kg (164 lb 14.4 oz)  BMI 24.35 kg/m2  Assessment/Plan   Jean was seen today for hypertension.    Diagnoses and all orders for this visit:    Type 2 diabetes mellitus without complication, without long-term current use of insulin  -     glimepiride (AMARYL) 1 MG tablet; Take 1.5 tablets by mouth Every Morning Before Breakfast.  -     Hemoglobin A1c; Future  -     Comprehensive Metabolic Panel; Future  -     TSH; Future  -     Lipid Panel; Future    Mixed hyperlipidemia  -     Lipid Panel; Future    Benign essential HTN  -     Comprehensive Metabolic Panel; Future      Today we reviewed recent metabolic parameters.  A1c 7.1.  Lipid CMP CBC noted.  All quite stable.  Weight and blood pressure looks good.  The very mild but persistent anemia still exists.  I think could liberalize diet slightly with more protein.  Will increase the glimepiride to 1.5 mg each a.m.  Continue all other medications as reconciled ordered.  Blood pressure stable on regimen.  Discussed need for statin drug.  You were on statin drug prior to the significant jaundice event.  Stay safely active.  Maintain heart healthy glycemic diet as we discussed.  Recheck in 6 months or as needed.  I think could safely just use some OTC Tylenol for the episodic discomfort.         "

## 2018-03-26 RX ORDER — CYCLOBENZAPRINE HCL 5 MG
5 TABLET ORAL NIGHTLY
Qty: 30 TABLET | Refills: 5 | Status: SHIPPED | OUTPATIENT
Start: 2018-03-26 | End: 2018-09-27 | Stop reason: SDUPTHER

## 2018-03-26 NOTE — TELEPHONE ENCOUNTER
PATIENT CALLED REQUESTING REFILL ON FLEXERIL. HE IS REQUESTING PRESCRIPTION FOR BID. SEND TO [facility].

## 2018-08-29 ENCOUNTER — LAB (OUTPATIENT)
Dept: FAMILY MEDICINE CLINIC | Facility: CLINIC | Age: 73
End: 2018-08-29

## 2018-08-29 DIAGNOSIS — E11.9 TYPE 2 DIABETES MELLITUS WITHOUT COMPLICATION, WITHOUT LONG-TERM CURRENT USE OF INSULIN (HCC): ICD-10-CM

## 2018-08-29 DIAGNOSIS — I10 BENIGN ESSENTIAL HTN: ICD-10-CM

## 2018-08-29 DIAGNOSIS — E78.2 MIXED HYPERLIPIDEMIA: ICD-10-CM

## 2018-08-29 LAB
ALBUMIN SERPL-MCNC: 3.7 G/DL (ref 3.4–4.8)
ALBUMIN/GLOB SERPL: 1.9 G/DL (ref 1.5–2.5)
ALP SERPL-CCNC: 62 U/L (ref 40–129)
ALT SERPL W P-5'-P-CCNC: 22 U/L (ref 10–44)
ANION GAP SERPL CALCULATED.3IONS-SCNC: 6.7 MMOL/L (ref 3.6–11.2)
AST SERPL-CCNC: 24 U/L (ref 10–34)
BILIRUB SERPL-MCNC: 0.4 MG/DL (ref 0.2–1.8)
BUN BLD-MCNC: 16 MG/DL (ref 7–21)
BUN/CREAT SERPL: 15 (ref 7–25)
CALCIUM SPEC-SCNC: 8.5 MG/DL (ref 7.7–10)
CHLORIDE SERPL-SCNC: 106 MMOL/L (ref 99–112)
CHOLEST SERPL-MCNC: 96 MG/DL (ref 0–200)
CO2 SERPL-SCNC: 29.3 MMOL/L (ref 24.3–31.9)
CREAT BLD-MCNC: 1.07 MG/DL (ref 0.43–1.29)
GFR SERPL CREATININE-BSD FRML MDRD: 68 ML/MIN/1.73
GLOBULIN UR ELPH-MCNC: 2 GM/DL
GLUCOSE BLD-MCNC: 160 MG/DL (ref 70–110)
HBA1C MFR BLD: 7.6 % (ref 4.5–5.7)
HDLC SERPL-MCNC: 48 MG/DL (ref 60–100)
LDLC SERPL CALC-MCNC: 28 MG/DL (ref 0–100)
LDLC/HDLC SERPL: 0.59 {RATIO}
OSMOLALITY SERPL CALC.SUM OF ELEC: 287.7 MOSM/KG (ref 273–305)
POTASSIUM BLD-SCNC: 4.8 MMOL/L (ref 3.5–5.3)
PROT SERPL-MCNC: 5.7 G/DL (ref 6–8)
SODIUM BLD-SCNC: 142 MMOL/L (ref 135–153)
TRIGL SERPL-MCNC: 99 MG/DL (ref 0–150)
TSH SERPL DL<=0.05 MIU/L-ACNC: 3.1 MIU/ML (ref 0.55–4.78)
VLDLC SERPL-MCNC: 19.8 MG/DL

## 2018-08-29 PROCEDURE — 83036 HEMOGLOBIN GLYCOSYLATED A1C: CPT | Performed by: FAMILY MEDICINE

## 2018-08-29 PROCEDURE — 36415 COLL VENOUS BLD VENIPUNCTURE: CPT | Performed by: FAMILY MEDICINE

## 2018-08-29 PROCEDURE — 84443 ASSAY THYROID STIM HORMONE: CPT | Performed by: FAMILY MEDICINE

## 2018-08-29 PROCEDURE — 80061 LIPID PANEL: CPT | Performed by: FAMILY MEDICINE

## 2018-08-29 PROCEDURE — 80053 COMPREHEN METABOLIC PANEL: CPT | Performed by: FAMILY MEDICINE

## 2018-09-12 ENCOUNTER — OFFICE VISIT (OUTPATIENT)
Dept: FAMILY MEDICINE CLINIC | Facility: CLINIC | Age: 73
End: 2018-09-12

## 2018-09-12 VITALS
HEIGHT: 69 IN | TEMPERATURE: 96.8 F | WEIGHT: 157.4 LBS | BODY MASS INDEX: 23.31 KG/M2 | HEART RATE: 68 BPM | OXYGEN SATURATION: 98 % | SYSTOLIC BLOOD PRESSURE: 117 MMHG | DIASTOLIC BLOOD PRESSURE: 69 MMHG

## 2018-09-12 DIAGNOSIS — E11.9 TYPE 2 DIABETES MELLITUS WITHOUT COMPLICATION, WITHOUT LONG-TERM CURRENT USE OF INSULIN (HCC): Primary | ICD-10-CM

## 2018-09-12 DIAGNOSIS — I10 BENIGN ESSENTIAL HTN: ICD-10-CM

## 2018-09-12 DIAGNOSIS — E78.2 MIXED HYPERLIPIDEMIA: ICD-10-CM

## 2018-09-12 PROCEDURE — 99214 OFFICE O/P EST MOD 30 MIN: CPT | Performed by: FAMILY MEDICINE

## 2018-09-12 RX ORDER — GLIMEPIRIDE 2 MG/1
2 TABLET ORAL
Qty: 90 TABLET | Refills: 3 | Status: SHIPPED | OUTPATIENT
Start: 2018-09-12 | End: 2019-06-25

## 2018-09-12 RX ORDER — LOSARTAN POTASSIUM 100 MG/1
TABLET ORAL
COMMUNITY
Start: 2018-08-13

## 2018-09-12 RX ORDER — PANCRELIPASE LIPASE, PANCRELIPASE PROTEASE, PANCRELIPASE AMYLASE 10000; 32000; 42000 [USP'U]/1; [USP'U]/1; [USP'U]/1
CAPSULE, DELAYED RELEASE ORAL
COMMUNITY
Start: 2018-08-13 | End: 2018-09-12 | Stop reason: SDUPTHER

## 2018-09-12 NOTE — PROGRESS NOTES
Subjective   Jean Noriega is a 73 y.o. male.     History of Present Illness  follow-up regarding diabetes hypertension.  Generally having no new problems.  Is been very active.  Compliant with diet.  Some intentional weight loss.  Maintaining visits with vascular surgery cardiology.  Urology.  Has had no CP SOB palpitations GI  changes at this time.  Compliant with medications.    The following portions of the patient's history were reviewed and updated as appropriate: allergies, past family history, past medical history, past social history, past surgical history and problem list.    Review of Systems see the history of present illness    Objective   Physical Exam   Constitutional: He is oriented to person, place, and time. He appears well-developed and well-nourished.   HENT:   Head: Normocephalic.   Mouth/Throat: Oropharynx is clear and moist.   Eyes: Pupils are equal, round, and reactive to light. Conjunctivae and EOM are normal.   Neck: Normal range of motion. Neck supple. No tracheal deviation present. No thyromegaly present.   Cardiovascular: Normal rate, regular rhythm, normal heart sounds and intact distal pulses.    No murmur heard.  Pulmonary/Chest: Effort normal and breath sounds normal.   Musculoskeletal: Normal range of motion. He exhibits no edema.   Lymphadenopathy:     He has no cervical adenopathy.   Neurological: He is alert and oriented to person, place, and time.   Skin: Skin is warm and dry.   Psychiatric: He has a normal mood and affect.   Vitals reviewed.      Assessment/Plan   Jean was seen today for blood work review and benign essential hypertension.    Diagnoses and all orders for this visit:    Type 2 diabetes mellitus without complication, without long-term current use of insulin (CMS/[facility])  -     glimepiride (AMARYL) 2 MG tablet; Take 1 tablet by mouth Every Morning Before Breakfast.  -     CBC & Differential; Future  -     Comprehensive Metabolic Panel; Future  -     Hemoglobin A1c;  Future  -     Lipid Panel; Future  -     Vitamin B12; Future    Benign essential HTN    Mixed hyperlipidemia     Reviewed recent metabolic parameters.  A1c not at goal.  Will increase glimepiride to 2 mg daily.  Continue all other medications as reconciled ordered.  Counseled regarding flu vaccine shingles vaccine.  Keep follow-ups elsewhere.  Recheck here in about 6 months or as needed.    Patient's Body mass index is 23.23 kg/m². BMI is within normal parameters. No follow-up required.

## 2018-09-27 ENCOUNTER — TELEPHONE (OUTPATIENT)
Dept: FAMILY MEDICINE CLINIC | Facility: CLINIC | Age: 73
End: 2018-09-27

## 2018-09-27 RX ORDER — CYCLOBENZAPRINE HCL 5 MG
5 TABLET ORAL NIGHTLY
Qty: 30 TABLET | Refills: 2 | Status: SHIPPED | OUTPATIENT
Start: 2018-09-27 | End: 2018-12-26 | Stop reason: SDUPTHER

## 2018-11-19 DIAGNOSIS — E11.9 TYPE 2 DIABETES MELLITUS WITHOUT COMPLICATION, WITHOUT LONG-TERM CURRENT USE OF INSULIN (HCC): ICD-10-CM

## 2018-11-19 RX ORDER — LANCETS 33 GAUGE
EACH MISCELLANEOUS
Qty: 100 EACH | Refills: 12 | Status: SHIPPED | OUTPATIENT
Start: 2018-11-19 | End: 2018-11-20 | Stop reason: SDUPTHER

## 2018-11-19 NOTE — TELEPHONE ENCOUNTER
Last filled on:  Test strips 7-11-17 refill 12  Lancets 7-11-17 refill 12    Last seen on: 9-12-18

## 2018-11-20 DIAGNOSIS — E11.9 TYPE 2 DIABETES MELLITUS WITHOUT COMPLICATION, WITHOUT LONG-TERM CURRENT USE OF INSULIN (HCC): ICD-10-CM

## 2018-11-20 RX ORDER — LANCETS 33 GAUGE
1 EACH MISCELLANEOUS DAILY
Qty: 100 EACH | Refills: 12 | Status: SHIPPED | OUTPATIENT
Start: 2018-11-20 | End: 2020-12-08

## 2018-11-20 NOTE — TELEPHONE ENCOUNTER
PLEASE RESEND PRESCRIPTION FOR PATIENTS TEST STRIPS AND LANCETS. PHARMACY WOULD NOT ACCEPT PREVIOUS PRESCRIPTION [facility]

## 2018-12-26 ENCOUNTER — TELEPHONE (OUTPATIENT)
Dept: FAMILY MEDICINE CLINIC | Facility: CLINIC | Age: 73
End: 2018-12-26

## 2018-12-26 RX ORDER — CYCLOBENZAPRINE HCL 5 MG
5 TABLET ORAL NIGHTLY
Qty: 30 TABLET | Refills: 2 | Status: SHIPPED | OUTPATIENT
Start: 2018-12-26 | End: 2019-02-22 | Stop reason: CLARIF

## 2019-01-28 ENCOUNTER — TELEPHONE (OUTPATIENT)
Dept: FAMILY MEDICINE CLINIC | Facility: CLINIC | Age: 74
End: 2019-01-28

## 2019-01-30 PROBLEM — R69 TAKING MULTIPLE MEDICATIONS FOR CHRONIC DISEASE: Status: ACTIVE | Noted: 2017-11-20

## 2019-01-31 ENCOUNTER — OFFICE VISIT (OUTPATIENT)
Dept: FAMILY MEDICINE CLINIC | Facility: CLINIC | Age: 74
End: 2019-01-31

## 2019-01-31 VITALS
HEIGHT: 69 IN | DIASTOLIC BLOOD PRESSURE: 65 MMHG | SYSTOLIC BLOOD PRESSURE: 121 MMHG | HEART RATE: 79 BPM | WEIGHT: 161 LBS | BODY MASS INDEX: 23.85 KG/M2 | TEMPERATURE: 98.3 F

## 2019-01-31 DIAGNOSIS — G47.9 SLEEP DISTURBANCE: ICD-10-CM

## 2019-01-31 DIAGNOSIS — I10 BENIGN ESSENTIAL HTN: ICD-10-CM

## 2019-01-31 DIAGNOSIS — Z00.00 MEDICARE ANNUAL WELLNESS VISIT, INITIAL: ICD-10-CM

## 2019-01-31 DIAGNOSIS — Z23 NEED FOR VACCINATION WITH 13-POLYVALENT PNEUMOCOCCAL CONJUGATE VACCINE: ICD-10-CM

## 2019-01-31 DIAGNOSIS — E11.9 ENCOUNTER FOR DIABETIC FOOT EXAM (HCC): ICD-10-CM

## 2019-01-31 DIAGNOSIS — E11.9 TYPE 2 DIABETES MELLITUS WITHOUT COMPLICATION, WITHOUT LONG-TERM CURRENT USE OF INSULIN (HCC): Primary | ICD-10-CM

## 2019-01-31 PROCEDURE — G0439 PPPS, SUBSEQ VISIT: HCPCS | Performed by: FAMILY MEDICINE

## 2019-01-31 PROCEDURE — G0009 ADMIN PNEUMOCOCCAL VACCINE: HCPCS | Performed by: FAMILY MEDICINE

## 2019-01-31 PROCEDURE — 90670 PCV13 VACCINE IM: CPT | Performed by: FAMILY MEDICINE

## 2019-01-31 RX ORDER — NAPROXEN 500 MG/1
TABLET ORAL EVERY 12 HOURS SCHEDULED
COMMUNITY
End: 2019-01-31

## 2019-01-31 RX ORDER — TEMAZEPAM 15 MG/1
15 CAPSULE ORAL NIGHTLY PRN
Qty: 15 CAPSULE | Refills: 0
Start: 2019-01-31 | End: 2019-06-25

## 2019-01-31 NOTE — PROGRESS NOTES
QUICK REFERENCE INFORMATION:  The ABCs of the Annual Wellness Visit    Subsequent Medicare Wellness Visit    HEALTH RISK ASSESSMENT    1945    Recent Hospitalizations:  No hospitalization(s) within the last year..        Current Medical Providers:  Patient Care Team:  Demetrius Wilkerson MD as PCP - General (Family Medicine)  Serjio Wall MD as Consulting Physician (Radiology)  Maurizio Cuellar MD as Consulting Physician (Gastroenterology)  Christopher Robledo MD as Consulting Physician (Infectious Diseases)        Smoking Status:  Social History     Tobacco Use   Smoking Status Former Smoker   • Packs/day: 1.00   • Years: 5.00   • Pack years: 5.00   • Types: Cigarettes, Cigars, Pipe   • Start date:    • Last attempt to quit:    • Years since quittin.1   Smokeless Tobacco Former User       Alcohol Consumption:  Social History     Substance and Sexual Activity   Alcohol Use No       Depression Screen:   PHQ-2/PHQ-9 Depression Screening 2019   Little interest or pleasure in doing things 0   Feeling down, depressed, or hopeless 0   Total Score 0       Health Habits and Functional and Cognitive Screening:  Functional & Cognitive Status 10/25/2017   Do you have difficulty preparing food and eating? No   Do you have difficulty bathing yourself, getting dressed or grooming yourself? No   Do you have difficulty using the toilet? No   Do you have difficulty moving around from place to place? No   Do you have trouble with steps or getting out of a bed or a chair? No   In the past year have you fallen or experienced a near fall? No   Current Diet Frequent Junk Food   Dental Exam Up to date   Eye Exam Up to date   Exercise (times per week) 7 times per week   Current Exercise Activities Include Yard Work   Do you need help using the phone?  No   Are you deaf or do you have serious difficulty hearing?  No   Do you need help with transportation? No   Do you need help shopping? No   Do you need help  preparing meals?  No   Do you need help with housework?  No   Do you need help with laundry? No   Do you need help taking your medications? No   Do you need help managing money? No   Have you felt unusual stress, anger or loneliness in the last month? No   Who do you live with? Spouse   If you need help, do you have trouble finding someone available to you? No   Have you been bothered in the last four weeks by sexual problems? No   Do you have difficulty concentrating, remembering or making decisions? No           Does the patient have evidence of cognitive impairment? No    Aspirin use counseling: Taking ASA appropriately as indicated      Recent Lab Results:  CMP:  Lab Results   Component Value Date    BUN 16 08/29/2018    CREATININE 1.07 08/29/2018    EGFRIFNONA 68 08/29/2018    BCR 15.0 08/29/2018     08/29/2018    K 4.8 08/29/2018    CO2 29.3 08/29/2018    CALCIUM 8.5 08/29/2018    ALBUMIN 3.70 08/29/2018    LABIL2 1.6 09/17/2015    BILITOT 0.4 08/29/2018    ALKPHOS 62 08/29/2018    AST 24 08/29/2018    ALT 22 08/29/2018     Lipid Panel:  Lab Results   Component Value Date    CHOL 96 08/29/2018    TRIG 99 08/29/2018    HDL 48 (L) 08/29/2018    VLDL 19.8 08/29/2018    LDLHDL 0.59 08/29/2018     HbA1c:  Lab Results   Component Value Date    HGBA1C 7.60 (H) 08/29/2018       Visual Acuity:   Visual Acuity Screening    Right eye Left eye Both eyes   Without correction: 20/30 20/50 20/30   With correction:          Age-appropriate Screening Schedule:  Refer to the list below for future screening recommendations based on patient's age, sex and/or medical conditions. Orders for these recommended tests are listed in the plan section. The patient has been provided with a written plan.    Health Maintenance   Topic Date Due   • ZOSTER VACCINE (2 of 3) 12/13/2014   • URINE MICROALBUMIN  10/20/2015   • DIABETIC FOOT EXAM  08/01/2017   • HEMOGLOBIN A1C  02/28/2019   • DIABETIC EYE EXAM  04/26/2019   • LIPID PANEL   08/29/2019   • TDAP/TD VACCINES (2 - Td) 01/01/2021   • COLONOSCOPY  01/01/2023   • INFLUENZA VACCINE  Completed   • PNEUMOCOCCAL VACCINES (65+ LOW/MEDIUM RISK)  Completed        Subjective   History of Present Illness comes in today for Medicare wellness supple visit but also follow-up on routine metabolic problems including hypertension dyslipidemia diabetes.  Has had some difficulties with sleep disturbance off and on for a while.  The leg cramps seem to be better since decreased frequency of statin drug to every other night.  Has had no difficulties with sore throat and upper or lower GI changes.  Denies  changes.  Denies CP SOB palpitations.  Denies skin.  Denies orthopedic concerns.  Jean Noriega is a 73 y.o. male who presents for an Subsequent Wellness Visit.    The following portions of the patient's history were reviewed and updated as appropriate: allergies, current medications, past medical history, past social history, past surgical history and problem list.    Outpatient Medications Prior to Visit   Medication Sig Dispense Refill   • amLODIPine (NORVASC) 10 MG tablet      • atorvastatin (LIPITOR) 40 MG tablet Take 40 mg by mouth Daily.     • cyclobenzaprine (FLEXERIL) 5 MG tablet Take 1 tablet by mouth Every Night. 30 tablet 2   • Docusate Calcium (STOOL SOFTENER PO) Take  by mouth.     • glimepiride (AMARYL) 2 MG tablet Take 1 tablet by mouth Every Morning Before Breakfast. 90 tablet 3   • glucose blood (ONE TOUCH TEST STRIPS) test strip 1 each by Other route Daily. 100 each 12   • losartan (COZAAR) 100 MG tablet      • metFORMIN (GLUCOPHAGE) 1000 MG tablet Take 1 tablet by mouth 2 (Two) Times a Day With Meals. 60 tablet 5   • MULTIPLE VITAMIN PO Take 1 tablet by mouth Daily.     • ONETOUCH DELICA LANCETS 33G misc 1 Device by Other route Daily. 100 each 12   • pancrelipase, Lip-Prot-Amyl, (ZENPEP) 50383 UNITS capsule delayed-release particles capsule Take 20,000 units of lipase by mouth 3 (Three)  "Times a Day With Meals.     • pantoprazole (PROTONIX) 40 MG EC tablet Take 40 mg by mouth 2 (Two) Times a Day.     • ASPIRIN 81 PO aspirin 81 mg tablet     • BESIVANCE 0.6 % suspension ophthalmic suspension      • DUREZOL 0.05 % ophthalmic emulsion      • ILEVRO 0.3 % suspension      • naproxen (NAPROSYN) 500 MG tablet Every 12 (Twelve) Hours.       No facility-administered medications prior to visit.        Patient Active Problem List   Diagnosis   • Epigastric pain   • Gastroesophageal reflux disease   • Chronic rhinitis   • Type 2 diabetes mellitus without complication, without long-term current use of insulin (CMS/HCC)   • Low back pain   • Mixed hyperlipidemia   • Hx of duodenal cancer, s/p Whipple 2004   • Hx of bladder cancer, s/p \"scraping\", 2004   • History of recent left nephrolithiasis s/p ureteral stent placement    • Benign essential HTN   • Arthritis   • Carcinoma of duodenum (CMS/HCC)   • Chronic diastolic heart failure (CMS/HCC)   • Dyspnea on exertion   • Peptic ulcer   • Taking multiple medications for chronic disease   • Varicose veins of lower extremity with inflammation   • Malignant neoplasm of urinary bladder (CMS/HCC)       Advance Care Planning:  has an advance directive - a copy has been provided and is in file    Identification of Risk Factors:  Risk factors include: cardiovascular risk.    Review of Systems    Compared to one year ago, the patient feels his physical health is the same.  Compared to one year ago, the patient feels his mental health is the same.    Objective     Physical Exam   Constitutional: He is oriented to person, place, and time. He appears well-developed and well-nourished.   HENT:   Head: Normocephalic.   Mouth/Throat: Oropharynx is clear and moist.   Eyes: Conjunctivae and EOM are normal. Pupils are equal, round, and reactive to light.   Neck: Normal range of motion. Neck supple. No tracheal deviation present. No thyromegaly present.   Cardiovascular: Normal rate, " "regular rhythm and normal heart sounds.   No murmur heard.  Pulmonary/Chest: Effort normal and breath sounds normal.   Musculoskeletal: He exhibits no edema.   Neurological: He is alert and oriented to person, place, and time.   Skin: Skin is warm and dry.   Psychiatric: He has a normal mood and affect.   Vitals reviewed.      Vitals:    01/31/19 0912   BP: 121/65   BP Location: Right arm   Patient Position: Sitting   Cuff Size: Adult   Pulse: 79   Temp: 98.3 °F (36.8 °C)   Weight: 73 kg (161 lb)   Height: 175.3 cm (69.02\")       Patient's Body mass index is 23.76 kg/m². BMI is within normal parameters. No follow-up required..      Assessment/Plan   Patient Self-Management and Personalized Health Advice  The patient has been provided with information about: alcohol use and preventive services including:   · Pneumococcal vaccine .    Visit Diagnoses:    ICD-10-CM ICD-9-CM   1. Type 2 diabetes mellitus without complication, without long-term current use of insulin (CMS/[facility]) E11.9 250.00   2. Encounter for diabetic foot exam (CMS/[facility]) E11.9 250.00   3. Benign essential HTN I10 401.1   4. Medicare annual wellness visit, initial Z00.00 V70.0   5. Need for vaccination with 13-polyvalent pneumococcal conjugate vaccine Z23 V03.82   6. Sleep disturbance G47.9 780.50       Orders Placed This Encounter   Procedures   • Pneumococcal Conjugate Vaccine 13-Valent All       Outpatient Encounter Medications as of 1/31/2019   Medication Sig Dispense Refill   • amLODIPine (NORVASC) 10 MG tablet      • atorvastatin (LIPITOR) 40 MG tablet Take 40 mg by mouth Daily.     • cyclobenzaprine (FLEXERIL) 5 MG tablet Take 1 tablet by mouth Every Night. 30 tablet 2   • Docusate Calcium (STOOL SOFTENER PO) Take  by mouth.     • glimepiride (AMARYL) 2 MG tablet Take 1 tablet by mouth Every Morning Before Breakfast. 90 tablet 3   • glucose blood (ONE TOUCH TEST STRIPS) test strip 1 each by Other route Daily. 100 each 12   • losartan (COZAAR) 100 MG " tablet      • metFORMIN (GLUCOPHAGE) 1000 MG tablet Take 1 tablet by mouth 2 (Two) Times a Day With Meals. 60 tablet 5   • MULTIPLE VITAMIN PO Take 1 tablet by mouth Daily.     • ONETOUCH DELICA LANCETS 33G misc 1 Device by Other route Daily. 100 each 12   • pancrelipase, Lip-Prot-Amyl, (ZENPEP) 57775 UNITS capsule delayed-release particles capsule Take 20,000 units of lipase by mouth 3 (Three) Times a Day With Meals.     • pantoprazole (PROTONIX) 40 MG EC tablet Take 40 mg by mouth 2 (Two) Times a Day.     • ASPIRIN 81 PO aspirin 81 mg tablet     • temazepam (RESTORIL) 15 MG capsule Take 1 capsule by mouth At Night As Needed for Sleep. 15 capsule 0   • [DISCONTINUED] BESIVANCE 0.6 % suspension ophthalmic suspension      • [DISCONTINUED] DUREZOL 0.05 % ophthalmic emulsion      • [DISCONTINUED] ILEVRO 0.3 % suspension      • [DISCONTINUED] naproxen (NAPROSYN) 500 MG tablet Every 12 (Twelve) Hours.       No facility-administered encounter medications on file as of 1/31/2019.        Reviewed use of high risk medication in the elderly: yes  Reviewed for potential of harmful drug interactions in the elderly: yes    Follow Up:  Return in about 6 months (around 7/31/2019).   Overall you seem to be doing quite well.  You have maintain follow-up with vascular surgery cardiology urology.  Available records have been reviewed.  Will ask you to follow-up soon for fasting lab as requested.  Stay safely active.  Continue medications as reconciled ordered.  In regards to the muscle cramps since not needed would stop the muscle relaxer.  Counseled.  Try OTC melatonin for the sleep disturbance.  If that doesn't work then I gave a prescription for some temazepam to use sparingly.  #15.  Stay safely active.  Maintain heart healthy diabetic diet.  After consent Prevnar 13 vaccine given.  You have received your flu shot and first hepatitis A vaccine.  Recheck in about 6 months or as needed.  Tian pending  An After Visit Summary and  PPPS with all of these plans were given to the patient.

## 2019-02-14 ENCOUNTER — OFFICE VISIT (OUTPATIENT)
Dept: FAMILY MEDICINE CLINIC | Facility: CLINIC | Age: 74
End: 2019-02-14

## 2019-02-14 VITALS
WEIGHT: 158.6 LBS | SYSTOLIC BLOOD PRESSURE: 122 MMHG | HEIGHT: 69 IN | HEART RATE: 73 BPM | DIASTOLIC BLOOD PRESSURE: 68 MMHG | TEMPERATURE: 97 F | BODY MASS INDEX: 23.49 KG/M2

## 2019-02-14 DIAGNOSIS — E11.9 TYPE 2 DIABETES MELLITUS WITHOUT COMPLICATION, WITHOUT LONG-TERM CURRENT USE OF INSULIN (HCC): ICD-10-CM

## 2019-02-14 DIAGNOSIS — B00.1 FEVER BLISTER: ICD-10-CM

## 2019-02-14 LAB
ALBUMIN SERPL-MCNC: 4.2 G/DL (ref 3.4–4.8)
ALBUMIN/GLOB SERPL: 2 G/DL (ref 1.5–2.5)
ALP SERPL-CCNC: 57 U/L (ref 40–129)
ALT SERPL W P-5'-P-CCNC: 20 U/L (ref 10–44)
ANION GAP SERPL CALCULATED.3IONS-SCNC: 3.5 MMOL/L (ref 3.6–11.2)
AST SERPL-CCNC: 25 U/L (ref 10–34)
BASOPHILS # BLD AUTO: 0.02 10*3/MM3 (ref 0–0.3)
BASOPHILS NFR BLD AUTO: 0.3 % (ref 0–2)
BILIRUB SERPL-MCNC: 0.3 MG/DL (ref 0.2–1.8)
BUN BLD-MCNC: 16 MG/DL (ref 7–21)
BUN/CREAT SERPL: 15.5 (ref 7–25)
CALCIUM SPEC-SCNC: 8.8 MG/DL (ref 7.7–10)
CHLORIDE SERPL-SCNC: 109 MMOL/L (ref 99–112)
CHOLEST SERPL-MCNC: 90 MG/DL (ref 0–200)
CO2 SERPL-SCNC: 27.5 MMOL/L (ref 24.3–31.9)
CREAT BLD-MCNC: 1.03 MG/DL (ref 0.43–1.29)
DEPRECATED RDW RBC AUTO: 46.4 FL (ref 37–54)
EOSINOPHIL # BLD AUTO: 0.1 10*3/MM3 (ref 0–0.7)
EOSINOPHIL NFR BLD AUTO: 1.7 % (ref 0–7)
ERYTHROCYTE [DISTWIDTH] IN BLOOD BY AUTOMATED COUNT: 13.6 % (ref 11.5–14.5)
GFR SERPL CREATININE-BSD FRML MDRD: 71 ML/MIN/1.73
GLOBULIN UR ELPH-MCNC: 2.1 GM/DL
GLUCOSE BLD-MCNC: 135 MG/DL (ref 70–110)
HBA1C MFR BLD: 7 % (ref 4.5–5.7)
HCT VFR BLD AUTO: 39.3 % (ref 42–52)
HDLC SERPL-MCNC: 40 MG/DL (ref 60–100)
HGB BLD-MCNC: 12.5 G/DL (ref 14–18)
IMM GRANULOCYTES # BLD AUTO: 0.02 10*3/MM3 (ref 0–0.03)
IMM GRANULOCYTES NFR BLD AUTO: 0.3 % (ref 0–0.5)
LDLC SERPL CALC-MCNC: 30 MG/DL (ref 0–100)
LDLC/HDLC SERPL: 0.75 {RATIO}
LYMPHOCYTES # BLD AUTO: 1.38 10*3/MM3 (ref 1–3)
LYMPHOCYTES NFR BLD AUTO: 23.9 % (ref 16–46)
MCH RBC QN AUTO: 29.8 PG (ref 27–33)
MCHC RBC AUTO-ENTMCNC: 31.8 G/DL (ref 33–37)
MCV RBC AUTO: 93.6 FL (ref 80–94)
MONOCYTES # BLD AUTO: 0.6 10*3/MM3 (ref 0.1–0.9)
MONOCYTES NFR BLD AUTO: 10.4 % (ref 0–12)
NEUTROPHILS # BLD AUTO: 3.65 10*3/MM3 (ref 1.4–6.5)
NEUTROPHILS NFR BLD AUTO: 63.4 % (ref 40–75)
OSMOLALITY SERPL CALC.SUM OF ELEC: 282.6 MOSM/KG (ref 273–305)
PLATELET # BLD AUTO: 275 10*3/MM3 (ref 130–400)
PMV BLD AUTO: 10.5 FL (ref 6–10)
POTASSIUM BLD-SCNC: 4.7 MMOL/L (ref 3.5–5.3)
PROT SERPL-MCNC: 6.3 G/DL (ref 6–8)
RBC # BLD AUTO: 4.2 10*6/MM3 (ref 4.7–6.1)
SODIUM BLD-SCNC: 140 MMOL/L (ref 135–153)
TRIGL SERPL-MCNC: 101 MG/DL (ref 0–150)
VIT B12 BLD-MCNC: 344 PG/ML (ref 211–911)
VLDLC SERPL-MCNC: 20.2 MG/DL
WBC NRBC COR # BLD: 5.77 10*3/MM3 (ref 4.5–12.5)

## 2019-02-14 PROCEDURE — 99212 OFFICE O/P EST SF 10 MIN: CPT | Performed by: NURSE PRACTITIONER

## 2019-02-14 PROCEDURE — 36415 COLL VENOUS BLD VENIPUNCTURE: CPT | Performed by: NURSE PRACTITIONER

## 2019-02-14 PROCEDURE — 80053 COMPREHEN METABOLIC PANEL: CPT | Performed by: FAMILY MEDICINE

## 2019-02-14 PROCEDURE — 83036 HEMOGLOBIN GLYCOSYLATED A1C: CPT | Performed by: FAMILY MEDICINE

## 2019-02-14 PROCEDURE — 80061 LIPID PANEL: CPT | Performed by: FAMILY MEDICINE

## 2019-02-14 PROCEDURE — 85025 COMPLETE CBC W/AUTO DIFF WBC: CPT | Performed by: FAMILY MEDICINE

## 2019-02-14 PROCEDURE — 82607 VITAMIN B-12: CPT | Performed by: FAMILY MEDICINE

## 2019-02-14 RX ORDER — ACYCLOVIR 50 MG/G
OINTMENT TOPICAL 4 TIMES DAILY
Qty: 1 EACH | Refills: 0 | Status: SHIPPED | OUTPATIENT
Start: 2019-02-14 | End: 2020-11-12

## 2019-02-14 NOTE — PROGRESS NOTES
"Zander Noriega is a 74 y.o. male.      Patient is presenting today for new onset symptoms :\"sore\" to his left nostril.  He is reporting symptoms started as sore., runny nose  2 weeks ago.  Has  fever blisters a few days ago to his lip and these resolved, but the area to his nose is . He Had flu shot.  He         The following portions of the patient's history were reviewed and updated as appropriate: past medical history, past social history and past surgical history.    Review of Systems   Constitutional: Negative for appetite change, chills, fatigue and fever.   HENT: Positive for congestion. Negative for sore throat, trouble swallowing and voice change.    Eyes: Negative for discharge, itching and visual disturbance.   Respiratory: Negative for cough, choking, chest tightness and shortness of breath.    Cardiovascular: Negative for chest pain, palpitations and leg swelling.   Gastrointestinal: Negative for abdominal pain, blood in stool, constipation, diarrhea and nausea.   Genitourinary: Negative for dysuria, frequency and hematuria.   Musculoskeletal: Negative for arthralgias, back pain and myalgias.   Skin: Positive for wound (left nare \"sore\" area ). Negative for rash.   Allergic/Immunologic: Positive for environmental allergies.   Neurological: Negative for dizziness, weakness and headaches.   Hematological: Does not bruise/bleed easily.   Psychiatric/Behavioral: Negative for sleep disturbance. The patient is not nervous/anxious.        Objective   Physical Exam   Constitutional: He appears well-developed. No distress.   HENT:   Head: Normocephalic and atraumatic.   Nose: No mucosal edema, rhinorrhea or sinus tenderness. Right sinus exhibits no maxillary sinus tenderness and no frontal sinus tenderness. Left sinus exhibits no maxillary sinus tenderness and no frontal sinus tenderness.       Eyes: Conjunctivae and EOM are normal.   Neck: Neck supple. No JVD present.   Cardiovascular: " Normal rate and normal heart sounds.   Pulmonary/Chest: Effort normal and breath sounds normal. No respiratory distress.   Abdominal: Soft. Bowel sounds are normal. He exhibits no distension.   Musculoskeletal: Normal range of motion. He exhibits no edema.   Neurological: He is alert.   Skin: Skin is warm. No rash noted.   Psychiatric: He has a normal mood and affect.   Vitals reviewed.      Assessment/Plan   Jean was seen today for sore inside  nose.  Will prescribe Zovirax ointment.  Likely remaining fever blister from his recent respiratory virus.  Discussed s/s of infection to report back to PCP office.  He verbalizes understanding.  He is also here for fasting labs today which were collected at listed below.    Diagnoses and all orders for this visit:    Type 2 diabetes mellitus without complication, without long-term current use of insulin (CMS/[facility])  -     Vitamin B12  -     Lipid Panel  -     Hemoglobin A1c  -     Comprehensive Metabolic Panel  -     CBC & Differential  -     CBC Auto Differential  -     Osmolality, Calculated; Future  -     Osmolality, Calculated    Fever blister    Other orders  -     acyclovir (ZOVIRAX) 5 % ointment; Apply  topically to the appropriate area as directed 4 (Four) Times a Day.

## 2019-02-17 NOTE — PROGRESS NOTES
Blood sugar control much better.  Cholesterol profile great.  B12 level is good although low normal.  Blood chemistries good.  Still persistent but very mild anemia.  Be sure keeps follow-up visits with his GI doctor.  Consider taking OTC B12 tablet daily.

## 2019-02-19 ENCOUNTER — TELEPHONE (OUTPATIENT)
Dept: FAMILY MEDICINE CLINIC | Facility: CLINIC | Age: 74
End: 2019-02-19

## 2019-02-19 NOTE — TELEPHONE ENCOUNTER
PATIENT CALLED AND STATES HE GOT SOME OTC B12 500 MCG. HE WANTS TO KNOW HOW MUCH HE SHOULD TAKE DAILY.     ALSO HE WAS WANTING TO KNOW ABOUT FOLLOWING UP WITH GI ABOUT THE ANEMIA. HE SAID HE HAS NOT SEEN HIM IN SOME YEARS. HE USED TO SEE DR SHYAM ANNE. DO WE NEED TO REFER HIM BACK IF SO PLEASE SEND REFERRAL.

## 2019-02-20 DIAGNOSIS — D64.9 ANEMIA, UNSPECIFIED TYPE: ICD-10-CM

## 2019-02-20 DIAGNOSIS — Z12.11 COLON CANCER SCREENING: Primary | ICD-10-CM

## 2019-02-21 ENCOUNTER — TELEPHONE (OUTPATIENT)
Dept: FAMILY MEDICINE CLINIC | Facility: CLINIC | Age: 74
End: 2019-02-21

## 2019-02-21 NOTE — TELEPHONE ENCOUNTER
PATIENTS CYCLOBENZAPRINE IS NOT COVERED ON INSURANCE WOULD YOU LIKE TO CHANGE THE MEDICATION OR PROCEED WITH PRIOR AUTH?

## 2019-02-22 RX ORDER — TIZANIDINE HYDROCHLORIDE 4 MG/1
4 CAPSULE, GELATIN COATED ORAL NIGHTLY PRN
Qty: 30 CAPSULE | Refills: 0 | Status: SHIPPED | OUTPATIENT
Start: 2019-02-22 | End: 2019-02-26

## 2019-02-25 ENCOUNTER — OFFICE VISIT (OUTPATIENT)
Dept: SURGERY | Facility: CLINIC | Age: 74
End: 2019-02-25

## 2019-02-25 VITALS
SYSTOLIC BLOOD PRESSURE: 120 MMHG | HEART RATE: 88 BPM | HEIGHT: 69 IN | BODY MASS INDEX: 23.4 KG/M2 | DIASTOLIC BLOOD PRESSURE: 77 MMHG | WEIGHT: 158 LBS

## 2019-02-25 DIAGNOSIS — M79.5 FOREIGN BODY (FB) IN SOFT TISSUE: Primary | ICD-10-CM

## 2019-02-25 PROCEDURE — 99202 OFFICE O/P NEW SF 15 MIN: CPT | Performed by: SURGERY

## 2019-02-26 ENCOUNTER — TELEPHONE (OUTPATIENT)
Dept: FAMILY MEDICINE CLINIC | Facility: CLINIC | Age: 74
End: 2019-02-26

## 2019-02-26 PROBLEM — M79.5 FOREIGN BODY (FB) IN SOFT TISSUE: Status: ACTIVE | Noted: 2019-02-26

## 2019-02-26 RX ORDER — METHOCARBAMOL 500 MG/1
TABLET, FILM COATED ORAL
Qty: 30 TABLET | Refills: 3 | Status: SHIPPED | OUTPATIENT
Start: 2019-02-26 | End: 2019-03-04

## 2019-02-26 NOTE — TELEPHONE ENCOUNTER
BARB CALLED ABOUT HAVING SCOPE DONE WHEN HE HAS COLONOSCOPY DONE. RAKESH WORKING ON [facility] COLONOSCOPY FOR HIM CAN DISCUSS HAVING  SCOPE WHEN SEEN FOR CONSULTATION.

## 2019-02-26 NOTE — PROGRESS NOTES
Subjective   Jean Noriega is a 74 y.o. male is being seen for consultation today at the request of Demetrius Wilkerson MD    Jean Noriega is a 74 y.o. male With foreign body in the right upper thigh.  He was working with a metal shard of metal became dislodged and injured his left upper thigh.  There is surrounding bruising that is improved.  Imaging shows no signs of complication no vascular compromise or signs of infection.  The piece of metal is minute and is 2-3 mm in size.  No significant pain related to the metal.        Past Medical History:   Diagnosis Date   • Anemia, recent acute blood loss 3/30/2017    ALB anemia in March after ureteral stent removed   • Bladder cancer (CMS/HCC)    • Diabetes mellitus (CMS/HCC)    • Duodenal cancer (CMS/HCC)    • Gallstones    • History of recent left nephrolithiasis s/p ureteral stent placement  3/30/2017    2/17/17 - ureteral stent placed at [facility], s/p stent and stone removal 17   • Hyperlipidemia    • Kidney stone    • Kidney stone on left side 2017   • Nephrolithiasis    • Pneumonia        Family History   Problem Relation Age of Onset   • Heart disease Father    • Hypertension Brother    • Diabetes Brother        Social History     Socioeconomic History   • Marital status:      Spouse name: Not on file   • Number of children: Not on file   • Years of education: Not on file   • Highest education level: Not on file   Social Needs   • Financial resource strain: Not on file   • Food insecurity - worry: Not on file   • Food insecurity - inability: Not on file   • Transportation needs - medical: Not on file   • Transportation needs - non-medical: Not on file   Occupational History   • Not on file   Tobacco Use   • Smoking status: Former Smoker     Packs/day: 1.00     Years: 5.00     Pack years: 5.00     Types: Cigarettes, Cigars, Pipe     Start date:      Last attempt to quit: 1975     Years since quittin.1   • Smokeless tobacco:  "Former User   Substance and Sexual Activity   • Alcohol use: No   • Drug use: No   • Sexual activity: Defer   Other Topics Concern   • Not on file   Social History Narrative   • Not on file       Past Surgical History:   Procedure Laterality Date   • CYSTOSCOPY W/ URETERAL STENT REMOVAL Left 03/13/2017   • EYE MUSCLE SURGERY     • HERNIA REPAIR     • INTERVENTIONAL RADIOLOGY PROCEDURE Right 3/31/2017    Procedure: PTC & Drain placement;  Surgeon: Serjio Wall MD;  Location: [facility] INVASIVE LOCATION;  Service:    • URETERAL STENT INSERTION Left    • WHIPPLE PROCEDURE  2004       Review of Systems   Constitutional: Negative for activity change, appetite change, chills and fever.   HENT: Negative for sore throat and trouble swallowing.    Eyes: Negative for visual disturbance.   Respiratory: Negative for cough and shortness of breath.    Cardiovascular: Negative for chest pain and palpitations.   Gastrointestinal: Negative for abdominal distention, abdominal pain, blood in stool, constipation, diarrhea, nausea and vomiting.   Endocrine: Negative for cold intolerance and heat intolerance.   Genitourinary: Negative for dysuria.   Musculoskeletal: Negative for joint swelling.   Skin: Positive for wound. Negative for color change and rash.   Allergic/Immunologic: Negative for immunocompromised state.   Neurological: Negative for dizziness, seizures, weakness and headaches.   Hematological: Negative for adenopathy. Does not bruise/bleed easily.   Psychiatric/Behavioral: Negative for agitation and confusion.         /77   Pulse 88   Ht 175.3 cm (69.02\")   Wt 71.7 kg (158 lb)   BMI 23.32 kg/m²   Objective   Physical Exam   Constitutional: He is oriented to person, place, and time. He appears well-developed.   HENT:   Head: Normocephalic and atraumatic.   Mouth/Throat: Mucous membranes are normal.   Eyes: Conjunctivae are normal. Pupils are equal, round, and reactive to light.   Neck: Neck supple. No JVD " present. No tracheal deviation present. No thyromegaly present.   Cardiovascular: Normal rate and regular rhythm. Exam reveals no gallop and no friction rub.   No murmur heard.  Pulmonary/Chest: Effort normal and breath sounds normal.   Abdominal: Soft. He exhibits no distension. There is no splenomegaly or hepatomegaly. There is no tenderness. No hernia.   Musculoskeletal: Normal range of motion. He exhibits no deformity.   Neurological: He is alert and oriented to person, place, and time.   Skin: Skin is warm and dry.   Right upper thigh with bruising and a small puncture wound centrally located.  No vascular compromise or signs of infection.   Psychiatric: He has a normal mood and affect.       Plain films of the right lower extremity show a small retained metallic foreign body with no signs of infection fracture or other abnormality.  I have personally reviewed the films.      Ngoc Pena was seen today for foreign body.    Diagnoses and all orders for this visit:    Foreign body (FB) in soft tissue      Jean Noriega is a 74 y.o. male with retained foreign body after shard of metal that is subcentimeter in size injured his right upper thigh.  The wound from the puncture is healing well with minimal bruising that is resolving.  No sign of complication or chronic pain.  No need for surgical removal at this time.  He is aware of symptoms that should prompt return to care and will follow-up as needed.    Patient's Body mass index is 23.32 kg/m². BMI is within normal parameters. No follow-up required..

## 2019-02-26 NOTE — TELEPHONE ENCOUNTER
BARB CALLED ZANAFLEX THAT WAS SENT IN IS TO EXPENSIVE. CAN YOU SEND SOMETHING ELSE IN?  EMMANUEL DODD    [phone]

## 2019-03-04 ENCOUNTER — TELEPHONE (OUTPATIENT)
Dept: FAMILY MEDICINE CLINIC | Facility: CLINIC | Age: 74
End: 2019-03-04

## 2019-03-04 RX ORDER — TIZANIDINE 4 MG/1
4 TABLET ORAL NIGHTLY PRN
Qty: 30 TABLET | Refills: 2 | Status: SHIPPED | OUTPATIENT
Start: 2019-03-04 | End: 2019-06-25

## 2019-03-04 NOTE — TELEPHONE ENCOUNTER
RECEIVED FAX FROM [facility] THAT THE METHOCARBAMOL IS NOT COVERED. PT INS PREFERS TIZANIDINE. S/W JEANMARIE AT [facility] AND SHE STATES TO SEND IN TABLETS.

## 2019-03-13 DIAGNOSIS — Z12.11 SCREENING FOR COLON CANCER: Primary | ICD-10-CM

## 2019-03-17 ENCOUNTER — APPOINTMENT (OUTPATIENT)
Dept: CT IMAGING | Facility: HOSPITAL | Age: 74
End: 2019-03-17

## 2019-03-17 ENCOUNTER — HOSPITAL ENCOUNTER (EMERGENCY)
Facility: HOSPITAL | Age: 74
Discharge: HOME | End: 2019-03-17
Attending: FAMILY MEDICINE | Admitting: EMERGENCY MEDICINE

## 2019-03-17 VITALS
OXYGEN SATURATION: 100 % | TEMPERATURE: 98 F | WEIGHT: 156 LBS | SYSTOLIC BLOOD PRESSURE: 115 MMHG | HEART RATE: 63 BPM | RESPIRATION RATE: 18 BRPM | HEIGHT: 69 IN | DIASTOLIC BLOOD PRESSURE: 77 MMHG | BODY MASS INDEX: 23.11 KG/M2

## 2019-03-17 DIAGNOSIS — N20.1 URETEROLITHIASIS: Primary | ICD-10-CM

## 2019-03-17 LAB
ALBUMIN SERPL-MCNC: 4.46 G/DL (ref 3.5–5.2)
ALBUMIN/GLOB SERPL: 1.8 G/DL
ALP SERPL-CCNC: 80 U/L (ref 39–117)
ALT SERPL W P-5'-P-CCNC: 36 U/L (ref 1–41)
ANION GAP SERPL CALCULATED.3IONS-SCNC: 10.8 MMOL/L
AST SERPL-CCNC: 40 U/L (ref 1–40)
BACTERIA UR QL AUTO: ABNORMAL /HPF
BASOPHILS # BLD AUTO: 0.02 10*3/MM3 (ref 0–0.2)
BASOPHILS NFR BLD AUTO: 0.2 % (ref 0–1.5)
BILIRUB SERPL-MCNC: 0.3 MG/DL (ref 0.1–1.2)
BILIRUB UR QL STRIP: NEGATIVE
BUN BLD-MCNC: 11 MG/DL (ref 8–23)
BUN/CREAT SERPL: 9.2 (ref 7–25)
CALCIUM SPEC-SCNC: 9.1 MG/DL (ref 8.6–10.5)
CHLORIDE SERPL-SCNC: 103 MMOL/L (ref 98–107)
CLARITY UR: ABNORMAL
CO2 SERPL-SCNC: 26.2 MMOL/L (ref 22–29)
COLOR UR: YELLOW
CREAT BLD-MCNC: 1.2 MG/DL (ref 0.76–1.27)
DEPRECATED RDW RBC AUTO: 43.6 FL (ref 37–54)
EOSINOPHIL # BLD AUTO: 0.09 10*3/MM3 (ref 0–0.4)
EOSINOPHIL NFR BLD AUTO: 0.9 % (ref 0.3–6.2)
ERYTHROCYTE [DISTWIDTH] IN BLOOD BY AUTOMATED COUNT: 13.3 % (ref 12.3–15.4)
GFR SERPL CREATININE-BSD FRML MDRD: 60 ML/MIN/1.73
GLOBULIN UR ELPH-MCNC: 2.4 GM/DL
GLUCOSE BLD-MCNC: 172 MG/DL (ref 65–99)
GLUCOSE UR STRIP-MCNC: ABNORMAL MG/DL
HCT VFR BLD AUTO: 39.4 % (ref 37.5–51)
HGB BLD-MCNC: 12.9 G/DL (ref 13–17.7)
HGB UR QL STRIP.AUTO: ABNORMAL
HOLD SPECIMEN: NORMAL
HOLD SPECIMEN: NORMAL
HYALINE CASTS UR QL AUTO: ABNORMAL /LPF
IMM GRANULOCYTES # BLD AUTO: 0.02 10*3/MM3 (ref 0–0.05)
IMM GRANULOCYTES NFR BLD AUTO: 0.2 % (ref 0–0.5)
KETONES UR QL STRIP: NEGATIVE
LEUKOCYTE ESTERASE UR QL STRIP.AUTO: ABNORMAL
LIPASE SERPL-CCNC: 4 U/L (ref 13–60)
LYMPHOCYTES # BLD AUTO: 1.59 10*3/MM3 (ref 0.7–3.1)
LYMPHOCYTES NFR BLD AUTO: 15.6 % (ref 19.6–45.3)
MCH RBC QN AUTO: 30.5 PG (ref 26.6–33)
MCHC RBC AUTO-ENTMCNC: 32.7 G/DL (ref 31.5–35.7)
MCV RBC AUTO: 93.1 FL (ref 79–97)
MONOCYTES # BLD AUTO: 1.24 10*3/MM3 (ref 0.1–0.9)
MONOCYTES NFR BLD AUTO: 12.2 % (ref 5–12)
NEUTROPHILS # BLD AUTO: 7.23 10*3/MM3 (ref 1.4–7)
NEUTROPHILS NFR BLD AUTO: 70.9 % (ref 42.7–76)
NITRITE UR QL STRIP: NEGATIVE
PH UR STRIP.AUTO: 5.5 [PH] (ref 5–8)
PLATELET # BLD AUTO: 210 10*3/MM3 (ref 140–450)
PMV BLD AUTO: 10.1 FL (ref 6–12)
POTASSIUM BLD-SCNC: 5 MMOL/L (ref 3.5–5.2)
PROT SERPL-MCNC: 6.9 G/DL (ref 6–8.5)
PROT UR QL STRIP: ABNORMAL
RBC # BLD AUTO: 4.23 10*6/MM3 (ref 4.14–5.8)
RBC # UR: ABNORMAL /HPF
REF LAB TEST METHOD: ABNORMAL
SODIUM BLD-SCNC: 140 MMOL/L (ref 136–145)
SP GR UR STRIP: 1.02 (ref 1–1.03)
SQUAMOUS #/AREA URNS HPF: ABNORMAL /HPF
UROBILINOGEN UR QL STRIP: ABNORMAL
WBC NRBC COR # BLD: 10.19 10*3/MM3 (ref 3.4–10.8)
WBC UR QL AUTO: ABNORMAL /HPF
WHOLE BLOOD HOLD SPECIMEN: NORMAL
WHOLE BLOOD HOLD SPECIMEN: NORMAL

## 2019-03-17 PROCEDURE — 25010000002 KETOROLAC TROMETHAMINE PER 15 MG: Performed by: FAMILY MEDICINE

## 2019-03-17 PROCEDURE — 96375 TX/PRO/DX INJ NEW DRUG ADDON: CPT

## 2019-03-17 PROCEDURE — 96361 HYDRATE IV INFUSION ADD-ON: CPT

## 2019-03-17 PROCEDURE — 96374 THER/PROPH/DIAG INJ IV PUSH: CPT

## 2019-03-17 PROCEDURE — 85025 COMPLETE CBC W/AUTO DIFF WBC: CPT | Performed by: FAMILY MEDICINE

## 2019-03-17 PROCEDURE — 36415 COLL VENOUS BLD VENIPUNCTURE: CPT

## 2019-03-17 PROCEDURE — 80053 COMPREHEN METABOLIC PANEL: CPT | Performed by: FAMILY MEDICINE

## 2019-03-17 PROCEDURE — 96376 TX/PRO/DX INJ SAME DRUG ADON: CPT

## 2019-03-17 PROCEDURE — 99284 EMERGENCY DEPT VISIT MOD MDM: CPT

## 2019-03-17 PROCEDURE — 74176 CT ABD & PELVIS W/O CONTRAST: CPT | Performed by: RADIOLOGY

## 2019-03-17 PROCEDURE — 81001 URINALYSIS AUTO W/SCOPE: CPT | Performed by: FAMILY MEDICINE

## 2019-03-17 PROCEDURE — 83690 ASSAY OF LIPASE: CPT | Performed by: FAMILY MEDICINE

## 2019-03-17 PROCEDURE — 74176 CT ABD & PELVIS W/O CONTRAST: CPT

## 2019-03-17 PROCEDURE — 25010000002 ONDANSETRON PER 1 MG: Performed by: FAMILY MEDICINE

## 2019-03-17 PROCEDURE — 25010000002 MORPHINE PER 10 MG: Performed by: FAMILY MEDICINE

## 2019-03-17 RX ORDER — TAMSULOSIN HYDROCHLORIDE 0.4 MG/1
1 CAPSULE ORAL DAILY
Qty: 15 CAPSULE | Refills: 0 | Status: SHIPPED | OUTPATIENT
Start: 2019-03-17 | End: 2019-06-25

## 2019-03-17 RX ORDER — SODIUM CHLORIDE 0.9 % (FLUSH) 0.9 %
10 SYRINGE (ML) INJECTION AS NEEDED
Status: DISCONTINUED | OUTPATIENT
Start: 2019-03-17 | End: 2019-03-17 | Stop reason: HOSPADM

## 2019-03-17 RX ORDER — KETOROLAC TROMETHAMINE 30 MG/ML
15 INJECTION, SOLUTION INTRAMUSCULAR; INTRAVENOUS ONCE
Status: COMPLETED | OUTPATIENT
Start: 2019-03-17 | End: 2019-03-17

## 2019-03-17 RX ORDER — ONDANSETRON 2 MG/ML
4 INJECTION INTRAMUSCULAR; INTRAVENOUS ONCE
Status: COMPLETED | OUTPATIENT
Start: 2019-03-17 | End: 2019-03-17

## 2019-03-17 RX ORDER — CIPROFLOXACIN 500 MG/1
500 TABLET, FILM COATED ORAL 2 TIMES DAILY
Qty: 14 TABLET | Refills: 0 | Status: SHIPPED | OUTPATIENT
Start: 2019-03-17 | End: 2019-06-25

## 2019-03-17 RX ORDER — IBUPROFEN 600 MG/1
600 TABLET ORAL EVERY 6 HOURS PRN
Qty: 30 TABLET | Refills: 0 | Status: SHIPPED | OUTPATIENT
Start: 2019-03-17 | End: 2019-10-01

## 2019-03-17 RX ORDER — HYDROCODONE BITARTRATE AND ACETAMINOPHEN 5; 325 MG/1; MG/1
1 TABLET ORAL EVERY 6 HOURS PRN
Qty: 14 TABLET | Refills: 0 | Status: SHIPPED | OUTPATIENT
Start: 2019-03-17 | End: 2019-06-25

## 2019-03-17 RX ORDER — ONDANSETRON 4 MG/1
4 TABLET, FILM COATED ORAL EVERY 6 HOURS PRN
Qty: 12 TABLET | Refills: 0 | Status: SHIPPED | OUTPATIENT
Start: 2019-03-17 | End: 2020-11-12

## 2019-03-17 RX ORDER — SODIUM CHLORIDE 9 MG/ML
INJECTION, SOLUTION INTRAVENOUS
Status: DISCONTINUED
Start: 2019-03-17 | End: 2019-03-17 | Stop reason: HOSPADM

## 2019-03-17 RX ADMIN — SODIUM CHLORIDE, PRESERVATIVE FREE 10 ML: 5 INJECTION INTRAVENOUS at 04:29

## 2019-03-17 RX ADMIN — SODIUM CHLORIDE 1000 ML: 9 INJECTION, SOLUTION INTRAVENOUS at 04:27

## 2019-03-17 RX ADMIN — ONDANSETRON 4 MG: 2 INJECTION, SOLUTION INTRAMUSCULAR; INTRAVENOUS at 04:28

## 2019-03-17 RX ADMIN — KETOROLAC TROMETHAMINE 15 MG: 30 INJECTION, SOLUTION INTRAMUSCULAR; INTRAVENOUS at 04:28

## 2019-03-17 RX ADMIN — MORPHINE SULFATE 2 MG: 4 INJECTION INTRAVENOUS at 04:28

## 2019-03-17 RX ADMIN — SODIUM CHLORIDE 2124 ML: 9 INJECTION, SOLUTION INTRAVENOUS at 07:56

## 2019-03-17 RX ADMIN — MORPHINE SULFATE 2 MG: 4 INJECTION INTRAVENOUS at 07:57

## 2019-03-17 NOTE — ED PROVIDER NOTES
Subjective     History provided by:  Patient  Flank Pain   Pain location:  R flank  Pain radiates to:  Groin  Pain severity:  Severe  Onset quality:  Gradual  Timing:  Constant  Progression:  Unchanged  Chronicity:  New  Relieved by:  Nothing  Worsened by:  Nothing  Ineffective treatments:  None tried  Associated symptoms: nausea and vomiting    Associated symptoms: no anorexia, no chest pain, no dysuria and no fever    Risk factors: no alcohol abuse, no aspirin use, not elderly, has not had multiple surgeries, no NSAID use, not obese, not pregnant and no recent hospitalization        Review of Systems   Constitutional: Negative.  Negative for fever.   HENT: Negative.    Respiratory: Negative.    Cardiovascular: Negative.  Negative for chest pain.   Gastrointestinal: Positive for nausea and vomiting. Negative for abdominal pain and anorexia.   Endocrine: Negative.    Genitourinary: Positive for flank pain. Negative for dysuria.   Skin: Negative.    Neurological: Negative.    Psychiatric/Behavioral: Negative.    All other systems reviewed and are negative.      Past Medical History:   Diagnosis Date   • Anemia, recent acute blood loss 3/30/2017    ALB anemia in March after ureteral stent removed   • Bladder cancer (CMS/HCC)    • Diabetes mellitus (CMS/HCC)    • Duodenal cancer (CMS/HCC)    • Gallstones    • History of recent left nephrolithiasis s/p ureteral stent placement  3/30/2017    2/17/17 - ureteral stent placed at [facility], s/p stent and stone removal 2/13/17   • Hyperlipidemia    • Kidney stone    • Kidney stone on left side 02/2017   • Nephrolithiasis    • Pneumonia        Allergies   Allergen Reactions   • Prednisone      Patient stated ran glucose high and rapid heart rate.       Past Surgical History:   Procedure Laterality Date   • CYSTOSCOPY W/ URETERAL STENT REMOVAL Left 03/13/2017   • EYE MUSCLE SURGERY     • HERNIA REPAIR     • INTERVENTIONAL RADIOLOGY PROCEDURE Right 3/31/2017    Procedure: PTC  & Drain placement;  Surgeon: Serjio Wall MD;  Location: [facility] CATH INVASIVE LOCATION;  Service:    • URETERAL STENT INSERTION Left    • WHIPPLE PROCEDURE         Family History   Problem Relation Age of Onset   • Heart disease Father    • Hypertension Brother    • Diabetes Brother        Social History     Socioeconomic History   • Marital status:      Spouse name: Not on file   • Number of children: Not on file   • Years of education: Not on file   • Highest education level: Not on file   Tobacco Use   • Smoking status: Former Smoker     Packs/day: 1.00     Years: 5.00     Pack years: 5.00     Types: Cigarettes, Cigars, Pipe     Start date:      Last attempt to quit:      Years since quittin.2   • Smokeless tobacco: Former User   Substance and Sexual Activity   • Alcohol use: No   • Drug use: No   • Sexual activity: Defer           Objective   Physical Exam   Constitutional: He is oriented to person, place, and time. He appears well-developed and well-nourished.   HENT:   Head: Normocephalic and atraumatic.   Right Ear: External ear normal.   Left Ear: External ear normal.   Mouth/Throat: Oropharynx is clear and moist.   Eyes: EOM are normal. Pupils are equal, round, and reactive to light.   Neck: Neck supple.   Cardiovascular: Normal rate and regular rhythm.   Pulmonary/Chest: Effort normal and breath sounds normal.   Abdominal: Soft. Bowel sounds are normal.   Musculoskeletal: Normal range of motion.   Neurological: He is alert and oriented to person, place, and time.   Skin: Skin is warm. Capillary refill takes less than 2 seconds.   Psychiatric: He has a normal mood and affect. His behavior is normal. Judgment and thought content normal.   Nursing note and vitals reviewed.      Procedures       Results for orders placed or performed during the hospital encounter of 19   Comprehensive Metabolic Panel   Result Value Ref Range    Glucose 172 (H) 65 - 99 mg/dL    BUN 11 8 - 23  mg/dL    Creatinine 1.20 0.76 - 1.27 mg/dL    Sodium 140 136 - 145 mmol/L    Potassium 5.0 3.5 - 5.2 mmol/L    Chloride 103 98 - 107 mmol/L    CO2 26.2 22.0 - 29.0 mmol/L    Calcium 9.1 8.6 - 10.5 mg/dL    Total Protein 6.9 6.0 - 8.5 g/dL    Albumin 4.46 3.50 - 5.20 g/dL    ALT (SGPT) 36 1 - 41 U/L    AST (SGOT) 40 1 - 40 U/L    Alkaline Phosphatase 80 39 - 117 U/L    Total Bilirubin 0.3 0.1 - 1.2 mg/dL    eGFR Non African Amer 60 (L) >60 mL/min/1.73    Globulin 2.4 gm/dL    A/G Ratio 1.8 g/dL    BUN/Creatinine Ratio 9.2 7.0 - 25.0    Anion Gap 10.8 mmol/L   Lipase   Result Value Ref Range    Lipase 4 (L) 13 - 60 U/L   Urinalysis With Microscopic If Indicated (No Culture) - Urine, Clean Catch   Result Value Ref Range    Color, UA Yellow Yellow, Straw    Appearance, UA Cloudy (A) Clear    pH, UA 5.5 5.0 - 8.0    Specific Gravity, UA 1.017 1.005 - 1.030    Glucose,  mg/dL (1+) (A) Negative    Ketones, UA Negative Negative    Bilirubin, UA Negative Negative    Blood, UA Large (3+) (A) Negative    Protein, UA Trace (A) Negative    Leuk Esterase, UA Trace (A) Negative    Nitrite, UA Negative Negative    Urobilinogen, UA 0.2 E.U./dL 0.2 - 1.0 E.U./dL   CBC Auto Differential   Result Value Ref Range    WBC 10.19 3.40 - 10.80 10*3/mm3    RBC 4.23 4.14 - 5.80 10*6/mm3    Hemoglobin 12.9 (L) 13.0 - 17.7 g/dL    Hematocrit 39.4 37.5 - 51.0 %    MCV 93.1 79.0 - 97.0 fL    MCH 30.5 26.6 - 33.0 pg    MCHC 32.7 31.5 - 35.7 g/dL    RDW 13.3 12.3 - 15.4 %    RDW-SD 43.6 37.0 - 54.0 fl    MPV 10.1 6.0 - 12.0 fL    Platelets 210 140 - 450 10*3/mm3    Neutrophil % 70.9 42.7 - 76.0 %    Lymphocyte % 15.6 (L) 19.6 - 45.3 %    Monocyte % 12.2 (H) 5.0 - 12.0 %    Eosinophil % 0.9 0.3 - 6.2 %    Basophil % 0.2 0.0 - 1.5 %    Immature Grans % 0.2 0.0 - 0.5 %    Neutrophils, Absolute 7.23 (H) 1.40 - 7.00 10*3/mm3    Lymphocytes, Absolute 1.59 0.70 - 3.10 10*3/mm3    Monocytes, Absolute 1.24 (H) 0.10 - 0.90 10*3/mm3    Eosinophils,  Absolute 0.09 0.00 - 0.40 10*3/mm3    Basophils, Absolute 0.02 0.00 - 0.20 10*3/mm3    Immature Grans, Absolute 0.02 0.00 - 0.05 10*3/mm3   Urinalysis, Microscopic Only - Urine, Clean Catch   Result Value Ref Range    RBC, UA Too Numerous to Count (A) None Seen, 0-2 /HPF    WBC, UA 6-12 (A) None Seen, 0-2 /HPF    Bacteria, UA None Seen None Seen /HPF    Squamous Epithelial Cells, UA 0-2 None Seen, 0-2 /HPF    Hyaline Casts, UA None Seen None Seen /LPF    Methodology Automated Microscopy    Light Blue Top   Result Value Ref Range    Extra Tube hold for add-on    Green Top (Gel)   Result Value Ref Range    Extra Tube Hold for add-ons.    Lavender Top   Result Value Ref Range    Extra Tube hold for add-on    Gold Top - SST   Result Value Ref Range    Extra Tube Hold for add-ons.      Ct Abdomen Pelvis Stone Protocol    Result Date: 3/17/2019  Narrative:  CT ABDOMEN PELVIS STONE PROTOCOL-    TECHNIQUE: Multiple axial CT images were obtained from lung bases through pubic symphysis without administration of IV contrast. Reformatted images in the coronal and/or sagittal plane(s) were generated from the axial data set to facilitate diagnostic accuracy and/or surgical planning. Oral Contrast:NONE.  Radiation dose reduction techniques were utilized per ALARA protocol. Automated exposure control was initiated through either or CareDoBusyEvent or DoseRight software packages by  protocol.   647.57 mGy.cm  Clinical information Flank pain, stone disease suspected  Comparison NONE.  Findings LOWER THORAX: Clear. No effusions.  ABDOMEN:     LIVER: Homogeneous. No focal hepatic mass or ductal dilatation.     GALLBLADDER: CHOLECYSTECTOMY CLIPS.     PANCREAS: Unremarkable. No mass or ductal dilatation.     SPLEEN: Homogeneous. No splenomegaly.     ADRENALS: No mass.     KIDNEYS: RIGHT MID TO DISTAL URETERAL CALCULUS MEASURING ABOUT 4 MM CAUSING MILD RIGHT HYDRONEPHROSIS.     GI TRACT: ABUNDANT STOOL THROUGHOUT THE COLON.      PERITONEUM: No free air. No free fluid or loculated fluid collections.     MESENTERY: Unremarkable.     LYMPH NODES: No lymphadenopathy.     VASCULATURE: EXTENSIVE VASCULAR CALCIFICATION NOTED.     ABDOMINAL WALL: No focal hernia or mass.      OTHER: None.  PELVIS:     BLADDER: No focal mass or significant wall thickening     REPRODUCTIVE: PROSTATE ENLARGEMENT NOTED.     APPENDIX: Nondistended. No surrounding inflammation.  BONES: No acute bony abnormality.      Impression: Impression: 1. Right mid to distal ureteral calculus measuring about 4 mm causing mild right hydronephrosis. 2. Other incidental/non-acute findings as above.  This report was finalized on 3/17/2019 7:57 AM by Dr. Fede Shearer MD.          ED Course  ED Course as of Mar 17 0944   Sun Mar 17, 2019   0725 4 mm stone noted on CT in mid to distal right ureter  []   0750 Endorsed to Dr Broderick at shift change  []   0936 Care assumed from Dr. Bonilla at shift change.  Patient without complaints at this time.  Pain is controlled.  Have discussed results of workup with patient and wife.  He is going to follow-up with his urologist, Dr. Lawson, in [place].  [BC]      ED Course User Index  [BC] Torsten Broderick MD  [] Amanda Bonilla,                   MDM  Number of Diagnoses or Management Options  Ureterolithiasis:      Amount and/or Complexity of Data Reviewed  Clinical lab tests: reviewed  Tests in the radiology section of CPT®: reviewed    Risk of Complications, Morbidity, and/or Mortality  Presenting problems: high  Diagnostic procedures: high  Management options: moderate          Final diagnoses:   Ureterolithiasis            Torsten Broderick MD  03/17/19 0991

## 2019-03-20 ENCOUNTER — TELEPHONE (OUTPATIENT)
Dept: FAMILY MEDICINE CLINIC | Facility: CLINIC | Age: 74
End: 2019-03-20

## 2019-03-20 NOTE — TELEPHONE ENCOUNTER
BARB WENT TO [facility] ON Sunday WITH SIDE PAIN. HAS  KIDNEY STONE. HAS NOT HAD ANY PAIN LAST FEW DAYS BUT HAS NOT  PASSED STONE YET. DOES HE NEED TO FOLLOW UP WITH  ANOTHER SCAN OR JUST WAIT TO SEE IF IT HAPPENS AGAIN?  [phone]

## 2019-03-21 NOTE — TELEPHONE ENCOUNTER
The ER note suggested he was going to follow with Dr. Lawson.  I do not believe would want to go more than about 2 weeks without passing the stone because of potential risk of infection or harm to kidney.

## 2019-03-22 NOTE — TELEPHONE ENCOUNTER
Called patient [phone] notified of results patient stated he called Dr Lawson yesterday. Verbalized understanding.

## 2019-04-08 DIAGNOSIS — Z12.11 SCREENING FOR COLON CANCER: Primary | ICD-10-CM

## 2019-04-09 ENCOUNTER — OUTSIDE FACILITY SERVICE (OUTPATIENT)
Dept: GASTROENTEROLOGY | Facility: CLINIC | Age: 74
End: 2019-04-09

## 2019-04-09 PROCEDURE — G0500 MOD SEDAT ENDO SERVICE >5YRS: HCPCS | Performed by: INTERNAL MEDICINE

## 2019-04-09 PROCEDURE — 43235 EGD DIAGNOSTIC BRUSH WASH: CPT | Performed by: INTERNAL MEDICINE

## 2019-04-15 ENCOUNTER — LAB REQUISITION (OUTPATIENT)
Dept: LAB | Facility: HOSPITAL | Age: 74
End: 2019-04-15

## 2019-04-15 ENCOUNTER — OUTSIDE FACILITY SERVICE (OUTPATIENT)
Dept: GASTROENTEROLOGY | Facility: CLINIC | Age: 74
End: 2019-04-15

## 2019-04-15 DIAGNOSIS — Z83.71 FAMILY HISTORY OF COLONIC POLYPS: ICD-10-CM

## 2019-04-15 DIAGNOSIS — Z12.11 ENCOUNTER FOR SCREENING FOR MALIGNANT NEOPLASM OF COLON: ICD-10-CM

## 2019-04-15 PROCEDURE — G0500 MOD SEDAT ENDO SERVICE >5YRS: HCPCS | Performed by: INTERNAL MEDICINE

## 2019-04-15 PROCEDURE — 88305 TISSUE EXAM BY PATHOLOGIST: CPT | Performed by: INTERNAL MEDICINE

## 2019-04-15 PROCEDURE — 45385 COLONOSCOPY W/LESION REMOVAL: CPT | Performed by: INTERNAL MEDICINE

## 2019-04-16 LAB
CYTO UR: NORMAL
LAB AP CASE REPORT: NORMAL
LAB AP CLINICAL INFORMATION: NORMAL
PATH REPORT.FINAL DX SPEC: NORMAL
PATH REPORT.GROSS SPEC: NORMAL

## 2019-05-01 ENCOUNTER — HOSPITAL ENCOUNTER (EMERGENCY)
Facility: HOSPITAL | Age: 74
Discharge: ANOTHER HOSPITAL | End: 2019-05-02
Attending: EMERGENCY MEDICINE | Admitting: FAMILY MEDICINE

## 2019-05-01 ENCOUNTER — APPOINTMENT (OUTPATIENT)
Dept: CT IMAGING | Facility: HOSPITAL | Age: 74
End: 2019-05-01

## 2019-05-01 ENCOUNTER — APPOINTMENT (OUTPATIENT)
Dept: GENERAL RADIOLOGY | Facility: HOSPITAL | Age: 74
End: 2019-05-01

## 2019-05-01 DIAGNOSIS — A41.9 SEPSIS, DUE TO UNSPECIFIED ORGANISM: Primary | ICD-10-CM

## 2019-05-01 DIAGNOSIS — R74.8 ELEVATED LIVER ENZYMES: ICD-10-CM

## 2019-05-01 LAB
ALBUMIN SERPL-MCNC: 3.74 G/DL (ref 3.5–5.2)
ALBUMIN/GLOB SERPL: 1.5 G/DL
ALP SERPL-CCNC: 201 U/L (ref 39–117)
ALT SERPL W P-5'-P-CCNC: 433 U/L (ref 1–41)
AMYLASE SERPL-CCNC: 76 U/L (ref 28–100)
ANION GAP SERPL CALCULATED.3IONS-SCNC: 14.4 MMOL/L
AST SERPL-CCNC: 722 U/L (ref 1–40)
BACTERIA UR QL AUTO: ABNORMAL /HPF
BASOPHILS # BLD AUTO: 0.01 10*3/MM3 (ref 0–0.2)
BASOPHILS NFR BLD AUTO: 0.1 % (ref 0–1.5)
BILIRUB SERPL-MCNC: 2.2 MG/DL (ref 0.2–1.2)
BILIRUB UR QL STRIP: NEGATIVE
BUN BLD-MCNC: 17 MG/DL (ref 8–23)
BUN/CREAT SERPL: 15.7 (ref 7–25)
CALCIUM SPEC-SCNC: 8.2 MG/DL (ref 8.6–10.5)
CHLORIDE SERPL-SCNC: 101 MMOL/L (ref 98–107)
CLARITY UR: CLEAR
CO2 SERPL-SCNC: 20.6 MMOL/L (ref 22–29)
COLOR UR: ABNORMAL
CREAT BLD-MCNC: 1.08 MG/DL (ref 0.76–1.27)
CRP SERPL-MCNC: 1.56 MG/DL (ref 0–0.5)
D-LACTATE SERPL-SCNC: 1.2 MMOL/L (ref 0.5–2)
DEPRECATED RDW RBC AUTO: 45.7 FL (ref 37–54)
EOSINOPHIL # BLD AUTO: 0 10*3/MM3 (ref 0–0.4)
EOSINOPHIL NFR BLD AUTO: 0 % (ref 0.3–6.2)
ERYTHROCYTE [DISTWIDTH] IN BLOOD BY AUTOMATED COUNT: 14 % (ref 12.3–15.4)
FLUAV AG NPH QL: NEGATIVE
FLUBV AG NPH QL IA: NEGATIVE
GFR SERPL CREATININE-BSD FRML MDRD: 67 ML/MIN/1.73
GLOBULIN UR ELPH-MCNC: 2.6 GM/DL
GLUCOSE BLD-MCNC: 187 MG/DL (ref 65–99)
GLUCOSE UR STRIP-MCNC: ABNORMAL MG/DL
HAV IGM SERPL QL IA: NORMAL
HBV CORE IGM SERPL QL IA: NORMAL
HBV SURFACE AG SERPL QL IA: NORMAL
HCT VFR BLD AUTO: 36 % (ref 37.5–51)
HCV AB SER DONR QL: NORMAL
HGB BLD-MCNC: 12.2 G/DL (ref 13–17.7)
HGB UR QL STRIP.AUTO: ABNORMAL
HYALINE CASTS UR QL AUTO: ABNORMAL /LPF
IMM GRANULOCYTES # BLD AUTO: 0.04 10*3/MM3 (ref 0–0.05)
IMM GRANULOCYTES NFR BLD AUTO: 0.2 % (ref 0–0.5)
KETONES UR QL STRIP: NEGATIVE
LEUKOCYTE ESTERASE UR QL STRIP.AUTO: NEGATIVE
LIPASE SERPL-CCNC: 3 U/L (ref 13–60)
LYMPHOCYTES # BLD AUTO: 0.89 10*3/MM3 (ref 0.7–3.1)
LYMPHOCYTES NFR BLD AUTO: 5.5 % (ref 19.6–45.3)
MAGNESIUM SERPL-MCNC: 1.4 MG/DL (ref 1.6–2.4)
MCH RBC QN AUTO: 30.6 PG (ref 26.6–33)
MCHC RBC AUTO-ENTMCNC: 33.9 G/DL (ref 31.5–35.7)
MCV RBC AUTO: 90.2 FL (ref 79–97)
MONOCYTES # BLD AUTO: 1.2 10*3/MM3 (ref 0.1–0.9)
MONOCYTES NFR BLD AUTO: 7.4 % (ref 5–12)
NEUTROPHILS # BLD AUTO: 13.97 10*3/MM3 (ref 1.7–7)
NEUTROPHILS NFR BLD AUTO: 86.8 % (ref 42.7–76)
NITRITE UR QL STRIP: NEGATIVE
PH UR STRIP.AUTO: 8 [PH] (ref 5–8)
PLATELET # BLD AUTO: 211 10*3/MM3 (ref 140–450)
PMV BLD AUTO: 10 FL (ref 6–12)
POTASSIUM BLD-SCNC: 4.3 MMOL/L (ref 3.5–5.2)
PROT SERPL-MCNC: 6.3 G/DL (ref 6–8.5)
PROT UR QL STRIP: NEGATIVE
RBC # BLD AUTO: 3.99 10*6/MM3 (ref 4.14–5.8)
RBC # UR: ABNORMAL /HPF
REF LAB TEST METHOD: ABNORMAL
SODIUM BLD-SCNC: 136 MMOL/L (ref 136–145)
SP GR UR STRIP: 1.01 (ref 1–1.03)
SQUAMOUS #/AREA URNS HPF: ABNORMAL /HPF
TROPONIN T SERPL-MCNC: <0.01 NG/ML (ref 0–0.03)
UROBILINOGEN UR QL STRIP: ABNORMAL
WBC NRBC COR # BLD: 16.11 10*3/MM3 (ref 3.4–10.8)
WBC UR QL AUTO: ABNORMAL /HPF

## 2019-05-01 PROCEDURE — 74176 CT ABD & PELVIS W/O CONTRAST: CPT

## 2019-05-01 PROCEDURE — 82150 ASSAY OF AMYLASE: CPT | Performed by: PHYSICIAN ASSISTANT

## 2019-05-01 PROCEDURE — 96361 HYDRATE IV INFUSION ADD-ON: CPT

## 2019-05-01 PROCEDURE — 87040 BLOOD CULTURE FOR BACTERIA: CPT | Performed by: PHYSICIAN ASSISTANT

## 2019-05-01 PROCEDURE — 81001 URINALYSIS AUTO W/SCOPE: CPT | Performed by: PHYSICIAN ASSISTANT

## 2019-05-01 PROCEDURE — 93010 ELECTROCARDIOGRAM REPORT: CPT | Performed by: INTERNAL MEDICINE

## 2019-05-01 PROCEDURE — 87077 CULTURE AEROBIC IDENTIFY: CPT | Performed by: PHYSICIAN ASSISTANT

## 2019-05-01 PROCEDURE — 87150 DNA/RNA AMPLIFIED PROBE: CPT | Performed by: PHYSICIAN ASSISTANT

## 2019-05-01 PROCEDURE — 96368 THER/DIAG CONCURRENT INF: CPT

## 2019-05-01 PROCEDURE — 80074 ACUTE HEPATITIS PANEL: CPT | Performed by: PHYSICIAN ASSISTANT

## 2019-05-01 PROCEDURE — 93005 ELECTROCARDIOGRAM TRACING: CPT | Performed by: PHYSICIAN ASSISTANT

## 2019-05-01 PROCEDURE — 96365 THER/PROPH/DIAG IV INF INIT: CPT

## 2019-05-01 PROCEDURE — 25010000002 MAGNESIUM SULFATE IN D5W 1G/100ML (PREMIX) 1-5 GM/100ML-% SOLUTION: Performed by: PHYSICIAN ASSISTANT

## 2019-05-01 PROCEDURE — 36415 COLL VENOUS BLD VENIPUNCTURE: CPT

## 2019-05-01 PROCEDURE — 87804 INFLUENZA ASSAY W/OPTIC: CPT | Performed by: PHYSICIAN ASSISTANT

## 2019-05-01 PROCEDURE — 71045 X-RAY EXAM CHEST 1 VIEW: CPT | Performed by: RADIOLOGY

## 2019-05-01 PROCEDURE — 83605 ASSAY OF LACTIC ACID: CPT | Performed by: PHYSICIAN ASSISTANT

## 2019-05-01 PROCEDURE — 83690 ASSAY OF LIPASE: CPT | Performed by: PHYSICIAN ASSISTANT

## 2019-05-01 PROCEDURE — 86140 C-REACTIVE PROTEIN: CPT | Performed by: PHYSICIAN ASSISTANT

## 2019-05-01 PROCEDURE — 85025 COMPLETE CBC W/AUTO DIFF WBC: CPT | Performed by: PHYSICIAN ASSISTANT

## 2019-05-01 PROCEDURE — 99285 EMERGENCY DEPT VISIT HI MDM: CPT

## 2019-05-01 PROCEDURE — 83735 ASSAY OF MAGNESIUM: CPT | Performed by: PHYSICIAN ASSISTANT

## 2019-05-01 PROCEDURE — 71045 X-RAY EXAM CHEST 1 VIEW: CPT

## 2019-05-01 PROCEDURE — 84484 ASSAY OF TROPONIN QUANT: CPT | Performed by: PHYSICIAN ASSISTANT

## 2019-05-01 PROCEDURE — 74176 CT ABD & PELVIS W/O CONTRAST: CPT | Performed by: RADIOLOGY

## 2019-05-01 PROCEDURE — 87181 SC STD AGAR DILUTION PER AGT: CPT | Performed by: PHYSICIAN ASSISTANT

## 2019-05-01 PROCEDURE — 25010000002 PIPERACILLIN-TAZOBACTAM: Performed by: PHYSICIAN ASSISTANT

## 2019-05-01 PROCEDURE — 87186 SC STD MICRODIL/AGAR DIL: CPT | Performed by: PHYSICIAN ASSISTANT

## 2019-05-01 PROCEDURE — 80053 COMPREHEN METABOLIC PANEL: CPT | Performed by: PHYSICIAN ASSISTANT

## 2019-05-01 RX ORDER — MAGNESIUM SULFATE 1 G/100ML
1 INJECTION INTRAVENOUS ONCE
Status: COMPLETED | OUTPATIENT
Start: 2019-05-01 | End: 2019-05-01

## 2019-05-01 RX ORDER — ACETAMINOPHEN 325 MG/1
650 TABLET ORAL ONCE
Status: COMPLETED | OUTPATIENT
Start: 2019-05-01 | End: 2019-05-01

## 2019-05-01 RX ORDER — SODIUM CHLORIDE 0.9 % (FLUSH) 0.9 %
10 SYRINGE (ML) INJECTION AS NEEDED
Status: DISCONTINUED | OUTPATIENT
Start: 2019-05-01 | End: 2019-05-02 | Stop reason: HOSPADM

## 2019-05-01 RX ORDER — IBUPROFEN 600 MG/1
600 TABLET ORAL ONCE
Status: COMPLETED | OUTPATIENT
Start: 2019-05-01 | End: 2019-05-01

## 2019-05-01 RX ORDER — SODIUM CHLORIDE 9 MG/ML
125 INJECTION, SOLUTION INTRAVENOUS CONTINUOUS
Status: DISCONTINUED | OUTPATIENT
Start: 2019-05-01 | End: 2019-05-02 | Stop reason: HOSPADM

## 2019-05-01 RX ADMIN — PIPERACILLIN SODIUM,TAZOBACTAM SODIUM 3.38 G: 3; .375 INJECTION, POWDER, FOR SOLUTION INTRAVENOUS at 17:26

## 2019-05-01 RX ADMIN — MAGNESIUM SULFATE IN DEXTROSE 1 G: 10 INJECTION, SOLUTION INTRAVENOUS at 17:26

## 2019-05-01 RX ADMIN — SODIUM CHLORIDE 125 ML/HR: 9 INJECTION, SOLUTION INTRAVENOUS at 18:49

## 2019-05-01 RX ADMIN — ACETAMINOPHEN 650 MG: 325 TABLET ORAL at 16:02

## 2019-05-01 RX ADMIN — SODIUM CHLORIDE 1000 ML: 9 INJECTION, SOLUTION INTRAVENOUS at 16:03

## 2019-05-01 RX ADMIN — IBUPROFEN 600 MG: 600 TABLET ORAL at 22:39

## 2019-05-01 NOTE — ED PROVIDER NOTES
Subjective   74-year-old male presents the ED today for abdominal pain and fever.  He states that started last night.  He states the pain is in the epigastric area.  He has been having chills and body aches.  He is also had a headache.  He denies any cough or upper respiratory symptoms.  He has a history of iron deficiency anemia.  He states he had an iron infusion last week and is actually due for another one tomorrow.  He had recent EGD and colonoscopy at [facility] to look for any source of GI bleeding and those came back negative.  He also reports that he was diagnosed with a ureteral stone in March.  He is unsure if he ever passed it.  He states he has had ureteral stents in the past.  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  He also has a hiatal hernia and GERD.  He denies any nausea, vomiting or diarrhea at this time.  He states he has not taken any medications for his fever today.        History provided by:  Patient  Abdominal Pain   Pain location:  Epigastric  Pain quality: aching    Pain radiates to:  Does not radiate  Pain severity:  Moderate  Onset quality:  Gradual  Duration:  1 day  Timing:  Constant  Progression:  Worsening  Chronicity:  New  Context: not recent travel, not sick contacts, not suspicious food intake and not trauma    Relieved by:  Nothing  Worsened by:  Nothing  Associated symptoms: anorexia, chills, fatigue and fever    Associated symptoms: no chest pain, no cough, no diarrhea, no dysuria, no hematemesis, no hematochezia, no hematuria, no melena, no nausea, no shortness of breath, no sore throat and no vomiting        Review of Systems   Constitutional: Positive for appetite change, chills, fatigue and fever.   HENT: Negative.  Negative for sore throat.    Eyes: Negative.    Respiratory: Negative for cough and shortness of breath.    Cardiovascular: Negative for chest pain.   Gastrointestinal: Positive for abdominal pain  and anorexia. Negative for diarrhea, hematemesis, hematochezia, melena, nausea and vomiting.   Genitourinary: Negative for dysuria and hematuria.   Musculoskeletal: Negative.    Skin: Negative.    Neurological: Negative.    Psychiatric/Behavioral: Negative.    All other systems reviewed and are negative.      Past Medical History:   Diagnosis Date   • Anemia, recent acute blood loss 3/30/2017    ALB anemia in March after ureteral stent removed   • Bladder cancer (CMS/HCC)    • Diabetes mellitus (CMS/HCC)    • Duodenal cancer (CMS/HCC)    • Gallstones    • History of recent left nephrolithiasis s/p ureteral stent placement  3/30/2017    2/17/17 - ureteral stent placed at [facility], s/p stent and stone removal 17   • Hyperlipidemia    • Kidney stone    • Kidney stone on left side 2017   • Nephrolithiasis    • Pneumonia        Allergies   Allergen Reactions   • Percocet [Oxycodone-Acetaminophen] Itching       Past Surgical History:   Procedure Laterality Date   • CYSTOSCOPY W/ URETERAL STENT REMOVAL Left 2017   • EYE MUSCLE SURGERY     • HERNIA REPAIR     • INTERVENTIONAL RADIOLOGY PROCEDURE Right 3/31/2017    Procedure: PTC & Drain placement;  Surgeon: Serjio Wall MD;  Location: [facility] INVASIVE LOCATION;  Service:    • URETERAL STENT INSERTION Left    • WHIPPLE PROCEDURE         Family History   Problem Relation Age of Onset   • Heart disease Father    • Hypertension Brother    • Diabetes Brother        Social History     Socioeconomic History   • Marital status:      Spouse name: Not on file   • Number of children: Not on file   • Years of education: Not on file   • Highest education level: Not on file   Tobacco Use   • Smoking status: Former Smoker     Packs/day: 1.00     Years: 5.00     Pack years: 5.00     Types: Cigarettes, Cigars, Pipe     Start date:      Last attempt to quit: 1975     Years since quittin.3   • Smokeless tobacco: Former User   Substance and Sexual  Activity   • Alcohol use: No   • Drug use: No   • Sexual activity: Defer           Objective   Physical Exam   Constitutional: He is oriented to person, place, and time. He appears well-developed and well-nourished. No distress.   HENT:   Head: Normocephalic and atraumatic.   Right Ear: External ear normal.   Left Ear: External ear normal.   Nose: Nose normal.   Mouth/Throat: Oropharynx is clear and moist.   Eyes: Conjunctivae and EOM are normal. Pupils are equal, round, and reactive to light.   Neck: Normal range of motion. Neck supple.   Cardiovascular: Normal rate, regular rhythm, normal heart sounds and intact distal pulses.   Pulmonary/Chest: Effort normal and breath sounds normal.   Abdominal: Soft. Bowel sounds are normal. There is tenderness (epigastric area). There is no rebound and no guarding.   Musculoskeletal: Normal range of motion.   Neurological: He is alert and oriented to person, place, and time.   Skin: Skin is warm and dry. Capillary refill takes less than 2 seconds.   Psychiatric: He has a normal mood and affect. His behavior is normal. Judgment and thought content normal.   Nursing note and vitals reviewed.      Procedures           ED Course  ED Course as of May 02 1719   Wed May 01, 2019   1618 16:09 - sinus rhythm, rate of 90, no acute ischemia, reviewed by Dr. Carrero ECG 12 Lead []   1836 CT abd/pelvis shows no generalized ileus or bowel obstruction.  Compared to the prior exam dated 3/17/2019, interval passage of 4 mm calcified right ureteral stone.  Now located within the posterior urinary bladder.  Nonobstructing calcified stone midpole right kidney.  [AH]   1837 I have discussed findings with patient and his wife.  He has elevated WBC count and elevated liver enzymes.  His wife states this occurred about 3 years ago with the same symptoms and lab findings and at that time, his bile ducts were obstructed with stones and sludge and he had to undergo several procedures to fix his  problems.  She reported he had positive blood cultures at that time and had to have several weeks of IV antibiotics.  I advised them that due to the possibility that this could be occurring again, he would need to be transferred to an outside facility due to no GI here or interventional radiology.  Patient would like time to discuss his options with his wife and son.  [AH]   1942 Patient has decided he wants to be transferred to [facility].  They are currently on a pending list and I have advised the patient and his family of this.  He states he still would prefer to be transferred to this facility.  [AH]   1951 I have discussed with Dr. Bojorquez at [facility], patient accepted in transfer to a telemetry bed.  They will notify us when a bed becomes available.  [AH]   u May 02, 2019   0815 Patient's nighttime stay was uneventful. Still awaiting a bed at [facility]. Signed out to Ena Abad PA-C   [MM]   7155 I have evaluated the patient this morning.  He is awaiting bed placement at [facility].  He is currently 3rd on the waiting list for a bed.  He is currently resting quietly.  States he has a headache and a sore throat this morning.  Ibuprofen ordered.  Morning labs ordered.  Food tray ordered and patient reports he will take his morning meds when he eats breakfast.  Next dose of antibiotics is due at 10 am.  WBC count improved this morning.  [AH]   0954 Patient's preliminary blood cultures are positive for E. Coli and Klebsiella.  I have discussed with Dr. Thornton.  He recommended high dose Zosyn at 4.5 grams and a dose of Gentamicin 5 mg/kg IV.  He also recommended a stool for O&P if possible.  [AH]   1001 I gave an update to Dr. Reed, hospitalist at [facility].  He continues to be #3 on the waiting list.  [AH]   1521 Patient is now #2 on the wait list at [facility].  They cannot guarantee a bed today.  I have discussed this with the patient and his wife.  They advised to try [facility].  I spoke with Dr. Buitrago, hospitalist at [facility].  He  accepts the patient in transfer to a telemetry.  At this time they are at capacity and he will be on a wait list.  He and his wife have been notified of this.  [AH]   1618 Patient now has a bed available at [facility].  Patient is adamantly refusing EMS transfer.  He states he will only go private vehicle.  I discussed with him the risks of transfer via private vehicle vs. The benefits of going EMS but he continues to decline EMS transport.  His wife is willing to drive him to [facility] at this time.  He was advised to drive directly from here to there.  He was advised to stop at the closest hospital if he has any complications.  He advised understanding.  [AH]      ED Course User Index  [AH] Ena Abad PA  [MM] Carisa Aldrich PA                  MDM  Number of Diagnoses or Management Options  Elevated liver enzymes:   Sepsis, due to unspecified organism (CMS/HCC):      Amount and/or Complexity of Data Reviewed  Clinical lab tests: reviewed  Tests in the radiology section of CPT®: reviewed  Tests in the medicine section of CPT®: reviewed  Decide to obtain previous medical records or to obtain history from someone other than the patient: yes  Discuss the patient with other providers: yes    Patient Progress  Patient progress: stable        Final diagnoses:   Sepsis, due to unspecified organism (CMS/HCC)   Elevated liver enzymes            Ena Abad PA  05/02/19 4585

## 2019-05-01 NOTE — ED NOTES
[facility] hospitalist at [facility]  For A Pollo SANCHEZ at this time     Mariela Ba  05/01/19 1935

## 2019-05-02 VITALS
WEIGHT: 163 LBS | HEIGHT: 69 IN | TEMPERATURE: 98.2 F | BODY MASS INDEX: 24.14 KG/M2 | OXYGEN SATURATION: 100 % | SYSTOLIC BLOOD PRESSURE: 134 MMHG | DIASTOLIC BLOOD PRESSURE: 68 MMHG | HEART RATE: 72 BPM | RESPIRATION RATE: 16 BRPM

## 2019-05-02 LAB
ALBUMIN SERPL-MCNC: 3.11 G/DL (ref 3.5–5.2)
ALBUMIN/GLOB SERPL: 1.4 G/DL
ALP SERPL-CCNC: 166 U/L (ref 39–117)
ALT SERPL W P-5'-P-CCNC: 243 U/L (ref 1–41)
ANION GAP SERPL CALCULATED.3IONS-SCNC: 9.5 MMOL/L
AST SERPL-CCNC: 206 U/L (ref 1–40)
BACTERIA BLD CULT: ABNORMAL
BACTERIA ID TEST ISLT QL CULT: ABNORMAL
BASOPHILS # BLD AUTO: 0.01 10*3/MM3 (ref 0–0.2)
BASOPHILS NFR BLD AUTO: 0.1 % (ref 0–1.5)
BILIRUB SERPL-MCNC: 1.7 MG/DL (ref 0.2–1.2)
BUN BLD-MCNC: 15 MG/DL (ref 8–23)
BUN/CREAT SERPL: 14 (ref 7–25)
CALCIUM SPEC-SCNC: 7.7 MG/DL (ref 8.6–10.5)
CHLORIDE SERPL-SCNC: 109 MMOL/L (ref 98–107)
CO2 SERPL-SCNC: 21.5 MMOL/L (ref 22–29)
CREAT BLD-MCNC: 1.07 MG/DL (ref 0.76–1.27)
CRP SERPL-MCNC: 9.3 MG/DL (ref 0–0.5)
D-LACTATE SERPL-SCNC: 0.7 MMOL/L (ref 0.5–2)
DEPRECATED RDW RBC AUTO: 47.4 FL (ref 37–54)
EOSINOPHIL # BLD AUTO: 0.08 10*3/MM3 (ref 0–0.4)
EOSINOPHIL NFR BLD AUTO: 0.8 % (ref 0.3–6.2)
ERYTHROCYTE [DISTWIDTH] IN BLOOD BY AUTOMATED COUNT: 14.7 % (ref 12.3–15.4)
GFR SERPL CREATININE-BSD FRML MDRD: 68 ML/MIN/1.73
GLOBULIN UR ELPH-MCNC: 2.3 GM/DL
GLUCOSE BLD-MCNC: 163 MG/DL (ref 65–99)
HCT VFR BLD AUTO: 32.7 % (ref 37.5–51)
HGB BLD-MCNC: 10.5 G/DL (ref 13–17.7)
IMM GRANULOCYTES # BLD AUTO: 0.02 10*3/MM3 (ref 0–0.05)
IMM GRANULOCYTES NFR BLD AUTO: 0.2 % (ref 0–0.5)
LYMPHOCYTES # BLD AUTO: 0.76 10*3/MM3 (ref 0.7–3.1)
LYMPHOCYTES NFR BLD AUTO: 7.4 % (ref 19.6–45.3)
MAGNESIUM SERPL-MCNC: 1.9 MG/DL (ref 1.6–2.4)
MCH RBC QN AUTO: 29.7 PG (ref 26.6–33)
MCHC RBC AUTO-ENTMCNC: 32.1 G/DL (ref 31.5–35.7)
MCV RBC AUTO: 92.4 FL (ref 79–97)
MONOCYTES # BLD AUTO: 1.34 10*3/MM3 (ref 0.1–0.9)
MONOCYTES NFR BLD AUTO: 13 % (ref 5–12)
NEUTROPHILS # BLD AUTO: 8.13 10*3/MM3 (ref 1.7–7)
NEUTROPHILS NFR BLD AUTO: 78.5 % (ref 42.7–76)
PLATELET # BLD AUTO: 182 10*3/MM3 (ref 140–450)
PMV BLD AUTO: 9.3 FL (ref 6–12)
POTASSIUM BLD-SCNC: 4.4 MMOL/L (ref 3.5–5.2)
PROT SERPL-MCNC: 5.4 G/DL (ref 6–8.5)
RBC # BLD AUTO: 3.54 10*6/MM3 (ref 4.14–5.8)
SODIUM BLD-SCNC: 140 MMOL/L (ref 136–145)
TROPONIN T SERPL-MCNC: <0.01 NG/ML (ref 0–0.03)
WBC NRBC COR # BLD: 10.34 10*3/MM3 (ref 3.4–10.8)

## 2019-05-02 PROCEDURE — 85025 COMPLETE CBC W/AUTO DIFF WBC: CPT | Performed by: PHYSICIAN ASSISTANT

## 2019-05-02 PROCEDURE — 96367 TX/PROPH/DG ADDL SEQ IV INF: CPT

## 2019-05-02 PROCEDURE — 25010000002 GENTAMICIN PER 80 MG: Performed by: PHYSICIAN ASSISTANT

## 2019-05-02 PROCEDURE — 80053 COMPREHEN METABOLIC PANEL: CPT | Performed by: PHYSICIAN ASSISTANT

## 2019-05-02 PROCEDURE — 96361 HYDRATE IV INFUSION ADD-ON: CPT

## 2019-05-02 PROCEDURE — 25010000002 PIPERACILLIN-TAZOBACTAM: Performed by: PHYSICIAN ASSISTANT

## 2019-05-02 PROCEDURE — 83605 ASSAY OF LACTIC ACID: CPT | Performed by: PHYSICIAN ASSISTANT

## 2019-05-02 PROCEDURE — 84484 ASSAY OF TROPONIN QUANT: CPT | Performed by: PHYSICIAN ASSISTANT

## 2019-05-02 PROCEDURE — 86140 C-REACTIVE PROTEIN: CPT | Performed by: PHYSICIAN ASSISTANT

## 2019-05-02 PROCEDURE — 83735 ASSAY OF MAGNESIUM: CPT | Performed by: PHYSICIAN ASSISTANT

## 2019-05-02 PROCEDURE — 96366 THER/PROPH/DIAG IV INF ADDON: CPT

## 2019-05-02 RX ORDER — IBUPROFEN 600 MG/1
600 TABLET ORAL ONCE
Status: COMPLETED | OUTPATIENT
Start: 2019-05-02 | End: 2019-05-02

## 2019-05-02 RX ADMIN — GENTAMICIN SULFATE 370 MG: 40 INJECTION, SOLUTION INTRAMUSCULAR; INTRAVENOUS at 10:46

## 2019-05-02 RX ADMIN — PIPERACILLIN SODIUM,TAZOBACTAM SODIUM 3.38 G: 3; .375 INJECTION, POWDER, FOR SOLUTION INTRAVENOUS at 01:27

## 2019-05-02 RX ADMIN — SODIUM CHLORIDE 125 ML/HR: 9 INJECTION, SOLUTION INTRAVENOUS at 10:13

## 2019-05-02 RX ADMIN — PIPERACILLIN AND TAZOBACTAM 4.5 G: 4; .5 INJECTION, POWDER, LYOPHILIZED, FOR SOLUTION INTRAVENOUS; PARENTERAL at 10:10

## 2019-05-02 RX ADMIN — SODIUM CHLORIDE 125 ML/HR: 9 INJECTION, SOLUTION INTRAVENOUS at 02:27

## 2019-05-02 RX ADMIN — IBUPROFEN 600 MG: 600 TABLET ORAL at 08:25

## 2019-05-02 NOTE — ED NOTES
Dr. Rudolph called back to speak with Ena.   They are speaking at this time.      Erika Pa  05/02/19 5160

## 2019-05-02 NOTE — ED NOTES
Called [facility] per Ena Abad. They are going to have Dr. Rudolph to call back to speak with Erika Chavez  05/02/19 1451

## 2019-05-02 NOTE — ED NOTES
Called nutrition for food for pt. States they will send some lunch boxes over.     Mary Anne Elizabeth RN  05/01/19 2028

## 2019-05-02 NOTE — ED NOTES
[facility]ed [facility] to let them know that he had been accepted a different facility but to keep him on the list. Just in case something happens. Talked to Erika Banks  05/02/19 8281

## 2019-05-02 NOTE — ED NOTES
Called [facility] for an update on bed. Talked to Elsie, She stated that the patient is still #3 on the list.      Candida Amor  05/02/19 0813

## 2019-05-02 NOTE — ED NOTES
[facility]ed [facility] for Ena Abad to give an update on patient. Doctor Derek will call back      Candida Amor  05/02/19 8796

## 2019-05-02 NOTE — ED NOTES
Called [facility] for an update on bed status. Talked to Oseas. She states that patient has moved up to #2 on the list, but with the way the ER is. They have to get rid of the boarders first. So it may be a while. Ena is made aware.      Candida Amor  05/02/19 5308

## 2019-05-02 NOTE — ED NOTES
[facility] called to give the nurse a number to call for report.      Erika Pa  05/02/19 4974

## 2019-05-02 NOTE — ED NOTES
Ena SANCHEZ and I spoke with pt about risk and benefits of being transferred by EMS to [facility] but pt refuses to be transferred by EMS and wants to go private vehicle. Pt states he and his wife understand the risks and still wish to go by POV. All IV access lines will be pulled at this time. Pt states if in changed in his status occur he will stop at the nearest hospital. VSS at this time pt A&O x4.      Ana Ruffin RN  05/02/19 2824

## 2019-05-02 NOTE — ED NOTES
Called [facility] to check on bed status for pt, [facility] stated there are still no beds available and the pt is third on the list at this time     Mariela Ba  05/02/19 0544

## 2019-05-03 LAB
BACTERIA BLD CULT: ABNORMAL
BACTERIA ID TEST ISLT QL CULT: ABNORMAL

## 2019-05-04 LAB
BACTERIA SPEC AEROBE CULT: ABNORMAL
BACTERIA SPEC AEROBE CULT: ABNORMAL
GRAM STN SPEC: ABNORMAL
GRAM STN SPEC: ABNORMAL
ISOLATED FROM: ABNORMAL
ISOLATED FROM: ABNORMAL

## 2019-05-07 LAB
BACTERIA SPEC AEROBE CULT: ABNORMAL
BACTERIA SPEC AEROBE CULT: ABNORMAL
GRAM STN SPEC: ABNORMAL

## 2019-05-08 ENCOUNTER — TRANSITIONAL CARE MANAGEMENT TELEPHONE ENCOUNTER (OUTPATIENT)
Dept: FAMILY MEDICINE CLINIC | Facility: CLINIC | Age: 74
End: 2019-05-08

## 2019-05-08 NOTE — OUTREACH NOTE
ELLIOTT call completed and has upcoming hospital f/u scheduled with Dr Demetrius Wilkerson 5/20/19. See TCM call flowsheet for details.    Pt states doing good, voices no complaints. He states has a picc in and wife is giving his abx infusions qid. He denies n/v, fever/chills, trouble breathing, or pain. No questions or needs. PCP appt confirmed.

## 2019-05-09 ENCOUNTER — TRANSCRIBE ORDERS (OUTPATIENT)
Dept: LAB | Facility: HOSPITAL | Age: 74
End: 2019-05-09

## 2019-05-09 ENCOUNTER — LAB (OUTPATIENT)
Dept: LAB | Facility: HOSPITAL | Age: 74
End: 2019-05-09

## 2019-05-09 DIAGNOSIS — A41.59 HEMORRHAGIC SEPTICEMIA DUE TO PASTEURELLA MULTOCIDA (HCC): ICD-10-CM

## 2019-05-09 DIAGNOSIS — E11.8 TYPE 2 DIABETES MELLITUS WITH COMPLICATION, UNSPECIFIED WHETHER LONG TERM INSULIN USE: ICD-10-CM

## 2019-05-09 DIAGNOSIS — A41.81 SEPTICEMIA DUE TO ENTEROCOCCUS (HCC): ICD-10-CM

## 2019-05-09 DIAGNOSIS — A28.0 HEMORRHAGIC SEPTICEMIA DUE TO PASTEURELLA MULTOCIDA (HCC): Primary | ICD-10-CM

## 2019-05-09 DIAGNOSIS — A28.0 HEMORRHAGIC SEPTICEMIA DUE TO PASTEURELLA MULTOCIDA (HCC): ICD-10-CM

## 2019-05-09 DIAGNOSIS — A41.59 HEMORRHAGIC SEPTICEMIA DUE TO PASTEURELLA MULTOCIDA (HCC): Primary | ICD-10-CM

## 2019-05-09 LAB
ALBUMIN SERPL-MCNC: 3.7 G/DL (ref 3.5–5.2)
ALBUMIN/GLOB SERPL: 1.2 G/DL
ALP SERPL-CCNC: 152 U/L (ref 39–117)
ALT SERPL W P-5'-P-CCNC: 44 U/L (ref 1–41)
ANION GAP SERPL CALCULATED.3IONS-SCNC: 7 MMOL/L
AST SERPL-CCNC: 20 U/L (ref 1–40)
BASOPHILS # BLD AUTO: 0.02 10*3/MM3 (ref 0–0.2)
BASOPHILS NFR BLD AUTO: 0.3 % (ref 0–1.5)
BILIRUB SERPL-MCNC: 0.5 MG/DL (ref 0.2–1.2)
BUN BLD-MCNC: 17 MG/DL (ref 8–23)
BUN/CREAT SERPL: 17.2 (ref 7–25)
CALCIUM SPEC-SCNC: 9 MG/DL (ref 8.6–10.5)
CHLORIDE SERPL-SCNC: 104 MMOL/L (ref 98–107)
CO2 SERPL-SCNC: 26 MMOL/L (ref 22–29)
CREAT BLD-MCNC: 0.99 MG/DL (ref 0.76–1.27)
DEPRECATED RDW RBC AUTO: 50.5 FL (ref 37–54)
EOSINOPHIL # BLD AUTO: 0.25 10*3/MM3 (ref 0–0.4)
EOSINOPHIL NFR BLD AUTO: 3.1 % (ref 0.3–6.2)
ERYTHROCYTE [DISTWIDTH] IN BLOOD BY AUTOMATED COUNT: 15 % (ref 12.3–15.4)
GFR SERPL CREATININE-BSD FRML MDRD: 74 ML/MIN/1.73
GLOBULIN UR ELPH-MCNC: 3 GM/DL
GLUCOSE BLD-MCNC: 229 MG/DL (ref 65–99)
HCT VFR BLD AUTO: 38.6 % (ref 37.5–51)
HGB BLD-MCNC: 12.4 G/DL (ref 13–17.7)
IMM GRANULOCYTES # BLD AUTO: 0.08 10*3/MM3 (ref 0–0.05)
IMM GRANULOCYTES NFR BLD AUTO: 1 % (ref 0–0.5)
LYMPHOCYTES # BLD AUTO: 1.3 10*3/MM3 (ref 0.7–3.1)
LYMPHOCYTES NFR BLD AUTO: 16.4 % (ref 19.6–45.3)
MCH RBC QN AUTO: 30.2 PG (ref 26.6–33)
MCHC RBC AUTO-ENTMCNC: 32.1 G/DL (ref 31.5–35.7)
MCV RBC AUTO: 94.1 FL (ref 79–97)
MONOCYTES # BLD AUTO: 0.63 10*3/MM3 (ref 0.1–0.9)
MONOCYTES NFR BLD AUTO: 7.9 % (ref 5–12)
NEUTROPHILS # BLD AUTO: 5.75 10*3/MM3 (ref 1.7–7)
NEUTROPHILS NFR BLD AUTO: 72.3 % (ref 42.7–76)
PLATELET # BLD AUTO: 264 10*3/MM3 (ref 140–450)
PMV BLD AUTO: 9.5 FL (ref 6–12)
POTASSIUM BLD-SCNC: 5.1 MMOL/L (ref 3.5–5.2)
PROT SERPL-MCNC: 6.7 G/DL (ref 6–8.5)
RBC # BLD AUTO: 4.1 10*6/MM3 (ref 4.14–5.8)
SODIUM BLD-SCNC: 137 MMOL/L (ref 136–145)
WBC NRBC COR # BLD: 7.95 10*3/MM3 (ref 3.4–10.8)

## 2019-05-09 PROCEDURE — 36415 COLL VENOUS BLD VENIPUNCTURE: CPT

## 2019-05-09 PROCEDURE — 85025 COMPLETE CBC W/AUTO DIFF WBC: CPT

## 2019-05-09 PROCEDURE — 80053 COMPREHEN METABOLIC PANEL: CPT

## 2019-05-13 ENCOUNTER — LAB REQUISITION (OUTPATIENT)
Dept: LAB | Facility: HOSPITAL | Age: 74
End: 2019-05-13

## 2019-05-13 DIAGNOSIS — I10 ESSENTIAL (PRIMARY) HYPERTENSION: ICD-10-CM

## 2019-05-13 LAB
ALBUMIN SERPL-MCNC: 4.2 G/DL (ref 3.5–5.2)
ALBUMIN/GLOB SERPL: 1.6 G/DL
ALP SERPL-CCNC: 152 U/L (ref 39–117)
ALT SERPL W P-5'-P-CCNC: 22 U/L (ref 1–41)
ANION GAP SERPL CALCULATED.3IONS-SCNC: 12.8 MMOL/L
AST SERPL-CCNC: 17 U/L (ref 1–40)
BASOPHILS # BLD AUTO: 0.02 10*3/MM3 (ref 0–0.2)
BASOPHILS NFR BLD AUTO: 0.2 % (ref 0–1.5)
BILIRUB SERPL-MCNC: 0.4 MG/DL (ref 0.2–1.2)
BUN BLD-MCNC: 11 MG/DL (ref 8–23)
BUN/CREAT SERPL: 11.1 (ref 7–25)
CALCIUM SPEC-SCNC: 9.6 MG/DL (ref 8.6–10.5)
CHLORIDE SERPL-SCNC: 101 MMOL/L (ref 98–107)
CO2 SERPL-SCNC: 25.2 MMOL/L (ref 22–29)
CREAT BLD-MCNC: 0.99 MG/DL (ref 0.76–1.27)
DEPRECATED RDW RBC AUTO: 48.6 FL (ref 37–54)
EOSINOPHIL # BLD AUTO: 0.18 10*3/MM3 (ref 0–0.4)
EOSINOPHIL NFR BLD AUTO: 1.9 % (ref 0.3–6.2)
ERYTHROCYTE [DISTWIDTH] IN BLOOD BY AUTOMATED COUNT: 14.9 % (ref 12.3–15.4)
GFR SERPL CREATININE-BSD FRML MDRD: 74 ML/MIN/1.73
GLOBULIN UR ELPH-MCNC: 2.7 GM/DL
GLUCOSE BLD-MCNC: 155 MG/DL (ref 65–99)
HCT VFR BLD AUTO: 38.3 % (ref 37.5–51)
HGB BLD-MCNC: 12.4 G/DL (ref 13–17.7)
IMM GRANULOCYTES # BLD AUTO: 0.08 10*3/MM3 (ref 0–0.05)
IMM GRANULOCYTES NFR BLD AUTO: 0.8 % (ref 0–0.5)
LYMPHOCYTES # BLD AUTO: 2.04 10*3/MM3 (ref 0.7–3.1)
LYMPHOCYTES NFR BLD AUTO: 21.1 % (ref 19.6–45.3)
MCH RBC QN AUTO: 30.3 PG (ref 26.6–33)
MCHC RBC AUTO-ENTMCNC: 32.4 G/DL (ref 31.5–35.7)
MCV RBC AUTO: 93.6 FL (ref 79–97)
MONOCYTES # BLD AUTO: 0.67 10*3/MM3 (ref 0.1–0.9)
MONOCYTES NFR BLD AUTO: 6.9 % (ref 5–12)
NEUTROPHILS # BLD AUTO: 6.7 10*3/MM3 (ref 1.7–7)
NEUTROPHILS NFR BLD AUTO: 69.1 % (ref 42.7–76)
PLATELET # BLD AUTO: 263 10*3/MM3 (ref 140–450)
PMV BLD AUTO: 10.3 FL (ref 6–12)
POTASSIUM BLD-SCNC: 4.3 MMOL/L (ref 3.5–5.2)
PROT SERPL-MCNC: 6.9 G/DL (ref 6–8.5)
RBC # BLD AUTO: 4.09 10*6/MM3 (ref 4.14–5.8)
SODIUM BLD-SCNC: 139 MMOL/L (ref 136–145)
WBC NRBC COR # BLD: 9.69 10*3/MM3 (ref 3.4–10.8)

## 2019-05-13 PROCEDURE — 80053 COMPREHEN METABOLIC PANEL: CPT | Performed by: INTERNAL MEDICINE

## 2019-05-13 PROCEDURE — 85025 COMPLETE CBC W/AUTO DIFF WBC: CPT | Performed by: INTERNAL MEDICINE

## 2019-05-20 ENCOUNTER — TRANSCRIBE ORDERS (OUTPATIENT)
Dept: LAB | Facility: HOSPITAL | Age: 74
End: 2019-05-20

## 2019-05-20 ENCOUNTER — LAB (OUTPATIENT)
Dept: LAB | Facility: HOSPITAL | Age: 74
End: 2019-05-20

## 2019-05-20 DIAGNOSIS — A41.51 SEPTICEMIA DUE TO E. COLI (HCC): ICD-10-CM

## 2019-05-20 DIAGNOSIS — A41.81 SEPTICEMIA DUE TO ENTEROCOCCUS (HCC): ICD-10-CM

## 2019-05-20 DIAGNOSIS — D72.823 NEUTROPHILIC LEUKEMOID REACTION: ICD-10-CM

## 2019-05-20 DIAGNOSIS — E11.8 TYPE 2 DIABETES MELLITUS WITH COMPLICATION, UNSPECIFIED WHETHER LONG TERM INSULIN USE: ICD-10-CM

## 2019-05-20 DIAGNOSIS — A41.59 HEMORRHAGIC SEPTICEMIA DUE TO PASTEURELLA MULTOCIDA (HCC): Primary | ICD-10-CM

## 2019-05-20 DIAGNOSIS — A28.0 HEMORRHAGIC SEPTICEMIA DUE TO PASTEURELLA MULTOCIDA (HCC): Primary | ICD-10-CM

## 2019-05-20 DIAGNOSIS — A28.0 HEMORRHAGIC SEPTICEMIA DUE TO PASTEURELLA MULTOCIDA (HCC): ICD-10-CM

## 2019-05-20 DIAGNOSIS — A41.59 HEMORRHAGIC SEPTICEMIA DUE TO PASTEURELLA MULTOCIDA (HCC): ICD-10-CM

## 2019-05-20 LAB
ALBUMIN SERPL-MCNC: 4 G/DL (ref 3.5–5.2)
ALBUMIN/GLOB SERPL: 1.6 G/DL
ALP SERPL-CCNC: 118 U/L (ref 39–117)
ALT SERPL W P-5'-P-CCNC: 14 U/L (ref 1–41)
ANION GAP SERPL CALCULATED.3IONS-SCNC: 9 MMOL/L
AST SERPL-CCNC: 17 U/L (ref 1–40)
BILIRUB SERPL-MCNC: 0.3 MG/DL (ref 0.2–1.2)
BUN BLD-MCNC: 10 MG/DL (ref 8–23)
BUN/CREAT SERPL: 9.1 (ref 7–25)
CALCIUM SPEC-SCNC: 8.9 MG/DL (ref 8.6–10.5)
CHLORIDE SERPL-SCNC: 100 MMOL/L (ref 98–107)
CO2 SERPL-SCNC: 30 MMOL/L (ref 22–29)
CREAT BLD-MCNC: 1.1 MG/DL (ref 0.76–1.27)
CRP SERPL-MCNC: 0.05 MG/DL (ref 0–0.5)
DEPRECATED RDW RBC AUTO: 52.4 FL (ref 37–54)
ERYTHROCYTE [DISTWIDTH] IN BLOOD BY AUTOMATED COUNT: 15.3 % (ref 12.3–15.4)
ERYTHROCYTE [SEDIMENTATION RATE] IN BLOOD: 2 MM/HR (ref 0–20)
GFR SERPL CREATININE-BSD FRML MDRD: 66 ML/MIN/1.73
GLOBULIN UR ELPH-MCNC: 2.5 GM/DL
GLUCOSE BLD-MCNC: 153 MG/DL (ref 65–99)
HCT VFR BLD AUTO: 40.3 % (ref 37.5–51)
HGB BLD-MCNC: 13 G/DL (ref 13–17.7)
MCH RBC QN AUTO: 30.7 PG (ref 26.6–33)
MCHC RBC AUTO-ENTMCNC: 32.3 G/DL (ref 31.5–35.7)
MCV RBC AUTO: 95.3 FL (ref 79–97)
PLATELET # BLD AUTO: 239 10*3/MM3 (ref 140–450)
PMV BLD AUTO: 10.3 FL (ref 6–12)
POTASSIUM BLD-SCNC: 4.7 MMOL/L (ref 3.5–5.2)
PROT SERPL-MCNC: 6.5 G/DL (ref 6–8.5)
RBC # BLD AUTO: 4.23 10*6/MM3 (ref 4.14–5.8)
SODIUM BLD-SCNC: 139 MMOL/L (ref 136–145)
WBC NRBC COR # BLD: 8.04 10*3/MM3 (ref 3.4–10.8)

## 2019-05-20 PROCEDURE — 36415 COLL VENOUS BLD VENIPUNCTURE: CPT

## 2019-05-20 PROCEDURE — 85027 COMPLETE CBC AUTOMATED: CPT

## 2019-05-20 PROCEDURE — 85652 RBC SED RATE AUTOMATED: CPT

## 2019-05-20 PROCEDURE — 80053 COMPREHEN METABOLIC PANEL: CPT

## 2019-05-20 PROCEDURE — 86140 C-REACTIVE PROTEIN: CPT

## 2019-06-12 RX ORDER — CYCLOBENZAPRINE HCL 5 MG
5 TABLET ORAL NIGHTLY
Qty: 30 TABLET | Refills: 2 | Status: SHIPPED | OUTPATIENT
Start: 2019-06-12 | End: 2020-02-03

## 2019-06-24 NOTE — PROGRESS NOTES
Subjective   Jean Noriega is a 74 y.o. male.     History of Present Illness some mechanical left-sided low nontraumatic nonradiating back discomfort.  Very physically aggressive activity wise.  Medications are as reconciled.  Stop the glimepiride with the significant weight loss.  Follow-up regarding the significant challenges recently.  ER visit [facility] and transferred to [facility] in [place] where underwent extensive GI evaluation intervention with some ductal stents.  Sepsis polymicrobial.  Did receive some outpatient IV antibiotics after being discharged.  Presently back to a better baseline.  No recent fevers.  Denies respiratory current GI or  challenges.  Has followed with infectious disease as well as GI.  Has received some iron infusions secondary to persistent anemia.    The following portions of the patient's history were reviewed and updated as appropriate: allergies, current medications, past medical history, past social history, past surgical history and problem list.    Review of Systems  See history of Present Illness     Objective     Physical Exam   Constitutional: He is oriented to person, place, and time. He appears well-developed and well-nourished.   Weight loss noted   HENT:   Head: Normocephalic.   Mouth/Throat: Oropharynx is clear and moist.   Eyes: Conjunctivae and EOM are normal. Pupils are equal, round, and reactive to light.   Neck: Normal range of motion. Neck supple. No tracheal deviation present. No thyromegaly present.   Cardiovascular: Normal rate, regular rhythm and normal heart sounds.   No murmur heard.  Pulmonary/Chest: Effort normal and breath sounds normal.   Musculoskeletal: He exhibits no edema.   Neurological: He is alert and oriented to person, place, and time.   Skin: Skin is warm and dry.   Psychiatric: He has a normal mood and affect.   Vitals reviewed.      PHQ-9 Total Score:      Patient's There is no height or weight on file to calculate  BMI. BMI is within normal parameters. No follow-up required..   (Normal BMI:  18.5-24.9, OW 25-29.9, Obesity 30 or greater)      Assessment/Plan     Jean was seen today for discuss medical history.    Diagnoses and all orders for this visit:    Acute left-sided low back pain without sciatica  -     XR Spine Lumbar AP & Lateral; Future    Type 2 diabetes mellitus without complication, without long-term current use of insulin (CMS/[facility])    Benign essential HTN    In regards to the low back pain will check plain film.  Continue medications as reconciled including no glimepiride.  Stay safely active but realize may need to slow down some.  The chronic sleep disturbance I think can be best managed with OTC melatonin.  You have not responded well to other medications tried.  Recheck as scheduled.  Will notify of x-ray results when available.  Further recommendations pending.                     This document has been electronically signed by Demetrius Wilkerson MD   June 24, 2019 11:37 AM

## 2019-06-25 ENCOUNTER — OFFICE VISIT (OUTPATIENT)
Dept: FAMILY MEDICINE CLINIC | Facility: CLINIC | Age: 74
End: 2019-06-25

## 2019-06-25 VITALS
HEART RATE: 71 BPM | DIASTOLIC BLOOD PRESSURE: 62 MMHG | OXYGEN SATURATION: 97 % | TEMPERATURE: 97 F | BODY MASS INDEX: 22.2 KG/M2 | WEIGHT: 149.9 LBS | HEIGHT: 69 IN | SYSTOLIC BLOOD PRESSURE: 110 MMHG

## 2019-06-25 DIAGNOSIS — M54.50 ACUTE LEFT-SIDED LOW BACK PAIN WITHOUT SCIATICA: Primary | ICD-10-CM

## 2019-06-25 DIAGNOSIS — I10 BENIGN ESSENTIAL HTN: ICD-10-CM

## 2019-06-25 DIAGNOSIS — E11.9 TYPE 2 DIABETES MELLITUS WITHOUT COMPLICATION, WITHOUT LONG-TERM CURRENT USE OF INSULIN (HCC): ICD-10-CM

## 2019-06-25 PROCEDURE — 99214 OFFICE O/P EST MOD 30 MIN: CPT | Performed by: FAMILY MEDICINE

## 2019-06-26 ENCOUNTER — HOSPITAL ENCOUNTER (OUTPATIENT)
Dept: GENERAL RADIOLOGY | Facility: HOSPITAL | Age: 74
Discharge: HOME | End: 2019-06-26
Admitting: FAMILY MEDICINE

## 2019-06-26 DIAGNOSIS — M54.50 ACUTE LEFT-SIDED LOW BACK PAIN WITHOUT SCIATICA: ICD-10-CM

## 2019-06-26 PROCEDURE — 72100 X-RAY EXAM L-S SPINE 2/3 VWS: CPT

## 2019-06-26 PROCEDURE — 72100 X-RAY EXAM L-S SPINE 2/3 VWS: CPT | Performed by: RADIOLOGY

## 2019-06-27 DIAGNOSIS — Z87.442 HISTORY OF NEPHROLITHIASIS: ICD-10-CM

## 2019-06-27 DIAGNOSIS — N20.0 RENAL STONE: ICD-10-CM

## 2019-06-27 DIAGNOSIS — M54.50 ACUTE LEFT-SIDED LOW BACK PAIN WITHOUT SCIATICA: Primary | ICD-10-CM

## 2019-06-27 NOTE — PROGRESS NOTES
The x-ray showed expected arthritis changes with spine.  Also notation of slight changes with alignment.  Overall I do not think that is significant.  He does suggest possibly a stone on the left side.  This is not ideal study to determine stones.  Is that known about when  is urology follow-up

## 2019-06-28 ENCOUNTER — HOSPITAL ENCOUNTER (OUTPATIENT)
Dept: CT IMAGING | Facility: HOSPITAL | Age: 74
Discharge: HOME | End: 2019-06-28
Admitting: FAMILY MEDICINE

## 2019-06-28 DIAGNOSIS — M54.50 ACUTE LEFT-SIDED LOW BACK PAIN WITHOUT SCIATICA: ICD-10-CM

## 2019-06-28 DIAGNOSIS — N20.0 RENAL STONE: ICD-10-CM

## 2019-06-28 PROCEDURE — 74176 CT ABD & PELVIS W/O CONTRAST: CPT | Performed by: RADIOLOGY

## 2019-06-28 PROCEDURE — 74176 CT ABD & PELVIS W/O CONTRAST: CPT

## 2019-07-29 ENCOUNTER — OFFICE VISIT (OUTPATIENT)
Dept: FAMILY MEDICINE CLINIC | Facility: CLINIC | Age: 74
End: 2019-07-29

## 2019-07-29 VITALS
OXYGEN SATURATION: 98 % | WEIGHT: 147.9 LBS | DIASTOLIC BLOOD PRESSURE: 62 MMHG | HEIGHT: 69 IN | HEART RATE: 68 BPM | BODY MASS INDEX: 21.91 KG/M2 | TEMPERATURE: 98.3 F | SYSTOLIC BLOOD PRESSURE: 124 MMHG

## 2019-07-29 DIAGNOSIS — E11.9 TYPE 2 DIABETES MELLITUS WITHOUT COMPLICATION, WITHOUT LONG-TERM CURRENT USE OF INSULIN (HCC): Primary | ICD-10-CM

## 2019-07-29 PROCEDURE — 99213 OFFICE O/P EST LOW 20 MIN: CPT | Performed by: FAMILY MEDICINE

## 2019-07-29 PROCEDURE — 36415 COLL VENOUS BLD VENIPUNCTURE: CPT | Performed by: FAMILY MEDICINE

## 2019-07-29 PROCEDURE — 80053 COMPREHEN METABOLIC PANEL: CPT | Performed by: FAMILY MEDICINE

## 2019-07-29 PROCEDURE — 83036 HEMOGLOBIN GLYCOSYLATED A1C: CPT | Performed by: FAMILY MEDICINE

## 2019-07-29 RX ORDER — URSODIOL 500 MG/1
500 TABLET, FILM COATED ORAL 3 TIMES DAILY
COMMUNITY

## 2019-07-29 NOTE — PROGRESS NOTES
Subjective   Jean Noriega is a 74 y.o. male.     History of Present Illness follow-up regarding diabetes hypertension.  No new problems.  Flank abdominal pain is resolved.  Denies current GI.  Denies .  Denies respiratory CDV.  Had recent visit with Dr. Olson.    The following portions of the patient's history were reviewed and updated as appropriate: allergies, current medications, past family history, past social history, past surgical history and problem list.    Review of Systems  See history of Present Illness     Objective     Physical Exam   Constitutional: He is oriented to person, place, and time. He appears well-developed and well-nourished.   HENT:   Head: Normocephalic.   Mouth/Throat: Oropharynx is clear and moist.   Neck: Normal range of motion. Neck supple. No tracheal deviation present. No thyromegaly present.   Cardiovascular: Normal rate, regular rhythm and normal heart sounds.   No murmur heard.  Pulmonary/Chest: Effort normal and breath sounds normal.   Musculoskeletal: He exhibits no edema.   Neurological: He is alert and oriented to person, place, and time.   Skin: Skin is warm and dry.   Psychiatric: He has a normal mood and affect.   Vitals reviewed.      PHQ-9 Total Score:      Patient's Body mass index is 21.83 kg/m². BMI is within normal parameters. No follow-up required..   (Normal BMI:  18.5-24.9, OW 25-29.9, Obesity 30 or greater)      Assessment/Plan     Jean was seen today for type 3 diabetes mellitus without complication without long t and benign essential hypertension.    Diagnoses and all orders for this visit:    Type 2 diabetes mellitus without complication, without long-term current use of insulin (CMS/[facility])  -     Comprehensive Metabolic Panel  -     Hemoglobin A1c    You seem to be doing quite well.  Please avoid excessive fatigue and heat.  Stay well-hydrated.  Will check labs to monitor metabolic status.  Try to maintain heart healthy diabetic diet.  Recheck in about 6  months or as needed.  Flu vaccine sooner.                     This document has been electronically signed by Demetrius Wilkerson MD   July 29, 2019 10:59 AM

## 2019-07-30 LAB
ALBUMIN SERPL-MCNC: 3.9 G/DL (ref 3.5–5.2)
ALBUMIN/GLOB SERPL: 1.6 G/DL
ALP SERPL-CCNC: 98 U/L (ref 39–117)
ALT SERPL W P-5'-P-CCNC: 17 U/L (ref 1–41)
ANION GAP SERPL CALCULATED.3IONS-SCNC: 11 MMOL/L (ref 5–15)
AST SERPL-CCNC: 17 U/L (ref 1–40)
BILIRUB SERPL-MCNC: 0.4 MG/DL (ref 0.2–1.2)
BUN BLD-MCNC: 13 MG/DL (ref 8–23)
BUN/CREAT SERPL: 14.4 (ref 7–25)
CALCIUM SPEC-SCNC: 9 MG/DL (ref 8.6–10.5)
CHLORIDE SERPL-SCNC: 102 MMOL/L (ref 98–107)
CO2 SERPL-SCNC: 27 MMOL/L (ref 22–29)
CREAT BLD-MCNC: 0.9 MG/DL (ref 0.76–1.27)
GFR SERPL CREATININE-BSD FRML MDRD: 83 ML/MIN/1.73
GLOBULIN UR ELPH-MCNC: 2.5 GM/DL
GLUCOSE BLD-MCNC: 214 MG/DL (ref 65–99)
HBA1C MFR BLD: 7.8 % (ref 4.8–5.6)
POTASSIUM BLD-SCNC: 4.8 MMOL/L (ref 3.5–5.2)
PROT SERPL-MCNC: 6.4 G/DL (ref 6–8.5)
SODIUM BLD-SCNC: 140 MMOL/L (ref 136–145)

## 2019-07-30 RX ORDER — PIOGLITAZONEHYDROCHLORIDE 15 MG/1
15 TABLET ORAL DAILY
Qty: 30 TABLET | Refills: 3 | Status: SHIPPED | OUTPATIENT
Start: 2019-07-30 | End: 2019-10-01

## 2019-07-30 NOTE — PROGRESS NOTES
Average blood sugar result is not as good.  7.8.  Additional low-dose medication has been sent in to take along with the metformin.  Must divorce V[employer].  Stay aggressive with diet.  Stay active and well-hydrated.

## 2019-08-08 ENCOUNTER — TELEPHONE (OUTPATIENT)
Dept: FAMILY MEDICINE CLINIC | Facility: CLINIC | Age: 74
End: 2019-08-08

## 2019-08-08 NOTE — TELEPHONE ENCOUNTER
PATIENT IS CURRENTLY TAKING GLIMEPIRIDE. HE HAS BEEN TAKING 1/2 MORNING AND 1/2 NOON AND WATCHING WHAT HE EATS AND HIS READINGS ARE DOING BETTER THIS MORNING . HE DOES NOT WANT TO TRY A NEW MEDICATION AT THIS TIME.

## 2019-08-09 NOTE — TELEPHONE ENCOUNTER
Patient stated when he was here for his last appointment.  He reported he had  stopped taking the glimepiride.  Because his BS was getting low and making him sick. He started back on 1/2 at breakfast and 1/2 at lunch Wednesday, because his BS was running high.  He states this seems to be working.

## 2019-10-01 ENCOUNTER — OFFICE VISIT (OUTPATIENT)
Dept: FAMILY MEDICINE CLINIC | Facility: CLINIC | Age: 74
End: 2019-10-01

## 2019-10-01 VITALS
OXYGEN SATURATION: 99 % | DIASTOLIC BLOOD PRESSURE: 64 MMHG | HEART RATE: 73 BPM | WEIGHT: 147.3 LBS | SYSTOLIC BLOOD PRESSURE: 118 MMHG | TEMPERATURE: 99.2 F | HEIGHT: 69 IN | BODY MASS INDEX: 21.82 KG/M2

## 2019-10-01 DIAGNOSIS — M54.6 CHRONIC MIDLINE THORACIC BACK PAIN: Primary | ICD-10-CM

## 2019-10-01 DIAGNOSIS — Z23 NEEDS FLU SHOT: ICD-10-CM

## 2019-10-01 DIAGNOSIS — G89.29 CHRONIC MIDLINE THORACIC BACK PAIN: Primary | ICD-10-CM

## 2019-10-01 PROCEDURE — G0008 ADMIN INFLUENZA VIRUS VAC: HCPCS | Performed by: FAMILY MEDICINE

## 2019-10-01 PROCEDURE — 90653 IIV ADJUVANT VACCINE IM: CPT | Performed by: FAMILY MEDICINE

## 2019-10-01 PROCEDURE — 99213 OFFICE O/P EST LOW 20 MIN: CPT | Performed by: FAMILY MEDICINE

## 2019-10-01 RX ORDER — GLIMEPIRIDE 2 MG/1
2 TABLET ORAL
Refills: 3 | COMMUNITY
Start: 2019-08-12 | End: 2019-11-26 | Stop reason: SDUPTHER

## 2019-10-01 NOTE — PROGRESS NOTES
Subjective   Jean Noriega is a 74 y.o. male.     History of Present Illness somewhat chronic mid upper back discomfort.  Nontraumatic nonradiating.  No associated shortness of breath chest pain palpitations.  No rashes.  Extremely active physically.  States it feels better when active.  Seems to worsen in the evening when tries to rest.  Has known lumbar degenerative disease.  Has been using the glimepiride for diabetes did not ever  start the Actos.    The following portions of the patient's history were reviewed and updated as appropriate: allergies, current medications, past family history, past social history, past surgical history and problem list.    Review of Systems  See history of Present Illness     Objective     Physical Exam   Constitutional: He is oriented to person, place, and time. He appears well-developed and well-nourished.   HENT:   Head: Normocephalic.   Neck: Normal range of motion. Neck supple. No tracheal deviation present. No thyromegaly present.   Cardiovascular: Normal rate, regular rhythm and normal heart sounds.   No murmur heard.  Pulmonary/Chest: Effort normal and breath sounds normal.   Musculoskeletal: He exhibits no edema.   Minimal parathoracic soft tissue tenderness.  No spinal percussion tenderness.  No radicular findings elicited or reported.   Neurological: He is alert and oriented to person, place, and time.   Skin: Skin is warm and dry.   Psychiatric: He has a normal mood and affect.   Vitals reviewed.      PHQ-9 Total Score:      Patient's Body mass index is 21.74 kg/m². BMI is within normal parameters. No follow-up required..   (Normal BMI:  18.5-24.9, OW 25-29.9, Obesity 30 or greater)      Assessment/Plan     Jean was seen today for upper back pain.    Diagnoses and all orders for this visit:    Chronic midline thoracic back pain  -     XR spine thoracic 2 vw; Future    Needs flu shot  -     Fluad Quad >65 years    Will check x-ray.  Use OTC NSAID rarely.  Encouraged to  decrease the aggressive physical activity.  Will notify with x-ray findings make further recommendations.  Gave flu shot.  Continue all other modalities same.  Home blood sugar readings look good.                     This document has been electronically signed by Demetrius Wilkerson MD   October 1, 2019 2:54 PM

## 2019-10-02 ENCOUNTER — HOSPITAL ENCOUNTER (OUTPATIENT)
Dept: GENERAL RADIOLOGY | Facility: HOSPITAL | Age: 74
Discharge: HOME | End: 2019-10-02
Admitting: FAMILY MEDICINE

## 2019-10-02 DIAGNOSIS — G89.29 CHRONIC MIDLINE THORACIC BACK PAIN: ICD-10-CM

## 2019-10-02 DIAGNOSIS — M54.6 CHRONIC MIDLINE THORACIC BACK PAIN: ICD-10-CM

## 2019-10-02 PROCEDURE — 72072 X-RAY EXAM THORAC SPINE 3VWS: CPT

## 2019-10-02 PROCEDURE — 72072 X-RAY EXAM THORAC SPINE 3VWS: CPT | Performed by: RADIOLOGY

## 2019-10-03 NOTE — PROGRESS NOTES
Let him know the x-ray showed expected changes of arthritis like we discussed.  No other significant findings.  I encouraged to try to slow down and limit activities

## 2019-11-21 ENCOUNTER — OFFICE VISIT (OUTPATIENT)
Dept: FAMILY MEDICINE CLINIC | Facility: CLINIC | Age: 74
End: 2019-11-21

## 2019-11-21 VITALS
OXYGEN SATURATION: 99 % | BODY MASS INDEX: 22.48 KG/M2 | HEIGHT: 69 IN | WEIGHT: 151.8 LBS | DIASTOLIC BLOOD PRESSURE: 64 MMHG | TEMPERATURE: 97.4 F | SYSTOLIC BLOOD PRESSURE: 128 MMHG | HEART RATE: 69 BPM

## 2019-11-21 DIAGNOSIS — Z13.220 SCREENING, LIPID: ICD-10-CM

## 2019-11-21 DIAGNOSIS — E11.9 TYPE 2 DIABETES MELLITUS WITHOUT COMPLICATION, WITHOUT LONG-TERM CURRENT USE OF INSULIN (HCC): ICD-10-CM

## 2019-11-21 DIAGNOSIS — R10.13 EPIGASTRIC PAIN: Primary | ICD-10-CM

## 2019-11-21 PROCEDURE — 99213 OFFICE O/P EST LOW 20 MIN: CPT | Performed by: NURSE PRACTITIONER

## 2019-11-21 PROCEDURE — 80053 COMPREHEN METABOLIC PANEL: CPT | Performed by: NURSE PRACTITIONER

## 2019-11-21 PROCEDURE — 83690 ASSAY OF LIPASE: CPT | Performed by: NURSE PRACTITIONER

## 2019-11-21 PROCEDURE — 83036 HEMOGLOBIN GLYCOSYLATED A1C: CPT | Performed by: NURSE PRACTITIONER

## 2019-11-21 PROCEDURE — 82043 UR ALBUMIN QUANTITATIVE: CPT | Performed by: NURSE PRACTITIONER

## 2019-11-21 PROCEDURE — 80061 LIPID PANEL: CPT | Performed by: NURSE PRACTITIONER

## 2019-11-21 PROCEDURE — 36415 COLL VENOUS BLD VENIPUNCTURE: CPT | Performed by: NURSE PRACTITIONER

## 2019-11-21 PROCEDURE — 85025 COMPLETE CBC W/AUTO DIFF WBC: CPT | Performed by: NURSE PRACTITIONER

## 2019-11-21 PROCEDURE — 82150 ASSAY OF AMYLASE: CPT | Performed by: NURSE PRACTITIONER

## 2019-11-21 NOTE — PROGRESS NOTES
Subjective   Jean Noriega is a 74 y.o. male.     Chief Complaint   Patient presents with   • upset stomach       He presents with c/o upset stomach for 3-4 days to a week off and on. He c/o hot flashes and not sleeping well. 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 He states they put two baby stents in his liver. He states they put him on ursodiol to thin the juices in his liver. He states he had a stent put in 6-8 months ago and needs liver enzymes checked. He states he feels nauseous sometimes, not very often. He c/o a dead ache in his stomach and a headache. He states he felt hot but he hasn't ran a fever. He states he had a normal bowel movement this morning with no blood. He denies dysuria and hematuria. 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 He states even when he does sleep, he doesn't sleep more than 4-5 hours. He states he has lost 15lb since his procedure and he can't seem to gain the weight back. He is on Travis pep as well.          The following portions of the patient's history were reviewed and updated as appropriate: allergies, current medications, past family history, past medical history, past social history, past surgical history and problem list.    Review of Systems   Constitutional: Positive for diaphoresis. Negative for fever and unexpected weight change.   HENT: Negative for ear pain, rhinorrhea, sore throat and trouble swallowing.    Eyes: Negative for visual disturbance.   Respiratory: Negative for cough, shortness of breath and wheezing.    Cardiovascular: Negative for chest pain and palpitations.   Gastrointestinal: Positive for abdominal pain and nausea. Negative for blood in stool, constipation, diarrhea and vomiting.   Endocrine: Positive for polydipsia. Negative for polyphagia and polyuria.   Genitourinary: Negative for dysuria and hematuria.   Musculoskeletal: Positive for  "arthralgias. Negative for myalgias.   Skin: Negative for color change and rash.   Allergic/Immunologic: Positive for environmental allergies.   Neurological: Positive for headaches. Negative for dizziness.   Hematological: Negative for adenopathy.   Psychiatric/Behavioral: Positive for sleep disturbance. Negative for suicidal ideas. The patient is not nervous/anxious.        Objective     /64 (BP Location: Right arm, Patient Position: Sitting, Cuff Size: Adult)   Pulse 69   Temp 97.4 °F (36.3 °C) (Oral)   Ht 175.3 cm (69.02\")   Wt 68.9 kg (151 lb 12.8 oz)   SpO2 99%   BMI 22.41 kg/m²     Physical Exam   Constitutional: He is oriented to person, place, and time. Vital signs are normal. He appears well-developed and well-nourished. No distress.   HENT:   Head: Normocephalic and atraumatic.   Cardiovascular: Normal rate, regular rhythm, normal heart sounds and intact distal pulses. Exam reveals no gallop and no friction rub.   No murmur heard.  Pulmonary/Chest: Effort normal and breath sounds normal. No respiratory distress.   Abdominal: Soft. Bowel sounds are normal. He exhibits no distension. There is tenderness in the epigastric area. There is no rigidity, no rebound and no guarding.   Neurological: He is alert and oriented to person, place, and time.   Skin: Skin is warm and dry. He is not diaphoretic.   Psychiatric: He has a normal mood and affect. His behavior is normal. Judgment and thought content normal.       Assessment/Plan     Problem List Items Addressed This Visit        Endocrine    Type 2 diabetes mellitus without complication, without long-term current use of insulin (CMS/[facility])    Relevant Orders    Hemoglobin A1c    MicroAlbumin, Urine, Random - Urine, Clean Catch       Nervous and Auditory    Epigastric pain - Primary    Relevant Orders    CBC & Differential    Comprehensive Metabolic Panel    Amylase    Lipase      Other Visit Diagnoses     Screening, lipid        Relevant Orders    Lipid " Panel          Plan: Get labs to look for cause of abdominal pain. He declines nausea medicine or medicine for cramps. With his history the pain could have multiple etiologies. Follow up pending results.     Patient's Body mass index is 22.41 kg/m². BMI is within normal parameters. No follow-up required..   (Normal BMI:  18.5-24.9, OW 25-29.9, Obesity 30 or greater)          Understands disease processes and need for medications.  Understands reasons for urgent and emergent care.  Patient (& family) verbalized agreement for treatment plan.   Emotional support and active listening provided.  Patient provided time to verbalize feelings.               This document has been electronically signed by JADE Murphy   November 21, 2019 2:49 PM

## 2019-11-22 LAB
ALBUMIN SERPL-MCNC: 3.6 G/DL (ref 3.5–5.2)
ALBUMIN UR-MCNC: <1.2 MG/DL
ALBUMIN/GLOB SERPL: 1.4 G/DL
ALP SERPL-CCNC: 107 U/L (ref 39–117)
ALT SERPL W P-5'-P-CCNC: 21 U/L (ref 1–41)
AMYLASE SERPL-CCNC: 92 U/L (ref 28–100)
ANION GAP SERPL CALCULATED.3IONS-SCNC: 12 MMOL/L (ref 5–15)
AST SERPL-CCNC: 16 U/L (ref 1–40)
BASOPHILS # BLD AUTO: 0.02 10*3/MM3 (ref 0–0.2)
BASOPHILS NFR BLD AUTO: 0.3 % (ref 0–1.5)
BILIRUB SERPL-MCNC: 0.4 MG/DL (ref 0.2–1.2)
BUN BLD-MCNC: 16 MG/DL (ref 8–23)
BUN/CREAT SERPL: 17.6 (ref 7–25)
CALCIUM SPEC-SCNC: 8.8 MG/DL (ref 8.6–10.5)
CHLORIDE SERPL-SCNC: 100 MMOL/L (ref 98–107)
CHOLEST SERPL-MCNC: 81 MG/DL (ref 0–200)
CO2 SERPL-SCNC: 26 MMOL/L (ref 22–29)
CREAT BLD-MCNC: 0.91 MG/DL (ref 0.76–1.27)
DEPRECATED RDW RBC AUTO: 42.7 FL (ref 37–54)
EOSINOPHIL # BLD AUTO: 0.07 10*3/MM3 (ref 0–0.4)
EOSINOPHIL NFR BLD AUTO: 1 % (ref 0.3–6.2)
ERYTHROCYTE [DISTWIDTH] IN BLOOD BY AUTOMATED COUNT: 11.7 % (ref 12.3–15.4)
GFR SERPL CREATININE-BSD FRML MDRD: 82 ML/MIN/1.73
GLOBULIN UR ELPH-MCNC: 2.5 GM/DL
GLUCOSE BLD-MCNC: 200 MG/DL (ref 65–99)
HBA1C MFR BLD: 6.59 % (ref 4.8–5.6)
HCT VFR BLD AUTO: 37.8 % (ref 37.5–51)
HDLC SERPL-MCNC: 41 MG/DL (ref 40–60)
HGB BLD-MCNC: 12.6 G/DL (ref 13–17.7)
IMM GRANULOCYTES # BLD AUTO: 0.04 10*3/MM3 (ref 0–0.05)
IMM GRANULOCYTES NFR BLD AUTO: 0.6 % (ref 0–0.5)
LDLC SERPL CALC-MCNC: 29 MG/DL (ref 0–100)
LDLC/HDLC SERPL: 0.72 {RATIO}
LIPASE SERPL-CCNC: 3 U/L (ref 13–60)
LYMPHOCYTES # BLD AUTO: 1.21 10*3/MM3 (ref 0.7–3.1)
LYMPHOCYTES NFR BLD AUTO: 17.2 % (ref 19.6–45.3)
MCH RBC QN AUTO: 33.2 PG (ref 26.6–33)
MCHC RBC AUTO-ENTMCNC: 33.3 G/DL (ref 31.5–35.7)
MCV RBC AUTO: 99.5 FL (ref 79–97)
MONOCYTES # BLD AUTO: 1.09 10*3/MM3 (ref 0.1–0.9)
MONOCYTES NFR BLD AUTO: 15.5 % (ref 5–12)
NEUTROPHILS # BLD AUTO: 4.62 10*3/MM3 (ref 1.7–7)
NEUTROPHILS NFR BLD AUTO: 65.4 % (ref 42.7–76)
NRBC BLD AUTO-RTO: 0 /100 WBC (ref 0–0.2)
PLATELET # BLD AUTO: 184 10*3/MM3 (ref 140–450)
PMV BLD AUTO: 11.3 FL (ref 6–12)
POTASSIUM BLD-SCNC: 4.6 MMOL/L (ref 3.5–5.2)
PROT SERPL-MCNC: 6.1 G/DL (ref 6–8.5)
RBC # BLD AUTO: 3.8 10*6/MM3 (ref 4.14–5.8)
SODIUM BLD-SCNC: 138 MMOL/L (ref 136–145)
TRIGL SERPL-MCNC: 53 MG/DL (ref 0–150)
VLDLC SERPL-MCNC: 10.6 MG/DL (ref 5–40)
WBC NRBC COR # BLD: 7.05 10*3/MM3 (ref 3.4–10.8)

## 2019-11-22 NOTE — PROGRESS NOTES
His labs look ok. His liver enzymes look good. Pancreas enzymes look good as well. If his abdominal pain doesn't improve, he should go to the Er.

## 2019-11-26 RX ORDER — GLIMEPIRIDE 2 MG/1
2 TABLET ORAL
Qty: 30 TABLET | Refills: 6 | Status: SHIPPED | OUTPATIENT
Start: 2019-11-26 | End: 2020-05-26

## 2019-11-26 NOTE — TELEPHONE ENCOUNTER
PATIENT LEFT MESSAGE ON VOICEMAIL REQUESTING REFILL SENT TO [facility].    GLIMEPIRIDE 8-12-19 REFILL X 3      LAST  APPOINTMENT: 11-21-19  NEXT APPOINTMENT: 1-30-19

## 2020-01-10 NOTE — TELEPHONE ENCOUNTER
Patient left message on voicemail refill sent to [facility].    Metformin 7-15-19 # 60 refill x 5    Last appointment 11-21-19  Next appointment: 1-30-20

## 2020-02-03 ENCOUNTER — OFFICE VISIT (OUTPATIENT)
Dept: FAMILY MEDICINE CLINIC | Facility: CLINIC | Age: 75
End: 2020-02-03

## 2020-02-03 VITALS
SYSTOLIC BLOOD PRESSURE: 118 MMHG | BODY MASS INDEX: 23.25 KG/M2 | HEART RATE: 76 BPM | TEMPERATURE: 97.7 F | DIASTOLIC BLOOD PRESSURE: 60 MMHG | OXYGEN SATURATION: 98 % | HEIGHT: 69 IN | WEIGHT: 157 LBS

## 2020-02-03 DIAGNOSIS — Z00.00 MEDICARE ANNUAL WELLNESS VISIT, SUBSEQUENT: ICD-10-CM

## 2020-02-03 DIAGNOSIS — I10 BENIGN ESSENTIAL HTN: ICD-10-CM

## 2020-02-03 DIAGNOSIS — E11.9 TYPE 2 DIABETES MELLITUS WITHOUT COMPLICATION, WITHOUT LONG-TERM CURRENT USE OF INSULIN (HCC): Primary | ICD-10-CM

## 2020-02-03 DIAGNOSIS — E78.2 MIXED HYPERLIPIDEMIA: ICD-10-CM

## 2020-02-03 PROCEDURE — G0439 PPPS, SUBSEQ VISIT: HCPCS | Performed by: FAMILY MEDICINE

## 2020-02-03 NOTE — PROGRESS NOTES
The ABCs of the Annual Wellness Visit  Subsequent Medicare Wellness Visit    Chief Complaint   Patient presents with   • Subsequent medicare wellness       Subjective   History of Present Illness:  Jean Noriega is a 74 y.o. male who presents for a Subsequent Medicare Wellness Visit.  Follow-up diabetes dyslipidemia hypertension.  Globally doing well.  Stays very active.  Has had follow-up with urology.  Has had follow-up with oncologic surgeon.  Utilizing all medications as reconciled.  Blood sugars are doing well.  Denies recent acute illness.  Denies respiratory CDV GI  skin concerns.  Still with episodic orthopedic issues but pushes self quite aggressively from activity standpoint.    HEALTH RISK ASSESSMENT    Recent Hospitalizations:  No hospitalization(s) within the last year.    Current Medical Providers:  Patient Care Team:  Demetrius Wilkerson MD as PCP - General (Family Medicine)  Serjio Wall MD as Consulting Physician (Radiology)  Maurizio Cuellar MD as Consulting Physician (Gastroenterology)  Christopher Robledo MD as Consulting Physician (Infectious Diseases)    Smoking Status:  Social History     Tobacco Use   Smoking Status Former Smoker   • Packs/day: 1.00   • Years: 5.00   • Pack years: 5.00   • Types: Cigarettes, Cigars, Pipe   • Start date:    • Last attempt to quit:    • Years since quittin.1   Smokeless Tobacco Former User       Alcohol Consumption:  Social History     Substance and Sexual Activity   Alcohol Use No       Depression Screen:   PHQ-2/PHQ-9 Depression Screening 2/3/2020   Little interest or pleasure in doing things 0   Feeling down, depressed, or hopeless 0   Total Score 0       Fall Risk Screen:  STEADI Fall Risk Assessment has not been completed.    Health Habits and Functional and Cognitive Screening:  Functional & Cognitive Status 2/3/2020   Do you have difficulty preparing food and eating? No   Do you have difficulty bathing yourself, getting dressed or  grooming yourself? No   Do you have difficulty using the toilet? No   Do you have difficulty moving around from place to place? No   Do you have trouble with steps or getting out of a bed or a chair? No   Current Diet Well Balanced Diet   Dental Exam Up to date   Eye Exam Up to date   Exercise (times per week) 6 times per week   Current Exercise Activities Include Aerobics   Do you need help using the phone?  No   Are you deaf or do you have serious difficulty hearing?  No   Do you need help with transportation? No   Do you need help shopping? No   Do you need help preparing meals?  No   Do you need help with housework?  No   Do you need help with laundry? No   Do you need help taking your medications? No   Do you need help managing money? No   Do you ever drive or ride in a car without wearing a seat belt? No   Have you felt unusual stress, anger or loneliness in the last month? No   Who do you live with? Spouse   If you need help, do you have trouble finding someone available to you? No   Have you been bothered in the last four weeks by sexual problems? No   Do you have difficulty concentrating, remembering or making decisions? No         Does the patient have evidence of cognitive impairment? No    Asprin use counseling:Taking ASA appropriately as indicated    Age-appropriate Screening Schedule:  Refer to the list below for future screening recommendations based on patient's age, sex and/or medical conditions. Orders for these recommended tests are listed in the plan section. The patient has been provided with a written plan.    Health Maintenance   Topic Date Due   • ZOSTER VACCINE (2 of 3) 12/13/2014   • DIABETIC FOOT EXAM  01/31/2020   • HEMOGLOBIN A1C  05/21/2020   • DIABETIC EYE EXAM  06/13/2020   • LIPID PANEL  11/21/2020   • URINE MICROALBUMIN  11/21/2020   • TDAP/TD VACCINES (3 - Td) 02/22/2029   • COLONOSCOPY  04/15/2029   • PNEUMOCOCCAL VACCINE (65+ HIGH RISK)  Completed   • INFLUENZA VACCINE  Completed           The following portions of the patient's history were reviewed and updated as appropriate: allergies, current medications, past family history, past medical history, past surgical history and problem list.    Outpatient Medications Prior to Visit   Medication Sig Dispense Refill   • acyclovir (ZOVIRAX) 5 % ointment Apply  topically to the appropriate area as directed 4 (Four) Times a Day. 1 each 0   • amLODIPine (NORVASC) 10 MG tablet      • ASPIRIN 81 PO aspirin 81 mg tablet     • atorvastatin (LIPITOR) 40 MG tablet Take 40 mg by mouth Daily.     • glimepiride (AMARYL) 2 MG tablet Take 1 tablet by mouth Every Morning Before Breakfast. 30 tablet 6   • glucose blood (ONE TOUCH TEST STRIPS) test strip 1 each by Other route Daily. 100 each 12   • losartan (COZAAR) 100 MG tablet      • metFORMIN (GLUCOPHAGE) 1000 MG tablet Take 1 tablet by mouth 2 (Two) Times a Day With Meals. 60 tablet 5   • MULTIPLE VITAMIN PO Take 1 tablet by mouth Daily.     • ondansetron (ZOFRAN) 4 MG tablet Take 1 tablet by mouth Every 6 (Six) Hours As Needed for Nausea or Vomiting. 12 tablet 0   • ONETOUCH DELICA LANCETS 33G misc 1 Device by Other route Daily. 100 each 12   • pancrelipase, Lip-Prot-Amyl, (ZENPEP) 26279 UNITS capsule delayed-release particles capsule Take 30,000 units of lipase by mouth 3 (Three) Times a Day With Meals.     • pantoprazole (PROTONIX) 40 MG EC tablet Take 40 mg by mouth 2 (Two) Times a Day.     • Sod Picosulfate-Mag Ox-Cit Acd 10-3.5-12 MG-GM -GM/160ML solution Take 1 kit by mouth Take As Directed. Follow instructions that were mailed to your home. If you didn't receive these call [phone]. 2 bottle 0   • Sod Picosulfate-Mag Ox-Cit Acd 10-3.5-12 MG-GM -GM/160ML solution Take 1 kit by mouth Take As Directed. Follow instructions that were mailed to your home. If you didn't receive these call [phone]. 2 bottle 0   • ursodiol (ACTIGALL) 500 MG tablet Take 500 mg by mouth 3 (Three) Times a Day.     •  "cyclobenzaprine (FLEXERIL) 5 MG tablet Take 1 tablet by mouth Every Night. 30 tablet 2   • Docusate Calcium (STOOL SOFTENER PO) Take  by mouth.       No facility-administered medications prior to visit.        Patient Active Problem List   Diagnosis   • Epigastric pain   • Gastroesophageal reflux disease   • Chronic rhinitis   • Type 2 diabetes mellitus without complication, without long-term current use of insulin (CMS/HCC)   • Low back pain   • Mixed hyperlipidemia   • Hx of duodenal cancer, s/p Whipple 2004   • Hx of bladder cancer, s/p \"scraping\", 2004   • History of recent left nephrolithiasis s/p ureteral stent placement    • Benign essential HTN   • Arthritis   • Carcinoma of duodenum (CMS/HCC)   • Chronic diastolic heart failure (CMS/HCC)   • Dyspnea on exertion   • Peptic ulcer   • Taking multiple medications for chronic disease   • Varicose veins of lower extremity with inflammation   • Malignant neoplasm of urinary bladder (CMS/HCC)   • Foreign body (FB) in soft tissue       Advanced Care Planning:  Patient has an advance directive - a copy has been provided and is visible in patient header    Review of Systems    Compared to one year ago, the patient feels his physical health is the same.  Compared to one year ago, the patient feels his mental health is the same.    Reviewed chart for potential of high risk medication in the elderly: yes  Reviewed chart for potential of harmful drug interactions in the elderly:yes    Objective         Vitals:    02/03/20 0918   BP: 118/60   BP Location: Left arm   Patient Position: Sitting   Cuff Size: Adult   Pulse: 76   Temp: 97.7 °F (36.5 °C)   TempSrc: Oral   SpO2: 98%   Weight: 71.2 kg (157 lb)   Height: 175.3 cm (69.02\")       Body mass index is 23.17 kg/m².  Discussed the patient's BMI with him. The BMI is in the acceptable range.    Physical Exam    Lab Results   Component Value Date    TRIG 53 11/21/2019    HDL 41 11/21/2019    LDL 29 11/21/2019    VLDL 10.6 " 11/21/2019    HGBA1C 6.59 (H) 11/21/2019        Assessment/Plan   Medicare Risks and Personalized Health Plan  CMS Preventative Services Quick Reference  Cardiovascular risk    The above risks/problems have been discussed with the patient.  Pertinent information has been shared with the patient in the After Visit Summary.  Follow up plans and orders are seen below in the Assessment/Plan Section.    Diagnoses and all orders for this visit:    1. Type 2 diabetes mellitus without complication, without long-term current use of insulin (CMS/[facility]) (Primary)  -     CBC & Differential; Future  -     Comprehensive Metabolic Panel; Future  -     Hemoglobin A1c; Future  -     Lipid Panel; Future  -     TSH; Future    2. Mixed hyperlipidemia  -     Comprehensive Metabolic Panel; Future  -     Lipid Panel; Future  -     TSH; Future    3. Benign essential HTN  -     CBC & Differential; Future  -     Comprehensive Metabolic Panel; Future    4. Medicare annual wellness visit, subsequent      Follow Up:  Return in about 6 months (around 8/3/2020) for Recheck lab prior.     An After Visit Summary and PPPS were given to the patient.     You are doing quite well.  You are current on PME.  Current on vaccines.  I encourage you to stay safely active.  Maintain reasonable heart healthy diabetic diet.  I encourage you to pay attention to your self and not overexert physically.  Keep follow-ups elsewhere.  Recent lab and metabolic parameters noted.  We will ask you to recheck in about 6 months.  Will monitor metabolically at that time.

## 2020-02-03 NOTE — PROGRESS NOTES
"Subjective   Jean Noriega is a 74 y.o. male.     Chief Complaint   Patient presents with   • Subsequent medicare wellness       History of Present Illness     The following portions of the patient's history were reviewed and updated as appropriate: allergies, current medications, past family history, past medical history, past social history, past surgical history and problem list.    Review of Systems    Objective     /60 (BP Location: Left arm, Patient Position: Sitting, Cuff Size: Adult)   Pulse 76   Temp 97.7 °F (36.5 °C) (Oral)   Ht 175.3 cm (69.02\")   Wt 71.2 kg (157 lb)   SpO2 98%   BMI 23.17 kg/m²     Physical Exam    Assessment/Plan     Problem List Items Addressed This Visit     None          {PHQ-9 Score:56638:::1}    Patient's Body mass index is 23.17 kg/m². BMI is {BMI range:70578}.   (Normal BMI:  18.5-24.9, OW 25-29.9, Obesity 30 or greater)    Jean Noriega  reports that he quit smoking about 45 years ago. His smoking use included cigarettes, cigars, and pipe. He started smoking about 50 years ago. He has a 5.00 pack-year smoking history. He has quit using smokeless tobacco.. I have educated him on the risk of diseases from using tobacco products such as {Tobacco Cessation Diseases:53596::\"cancer\",\"COPD\",\"heart diease\"}.     I advised him to quit and he is {Willing/Not Willing to Quit Tobacco Products:05464}.    I spent {Time Spent Tobacco :07221} minutes counseling the patient.           Understands disease processes and need for medications.  Understands reasons for urgent and emergent care.  Patient (& family) verbalized agreement for treatment plan.   Emotional support and active listening provided.  Patient provided time to verbalize feelings.               This document has been electronically signed by JADE Murphy   February 3, 2020 9:21 AM  "

## 2020-05-26 RX ORDER — GLIMEPIRIDE 2 MG/1
TABLET ORAL
Qty: 90 TABLET | Refills: 0 | Status: SHIPPED | OUTPATIENT
Start: 2020-05-26 | End: 2020-09-21 | Stop reason: SDUPTHER

## 2020-05-26 NOTE — TELEPHONE ENCOUNTER
PATIENT IS CALLING TO CHECK ON HIS REFILL FOR HIS glimepiride (AMARYL) 2 MG tablet THAT PHARMACY REQUESTED.    PHARMACY VERIFIED AS:  [facility] [address] [place] [address] [phone] [facility] [phone] FX     ANY QUESTIONS OR CONCERNS, PLEASE CALL PATIENT AT: [phone]

## 2020-07-06 NOTE — TELEPHONE ENCOUNTER
PT REQUESTS REFILL ON METFORMIN BE SENT TO [facility]  LAST FILLED 1/10/20 X5RF  LAST SEEN 2/3/2020

## 2020-08-03 ENCOUNTER — OFFICE VISIT (OUTPATIENT)
Dept: FAMILY MEDICINE CLINIC | Facility: CLINIC | Age: 75
End: 2020-08-03

## 2020-08-03 VITALS
BODY MASS INDEX: 22.81 KG/M2 | TEMPERATURE: 97.4 F | OXYGEN SATURATION: 98 % | HEIGHT: 69 IN | RESPIRATION RATE: 18 BRPM | WEIGHT: 154 LBS | SYSTOLIC BLOOD PRESSURE: 127 MMHG | HEART RATE: 64 BPM | DIASTOLIC BLOOD PRESSURE: 65 MMHG

## 2020-08-03 DIAGNOSIS — E78.2 MIXED HYPERLIPIDEMIA: ICD-10-CM

## 2020-08-03 DIAGNOSIS — E11.9 TYPE 2 DIABETES MELLITUS WITHOUT COMPLICATION, WITHOUT LONG-TERM CURRENT USE OF INSULIN (HCC): Primary | ICD-10-CM

## 2020-08-03 DIAGNOSIS — I10 BENIGN ESSENTIAL HTN: ICD-10-CM

## 2020-08-03 PROCEDURE — 99213 OFFICE O/P EST LOW 20 MIN: CPT | Performed by: FAMILY MEDICINE

## 2020-08-03 NOTE — PROGRESS NOTES
Subjective   Jean Noriega is a 75 y.o. male.     History of Present Illness follow-up regarding diabetes hypertension dyslipidemia.  Chronic medical problems.  Globally no new problems.  Very active.  Reasonable diet.  Stable sleep patterns.  Compliant with medications.  No recent illness or exposures.  Maintains follow-up with GI and oncology.  Denies respiratory CDV GI  skin concerns.  OA changes are progressing.    The following portions of the patient's history were reviewed and updated as appropriate: allergies, past family history, past medical history, past social history, past surgical history and problem list.    Review of Systems  See history of Present Illness     Objective     Physical Exam   Constitutional: He is oriented to person, place, and time. He appears well-developed and well-nourished.   HENT:   Head: Normocephalic.   Neck: Normal range of motion. Neck supple. No tracheal deviation present. No thyromegaly present.   Cardiovascular: Normal rate, regular rhythm and normal heart sounds.   No murmur heard.  Pulmonary/Chest: Effort normal and breath sounds normal.   Abdominal: Soft. There is no tenderness.   Musculoskeletal: He exhibits no edema.   Neurological: He is alert and oriented to person, place, and time.   Skin: Skin is warm and dry.   Psychiatric: He has a normal mood and affect.   Vitals reviewed.      PHQ-9 Total Score:      Patient's Body mass index is 22.73 kg/m². BMI is within normal parameters. No follow-up required..   (Normal BMI:  18.5-24.9, OW 25-29.9, Obesity 30 or greater)      Assessment/Plan     Jean was seen today for diabetes.    Diagnoses and all orders for this visit:    Type 2 diabetes mellitus without complication, without long-term current use of insulin (CMS/[facility])    Mixed hyperlipidemia    Benign essential HTN    You seem to be doing quite well.  We will continue medications as reconciled ordered.  Follow-up soon for fasting lab check to monitor metabolically.   Flu vaccine at appropriate time.  Maintain pandemic response.  Recheck in 6 months or as needed.                     This document has been electronically signed by Demetrius Wilkerson MD   August 3, 2020 09:13

## 2020-08-12 ENCOUNTER — LAB (OUTPATIENT)
Dept: FAMILY MEDICINE CLINIC | Facility: CLINIC | Age: 75
End: 2020-08-12

## 2020-08-12 DIAGNOSIS — I10 BENIGN ESSENTIAL HTN: ICD-10-CM

## 2020-08-12 DIAGNOSIS — E78.2 MIXED HYPERLIPIDEMIA: ICD-10-CM

## 2020-08-12 DIAGNOSIS — E11.9 TYPE 2 DIABETES MELLITUS WITHOUT COMPLICATION, WITHOUT LONG-TERM CURRENT USE OF INSULIN (HCC): ICD-10-CM

## 2020-08-12 PROCEDURE — 36415 COLL VENOUS BLD VENIPUNCTURE: CPT | Performed by: NURSE PRACTITIONER

## 2020-08-12 PROCEDURE — 80053 COMPREHEN METABOLIC PANEL: CPT | Performed by: FAMILY MEDICINE

## 2020-08-12 PROCEDURE — 80061 LIPID PANEL: CPT | Performed by: FAMILY MEDICINE

## 2020-08-12 PROCEDURE — 85025 COMPLETE CBC W/AUTO DIFF WBC: CPT | Performed by: FAMILY MEDICINE

## 2020-08-12 PROCEDURE — 83036 HEMOGLOBIN GLYCOSYLATED A1C: CPT | Performed by: FAMILY MEDICINE

## 2020-08-12 PROCEDURE — 84443 ASSAY THYROID STIM HORMONE: CPT | Performed by: FAMILY MEDICINE

## 2020-08-13 LAB
ALBUMIN SERPL-MCNC: 3.7 G/DL (ref 3.5–5.2)
ALBUMIN/GLOB SERPL: 1.9 G/DL
ALP SERPL-CCNC: 57 U/L (ref 39–117)
ALT SERPL W P-5'-P-CCNC: 22 U/L (ref 1–41)
ANION GAP SERPL CALCULATED.3IONS-SCNC: 6.5 MMOL/L (ref 5–15)
AST SERPL-CCNC: 29 U/L (ref 1–40)
BASOPHILS # BLD AUTO: 0.03 10*3/MM3 (ref 0–0.2)
BASOPHILS NFR BLD AUTO: 0.5 % (ref 0–1.5)
BILIRUB SERPL-MCNC: 0.4 MG/DL (ref 0–1.2)
BUN SERPL-MCNC: 23 MG/DL (ref 8–23)
BUN/CREAT SERPL: 18.7 (ref 7–25)
CALCIUM SPEC-SCNC: 8.8 MG/DL (ref 8.6–10.5)
CHLORIDE SERPL-SCNC: 104 MMOL/L (ref 98–107)
CHOLEST SERPL-MCNC: 105 MG/DL (ref 0–200)
CO2 SERPL-SCNC: 27.5 MMOL/L (ref 22–29)
CREAT SERPL-MCNC: 1.23 MG/DL (ref 0.76–1.27)
DEPRECATED RDW RBC AUTO: 42.3 FL (ref 37–54)
EOSINOPHIL # BLD AUTO: 0.12 10*3/MM3 (ref 0–0.4)
EOSINOPHIL NFR BLD AUTO: 2.1 % (ref 0.3–6.2)
ERYTHROCYTE [DISTWIDTH] IN BLOOD BY AUTOMATED COUNT: 12 % (ref 12.3–15.4)
GFR SERPL CREATININE-BSD FRML MDRD: 58 ML/MIN/1.73
GLOBULIN UR ELPH-MCNC: 2 GM/DL
GLUCOSE SERPL-MCNC: 149 MG/DL (ref 65–99)
HBA1C MFR BLD: 6.34 % (ref 4.8–5.6)
HCT VFR BLD AUTO: 38.6 % (ref 37.5–51)
HDLC SERPL-MCNC: 48 MG/DL (ref 40–60)
HGB BLD-MCNC: 13 G/DL (ref 13–17.7)
IMM GRANULOCYTES # BLD AUTO: 0.04 10*3/MM3 (ref 0–0.05)
IMM GRANULOCYTES NFR BLD AUTO: 0.7 % (ref 0–0.5)
LDLC SERPL CALC-MCNC: 43 MG/DL (ref 0–100)
LDLC/HDLC SERPL: 0.9 {RATIO}
LYMPHOCYTES # BLD AUTO: 1.07 10*3/MM3 (ref 0.7–3.1)
LYMPHOCYTES NFR BLD AUTO: 18.4 % (ref 19.6–45.3)
MCH RBC QN AUTO: 32.5 PG (ref 26.6–33)
MCHC RBC AUTO-ENTMCNC: 33.7 G/DL (ref 31.5–35.7)
MCV RBC AUTO: 96.5 FL (ref 79–97)
MONOCYTES # BLD AUTO: 0.77 10*3/MM3 (ref 0.1–0.9)
MONOCYTES NFR BLD AUTO: 13.2 % (ref 5–12)
NEUTROPHILS NFR BLD AUTO: 3.8 10*3/MM3 (ref 1.7–7)
NEUTROPHILS NFR BLD AUTO: 65.1 % (ref 42.7–76)
NRBC BLD AUTO-RTO: 0 /100 WBC (ref 0–0.2)
PLATELET # BLD AUTO: 198 10*3/MM3 (ref 140–450)
PMV BLD AUTO: 10.6 FL (ref 6–12)
POTASSIUM SERPL-SCNC: 5.2 MMOL/L (ref 3.5–5.2)
PROT SERPL-MCNC: 5.7 G/DL (ref 6–8.5)
RBC # BLD AUTO: 4 10*6/MM3 (ref 4.14–5.8)
SODIUM SERPL-SCNC: 138 MMOL/L (ref 136–145)
TRIGL SERPL-MCNC: 69 MG/DL (ref 0–150)
TSH SERPL DL<=0.05 MIU/L-ACNC: 1.97 UIU/ML (ref 0.27–4.2)
VLDLC SERPL-MCNC: 13.8 MG/DL (ref 5–40)
WBC # BLD AUTO: 5.83 10*3/MM3 (ref 3.4–10.8)

## 2020-08-31 ENCOUNTER — OFFICE VISIT (OUTPATIENT)
Dept: FAMILY MEDICINE CLINIC | Facility: CLINIC | Age: 75
End: 2020-08-31

## 2020-08-31 VITALS
DIASTOLIC BLOOD PRESSURE: 58 MMHG | TEMPERATURE: 97.5 F | HEART RATE: 70 BPM | BODY MASS INDEX: 22.9 KG/M2 | OXYGEN SATURATION: 98 % | SYSTOLIC BLOOD PRESSURE: 114 MMHG | WEIGHT: 154.6 LBS | HEIGHT: 69 IN

## 2020-08-31 DIAGNOSIS — M54.50 ACUTE BILATERAL LOW BACK PAIN WITHOUT SCIATICA: Primary | ICD-10-CM

## 2020-08-31 PROCEDURE — 99213 OFFICE O/P EST LOW 20 MIN: CPT | Performed by: FAMILY MEDICINE

## 2020-08-31 RX ORDER — TIZANIDINE 4 MG/1
4 TABLET ORAL NIGHTLY PRN
Qty: 20 TABLET | Refills: 0 | Status: SHIPPED | OUTPATIENT
Start: 2020-08-31 | End: 2020-11-12

## 2020-08-31 NOTE — PROGRESS NOTES
Subjective   Jean Noriega is a 75 y.o. male.     History of Present Illness few weeks of mechanical low back pain.  Worse in a.m. gets better through course of day.  Does not prevent sleep.  Has not restricted or changed activities.  Started after a day of excessive physical activity cutting lots of branches trimming trees.  Does occasionally go down into the legs but again is day moves along symptoms relief.  Denies respiratory CDV GI  changes.  Keeps follow-up elsewhere.    The following portions of the patient's history were reviewed and updated as appropriate: allergies, current medications, past medical history, past social history, past surgical history and problem list.    Review of Systems  See history of Present Illness     Objective     Physical Exam   Constitutional: He is oriented to person, place, and time. He appears well-developed and well-nourished.   HENT:   Head: Normocephalic.   Neck: Normal range of motion. Neck supple. No tracheal deviation present. No thyromegaly present.   Cardiovascular: Normal rate, regular rhythm and normal heart sounds.   No murmur heard.  Pulmonary/Chest: Effort normal and breath sounds normal.   Abdominal: Soft. There is no tenderness.   Musculoskeletal: Normal range of motion. He exhibits no edema.   Neurological: He is alert and oriented to person, place, and time. He displays normal reflexes. No sensory deficit.   Skin: Skin is warm and dry.   Psychiatric: He has a normal mood and affect.   Vitals reviewed.      PHQ-9 Total Score:      Patient's Body mass index is 22.82 kg/m². BMI is within normal parameters. No follow-up required..   (Normal BMI:  18.5-24.9, OW 25-29.9, Obesity 30 or greater)      Assessment/Plan     Jean was seen today for back pain.    Diagnoses and all orders for this visit:    Acute bilateral low back pain without sciatica  -     tiZANidine (ZANAFLEX) 4 MG tablet; Take 1 tablet by mouth At Night As Needed for Muscle Spasms.    I believe  self-limited.  Use the mild muscle relaxer in the evening.  If we do not see resolution in the next week or so contact for further recommendations.  I believe you are going to have to adjust your expectations on your body.                     This document has been electronically signed by Demetrius Wilkerson MD   August 31, 2020 12:34

## 2020-09-14 ENCOUNTER — TELEPHONE (OUTPATIENT)
Dept: FAMILY MEDICINE CLINIC | Facility: CLINIC | Age: 75
End: 2020-09-14

## 2020-09-14 NOTE — TELEPHONE ENCOUNTER
Patient states that Dr. Wilkerson gave him a prescription for Flexeril for his back pain, but it is not better and was wanting to see if he could get some tests run.  He can be reached at [phone]

## 2020-09-15 ENCOUNTER — OFFICE VISIT (OUTPATIENT)
Dept: FAMILY MEDICINE CLINIC | Facility: CLINIC | Age: 75
End: 2020-09-15

## 2020-09-15 VITALS
HEIGHT: 69 IN | TEMPERATURE: 97.7 F | BODY MASS INDEX: 22.36 KG/M2 | HEART RATE: 77 BPM | DIASTOLIC BLOOD PRESSURE: 68 MMHG | OXYGEN SATURATION: 98 % | SYSTOLIC BLOOD PRESSURE: 110 MMHG | WEIGHT: 151 LBS | RESPIRATION RATE: 16 BRPM

## 2020-09-15 DIAGNOSIS — C17.0 DUODENAL CANCER (HCC): ICD-10-CM

## 2020-09-15 DIAGNOSIS — G89.29 CHRONIC MIDLINE THORACIC BACK PAIN: ICD-10-CM

## 2020-09-15 DIAGNOSIS — M54.42 CHRONIC BILATERAL LOW BACK PAIN WITH BILATERAL SCIATICA: Primary | ICD-10-CM

## 2020-09-15 DIAGNOSIS — M43.16 SPONDYLOLISTHESIS OF LUMBAR REGION: ICD-10-CM

## 2020-09-15 DIAGNOSIS — G89.29 CHRONIC BILATERAL LOW BACK PAIN WITH BILATERAL SCIATICA: Primary | ICD-10-CM

## 2020-09-15 DIAGNOSIS — M54.41 CHRONIC BILATERAL LOW BACK PAIN WITH BILATERAL SCIATICA: Primary | ICD-10-CM

## 2020-09-15 DIAGNOSIS — M54.6 CHRONIC MIDLINE THORACIC BACK PAIN: ICD-10-CM

## 2020-09-15 DIAGNOSIS — M47.896 OTHER OSTEOARTHRITIS OF SPINE, LUMBAR REGION: ICD-10-CM

## 2020-09-15 DIAGNOSIS — C67.9 MALIGNANT NEOPLASM OF URINARY BLADDER, UNSPECIFIED SITE (HCC): ICD-10-CM

## 2020-09-15 PROBLEM — M79.5 FOREIGN BODY (FB) IN SOFT TISSUE: Status: RESOLVED | Noted: 2019-02-26 | Resolved: 2020-09-15

## 2020-09-15 PROBLEM — M47.816 DEGENERATIVE JOINT DISEASE (DJD) OF LUMBAR SPINE: Status: ACTIVE | Noted: 2020-09-15

## 2020-09-15 PROCEDURE — 99213 OFFICE O/P EST LOW 20 MIN: CPT | Performed by: FAMILY MEDICINE

## 2020-09-15 RX ORDER — PANCRELIPASE LIPASE, PANCRELIPASE PROTEASE, PANCRELIPASE AMYLASE 10000; 32000; 42000 [USP'U]/1; [USP'U]/1; [USP'U]/1
CAPSULE, DELAYED RELEASE ORAL
COMMUNITY
Start: 2020-09-14 | End: 2022-02-14

## 2020-09-15 RX ORDER — PENICILLIN V POTASSIUM 500 MG/1
500 TABLET ORAL 3 TIMES DAILY
COMMUNITY
Start: 2020-09-09 | End: 2020-11-12

## 2020-09-15 NOTE — PROGRESS NOTES
Subjective   Jean Noriega is a 75 y.o. male.     History of Present Illness worsening bilateral low back pain with some radiation down into bilateral posterior thighs.  Gluteal pain.  Denies bowel or bladder changes.  Worse in a.m.  Pretty aggressive physical activity that does improve but also has difficulty with sleep.  Nontraumatic.  Denies weakness sensory deficit.  Known axial spine degenerative changes with lumbar spinal stenosis changes.  The muscle relaxer no significant benefit for symptom relief.  Maintaining weight loss.  No fever chills.    The following portions of the patient's history were reviewed and updated as appropriate: allergies, past family history, past medical history, past social history, past surgical history and problem list.    Review of Systems  See history of Present Illness     Objective     Physical Exam  Vitals signs reviewed.   Constitutional:       General: He is not in acute distress.     Appearance: Normal appearance. He is well-developed.   HENT:      Head: Normocephalic.      Right Ear: External ear normal.      Left Ear: External ear normal.   Neck:      Musculoskeletal: Normal range of motion and neck supple.      Thyroid: No thyromegaly.      Trachea: No tracheal deviation.   Cardiovascular:      Rate and Rhythm: Normal rate and regular rhythm.      Heart sounds: Normal heart sounds. No murmur.   Pulmonary:      Effort: Pulmonary effort is normal.      Breath sounds: Normal breath sounds.   Musculoskeletal: Normal range of motion.      Right lower leg: No edema.      Left lower leg: No edema.      Comments: Painful para lumbar soft tissue down into gluteal region bilateral.  No trochanteric tenderness elicited.  Range of motion hips unremarkable.   Lymphadenopathy:      Cervical: No cervical adenopathy.   Skin:     General: Skin is warm and dry.   Neurological:      General: No focal deficit present.      Mental Status: He is alert and oriented to person, place, and time.       Sensory: No sensory deficit.      Motor: No weakness.      Gait: Gait normal.   Psychiatric:         Mood and Affect: Mood normal.         PHQ-9 Total Score:      Patient's Body mass index is 22.29 kg/m². BMI is within normal parameters. No follow-up required..   (Normal BMI:  18.5-24.9, OW 25-29.9, Obesity 30 or greater)      Assessment/Plan     Jean was seen today for back pain and hip pain.    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  Known lumbar spinal stenosis and diffuse degenerative changes.  History of cancers.  Diabetic.  We will ask for MRI lumbar spine no contrast.  No new medications at this time.  I do not believe plain x-rays would elicit adequate information.                     This document has been electronically signed by Demetrius Wilkerson MD   September 15, 2020 15:40 EDT

## 2020-09-15 NOTE — TELEPHONE ENCOUNTER
PATIENT CALLED AND SAID HIS  BACK WAS STILL GIVING HIM SOME TROUBLE AND ASKED FOR A PRESCRIPTION OF FLEXIRIL; HE DIDN'T KNOW IF HE NEEDED TO MAKE AN APPT; PLEASE ADVISE AND CALL PT    BARB: [phone]    EMMANUEL: JU ACUNA

## 2020-09-21 RX ORDER — GLIMEPIRIDE 2 MG/1
TABLET ORAL
Qty: 90 TABLET | Refills: 3 | Status: SHIPPED | OUTPATIENT
Start: 2020-09-21 | End: 2021-09-20

## 2020-09-24 ENCOUNTER — HOSPITAL ENCOUNTER (OUTPATIENT)
Dept: MRI IMAGING | Facility: HOSPITAL | Age: 75
Discharge: HOME | End: 2020-09-24
Admitting: FAMILY MEDICINE

## 2020-09-24 DIAGNOSIS — C17.0 DUODENAL CANCER (HCC): ICD-10-CM

## 2020-09-24 DIAGNOSIS — M47.896 OTHER OSTEOARTHRITIS OF SPINE, LUMBAR REGION: ICD-10-CM

## 2020-09-24 DIAGNOSIS — G89.29 CHRONIC BILATERAL LOW BACK PAIN WITH BILATERAL SCIATICA: ICD-10-CM

## 2020-09-24 DIAGNOSIS — M43.16 SPONDYLOLISTHESIS OF LUMBAR REGION: ICD-10-CM

## 2020-09-24 DIAGNOSIS — M54.42 CHRONIC BILATERAL LOW BACK PAIN WITH BILATERAL SCIATICA: ICD-10-CM

## 2020-09-24 DIAGNOSIS — C67.9 MALIGNANT NEOPLASM OF URINARY BLADDER, UNSPECIFIED SITE (HCC): ICD-10-CM

## 2020-09-24 DIAGNOSIS — M54.41 CHRONIC BILATERAL LOW BACK PAIN WITH BILATERAL SCIATICA: ICD-10-CM

## 2020-09-24 PROCEDURE — 72148 MRI LUMBAR SPINE W/O DYE: CPT

## 2020-09-24 PROCEDURE — 72148 MRI LUMBAR SPINE W/O DYE: CPT | Performed by: RADIOLOGY

## 2020-09-25 DIAGNOSIS — M48.062 SPINAL STENOSIS OF LUMBAR REGION WITH NEUROGENIC CLAUDICATION: Primary | ICD-10-CM

## 2020-09-25 NOTE — PROGRESS NOTES
MRI showed worsening degree of the spinal stenosis lumbar region.  Arrange for neurosurgical consultation.  Contact him and refer to his desired location if not at [facility].

## 2020-10-08 ENCOUNTER — TELEPHONE (OUTPATIENT)
Dept: FAMILY MEDICINE CLINIC | Facility: CLINIC | Age: 75
End: 2020-10-08

## 2020-10-08 NOTE — TELEPHONE ENCOUNTER
PATIENT CALLED STATING HE RECEIVED A REFERRAL TO DR SAM A BACK SPECIALIST. PATIENT STATES DR SAM DOES NOT HAVE ANY OPEN SCHEDULING AND IT HAS BEEN 13 DAYS AND PATIENT HAS STILL RECEIVED AN APPOINTMENT. PATIENT WANTS TO KNOW IF SOMETHING CAN BE DONE OR IF HE CAN RECEIVE A REFERRAL TO A DIFFERENT DOCTOR.     PLEASE ADVISE   [phone]

## 2020-10-09 NOTE — TELEPHONE ENCOUNTER
I CALLED DR SAM OFFICE AND NEUROSURGERY REFERRALS GO THRU THE HUB FOR SCHEDULING AND THE HAD TO GET AN OK FROM DR VAZQUEZ TO TRANSFER TO DR SAM SINCE PATIENT SAW PRIOR. I CALL AND NOTIFIED PATIENT SPOUSE. SHE STATED IF THEY DON'T HEAR ANYTHING BY Monday SHE WILL CALL OUR OFFICE BACK AND WE WILL TRY ANOTHER LOCATION.

## 2020-10-22 ENCOUNTER — OFFICE VISIT (OUTPATIENT)
Dept: FAMILY MEDICINE CLINIC | Facility: CLINIC | Age: 75
End: 2020-10-22

## 2020-10-22 VITALS
TEMPERATURE: 98 F | OXYGEN SATURATION: 99 % | HEIGHT: 69 IN | RESPIRATION RATE: 16 BRPM | HEART RATE: 80 BPM | WEIGHT: 150 LBS | SYSTOLIC BLOOD PRESSURE: 124 MMHG | BODY MASS INDEX: 22.22 KG/M2 | DIASTOLIC BLOOD PRESSURE: 58 MMHG

## 2020-10-22 DIAGNOSIS — M48.061 SPINAL STENOSIS OF LUMBAR REGION, UNSPECIFIED WHETHER NEUROGENIC CLAUDICATION PRESENT: Primary | ICD-10-CM

## 2020-10-22 DIAGNOSIS — J06.9 ACUTE URI: ICD-10-CM

## 2020-10-22 DIAGNOSIS — M43.16 SPONDYLOLISTHESIS OF LUMBAR REGION: ICD-10-CM

## 2020-10-22 DIAGNOSIS — Z23 NEEDS FLU SHOT: ICD-10-CM

## 2020-10-22 PROCEDURE — 90694 VACC AIIV4 NO PRSRV 0.5ML IM: CPT | Performed by: FAMILY MEDICINE

## 2020-10-22 PROCEDURE — G0008 ADMIN INFLUENZA VIRUS VAC: HCPCS | Performed by: FAMILY MEDICINE

## 2020-10-22 PROCEDURE — 99213 OFFICE O/P EST LOW 20 MIN: CPT | Performed by: FAMILY MEDICINE

## 2020-10-22 RX ORDER — AZITHROMYCIN 250 MG/1
TABLET, FILM COATED ORAL
Qty: 6 TABLET | Refills: 0 | Status: SHIPPED | OUTPATIENT
Start: 2020-10-22 | End: 2020-11-12

## 2020-10-22 RX ORDER — TRAMADOL HYDROCHLORIDE 50 MG/1
50 TABLET ORAL EVERY 8 HOURS PRN
Qty: 30 TABLET | Refills: 0 | Status: SHIPPED | OUTPATIENT
Start: 2020-10-22 | End: 2022-09-20

## 2020-10-22 NOTE — PROGRESS NOTES
Subjective   Jean Noriega is a 75 y.o. male.     History of Present Illness several days of upper respiratory symptoms.  Minimal postnasal drainage.  No sore throat.  Occasional nonproductive cough.  Denies GI  changes.  Back pain is no better may be worse.  Worse at times of sitting and nighttime.  Does have scheduled visit with neurosurgery for consultation soon.    The following portions of the patient's history were reviewed and updated as appropriate: allergies, current medications, past medical history, past social history, past surgical history and problem list.    Review of Systems  See history of Present Illness     Objective     Physical Exam  Vitals signs reviewed.   Constitutional:       General: He is not in acute distress.     Appearance: Normal appearance. He is well-developed and normal weight.   HENT:      Head: Normocephalic.      Right Ear: Tympanic membrane, ear canal and external ear normal.      Left Ear: Tympanic membrane, ear canal and external ear normal.      Mouth/Throat:      Pharynx: Posterior oropharyngeal erythema present. No oropharyngeal exudate.   Neck:      Thyroid: No thyromegaly.      Trachea: No tracheal deviation.   Cardiovascular:      Rate and Rhythm: Normal rate and regular rhythm.      Heart sounds: Normal heart sounds. No murmur.   Pulmonary:      Effort: Pulmonary effort is normal.      Breath sounds: Normal breath sounds.   Lymphadenopathy:      Cervical: No cervical adenopathy.   Skin:     General: Skin is warm and dry.   Neurological:      Mental Status: He is alert and oriented to person, place, and time.   Psychiatric:         Mood and Affect: Mood normal.         PHQ-9 Total Score:      Patient's There is no height or weight on file to calculate BMI. BMI is within normal parameters. No follow-up required..   (Normal BMI:  18.5-24.9, OW 25-29.9, Obesity 30 or greater)      Assessment/Plan     Diagnoses and all orders for this visit:    1. Spinal stenosis of lumbar  region, unspecified whether neurogenic claudication present (Primary)  -     traMADol (Ultram) 50 MG tablet; Take 1 tablet by mouth Every 8 (Eight) Hours As Needed for Moderate Pain .  Dispense: 30 tablet; Refill: 0    2. Spondylolisthesis of lumbar region  -     traMADol (Ultram) 50 MG tablet; Take 1 tablet by mouth Every 8 (Eight) Hours As Needed for Moderate Pain .  Dispense: 30 tablet; Refill: 0    3. Acute URI  -     azithromycin (Zithromax Z-Javed) 250 MG tablet; Take 2 tablets the first day, then 1 tablet daily for 4 days.  Dispense: 6 tablet; Refill: 0    4. Needs flu shot  -     Fluad Quad >65 years    Tian reviewed.  Counseled.  Will make available tramadol.  Counseled about remote risk of cross-reactivity with the oxycodone.  He report itching as only adverse reaction there.  Keep follow-up with neurosurgery for consultation.  Stay safely active.  Maintain heart healthy diabetic diet.  Antibiotic authorized.  Follow-up as needed scheduled otherwise.  Warned about risk benefit of the tramadol and in anticipation of short-term use.                     This document has been electronically signed by Demetrius Wilkerson MD   October 22, 2020 13:20 EDT    Part of this note may be an electronic transcription/translation of spoken language to printed text using the Dragon Dictation System.

## 2020-11-12 ENCOUNTER — OFFICE VISIT (OUTPATIENT)
Dept: NEUROSURGERY | Facility: CLINIC | Age: 75
End: 2020-11-12

## 2020-11-12 VITALS
HEIGHT: 69 IN | BODY MASS INDEX: 22.72 KG/M2 | DIASTOLIC BLOOD PRESSURE: 56 MMHG | WEIGHT: 153.4 LBS | TEMPERATURE: 96.9 F | SYSTOLIC BLOOD PRESSURE: 115 MMHG

## 2020-11-12 DIAGNOSIS — M43.16 SPONDYLOLISTHESIS OF LUMBAR REGION: Primary | ICD-10-CM

## 2020-11-12 PROCEDURE — 99203 OFFICE O/P NEW LOW 30 MIN: CPT | Performed by: NEUROLOGICAL SURGERY

## 2020-11-12 RX ORDER — METHOCARBAMOL 750 MG/1
750 TABLET, FILM COATED ORAL NIGHTLY
Qty: 30 TABLET | Refills: 0 | Status: SHIPPED | OUTPATIENT
Start: 2020-11-12 | End: 2020-12-12

## 2020-11-12 NOTE — PROGRESS NOTES
Jean ALIYAH Noriega  1945  [id number]      Chief Complaint   Patient presents with   • Back Pain   • Hip Pain   • Leg Pain     BLE, posterior thigh       HISTORY OF PRESENT ILLNESS: This is a 75-year-old male who has an approximately 3-month history of severe pain in his back rating into his hips when he first gets out of bed in the morning.  After an hour or so he is doing quite well.  He states that the stiffness difficulty walking resolve as he increases his activity.  Subsequent to the pain leaving he denies symptoms of neurogenic claudication.  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  A lumbar MRI has been performed and is referred for neurosurgical consultation.    He has diabetes and CKD    Past Medical History:   Diagnosis Date   • Anemia, recent acute blood loss 3/30/2017    ALB anemia in March after ureteral stent removed   • Bladder cancer (CMS/HCC)    • Diabetes mellitus (CMS/HCC)    • Duodenal cancer (CMS/HCC)    • Dyspnea on exertion 2/1/2016   • Foreign body (FB) in soft tissue 2/26/2019   • Gallstones    • History of recent left nephrolithiasis s/p ureteral stent placement  3/30/2017    2/17/17 - ureteral stent placed at [facility], s/p stent and stone removal 2/13/17   • Hyperlipidemia    • Kidney stone    • Kidney stone on left side 02/2017   • Nephrolithiasis    • Pneumonia        Past Surgical History:   Procedure Laterality Date   • CYSTOSCOPY W/ URETERAL STENT REMOVAL Left 03/13/2017   • EYE MUSCLE SURGERY     • HERNIA REPAIR     • INTERVENTIONAL RADIOLOGY PROCEDURE Right 3/31/2017    Procedure: PTC & Drain placement;  Surgeon: Serjio Wall MD;  Location: [facility] INVASIVE LOCATION;  Service:    • LIVER SURGERY  06/10/2019   • URETERAL STENT INSERTION Left    • WHIPPLE PROCEDURE  2004       Family History   Problem Relation Age of Onset   • Heart disease Father    • Hypertension Brother    • Diabetes Brother        Social History      Socioeconomic History   • Marital status:      Spouse name: Not on file   • Number of children: Not on file   • Years of education: Not on file   • Highest education level: Not on file   Tobacco Use   • Smoking status: Former Smoker     Packs/day: 1.00     Years: 5.00     Pack years: 5.00     Types: Cigarettes, Cigars, Pipe     Start date:      Quit date:      Years since quittin.8   • Smokeless tobacco: Current User     Types: Snuff   Substance and Sexual Activity   • Alcohol use: No   • Drug use: No   • Sexual activity: Defer       Allergies   Allergen Reactions   • Percocet [Oxycodone-Acetaminophen] Itching         Current Outpatient Medications:   •  amLODIPine (NORVASC) 10 MG tablet, , Disp: , Rfl:   •  ASPIRIN 81 PO, aspirin 81 mg tablet, Disp: , Rfl:   •  atorvastatin (LIPITOR) 40 MG tablet, Take 40 mg by mouth Daily., Disp: , Rfl:   •  glimepiride (AMARYL) 2 MG tablet, TAKE 1 TABLET BY MOUTH ONCE DAILY IN THE MORNING BEFORE BREAKFAST, Disp: 90 tablet, Rfl: 3  •  glucose blood (ONE TOUCH TEST STRIPS) test strip, 1 each by Other route Daily., Disp: 100 each, Rfl: 12  •  losartan (COZAAR) 100 MG tablet, , Disp: , Rfl:   •  metFORMIN (GLUCOPHAGE) 1000 MG tablet, Take 1 tablet by mouth 2 (Two) Times a Day With Meals., Disp: 60 tablet, Rfl: 5  •  MULTIPLE VITAMIN PO, Take 1 tablet by mouth Daily., Disp: , Rfl:   •  ONETOUCH DELICA LANCETS 33G misc, 1 Device by Other route Daily., Disp: 100 each, Rfl: 12  •  pantoprazole (PROTONIX) 40 MG EC tablet, Take 40 mg by mouth 2 (Two) Times a Day., Disp: , Rfl:   •  tiZANidine (ZANAFLEX) 4 MG tablet, Take 1 tablet by mouth At Night As Needed for Muscle Spasms., Disp: 20 tablet, Rfl: 0  •  traMADol (Ultram) 50 MG tablet, Take 1 tablet by mouth Every 8 (Eight) Hours As Needed for Moderate Pain ., Disp: 30 tablet, Rfl: 0  •  ursodiol (ACTIGALL) 500 MG tablet, Take 500 mg by mouth 3 (Three) Times a Day., Disp: , Rfl:   •  Zenpep 37541-12926 units capsule  delayed-release particles, , Disp: , Rfl:     Review of Systems   Constitutional: Positive for chills. Negative for activity change, appetite change, diaphoresis, fatigue, fever and unexpected weight change.   HENT: Negative for congestion, dental problem, drooling, ear discharge, ear pain, facial swelling, hearing loss, mouth sores, nosebleeds, postnasal drip, rhinorrhea, sinus pressure, sneezing, sore throat, tinnitus, trouble swallowing and voice change.    Eyes: Positive for itching. Negative for photophobia, pain, discharge, redness and visual disturbance.   Respiratory: Negative for apnea, cough, choking, chest tightness, shortness of breath, wheezing and stridor.    Cardiovascular: Negative for chest pain, palpitations and leg swelling.   Gastrointestinal: Negative for abdominal distention, abdominal pain, anal bleeding, blood in stool, constipation, diarrhea, nausea, rectal pain and vomiting.   Endocrine: Positive for cold intolerance. Negative for heat intolerance, polydipsia, polyphagia and polyuria.   Genitourinary: Negative for decreased urine volume, difficulty urinating, dysuria, enuresis, flank pain, frequency, genital sores, hematuria and urgency.   Musculoskeletal: Positive for arthralgias, back pain, neck pain and neck stiffness. Negative for gait problem, joint swelling and myalgias.   Skin: Negative for color change, pallor, rash and wound.   Allergic/Immunologic: Negative for environmental allergies, food allergies and immunocompromised state.   Neurological: Positive for headaches. Negative for dizziness, tremors, seizures, syncope, facial asymmetry, speech difficulty, weakness, light-headedness and numbness.   Hematological: Negative for adenopathy. Bruises/bleeds easily.   Psychiatric/Behavioral: Negative for agitation, behavioral problems, confusion, decreased concentration, dysphoric mood, hallucinations, self-injury, sleep disturbance and suicidal ideas. The patient is not nervous/anxious  "and is not hyperactive.    All other systems reviewed and are negative.      Vitals:    11/12/20 1054   BP: 115/56   BP Location: Right arm   Patient Position: Sitting   Cuff Size: Adult   Temp: 96.9 °F (36.1 °C)   TempSrc: Infrared   Weight: 69.6 kg (153 lb 6.4 oz)   Height: 175.3 cm (69\")       Neurological Examination:    Mental status/speech: The patient is alert and oriented.  Speech is clear without aphysia or dysarthria.  No overt cognitive deficits.    Cranial nerve examination:    Olfaction: Smell is intact.  Vision: Vision is intact without visual field abnormalities.  Funduscopic examination is normal.  No pupillary irregularity.  Ocular motor examination: The extraocular muscles are intact.  There is no diplopia.  The pupil is round and reactive to both light and accommodation.  There is no nystagmus.  Facial movement/sensation: There is no facial weakness.  Sensation is intact in the first, second, and third divisions of the trigeminal nerve.  The corneal reflex is intact.  Auditory: Hearing is intact to finger rub bilaterally.  Cranial nerves IX, X, XI, XII: Phonation is normal.  No dysphagia.  Tongue is protruded in the midline without atrophy.  The gag reflex is intact.  Shoulder shrug is normal.    Musculoligamentous ligamentous examination: BMI 22.6; weight 153 pounds.  He has limited range of motion lumbar spine but able to achieve at least 80%.  Straight leg raising, Lasègue flip and Manolo test are negative.  Sensation is intact.  Reflexes are elicitable and are symmetrical.  His gait is normal.          Medical Decision Making:     Diagnostic Data Set: The lumbar MRI shows a grade 1 acquired spondylolisthesis with high-grade spinal stenosis at L4-L5.      Assessment: Acquired spondylolisthesis L4-5 with spinal stenosis          Recommendations: His symptoms are primarily in the morning when he arises from bed.  As he increases his activities he is able to do all the things that he wishes to " do.  He denies any history of neurogenic claudication despite the findings of the lumbar MRI which are significant.  Unfortunately he has diabetes and has renal dysfunction.  Therefore the use of NSAIDs are precluded.  I have given him a prescription of Robaxin-750 milligrams at night and have asked him to see  for consideration of facet block at L4-5 or epidural steroid injection.  He will call me afterward.    Should he develop symptoms of neurogenic claudication and PLIF L4-L5 would be warranted and corrective.  I discussed this with him in detail and reviewed his MRI.  Thanks for allow me to see Jean.        I greatly appreciate the opportunity to see and evaluate this individual.  If you have questions or concerns regarding issues that I may have overlooked please call me at any time: [phone].  Tom Chandler M.D.  [facility]  [address] [place], [place] [address] .  [facility]  [id number]

## 2020-11-12 NOTE — PATIENT INSTRUCTIONS
Call Dr. Chandler on a Monday or Tuesday (ask for Zeina or Isabella) and leave a message.     Dr. Chandler will call you back at the end of the day as soon as he can.     [phone] Zeina    [phone] Isabella Mcneal    Call after DR Sifuentes

## 2020-11-16 ENCOUNTER — TELEPHONE (OUTPATIENT)
Dept: NEUROSURGERY | Facility: CLINIC | Age: 75
End: 2020-11-16

## 2020-11-16 NOTE — TELEPHONE ENCOUNTER
Yes it is okay to stop the methocarbamol due to the panic dreams.  Would patient want to attempt another type of muscle relaxant?    Due to the patient's diabetes and renal dysfunction, use of NSAIDs are precluded.

## 2020-11-16 NOTE — TELEPHONE ENCOUNTER
Pt takes Aleve 1 QD because it is the only thing that helps. I have advised Aliya that it is not in the pt's best interest to take NSAIDs due to his renal function.  She verbalized understanding.  Tramadol (PCP prescribed) does not help.  Pt would like to try another muscle relaxer.  Chart shows pt had flexeril in the past.  Pt is willing to try any other muscle relaxer.  Also wants to know if he can take melatonin with it qhs.  Aliya is aware that it may be tomorrow before we are able to get back to them.

## 2020-11-16 NOTE — TELEPHONE ENCOUNTER
Provider:  Mae  Caller: wife  Time of call:  3:12   Phone #:  [phone]  Surgery:  Procedure PTC & Drain placement  Surgery Date:  04/14/17  Last visit:  11-12-70  Next visit: None    BONNIE:         Reason for call:     Patient's wife called and said since he started Methocarbamol he has had terrible dreams.  She wants to know if he can d/c this?    Based on his lived functions, is it ok if he takes 1 Aleve daily? Can he take Melatonin at bedtime?

## 2020-11-17 RX ORDER — CYCLOBENZAPRINE HCL 10 MG
10 TABLET ORAL 3 TIMES DAILY PRN
Qty: 30 TABLET | Refills: 1 | Status: SHIPPED | OUTPATIENT
Start: 2020-11-17 | End: 2021-02-04

## 2020-11-17 NOTE — TELEPHONE ENCOUNTER
I spoke with pt's wife   - he has robaxin (panic dreams)  - he has zanaflex (doesn't work)   - he has flexeril 5 mg - I called in flexeril 10 mg to try    He will call the office with an update

## 2020-12-08 DIAGNOSIS — E11.9 TYPE 2 DIABETES MELLITUS WITHOUT COMPLICATION, WITHOUT LONG-TERM CURRENT USE OF INSULIN (HCC): ICD-10-CM

## 2020-12-08 RX ORDER — LANCETS 33 GAUGE
EACH MISCELLANEOUS
Qty: 100 EACH | Refills: 0 | Status: SHIPPED | OUTPATIENT
Start: 2020-12-08 | End: 2022-03-07 | Stop reason: SDUPTHER

## 2020-12-08 RX ORDER — BLOOD SUGAR DIAGNOSTIC
STRIP MISCELLANEOUS
Qty: 50 EACH | Refills: 0 | Status: SHIPPED | OUTPATIENT
Start: 2020-12-08 | End: 2021-02-23

## 2021-02-04 ENCOUNTER — OFFICE VISIT (OUTPATIENT)
Dept: FAMILY MEDICINE CLINIC | Facility: CLINIC | Age: 76
End: 2021-02-04

## 2021-02-04 VITALS
HEIGHT: 69 IN | WEIGHT: 148 LBS | OXYGEN SATURATION: 98 % | BODY MASS INDEX: 21.92 KG/M2 | RESPIRATION RATE: 16 BRPM | SYSTOLIC BLOOD PRESSURE: 118 MMHG | DIASTOLIC BLOOD PRESSURE: 70 MMHG | HEART RATE: 74 BPM | TEMPERATURE: 97.3 F

## 2021-02-04 DIAGNOSIS — E78.2 MIXED HYPERLIPIDEMIA: ICD-10-CM

## 2021-02-04 DIAGNOSIS — E11.9 TYPE 2 DIABETES MELLITUS WITHOUT COMPLICATION, WITHOUT LONG-TERM CURRENT USE OF INSULIN (HCC): Primary | Chronic | ICD-10-CM

## 2021-02-04 DIAGNOSIS — I10 BENIGN ESSENTIAL HTN: Chronic | ICD-10-CM

## 2021-02-04 PROBLEM — M48.061 SPINAL STENOSIS OF LUMBAR REGION: Chronic | Status: ACTIVE | Noted: 2020-10-22

## 2021-02-04 PROCEDURE — 84443 ASSAY THYROID STIM HORMONE: CPT | Performed by: FAMILY MEDICINE

## 2021-02-04 PROCEDURE — 82607 VITAMIN B-12: CPT | Performed by: FAMILY MEDICINE

## 2021-02-04 PROCEDURE — 80053 COMPREHEN METABOLIC PANEL: CPT | Performed by: FAMILY MEDICINE

## 2021-02-04 PROCEDURE — 80061 LIPID PANEL: CPT | Performed by: FAMILY MEDICINE

## 2021-02-04 PROCEDURE — 84439 ASSAY OF FREE THYROXINE: CPT | Performed by: FAMILY MEDICINE

## 2021-02-04 PROCEDURE — 85025 COMPLETE CBC W/AUTO DIFF WBC: CPT | Performed by: FAMILY MEDICINE

## 2021-02-04 PROCEDURE — 36415 COLL VENOUS BLD VENIPUNCTURE: CPT | Performed by: FAMILY MEDICINE

## 2021-02-04 PROCEDURE — 99214 OFFICE O/P EST MOD 30 MIN: CPT | Performed by: FAMILY MEDICINE

## 2021-02-04 PROCEDURE — 83036 HEMOGLOBIN GLYCOSYLATED A1C: CPT | Performed by: FAMILY MEDICINE

## 2021-02-04 RX ORDER — METHOCARBAMOL 750 MG/1
TABLET, FILM COATED ORAL
COMMUNITY
End: 2021-02-04

## 2021-02-04 NOTE — PROGRESS NOTES
Subjective   Jean Noriega is a 75 y.o. male.     History of Present Illness follow-up regarding diabetes hypertension dyslipidemia.  Chronic conditions.  Has noted some slight weight declined variation.  Wife present brings up possibility of thyroid condition.  Sleep patterns have always been poor chronically.  Appetite is good reasonable dietary compliance.  States blood sugars are doing same.  Has visited with neurosurgery.  Notes reviewed.  Has visited with pain management regarding injections.  No notes available.  Will be having further pain management injection therapy soon.  Will be visiting with hematology oncology soon.  Is very current on GI follow-up.  Denies respiratory CDV GI  orthopedic concerns.  Has visited some with dermatology.  Notes reviewed.    The following portions of the patient's history were reviewed and updated as appropriate: allergies, past family history, past medical history, past social history, past surgical history and problem list.    Review of Systems  See history of Present Illness     Objective     Physical Exam  Vitals signs reviewed. Exam conducted with a chaperone present.   Constitutional:       Appearance: Normal appearance. He is normal weight.   HENT:      Head: Normocephalic.   Eyes:      Conjunctiva/sclera: Conjunctivae normal.   Neck:      Musculoskeletal: Normal range of motion and neck supple.   Cardiovascular:      Rate and Rhythm: Normal rate and regular rhythm.      Heart sounds: Normal heart sounds.   Pulmonary:      Effort: Pulmonary effort is normal.      Breath sounds: Normal breath sounds.   Abdominal:      Palpations: Abdomen is soft.      Tenderness: There is no abdominal tenderness. There is no guarding.   Skin:     General: Skin is warm and dry.   Neurological:      Mental Status: He is alert and oriented to person, place, and time.   Psychiatric:         Mood and Affect: Mood normal.         PHQ-9 Total Score:      Patient's There is no height or  weight on file to calculate BMI. BMI is within normal parameters. No follow-up required..   (Normal BMI:  18.5-24.9, OW 25-29.9, Obesity 30 or greater)      Assessment/Plan     Diagnoses and all orders for this visit:    1. Type 2 diabetes mellitus without complication, without long-term current use of insulin (CMS/[facility]) (Primary)  -     CBC & Differential  -     Comprehensive Metabolic Panel  -     Hemoglobin A1c  -     T4, Free  -     TSH  -     Vitamin B12  -     Lipid Panel  -     CBC Auto Differential    2. Mixed hyperlipidemia  -     CBC & Differential  -     Comprehensive Metabolic Panel  -     T4, Free  -     TSH  -     Lipid Panel  -     CBC Auto Differential    3. Benign essential HTN  -     CBC & Differential  -     Comprehensive Metabolic Panel  -     T4, Free  -     TSH  -     CBC Auto Differential      Overall things appear to be quite stable.  Will check labs as noted.  Will monitor metabolically.  Taking medications as reconciled.  Keep follow-ups elsewhere as noted.  No med changes today.  Discussed and encouraged Covid vaccine.  Stay safely active.  Maintain reasonable activity you have been known to push the limits with activity sometimes.  Maintain reasonable heart healthy diabetic diet.  Would ask you to recheck in 6 months or as needed.  Definitely if unintentional weight loss continues.                   This document has been electronically signed by Demetrius Wilkerson MD   February 4, 2021 09:07 EST    Part of this note may be an electronic transcription/translation of spoken language to printed text using the Dragon Dictation System.

## 2021-02-05 LAB
ALBUMIN SERPL-MCNC: 3.3 G/DL (ref 3.5–5.2)
ALBUMIN/GLOB SERPL: 1.7 G/DL
ALP SERPL-CCNC: 69 U/L (ref 39–117)
ALT SERPL W P-5'-P-CCNC: 21 U/L (ref 1–41)
ANION GAP SERPL CALCULATED.3IONS-SCNC: 9.2 MMOL/L (ref 5–15)
AST SERPL-CCNC: 17 U/L (ref 1–40)
BASOPHILS # BLD AUTO: 0.04 10*3/MM3 (ref 0–0.2)
BASOPHILS NFR BLD AUTO: 0.5 % (ref 0–1.5)
BILIRUB SERPL-MCNC: 0.3 MG/DL (ref 0–1.2)
BUN SERPL-MCNC: 18 MG/DL (ref 8–23)
BUN/CREAT SERPL: 18.6 (ref 7–25)
CALCIUM SPEC-SCNC: 8.7 MG/DL (ref 8.6–10.5)
CHLORIDE SERPL-SCNC: 102 MMOL/L (ref 98–107)
CHOLEST SERPL-MCNC: 86 MG/DL (ref 0–200)
CO2 SERPL-SCNC: 24.8 MMOL/L (ref 22–29)
CREAT SERPL-MCNC: 0.97 MG/DL (ref 0.76–1.27)
DEPRECATED RDW RBC AUTO: 44 FL (ref 37–54)
EOSINOPHIL # BLD AUTO: 0.1 10*3/MM3 (ref 0–0.4)
EOSINOPHIL NFR BLD AUTO: 1.3 % (ref 0.3–6.2)
ERYTHROCYTE [DISTWIDTH] IN BLOOD BY AUTOMATED COUNT: 12.3 % (ref 12.3–15.4)
GFR SERPL CREATININE-BSD FRML MDRD: 76 ML/MIN/1.73
GLOBULIN UR ELPH-MCNC: 2 GM/DL
GLUCOSE SERPL-MCNC: 329 MG/DL (ref 65–99)
HBA1C MFR BLD: 7.2 % (ref 4.8–5.6)
HCT VFR BLD AUTO: 39.2 % (ref 37.5–51)
HDLC SERPL-MCNC: 44 MG/DL (ref 40–60)
HGB BLD-MCNC: 13 G/DL (ref 13–17.7)
IMM GRANULOCYTES # BLD AUTO: 0.14 10*3/MM3 (ref 0–0.05)
IMM GRANULOCYTES NFR BLD AUTO: 1.8 % (ref 0–0.5)
LDLC SERPL CALC-MCNC: 28 MG/DL (ref 0–100)
LDLC/HDLC SERPL: 0.68 {RATIO}
LYMPHOCYTES # BLD AUTO: 0.81 10*3/MM3 (ref 0.7–3.1)
LYMPHOCYTES NFR BLD AUTO: 10.3 % (ref 19.6–45.3)
MCH RBC QN AUTO: 32.5 PG (ref 26.6–33)
MCHC RBC AUTO-ENTMCNC: 33.2 G/DL (ref 31.5–35.7)
MCV RBC AUTO: 98 FL (ref 79–97)
MONOCYTES # BLD AUTO: 0.7 10*3/MM3 (ref 0.1–0.9)
MONOCYTES NFR BLD AUTO: 8.9 % (ref 5–12)
NEUTROPHILS NFR BLD AUTO: 6.11 10*3/MM3 (ref 1.7–7)
NEUTROPHILS NFR BLD AUTO: 77.2 % (ref 42.7–76)
NRBC BLD AUTO-RTO: 0 /100 WBC (ref 0–0.2)
PLATELET # BLD AUTO: 278 10*3/MM3 (ref 140–450)
PMV BLD AUTO: 10.5 FL (ref 6–12)
POTASSIUM SERPL-SCNC: 4.7 MMOL/L (ref 3.5–5.2)
PROT SERPL-MCNC: 5.3 G/DL (ref 6–8.5)
RBC # BLD AUTO: 4 10*6/MM3 (ref 4.14–5.8)
SODIUM SERPL-SCNC: 136 MMOL/L (ref 136–145)
T4 FREE SERPL-MCNC: 1.26 NG/DL (ref 0.93–1.7)
TRIGL SERPL-MCNC: 61 MG/DL (ref 0–150)
TSH SERPL DL<=0.05 MIU/L-ACNC: 1.89 UIU/ML (ref 0.27–4.2)
VIT B12 BLD-MCNC: 802 PG/ML (ref 211–946)
VLDLC SERPL-MCNC: 14 MG/DL (ref 5–40)
WBC # BLD AUTO: 7.9 10*3/MM3 (ref 3.4–10.8)

## 2021-02-07 NOTE — PROGRESS NOTES
Good results.  Average sugar test reasonable.  Thyroid levels normal.  Continue all same.  Keep follow-ups as scheduled.

## 2021-02-08 NOTE — PROGRESS NOTES
Pt wife, Aliya, made aware of lab results.  Discussed provider recommendations.  Verbalizes understanding.  Agreeable.

## 2021-02-22 DIAGNOSIS — E11.9 TYPE 2 DIABETES MELLITUS WITHOUT COMPLICATION, WITHOUT LONG-TERM CURRENT USE OF INSULIN (HCC): ICD-10-CM

## 2021-02-23 RX ORDER — BLOOD SUGAR DIAGNOSTIC
STRIP MISCELLANEOUS
Qty: 50 EACH | Refills: 0 | Status: SHIPPED | OUTPATIENT
Start: 2021-02-23 | End: 2022-03-07 | Stop reason: SDUPTHER

## 2021-02-26 ENCOUNTER — IMMUNIZATION (OUTPATIENT)
Dept: VACCINE CLINIC | Facility: HOSPITAL | Age: 76
End: 2021-02-26

## 2021-02-26 PROCEDURE — 0001A: CPT | Performed by: INTERNAL MEDICINE

## 2021-02-26 PROCEDURE — 91300 HC SARSCOV02 VAC 30MCG/0.3ML IM: CPT | Performed by: INTERNAL MEDICINE

## 2021-03-02 ENCOUNTER — TELEPHONE (OUTPATIENT)
Dept: NEUROSURGERY | Facility: CLINIC | Age: 76
End: 2021-03-02

## 2021-03-02 NOTE — TELEPHONE ENCOUNTER
Caller: DREW IBARRA    Relationship to patient: SPOUSE    Best call back number: [phone]    Chief complaint: DISCUSS NEXT STEP    Type of visit: FOLLOW UP EXT    Requested date: ASAP    If rescheduling, when is the original appointment: N/A     Additional notes: PATIENT HAS HAD 3 EPIDURAL STERIOD INJECTIONS WITH PAIN MANAGEMENT AND HAS NOT HAD ANY SUCCESS. THEY WOULD LIKE TO DISCUSS THE NEXT STEP FOR HIM WITH DR. SAM. PATIENTS WIFE WOULD LIKE TO COME TO THE APPT. PLEASE ADVISE.

## 2021-03-19 ENCOUNTER — IMMUNIZATION (OUTPATIENT)
Dept: VACCINE CLINIC | Facility: HOSPITAL | Age: 76
End: 2021-03-19

## 2021-03-19 PROCEDURE — 91300 HC SARSCOV02 VAC 30MCG/0.3ML IM: CPT | Performed by: INTERNAL MEDICINE

## 2021-03-19 PROCEDURE — 0002A: CPT | Performed by: INTERNAL MEDICINE

## 2021-03-26 ENCOUNTER — OFFICE VISIT (OUTPATIENT)
Dept: FAMILY MEDICINE CLINIC | Facility: CLINIC | Age: 76
End: 2021-03-26

## 2021-03-26 VITALS
OXYGEN SATURATION: 98 % | HEART RATE: 71 BPM | WEIGHT: 151 LBS | DIASTOLIC BLOOD PRESSURE: 60 MMHG | SYSTOLIC BLOOD PRESSURE: 120 MMHG | TEMPERATURE: 97.5 F | HEIGHT: 69 IN | BODY MASS INDEX: 22.36 KG/M2 | RESPIRATION RATE: 18 BRPM

## 2021-03-26 DIAGNOSIS — R60.9 EDEMA, UNSPECIFIED TYPE: Primary | ICD-10-CM

## 2021-03-26 PROCEDURE — 99213 OFFICE O/P EST LOW 20 MIN: CPT | Performed by: NURSE PRACTITIONER

## 2021-03-26 NOTE — PROGRESS NOTES
"Zander Noriega is a 76 y.o. male.     Chief Complaint   Patient presents with   • Leg Swelling       He presents with c/o swelling in both his legs for the past couple days. He states he has pretty bad indention from his socks that are worse at the end of the day. He denies shortness of breath, chest pain, and palpitations. He denies change in medications. He has been eating more salt than normal. He states he loves salt. 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        The following portions of the patient's history were reviewed and updated as appropriate: allergies, current medications, past family history, past medical history, past social history, past surgical history and problem list.    Review of Systems   Constitutional: Negative for fever and unexpected weight change.   HENT: Negative for ear pain, rhinorrhea, sore throat and trouble swallowing.    Eyes: Negative for visual disturbance.   Respiratory: Negative for cough, shortness of breath and wheezing.    Cardiovascular: Positive for leg swelling. Negative for chest pain and palpitations.   Gastrointestinal: Negative for abdominal pain, blood in stool, constipation, diarrhea, nausea and vomiting.   Genitourinary: Negative for dysuria and hematuria.   Musculoskeletal: Negative for arthralgias and myalgias.   Skin: Negative for color change.   Allergic/Immunologic: Negative for environmental allergies.   Neurological: Negative for dizziness.   Hematological: Negative for adenopathy.   Psychiatric/Behavioral: Negative for sleep disturbance and suicidal ideas. The patient is not nervous/anxious.        Objective     /60 (BP Location: Right arm, Patient Position: Sitting, Cuff Size: Adult)   Pulse 71   Temp 97.5 °F (36.4 °C) (Temporal)   Resp 18   Ht 175.3 cm (69.02\")   Wt 68.5 kg (151 lb)   SpO2 98%   BMI 22.29 kg/m²     Physical Exam  Vitals reviewed.   Constitutional:       General: He is not in acute distress.     " Appearance: He is well-developed. He is not diaphoretic.   HENT:      Head: Normocephalic and atraumatic.   Cardiovascular:      Rate and Rhythm: Normal rate and regular rhythm.      Heart sounds: Normal heart sounds. No murmur heard.   No friction rub. No gallop.    Pulmonary:      Effort: Pulmonary effort is normal. No respiratory distress.      Breath sounds: Normal breath sounds.   Abdominal:      General: Bowel sounds are normal. There is no distension.      Palpations: Abdomen is soft.      Tenderness: There is no abdominal tenderness.   Skin:     General: Skin is warm and dry.   Neurological:      Mental Status: He is alert and oriented to person, place, and time.   Psychiatric:         Behavior: Behavior normal.         Thought Content: Thought content normal.         Judgment: Judgment normal.         Assessment/Plan     Problem List Items Addressed This Visit     None      Visit Diagnoses     Edema, unspecified type    -  Primary          Plan: No swelling noted in the ble. Sock indention noted. Swelling in the legs can be caused by too much salt intake, failure of veins to move the fluid back to the heart, heat, kidney disease, liver disease, and heart disease. He denies shortness of breath and palpitations. He feels he has been eating too much salt. He reports he feels great and has too much energy. I recommend he decrease his sodium intake to less than 2gm a day, elevate feet in the evenings, try knee high compression stockings. Return if you develop shortness of breath or swelling in other areas. He agrees. Keep scheduled follow up.     Patient's Body mass index is 22.29 kg/m². BMI is within normal parameters. No follow-up required..   (Normal BMI:  18.5-24.9, OW 25-29.9, Obesity 30 or greater)           Understands disease processes and need for medications.  Understands reasons for urgent and emergent care.  Patient (& family) verbalized agreement for treatment plan.   Emotional support and active  listening provided.  Patient provided time to verbalize feelings.               This document has been electronically signed by JADE Murphy   March 26, 2021 11:43 EDT

## 2021-03-26 NOTE — PATIENT INSTRUCTIONS
"https://www.nhlbi.nih.gov/files/docs/public/heart/dash_brief.pdf\">   DASH Eating Plan  DASH stands for Dietary Approaches to Stop Hypertension. The DASH eating plan is a healthy eating plan that has been shown to:  · Reduce high blood pressure (hypertension).  · Reduce your risk for type 2 diabetes, heart disease, and stroke.  · Help with weight loss.  What are tips for following this plan?  Reading food labels  · Check food labels for the amount of salt (sodium) per serving. Choose foods with less than 5 percent of the Daily Value of sodium. Generally, foods with less than 300 milligrams (mg) of sodium per serving fit into this eating plan.  · To find whole grains, look for the word \"whole\" as the first word in the ingredient list.  Shopping  · Buy products labeled as \"low-sodium\" or \"no salt added.\"  · Buy fresh foods. Avoid canned foods and pre-made or frozen meals.  Cooking  · Avoid adding salt when cooking. Use salt-free seasonings or herbs instead of table salt or sea salt. Check with your health care provider or pharmacist before using salt substitutes.  · Do not gonzalez foods. Cook foods using healthy methods such as baking, boiling, grilling, roasting, and broiling instead.  · Cook with heart-healthy oils, such as olive, canola, avocado, soybean, or sunflower oil.  Meal planning    · Eat a balanced diet that includes:  ? 4 or more servings of fruits and 4 or more servings of vegetables each day. Try to fill one-half of your plate with fruits and vegetables.  ? 6-8 servings of whole grains each day.  ? Less than 6 oz (170 g) of lean meat, poultry, or fish each day. A 3-oz (85-g) serving of meat is about the same size as a deck of cards. One egg equals 1 oz (28 g).  ? 2-3 servings of low-fat dairy each day. One serving is 1 cup (237 mL).  ? 1 serving of nuts, seeds, or beans 5 times each week.  ? 2-3 servings of heart-healthy fats. Healthy fats called omega-3 fatty acids are found in foods such as walnuts, " flaxseeds, fortified milks, and eggs. These fats are also found in cold-water fish, such as sardines, salmon, and mackerel.  · Limit how much you eat of:  ? Canned or prepackaged foods.  ? Food that is high in trans fat, such as some fried foods.  ? Food that is high in saturated fat, such as fatty meat.  ? Desserts and other sweets, sugary drinks, and other foods with added sugar.  ? Full-fat dairy products.  · Do not salt foods before eating.  · Do not eat more than 4 egg yolks a week.  · Try to eat at least 2 vegetarian meals a week.  · Eat more home-cooked food and less restaurant, buffet, and fast food.  Lifestyle  · When eating at a restaurant, ask that your food be prepared with less salt or no salt, if possible.  · If you drink alcohol:  ? Limit how much you use to:  § 0-1 drink a day for women who are not pregnant.  § 0-2 drinks a day for men.  ? Be aware of how much alcohol is in your drink. In the [place] one drink equals one 12 oz bottle of beer (355 mL), one 5 oz glass of wine (148 mL), or one 1½ oz glass of hard liquor (44 mL).  General information  · Avoid eating more than 2,300 mg of salt a day. If you have hypertension, you may need to reduce your sodium intake to 1,500 mg a day.  · Work with your health care provider to maintain a healthy body weight or to lose weight. Ask what an ideal weight is for you.  · Get at least 30 minutes of exercise that causes your heart to beat faster (aerobic exercise) most days of the week. Activities may include walking, swimming, or biking.  · Work with your health care provider or dietitian to adjust your eating plan to your individual calorie needs.  What foods should I eat?  Fruits  All fresh, dried, or frozen fruit. Canned fruit in natural juice (without added sugar).  Vegetables  Fresh or frozen vegetables (raw, steamed, roasted, or grilled). Low-sodium or reduced-sodium tomato and vegetable juice. Low-sodium or reduced-sodium tomato sauce and tomato paste.  Low-sodium or reduced-sodium canned vegetables.  Grains  Whole-grain or whole-wheat bread. Whole-grain or whole-wheat pasta. Brown rice. Oatmeal. Quinoa. Bulgur. Whole-grain and low-sodium cereals. Maty bread. Low-fat, low-sodium crackers. Whole-wheat flour tortillas.  Meats and other proteins  Skinless chicken or turkey. Ground chicken or turkey. Pork with fat trimmed off. Fish and seafood. Egg whites. Dried beans, peas, or lentils. Unsalted nuts, nut butters, and seeds. Unsalted canned beans. Lean cuts of beef with fat trimmed off. Low-sodium, lean precooked or cured meat, such as sausages or meat loaves.  Dairy  Low-fat (1%) or fat-free (skim) milk. Reduced-fat, low-fat, or fat-free cheeses. Nonfat, low-sodium ricotta or cottage cheese. Low-fat or nonfat yogurt. Low-fat, low-sodium cheese.  Fats and oils  Soft margarine without trans fats. Vegetable oil. Reduced-fat, low-fat, or light mayonnaise and salad dressings (reduced-sodium). Canola, safflower, olive, avocado, soybean, and sunflower oils. Avocado.  Seasonings and condiments  Herbs. Spices. Seasoning mixes without salt.  Other foods  Unsalted popcorn and pretzels. Fat-free sweets.  The items listed above may not be a complete list of foods and beverages you can eat. Contact a dietitian for more information.  What foods should I avoid?  Fruits  Canned fruit in a light or heavy syrup. Fried fruit. Fruit in cream or butter sauce.  Vegetables  Creamed or fried vegetables. Vegetables in a cheese sauce. Regular canned vegetables (not low-sodium or reduced-sodium). Regular canned tomato sauce and paste (not low-sodium or reduced-sodium). Regular tomato and vegetable juice (not low-sodium or reduced-sodium). Pickles. Olives.  Grains  Baked goods made with fat, such as croissants, muffins, or some breads. Dry pasta or rice meal packs.  Meats and other proteins  Fatty cuts of meat. Ribs. Fried meat. Ferguson. Bologna, salami, and other precooked or cured meats, such as  sausages or meat loaves. Fat from the back of a pig (fatback). Bratwurst. Salted nuts and seeds. Canned beans with added salt. Canned or smoked fish. Whole eggs or egg yolks. Chicken or turkey with skin.  Dairy  Whole or 2% milk, cream, and half-and-half. Whole or full-fat cream cheese. Whole-fat or sweetened yogurt. Full-fat cheese. Nondairy creamers. Whipped toppings. Processed cheese and cheese spreads.  Fats and oils  Butter. Stick margarine. Lard. Shortening. Ghee. Ferguson fat. Tropical oils, such as coconut, palm kernel, or palm oil.  Seasonings and condiments  Onion salt, garlic salt, seasoned salt, table salt, and sea salt. Worcestershire sauce. Tartar sauce. Barbecue sauce. Teriyaki sauce. Soy sauce, including reduced-sodium. Steak sauce. Canned and packaged gravies. Fish sauce. Oyster sauce. Cocktail sauce. Store-bought horseradish. Ketchup. Mustard. Meat flavorings and tenderizers. Bouillon cubes. Hot sauces. Pre-made or packaged marinades. Pre-made or packaged taco seasonings. Relishes. Regular salad dressings.  Other foods  Salted popcorn and pretzels.  The items listed above may not be a complete list of foods and beverages you should avoid. Contact a dietitian for more information.  Where to find more information  · National Heart, Lung, and Blood [place]: www.nhlbi.nih.gov  · American Heart Association: www.heart.org  · Academy of Nutrition and Dietetics: www.eatright.org  · National Kidney Foundation: www.kidney.org  Summary  · The DASH eating plan is a healthy eating plan that has been shown to reduce high blood pressure (hypertension). It may also reduce your risk for type 2 diabetes, heart disease, and stroke.  · When on the DASH eating plan, aim to eat more fresh fruits and vegetables, whole grains, lean proteins, low-fat dairy, and heart-healthy fats.  · With the DASH eating plan, you should limit salt (sodium) intake to 2,300 mg a day. If you have hypertension, you may need to reduce your  sodium intake to 1,500 mg a day.  · Work with your health care provider or dietitian to adjust your eating plan to your individual calorie needs.  This information is not intended to replace advice given to you by your health care provider. Make sure you discuss any questions you have with your health care provider.  Document Revised: 11/20/2020 Document Reviewed: 11/20/2020  [employer] Patient Education © 2021 [employer].

## 2021-08-09 ENCOUNTER — OFFICE VISIT (OUTPATIENT)
Dept: FAMILY MEDICINE CLINIC | Facility: CLINIC | Age: 76
End: 2021-08-09

## 2021-08-09 VITALS
OXYGEN SATURATION: 97 % | HEIGHT: 69 IN | DIASTOLIC BLOOD PRESSURE: 68 MMHG | BODY MASS INDEX: 22.07 KG/M2 | RESPIRATION RATE: 18 BRPM | HEART RATE: 78 BPM | TEMPERATURE: 97.8 F | WEIGHT: 149 LBS | SYSTOLIC BLOOD PRESSURE: 107 MMHG

## 2021-08-09 DIAGNOSIS — H65.193 OTHER NON-RECURRENT ACUTE NONSUPPURATIVE OTITIS MEDIA OF BOTH EARS: Primary | ICD-10-CM

## 2021-08-09 PROCEDURE — 96372 THER/PROPH/DIAG INJ SC/IM: CPT | Performed by: NURSE PRACTITIONER

## 2021-08-09 PROCEDURE — 99213 OFFICE O/P EST LOW 20 MIN: CPT | Performed by: NURSE PRACTITIONER

## 2021-08-09 RX ORDER — LORATADINE 10 MG/1
10 TABLET ORAL DAILY
Qty: 30 TABLET | Refills: 0 | Status: SHIPPED | OUTPATIENT
Start: 2021-08-09 | End: 2022-06-20

## 2021-08-09 RX ORDER — METHYLPREDNISOLONE ACETATE 40 MG/ML
40 INJECTION, SUSPENSION INTRA-ARTICULAR; INTRALESIONAL; INTRAMUSCULAR; SOFT TISSUE ONCE
Status: COMPLETED | OUTPATIENT
Start: 2021-08-09 | End: 2021-08-09

## 2021-08-09 RX ORDER — AMOXICILLIN AND CLAVULANATE POTASSIUM 875; 125 MG/1; MG/1
1 TABLET, FILM COATED ORAL 2 TIMES DAILY
Qty: 20 TABLET | Refills: 0 | Status: SHIPPED | OUTPATIENT
Start: 2021-08-09 | End: 2021-08-19

## 2021-08-09 RX ADMIN — METHYLPREDNISOLONE ACETATE 40 MG: 40 INJECTION, SUSPENSION INTRA-ARTICULAR; INTRALESIONAL; INTRAMUSCULAR; SOFT TISSUE at 14:03

## 2021-08-09 NOTE — PROGRESS NOTES
"Zander Noriega is a 76 y.o. male.     Chief Complaint   Patient presents with   • Earache       He presents with c/o earache in the left ear for the past 2-3 days. He denies fever, chills, sore throat. He states he got some wax out this morning. He took some tylenol last night. His wife put some earwax medicine in it last night. He c/o popping and cracking in his ear when he yawns.       The following portions of the patient's history were reviewed and updated as appropriate: allergies, current medications, past family history, past medical history, past social history, past surgical history and problem list.    Review of Systems   Constitutional: Negative for fever and unexpected weight change.   HENT: Positive for ear pain and hearing loss. Negative for rhinorrhea, sore throat and trouble swallowing.    Eyes: Negative for visual disturbance.   Respiratory: Negative for cough, shortness of breath and wheezing.    Cardiovascular: Negative for chest pain and palpitations.   Gastrointestinal: Negative for abdominal pain, blood in stool, constipation, diarrhea, nausea and vomiting.   Genitourinary: Negative for dysuria.   Musculoskeletal: Negative for arthralgias and myalgias.   Skin: Negative for color change.   Allergic/Immunologic: Negative for environmental allergies.   Neurological: Negative for dizziness.   Hematological: Negative for adenopathy.   Psychiatric/Behavioral: Negative for sleep disturbance and suicidal ideas. The patient is not nervous/anxious.        Objective     /68 (BP Location: Right arm, Patient Position: Sitting, Cuff Size: Adult)   Pulse 78   Temp 97.8 °F (36.6 °C) (Temporal)   Resp 18   Ht 175.3 cm (69.02\")   Wt 67.6 kg (149 lb)   SpO2 97%   BMI 21.99 kg/m²     Physical Exam    Assessment/Plan     Problem List Items Addressed This Visit     None      Visit Diagnoses     Other non-recurrent acute nonsuppurative otitis media of both ears    -  Primary    Relevant " Medications    amoxicillin-clavulanate (Augmentin) 875-125 MG per tablet    methylPREDNISolone acetate (DEPO-medrol) injection 40 mg    loratadine (Claritin) 10 MG tablet          Plan: Augmentin, depo-medrol, and loratadine ordered for otitis media. Follow up in two weeks if no better.     @Body mass index is 21.99 kg/m².                Understands disease processes and need for medications.  Understands reasons for urgent and emergent care.  Patient (& family) verbalized agreement for treatment plan.   Emotional support and active listening provided.  Patient provided time to verbalize feelings.               This document has been electronically signed by JADE Murphy   August 9, 2021 14:00 EDT

## 2021-08-12 ENCOUNTER — OFFICE VISIT (OUTPATIENT)
Dept: FAMILY MEDICINE CLINIC | Facility: CLINIC | Age: 76
End: 2021-08-12

## 2021-08-12 VITALS
HEART RATE: 72 BPM | WEIGHT: 145.8 LBS | TEMPERATURE: 95.5 F | HEIGHT: 69 IN | SYSTOLIC BLOOD PRESSURE: 118 MMHG | OXYGEN SATURATION: 97 % | BODY MASS INDEX: 21.59 KG/M2 | DIASTOLIC BLOOD PRESSURE: 62 MMHG

## 2021-08-12 DIAGNOSIS — Z00.00 MEDICARE ANNUAL WELLNESS VISIT, SUBSEQUENT: ICD-10-CM

## 2021-08-12 DIAGNOSIS — E78.2 MIXED HYPERLIPIDEMIA: Chronic | ICD-10-CM

## 2021-08-12 DIAGNOSIS — I10 BENIGN ESSENTIAL HTN: ICD-10-CM

## 2021-08-12 DIAGNOSIS — M48.061 SPINAL STENOSIS OF LUMBAR REGION, UNSPECIFIED WHETHER NEUROGENIC CLAUDICATION PRESENT: Chronic | ICD-10-CM

## 2021-08-12 DIAGNOSIS — E11.9 TYPE 2 DIABETES MELLITUS WITHOUT COMPLICATION, WITHOUT LONG-TERM CURRENT USE OF INSULIN (HCC): Primary | ICD-10-CM

## 2021-08-12 PROCEDURE — 83036 HEMOGLOBIN GLYCOSYLATED A1C: CPT | Performed by: FAMILY MEDICINE

## 2021-08-12 PROCEDURE — 85025 COMPLETE CBC W/AUTO DIFF WBC: CPT | Performed by: FAMILY MEDICINE

## 2021-08-12 PROCEDURE — 82043 UR ALBUMIN QUANTITATIVE: CPT | Performed by: FAMILY MEDICINE

## 2021-08-12 PROCEDURE — 80061 LIPID PANEL: CPT | Performed by: FAMILY MEDICINE

## 2021-08-12 PROCEDURE — G0439 PPPS, SUBSEQ VISIT: HCPCS | Performed by: FAMILY MEDICINE

## 2021-08-12 PROCEDURE — 80053 COMPREHEN METABOLIC PANEL: CPT | Performed by: FAMILY MEDICINE

## 2021-08-12 PROCEDURE — 36415 COLL VENOUS BLD VENIPUNCTURE: CPT | Performed by: FAMILY MEDICINE

## 2021-08-12 RX ORDER — FLUTICASONE PROPIONATE 50 MCG
2 SPRAY, SUSPENSION (ML) NASAL DAILY
Qty: 16 G | Refills: 1 | Status: SHIPPED | OUTPATIENT
Start: 2021-08-12 | End: 2022-02-14 | Stop reason: SDUPTHER

## 2021-08-12 NOTE — PROGRESS NOTES
The ABCs of the Annual Wellness Visit  Subsequent Medicare Wellness Visit    Chief Complaint   Patient presents with   • Medicare Wellness-subsequent   • Ear Problem       Subjective   History of Present Illness:  Jean Noriega is a 76 y.o. male who presents for a Subsequent Medicare Wellness Visit.  Follow-up diabetes hypertension.  Still having some nasal congestion and left ear pain.  Denies respiratory GI  CDV.  Has had follow-ups with GI surgeons cardiology urology.  Stays very active.  Does reasonable with diet and hydration.  Still working to maintain the challenges regarding the back.  May indeed consider neurosurgical intervention in future.  Tolerating things as are at this time.    HEALTH RISK ASSESSMENT    Recent Hospitalizations:  No hospitalization(s) within the last year.    Current Medical Providers:  Patient Care Team:  Demetrius Wilkerson MD as PCP - General (Family Medicine)  Serjio Wall MD as Consulting Physician (Radiology)  Maurizio Cuellar MD as Consulting Physician (Gastroenterology)  Christopher Robledo MD as Consulting Physician (Infectious Diseases)    Smoking Status:  Social History     Tobacco Use   Smoking Status Former Smoker   • Packs/day: 1.00   • Years: 5.00   • Pack years: 5.00   • Types: Cigarettes, Cigars, Pipe   • Start date:    • Quit date:    • Years since quittin.6   Smokeless Tobacco Current User   • Types: Snuff       Alcohol Consumption:  Social History     Substance and Sexual Activity   Alcohol Use No       Depression Screen:   PHQ-2/PHQ-9 Depression Screening 2021   Little interest or pleasure in doing things 0   Feeling down, depressed, or hopeless 0   Total Score 0       Fall Risk Screen:  STEADI Fall Risk Assessment was completed, and patient is at LOW risk for falls.Assessment completed on:2021    Health Habits and Functional and Cognitive Screening:  Functional & Cognitive Status 2021   Do you have difficulty preparing food  and eating? No   Do you have difficulty bathing yourself, getting dressed or grooming yourself? No   Do you have difficulty using the toilet? No   Do you have difficulty moving around from place to place? No   Do you have trouble with steps or getting out of a bed or a chair? No   Current Diet Well Balanced Diet   Dental Exam Up to date   Eye Exam Up to date   Exercise (times per week) 7 times per week   Current Exercises Include Yard Work   Current Exercise Activities Include -   Do you need help using the phone?  No   Are you deaf or do you have serious difficulty hearing?  No   Do you need help with transportation? No   Do you need help shopping? No   Do you need help preparing meals?  No   Do you need help with housework?  No   Do you need help with laundry? No   Do you need help taking your medications? No   Do you need help managing money? No   Do you ever drive or ride in a car without wearing a seat belt? No   Have you felt unusual stress, anger or loneliness in the last month? No   Who do you live with? Spouse   If you need help, do you have trouble finding someone available to you? No   Have you been bothered in the last four weeks by sexual problems? No   Do you have difficulty concentrating, remembering or making decisions? No         Does the patient have evidence of cognitive impairment? No    Asprin use counseling:Taking ASA appropriately as indicated    Age-appropriate Screening Schedule:  Refer to the list below for future screening recommendations based on patient's age, sex and/or medical conditions. Orders for these recommended tests are listed in the plan section. The patient has been provided with a written plan.    Health Maintenance   Topic Date Due   • ZOSTER VACCINE (2 of 3) 12/13/2014   • DIABETIC FOOT EXAM  01/31/2020   • URINE MICROALBUMIN  11/21/2020   • DIABETIC EYE EXAM  02/26/2021   • HEMOGLOBIN A1C  08/04/2021   • INFLUENZA VACCINE  10/01/2021   • LIPID PANEL  06/01/2022   • TDAP/TD  "VACCINES (3 - Td or Tdap) 02/22/2029          The following portions of the patient's history were reviewed and updated as appropriate: allergies, current medications, past medical history, past social history, past surgical history and problem list.    Outpatient Medications Prior to Visit   Medication Sig Dispense Refill   • amLODIPine (NORVASC) 10 MG tablet      • amoxicillin-clavulanate (Augmentin) 875-125 MG per tablet Take 1 tablet by mouth 2 (Two) Times a Day for 10 days. 20 tablet 0   • ASPIRIN 81 PO aspirin 81 mg tablet     • atorvastatin (LIPITOR) 40 MG tablet Take 40 mg by mouth Daily.     • glimepiride (AMARYL) 2 MG tablet TAKE 1 TABLET BY MOUTH ONCE DAILY IN THE MORNING BEFORE BREAKFAST 90 tablet 3   • Lancets (OneTouch Delica Plus [id number]) misc USE 1  TO CHECK GLUCOSE ONCE DAILY 100 each 0   • loratadine (Claritin) 10 MG tablet Take 1 tablet by mouth Daily. 30 tablet 0   • losartan (COZAAR) 100 MG tablet      • metFORMIN (GLUCOPHAGE) 1000 MG tablet TAKE 1 TABLET BY MOUTH TWICE DAILY WITH MEALS 180 tablet 2   • MULTIPLE VITAMIN PO Take 1 tablet by mouth Daily.     • OneTouch Verio test strip USE 1 STRIP TO CHECK GLUCOSE ONCE DAILY 50 each 0   • pantoprazole (PROTONIX) 40 MG EC tablet Take 40 mg by mouth 2 (Two) Times a Day.     • traMADol (Ultram) 50 MG tablet Take 1 tablet by mouth Every 8 (Eight) Hours As Needed for Moderate Pain . 30 tablet 0   • ursodiol (ACTIGALL) 500 MG tablet Take 500 mg by mouth 3 (Three) Times a Day.     • Zenpep [id number] units capsule delayed-release particles        No facility-administered medications prior to visit.       Patient Active Problem List   Diagnosis   • Epigastric pain   • Gastroesophageal reflux disease   • Chronic rhinitis   • Type 2 diabetes mellitus without complication, without long-term current use of insulin (CMS/HCC)   • Low back pain   • Mixed hyperlipidemia   • Hx of duodenal cancer, s/p Whipple 2004   • Hx of bladder cancer, s/p \"scraping\", 2004 " "  • History of recent left nephrolithiasis s/p ureteral stent placement    • Benign essential HTN   • Arthritis   • Carcinoma of duodenum (CMS/HCC)   • Chronic diastolic heart failure (CMS/HCC)   • Peptic ulcer   • Taking multiple medications for chronic disease   • Varicose veins of lower extremity with inflammation   • Malignant neoplasm of urinary bladder (CMS/HCC)   • Degenerative joint disease (DJD) of lumbar spine   • Spondylolisthesis of lumbar region   • Spinal stenosis of lumbar region       Advanced Care Planning:  ACP discussion was held with the patient during this visit. Patient has an advance directive in EMR which is still valid.     Review of Systems    Compared to one year ago, the patient feels his physical health is the same.  Compared to one year ago, the patient feels his mental health is the same.    Reviewed chart for potential of high risk medication in the elderly: yes  Reviewed chart for potential of harmful drug interactions in the elderly:yes    Objective         Vitals:    08/12/21 1034   BP: 118/62   BP Location: Right arm   Patient Position: Sitting   Cuff Size: Adult   Pulse: 72   Temp: 95.5 °F (35.3 °C)   TempSrc: Temporal   SpO2: 97%   Weight: 66.1 kg (145 lb 12.8 oz)   Height: 175.3 cm (69.02\")       Body mass index is 21.52 kg/m².  Discussed the patient's BMI with him. The BMI is in the acceptable range.    Physical Exam  Vitals reviewed.   Constitutional:       Appearance: Normal appearance.   HENT:      Head: Normocephalic.   Cardiovascular:      Rate and Rhythm: Normal rate and regular rhythm.      Heart sounds: Normal heart sounds.   Pulmonary:      Effort: Pulmonary effort is normal.      Breath sounds: Normal breath sounds.   Musculoskeletal:      Cervical back: Normal range of motion and neck supple.   Skin:     General: Skin is warm and dry.   Neurological:      Mental Status: He is alert and oriented to person, place, and time.   Psychiatric:         Mood and Affect: Mood " normal.                Assessment/Plan   Medicare Risks and Personalized Health Plan  CMS Preventative Services Quick Reference  Advance Directive Discussion  Cardiovascular risk  Chronic Pain   Immunizations Discussed/Encouraged (specific immunizations; Influenza and Shingrix )    The above risks/problems have been discussed with the patient.  Pertinent information has been shared with the patient in the After Visit Summary.  Follow up plans and orders are seen below in the Assessment/Plan Section.    Diagnoses and all orders for this visit:    1. Type 2 diabetes mellitus without complication, without long-term current use of insulin (CMS/[facility]) (Primary)  -     Hemoglobin A1c  -     Comprehensive Metabolic Panel  -     CBC & Differential  -     Lipid Panel  -     MicroAlbumin, Urine, Random - Urine, Clean Catch    2. Mixed hyperlipidemia  -     Comprehensive Metabolic Panel  -     Lipid Panel    3. Benign essential HTN  -     Comprehensive Metabolic Panel  -     CBC & Differential    4. Spinal stenosis of lumbar region, unspecified whether neurogenic claudication present    5. Medicare annual wellness visit, subsequent    Other orders  -     fluticasone (Flonase) 50 MCG/ACT nasal spray; 2 sprays into the nostril(s) as directed by provider Daily.  Dispense: 16 g; Refill: 1      Follow Up:  No follow-ups on file.     An After Visit Summary and PPPS were given to the patient.     Check labs.  Continue chronic's.  Will add Flonase nasal spray to see if help with left ear congestion.  Finish antibiotics.  Stay safely active maintain heart healthy diabetic diet.  Maintain pandemic response.  Stay well-hydrated as possible.  Recheck in 6 months or as needed.           no

## 2021-08-13 LAB
ALBUMIN SERPL-MCNC: 3.8 G/DL (ref 3.5–5.2)
ALBUMIN UR-MCNC: <1.2 MG/DL
ALBUMIN/GLOB SERPL: 2.2 G/DL
ALP SERPL-CCNC: 66 U/L (ref 39–117)
ALT SERPL W P-5'-P-CCNC: 18 U/L (ref 1–41)
ANION GAP SERPL CALCULATED.3IONS-SCNC: 10.5 MMOL/L (ref 5–15)
AST SERPL-CCNC: 20 U/L (ref 1–40)
BASOPHILS # BLD AUTO: 0.03 10*3/MM3 (ref 0–0.2)
BASOPHILS NFR BLD AUTO: 0.4 % (ref 0–1.5)
BILIRUB SERPL-MCNC: 0.3 MG/DL (ref 0–1.2)
BUN SERPL-MCNC: 13 MG/DL (ref 8–23)
BUN/CREAT SERPL: 12.9 (ref 7–25)
CALCIUM SPEC-SCNC: 8.8 MG/DL (ref 8.6–10.5)
CHLORIDE SERPL-SCNC: 102 MMOL/L (ref 98–107)
CHOLEST SERPL-MCNC: 95 MG/DL (ref 0–200)
CO2 SERPL-SCNC: 24.5 MMOL/L (ref 22–29)
CREAT SERPL-MCNC: 1.01 MG/DL (ref 0.76–1.27)
DEPRECATED RDW RBC AUTO: 46.9 FL (ref 37–54)
EOSINOPHIL # BLD AUTO: 0.05 10*3/MM3 (ref 0–0.4)
EOSINOPHIL NFR BLD AUTO: 0.7 % (ref 0.3–6.2)
ERYTHROCYTE [DISTWIDTH] IN BLOOD BY AUTOMATED COUNT: 12.2 % (ref 12.3–15.4)
GFR SERPL CREATININE-BSD FRML MDRD: 72 ML/MIN/1.73
GLOBULIN UR ELPH-MCNC: 1.7 GM/DL
GLUCOSE SERPL-MCNC: 189 MG/DL (ref 65–99)
HBA1C MFR BLD: 6.3 % (ref 4.8–5.6)
HCT VFR BLD AUTO: 42.8 % (ref 37.5–51)
HDLC SERPL-MCNC: 58 MG/DL (ref 40–60)
HGB BLD-MCNC: 13.5 G/DL (ref 13–17.7)
IMM GRANULOCYTES # BLD AUTO: 0.06 10*3/MM3 (ref 0–0.05)
IMM GRANULOCYTES NFR BLD AUTO: 0.8 % (ref 0–0.5)
LDLC SERPL CALC-MCNC: 22 MG/DL (ref 0–100)
LDLC/HDLC SERPL: 0.4 {RATIO}
LYMPHOCYTES # BLD AUTO: 0.82 10*3/MM3 (ref 0.7–3.1)
LYMPHOCYTES NFR BLD AUTO: 11 % (ref 19.6–45.3)
MCH RBC QN AUTO: 32.5 PG (ref 26.6–33)
MCHC RBC AUTO-ENTMCNC: 31.5 G/DL (ref 31.5–35.7)
MCV RBC AUTO: 103.1 FL (ref 79–97)
MONOCYTES # BLD AUTO: 0.74 10*3/MM3 (ref 0.1–0.9)
MONOCYTES NFR BLD AUTO: 9.9 % (ref 5–12)
NEUTROPHILS NFR BLD AUTO: 5.76 10*3/MM3 (ref 1.7–7)
NEUTROPHILS NFR BLD AUTO: 77.2 % (ref 42.7–76)
NRBC BLD AUTO-RTO: 0 /100 WBC (ref 0–0.2)
PLATELET # BLD AUTO: 222 10*3/MM3 (ref 140–450)
PMV BLD AUTO: 9.9 FL (ref 6–12)
POTASSIUM SERPL-SCNC: 4.9 MMOL/L (ref 3.5–5.2)
PROT SERPL-MCNC: 5.5 G/DL (ref 6–8.5)
RBC # BLD AUTO: 4.15 10*6/MM3 (ref 4.14–5.8)
SODIUM SERPL-SCNC: 137 MMOL/L (ref 136–145)
TRIGL SERPL-MCNC: 68 MG/DL (ref 0–150)
VLDLC SERPL-MCNC: 15 MG/DL (ref 5–40)
WBC # BLD AUTO: 7.46 10*3/MM3 (ref 3.4–10.8)

## 2021-08-16 DIAGNOSIS — D75.89 MACROCYTOSIS WITHOUT ANEMIA: Primary | ICD-10-CM

## 2021-08-19 ENCOUNTER — OFFICE VISIT (OUTPATIENT)
Dept: FAMILY MEDICINE CLINIC | Facility: CLINIC | Age: 76
End: 2021-08-19

## 2021-08-19 VITALS
TEMPERATURE: 97.7 F | HEIGHT: 69 IN | WEIGHT: 149.4 LBS | HEART RATE: 67 BPM | SYSTOLIC BLOOD PRESSURE: 115 MMHG | OXYGEN SATURATION: 98 % | BODY MASS INDEX: 22.13 KG/M2 | DIASTOLIC BLOOD PRESSURE: 61 MMHG

## 2021-08-19 DIAGNOSIS — H65.112 ACUTE MUCOID OTITIS MEDIA OF LEFT EAR: Primary | ICD-10-CM

## 2021-08-19 DIAGNOSIS — E11.9 TYPE 2 DIABETES MELLITUS WITHOUT COMPLICATION, WITHOUT LONG-TERM CURRENT USE OF INSULIN (HCC): Chronic | ICD-10-CM

## 2021-08-19 PROCEDURE — 69210 REMOVE IMPACTED EAR WAX UNI: CPT | Performed by: INTERNAL MEDICINE

## 2021-08-19 PROCEDURE — 99214 OFFICE O/P EST MOD 30 MIN: CPT | Performed by: INTERNAL MEDICINE

## 2021-08-19 RX ORDER — LEVOFLOXACIN 500 MG/1
500 TABLET, FILM COATED ORAL DAILY
Qty: 7 TABLET | Refills: 0 | Status: SHIPPED | OUTPATIENT
Start: 2021-08-19 | End: 2022-02-14

## 2021-08-19 NOTE — PROGRESS NOTES
Patient Name: Jean Noriega Today's Date: 2021   Patient MRN / CSN: [id number] / [id number] Date of Encounter: 2021   Patient Age / : 76 y.o. / 1945 Encounter Provider: Fannie Haynes DO   Referring Physician: No ref. provider found          Jean is a 76 y.o. who is being seen today for Earache (Left.    Patient states that this is his third visit for ear pain. No improvements with prior treatments. )      Earache   There is pain in the left ear. Chronicity: Patient reports left ear pain for 3 weeks. There has been no fever. Associated symptoms include headaches and hearing loss. Pertinent negatives include no abdominal pain or sore throat. Treatments tried: Patient took Augmentin without relief.  He was given a steroid injection without relief as well.  He has been taking Claritin and Flonase without improvement.  He feels that Flonase is giving him a headache.       Allergies include:Percocet [oxycodone-acetaminophen]  Current Outpatient Medications   Medication Sig Dispense Refill   • amLODIPine (NORVASC) 10 MG tablet      • ASPIRIN 81 PO aspirin 81 mg tablet     • atorvastatin (LIPITOR) 40 MG tablet Take 40 mg by mouth Daily.     • fluticasone (Flonase) 50 MCG/ACT nasal spray 2 sprays into the nostril(s) as directed by provider Daily. 16 g 1   • glimepiride (AMARYL) 2 MG tablet TAKE 1 TABLET BY MOUTH ONCE DAILY IN THE MORNING BEFORE BREAKFAST 90 tablet 3   • Lancets (OneTouch Delica Plus [id number]) misc USE 1  TO CHECK GLUCOSE ONCE DAILY 100 each 0   • loratadine (Claritin) 10 MG tablet Take 1 tablet by mouth Daily. 30 tablet 0   • losartan (COZAAR) 100 MG tablet      • metFORMIN (GLUCOPHAGE) 1000 MG tablet TAKE 1 TABLET BY MOUTH TWICE DAILY WITH MEALS 180 tablet 2   • MULTIPLE VITAMIN PO Take 1 tablet by mouth Daily.     • OneTouch Verio test strip USE 1 STRIP TO CHECK GLUCOSE ONCE DAILY 50 each 0   • pantoprazole (PROTONIX) 40 MG EC tablet Take 40 mg by mouth 2 (Two) Times a Day.     •  traMADol (Ultram) 50 MG tablet Take 1 tablet by mouth Every 8 (Eight) Hours As Needed for Moderate Pain . 30 tablet 0   • ursodiol (ACTIGALL) 500 MG tablet Take 500 mg by mouth 3 (Three) Times a Day.     • Zenpep [id number] units capsule delayed-release particles      • levoFLOXacin (Levaquin) 500 MG tablet Take 1 tablet by mouth Daily. 7 tablet 0     No current facility-administered medications for this visit.     Past Medical History:   Diagnosis Date   • Anemia, recent acute blood loss 3/30/2017    ALB anemia in March after ureteral stent removed   • Bladder cancer (CMS/HCC)    • Diabetes mellitus (CMS/HCC)    • Duodenal cancer (CMS/HCC)    • Dyspnea on exertion 2016   • Foreign body (FB) in soft tissue 2019   • Gallstones    • History of recent left nephrolithiasis s/p ureteral stent placement  3/30/2017    2/17/17 - ureteral stent [facility], s/p stent and stone removal 17   • Hyperlipidemia    • Kidney stone    • Kidney stone on left side 2017   • Nephrolithiasis    • Pneumonia      Family History   Problem Relation Age of Onset   • Heart disease Father    • Hypertension Brother    • Diabetes Brother      Past Surgical History:   Procedure Laterality Date   • CYSTOSCOPY W/ URETERAL STENT REMOVAL Left 2017   • EYE MUSCLE SURGERY     • HERNIA REPAIR     • INTERVENTIONAL RADIOLOGY PROCEDURE Right 3/31/2017    Procedure: PTC & Drain placement;  Surgeon: Serjio Wall MD;  Location: [facility] INVASIVE LOCATION;  Service:    • LIVER SURGERY  06/10/2019   • URETERAL STENT INSERTION Left    • WHIPPLE PROCEDURE       Social History     Substance and Sexual Activity   Alcohol Use No     Social History     Tobacco Use   Smoking Status Former Smoker   • Packs/day: 1.00   • Years: 5.00   • Pack years: 5.00   • Types: Cigarettes, Cigars, Pipe   • Start date:    • Quit date:    • Years since quittin.6   Smokeless Tobacco Current User   • Types: Snuff     Social History  "    Substance and Sexual Activity   Drug Use No     Review of Systems   Constitutional: Negative for fever.   HENT: Positive for ear pain and hearing loss. Negative for congestion, sinus pressure and sore throat.    Respiratory: Negative for shortness of breath.    Cardiovascular: Negative for chest pain.   Gastrointestinal: Negative for abdominal pain.   Neurological: Positive for headaches.        Frontal headache, intermittently, and described as dull, aching.  Patient feels that Flonase is causing him headaches.        Depression Assessment Review:  PHQ-9 Total Score:    Vital Signs & Measurements Taken This Encounter  /61 (BP Location: Right arm, Patient Position: Sitting, Cuff Size: Adult)   Pulse 67   Temp 97.7 °F (36.5 °C) (Temporal)   Ht 175.3 cm (69.02\")   Wt 67.8 kg (149 lb 6.4 oz)   SpO2 98%   BMI 22.05 kg/m²    SpO2 Percentage    08/19/21 1306   SpO2: 98%        Physical Exam  Vitals reviewed.   Constitutional:       General: He is not in acute distress.  HENT:      Head: Normocephalic and atraumatic.      Comments: No frontal or maxillary sinus tenderness to palpation.     Right Ear: Tympanic membrane, ear canal and external ear normal.      Left Ear: There is impacted cerumen.      Ears:      Comments: On initial evaluation, patient had impacted cerumen in the left canal completely obstructing the left TM.  I offered ear lavage and cerumen removal.  Patient reported having this done previously with good tolerance.  He gave verbal consent for this.  Ear lavage was performed with half-and-half solution of warm water and Willis's baby shampoo.  I used the white curette to remove the cerumen.  This was removed completely with good tolerance.  Patient reported that he could hear better and felt better after the cerumen was removed.  TM was visualized, intact without perforation, and showed erythema with some mucoid effusion.  Cardiovascular:      Rate and Rhythm: Normal rate and regular " "rhythm.   Pulmonary:      Effort: Pulmonary effort is normal. No respiratory distress.      Breath sounds: Normal breath sounds.   Skin:     General: Skin is warm and dry.      Coloration: Skin is not jaundiced.   Neurological:      Mental Status: He is alert.   Psychiatric:         Mood and Affect: Mood normal.         Behavior: Behavior normal.          Assessment & Plan  Patient Active Problem List   Diagnosis   • Epigastric pain   • Gastroesophageal reflux disease   • Chronic rhinitis   • Type 2 diabetes mellitus without complication, without long-term current use of insulin (CMS/HCC)   • Low back pain   • Mixed hyperlipidemia   • Hx of duodenal cancer, s/p Whipple 2004   • Hx of bladder cancer, s/p \"scraping\", 2004   • History of recent left nephrolithiasis s/p ureteral stent placement    • Benign essential HTN   • Arthritis   • Carcinoma of duodenum (CMS/HCC)   • Chronic diastolic heart failure (CMS/HCC)   • Peptic ulcer   • Taking multiple medications for chronic disease   • Varicose veins of lower extremity with inflammation   • Malignant neoplasm of urinary bladder (CMS/HCC)   • Degenerative joint disease (DJD) of lumbar spine   • Spondylolisthesis of lumbar region   • Spinal stenosis of lumbar region       ICD-10-CM ICD-9-CM   1. Acute mucoid otitis media of left ear  H65.112 381.02   2. Type 2 diabetes mellitus without complication, without long-term current use of insulin (CMS/HCC)  E11.9 250.00     No orders of the defined types were placed in this encounter.      Meds Ordered During Visit:  New Medications Ordered This Visit   Medications   • levoFLOXacin (Levaquin) 500 MG tablet     Sig: Take 1 tablet by mouth Daily.     Dispense:  7 tablet     Refill:  0       Ear lavage and cerumen removal was performed as above.  Levaquin 500 mg p.o. daily was discussed with patient in detail today.  I cautioned him of potential hypoglycemia given his sulfonylurea with Levaquin.  Patient reports his lowest glucose " recently was 90 but he is careful to eat regularly to avoid hypoglycemia.  I have encouraged him to monitor his glucose closely.  Stop Flonase.  Continue other meds as previously prescribed.  If patient continues to have ear pain after his second round of antibiotics, we may consider an ENT referral for further eval.  I have recommended that he contact us if he continues to have symptoms and he is agreeable.  Otherwise, he will plan to follow-up with Dr. Wilkerson as previously scheduled.  Of note, patient has had his Covid vaccines.  I encouraged him to continue wearing a mask.      Return if symptoms worsen or fail to improve, for Follow up with Dr. Wilkerson as previously planned.          Referring Provider (if known): No ref. provider found      This document has been electronically signed by Fannie Haynes DO  August 19, 2021 13:56 EDT    Fannie Haynes DO, FACOI  [address] [place] [address]  [phone] (office)    Part of this note may be an electronic transcription/translation of spoken language to printed text using the Dragon Dictation System.

## 2021-08-25 ENCOUNTER — LAB (OUTPATIENT)
Dept: FAMILY MEDICINE CLINIC | Facility: CLINIC | Age: 76
End: 2021-08-25

## 2021-08-25 DIAGNOSIS — D75.89 MACROCYTOSIS WITHOUT ANEMIA: ICD-10-CM

## 2021-08-25 LAB
FOLATE SERPL-MCNC: >20 NG/ML (ref 4.78–24.2)
VIT B12 BLD-MCNC: 377 PG/ML (ref 211–946)

## 2021-08-25 PROCEDURE — 82746 ASSAY OF FOLIC ACID SERUM: CPT | Performed by: FAMILY MEDICINE

## 2021-08-25 PROCEDURE — 82607 VITAMIN B-12: CPT | Performed by: FAMILY MEDICINE

## 2021-08-25 PROCEDURE — 36415 COLL VENOUS BLD VENIPUNCTURE: CPT | Performed by: NURSE PRACTITIONER

## 2021-08-30 DIAGNOSIS — E53.8 LOW VITAMIN B12 LEVEL: Primary | ICD-10-CM

## 2021-08-30 RX ORDER — CYANOCOBALAMIN 1000 UG/ML
1000 INJECTION, SOLUTION INTRAMUSCULAR; SUBCUTANEOUS
Status: SHIPPED | OUTPATIENT
Start: 2021-08-31 | End: 2021-09-20

## 2021-08-30 NOTE — PROGRESS NOTES
Folate level normal. B12 level in the low normal range and should improve with daily B12 supplement. I want you to come by for B12 injection weekly x3 weeks.

## 2021-09-01 ENCOUNTER — CLINICAL SUPPORT (OUTPATIENT)
Dept: FAMILY MEDICINE CLINIC | Facility: CLINIC | Age: 76
End: 2021-09-01

## 2021-09-01 DIAGNOSIS — E53.8 VITAMIN B12 DEFICIENCY: ICD-10-CM

## 2021-09-01 PROCEDURE — 96372 THER/PROPH/DIAG INJ SC/IM: CPT | Performed by: FAMILY MEDICINE

## 2021-09-01 RX ADMIN — CYANOCOBALAMIN 1000 MCG: 1000 INJECTION, SOLUTION INTRAMUSCULAR; SUBCUTANEOUS at 09:11

## 2021-09-03 ENCOUNTER — TELEPHONE (OUTPATIENT)
Dept: FAMILY MEDICINE CLINIC | Facility: CLINIC | Age: 76
End: 2021-09-03

## 2021-09-03 NOTE — TELEPHONE ENCOUNTER
Caller: Jean Noriega    Relationship: Self    Best call back number: [phone]    What is the medical concern/diagnosis: ISSUES WITH LEFT EAR    What specialty or service is being requested: ENT    What is the provider, practice or medical service name:     What is the office location: [facility]    What is the office phone number:     Any additional details:

## 2021-09-08 ENCOUNTER — CLINICAL SUPPORT (OUTPATIENT)
Dept: FAMILY MEDICINE CLINIC | Facility: CLINIC | Age: 76
End: 2021-09-08

## 2021-09-08 DIAGNOSIS — E53.8 VITAMIN B12 DEFICIENCY: Primary | ICD-10-CM

## 2021-09-08 PROCEDURE — 96372 THER/PROPH/DIAG INJ SC/IM: CPT | Performed by: FAMILY MEDICINE

## 2021-09-08 RX ADMIN — CYANOCOBALAMIN 1000 MCG: 1000 INJECTION, SOLUTION INTRAMUSCULAR; SUBCUTANEOUS at 09:46

## 2021-09-11 DIAGNOSIS — H92.02 OTALGIA OF LEFT EAR: Primary | ICD-10-CM

## 2021-09-20 RX ORDER — GLIMEPIRIDE 2 MG/1
TABLET ORAL
Qty: 90 TABLET | Refills: 0 | Status: SHIPPED | OUTPATIENT
Start: 2021-09-20 | End: 2021-12-27 | Stop reason: SDUPTHER

## 2021-09-22 ENCOUNTER — CLINICAL SUPPORT (OUTPATIENT)
Dept: FAMILY MEDICINE CLINIC | Facility: CLINIC | Age: 76
End: 2021-09-22

## 2021-09-22 DIAGNOSIS — E53.8 VITAMIN B12 DEFICIENCY: Primary | ICD-10-CM

## 2021-09-22 PROCEDURE — 96372 THER/PROPH/DIAG INJ SC/IM: CPT | Performed by: FAMILY MEDICINE

## 2021-09-22 RX ORDER — CYANOCOBALAMIN 1000 UG/ML
1000 INJECTION, SOLUTION INTRAMUSCULAR; SUBCUTANEOUS
Status: DISCONTINUED | OUTPATIENT
Start: 2021-09-22 | End: 2022-04-28

## 2021-09-22 RX ADMIN — CYANOCOBALAMIN 1000 MCG: 1000 INJECTION, SOLUTION INTRAMUSCULAR; SUBCUTANEOUS at 09:27

## 2021-10-05 ENCOUNTER — LAB (OUTPATIENT)
Dept: LAB | Facility: HOSPITAL | Age: 76
End: 2021-10-05

## 2021-10-05 ENCOUNTER — TRANSCRIBE ORDERS (OUTPATIENT)
Dept: ADMINISTRATIVE | Facility: HOSPITAL | Age: 76
End: 2021-10-05

## 2021-10-05 ENCOUNTER — HOSPITAL ENCOUNTER (EMERGENCY)
Facility: HOSPITAL | Age: 76
End: 2021-10-05

## 2021-10-05 DIAGNOSIS — Z01.818 PRE-OPERATIVE CLEARANCE: ICD-10-CM

## 2021-10-05 DIAGNOSIS — Z01.818 PRE-OPERATIVE CLEARANCE: Primary | ICD-10-CM

## 2021-10-05 PROCEDURE — U0004 COV-19 TEST NON-CDC HGH THRU: HCPCS | Performed by: EMERGENCY MEDICINE

## 2021-10-05 PROCEDURE — C9803 HOPD COVID-19 SPEC COLLECT: HCPCS

## 2021-10-05 PROCEDURE — U0005 INFEC AGEN DETEC AMPLI PROBE: HCPCS | Performed by: EMERGENCY MEDICINE

## 2021-10-06 LAB — SARS-COV-2 RNA NOSE QL NAA+PROBE: NOT DETECTED

## 2021-12-06 ENCOUNTER — TELEPHONE (OUTPATIENT)
Dept: FAMILY MEDICINE CLINIC | Facility: CLINIC | Age: 76
End: 2021-12-06

## 2021-12-06 NOTE — TELEPHONE ENCOUNTER
Caller: Jean Noriega S    Relationship: Self    Best call back number: [phone]    What is the best time to reach you: ANYTIME    Who are you requesting to speak with (clinical staff, provider,  specific staff member):     Do you know the name of the person who called:     What was the call regarding: JEAN IS WONDERING IF HE HAD HIS FLU SHOT THIS YEAR?    Do you require a callback: YES

## 2021-12-06 NOTE — TELEPHONE ENCOUNTER
Called patient notified that we didn't have a flu shot on file here at the clinic patient verbalized understanding

## 2021-12-27 RX ORDER — GLIMEPIRIDE 2 MG/1
2 TABLET ORAL
Qty: 90 TABLET | Refills: 0 | OUTPATIENT
Start: 2021-12-27

## 2021-12-27 RX ORDER — GLIMEPIRIDE 2 MG/1
TABLET ORAL
Qty: 90 TABLET | Refills: 0 | Status: SHIPPED | OUTPATIENT
Start: 2021-12-27 | End: 2022-04-05

## 2021-12-27 NOTE — TELEPHONE ENCOUNTER
Caller: Jean Noriega    Relationship: Self    Best call back number: [phone]  Requested Prescriptions:   Requested Prescriptions     Pending Prescriptions Disp Refills   • glimepiride (AMARYL) 2 MG tablet 90 tablet 0     Sig: Take 1 tablet by mouth.   • metFORMIN (GLUCOPHAGE) 1000 MG tablet 180 tablet 2     Sig: Take 1 tablet by mouth 2 (Two) Times a Day With Meals.        Pharmacy where request should be sent:        [facility] [address] [place] [address] [phone] PH - [phone]           Additional details provided by patient:     Does the patient have less than a 3 day supply:  [] Yes  [x] No    Trish Rahman Rep   12/27/21 09:23 EST

## 2022-01-25 ENCOUNTER — OFFICE VISIT (OUTPATIENT)
Dept: FAMILY MEDICINE CLINIC | Facility: CLINIC | Age: 77
End: 2022-01-25

## 2022-01-25 VITALS
WEIGHT: 146 LBS | TEMPERATURE: 97.1 F | HEIGHT: 69 IN | BODY MASS INDEX: 21.62 KG/M2 | SYSTOLIC BLOOD PRESSURE: 118 MMHG | OXYGEN SATURATION: 96 % | HEART RATE: 74 BPM | DIASTOLIC BLOOD PRESSURE: 70 MMHG

## 2022-01-25 DIAGNOSIS — R50.9 FEVER, UNSPECIFIED FEVER CAUSE: ICD-10-CM

## 2022-01-25 DIAGNOSIS — R19.7 DIARRHEA, UNSPECIFIED TYPE: ICD-10-CM

## 2022-01-25 DIAGNOSIS — R93.89 ABNORMAL CT SCAN: Primary | ICD-10-CM

## 2022-01-25 PROCEDURE — 86769 SARS-COV-2 COVID-19 ANTIBODY: CPT | Performed by: NURSE PRACTITIONER

## 2022-01-25 PROCEDURE — 99214 OFFICE O/P EST MOD 30 MIN: CPT | Performed by: NURSE PRACTITIONER

## 2022-01-25 PROCEDURE — 80053 COMPREHEN METABOLIC PANEL: CPT | Performed by: NURSE PRACTITIONER

## 2022-01-25 PROCEDURE — 36415 COLL VENOUS BLD VENIPUNCTURE: CPT | Performed by: NURSE PRACTITIONER

## 2022-01-25 PROCEDURE — 85025 COMPLETE CBC W/AUTO DIFF WBC: CPT | Performed by: NURSE PRACTITIONER

## 2022-01-25 NOTE — PROGRESS NOTES
Venipuncture Blood Specimen Collection  Venipuncture performed in Right Arm by Phuong Zepeda MA with good hemostasis. Patient tolerated the procedure well without complications.   01/25/22   Phuong Zepeda MA

## 2022-01-25 NOTE — PROGRESS NOTES
Subjective   Jean Noriega is a 76 y.o. male.     Chief Complaint   Patient presents with   • URI     Patient c/o productive cough, fevers, and diarrhea.   Patient also request COVID antibodies test.        He presents with c/o an abnormal scan. He states he had a scan and was told that his lungs look like he may have had covid. He c/o diarrhea for 2-3 weeks. He states it has been twice a day. He denies blood in the stool. He states he has not taken any antibiotics. He had some pain in he epigastrum that he describes as feeling like an ulcer. He states his scan came back good and he had it about a week ago. He states he has been getting a fever at night that is off and on. He denies loss of taste and smell. He has taken some otc anti-diarrheal medication that didn't seem to help. He takes pancreatic enzymes because his pancreas doesn't function correctly. He states he has lost a lot of weight. He has h/o pancreatic cancer and had a whipple procedure. He states his blood sugar has been good.       The following portions of the patient's history were reviewed and updated as appropriate: allergies, current medications, past family history, past medical history, past social history, past surgical history and problem list.    Review of Systems   Constitutional: Positive for fever. Negative for unexpected weight change.   HENT: Positive for rhinorrhea. Negative for ear pain, sore throat and trouble swallowing.    Eyes: Negative for visual disturbance.   Respiratory: Positive for cough. Negative for shortness of breath and wheezing.    Cardiovascular: Negative for chest pain and palpitations.   Gastrointestinal: Positive for abdominal pain and diarrhea. Negative for blood in stool, constipation, nausea and vomiting.   Genitourinary: Negative for dysuria and hematuria.   Musculoskeletal: Negative for arthralgias and myalgias.   Skin: Negative for color change.   Allergic/Immunologic: Negative for environmental allergies.  "  Neurological: Positive for headaches. Negative for dizziness.   Hematological: Negative for adenopathy.   Psychiatric/Behavioral: Negative for sleep disturbance and suicidal ideas. The patient is not nervous/anxious.        Objective     /70 (BP Location: Left arm, Patient Position: Sitting, Cuff Size: Adult)   Pulse 74   Temp 97.1 °F (36.2 °C) (Temporal)   Ht 175.3 cm (69.02\")   Wt 66.2 kg (146 lb)   SpO2 96%   BMI 21.55 kg/m²     Physical Exam  Vitals reviewed.   Constitutional:       General: He is not in acute distress.     Appearance: He is well-developed. He is not diaphoretic.   HENT:      Head: Normocephalic and atraumatic.      Right Ear: Hearing, tympanic membrane, ear canal and external ear normal.      Left Ear: Hearing, tympanic membrane, ear canal and external ear normal.      Nose: Nose normal.      Right Sinus: No maxillary sinus tenderness or frontal sinus tenderness.      Left Sinus: No maxillary sinus tenderness or frontal sinus tenderness.      Mouth/Throat:      Pharynx: Uvula midline.   Eyes:      General: Lids are normal.      Conjunctiva/sclera: Conjunctivae normal.      Pupils: Pupils are equal, round, and reactive to light.   Neck:      Trachea: Trachea normal. No tracheal tenderness or tracheal deviation.   Cardiovascular:      Rate and Rhythm: Normal rate and regular rhythm.      Heart sounds: Normal heart sounds, S1 normal and S2 normal. No murmur heard.  No friction rub. No gallop.    Pulmonary:      Effort: Pulmonary effort is normal. No respiratory distress.      Breath sounds: Normal breath sounds.   Abdominal:      General: Bowel sounds are normal. There is no distension.      Palpations: Abdomen is soft.      Tenderness: There is no abdominal tenderness.   Skin:     General: Skin is warm and dry.   Neurological:      Mental Status: He is alert and oriented to person, place, and time.   Psychiatric:         Behavior: Behavior normal.         Thought Content: Thought " content normal.         Judgment: Judgment normal.         Assessment/Plan     Problem List Items Addressed This Visit     None      Visit Diagnoses     Abnormal CT scan    -  Primary    Relevant Orders    SARS-CoV-2 Antibody, IgG ([facility])    SARS-CoV-2 Antibody, IgM    Diarrhea, unspecified type        Relevant Orders    SARS-CoV-2 Antibody, IgG ([facility])    SARS-CoV-2 Antibody, IgM    CBC & Differential    Comprehensive Metabolic Panel    Fever, unspecified fever cause        Relevant Orders    SARS-CoV-2 Antibody, IgG ([facility])    SARS-CoV-2 Antibody, IgM    CBC & Differential    Comprehensive Metabolic Panel          Plan: Get labs to evaluate diarrhea and fever. Get scan report. Get covid antibody levels. Drink lots of fluids. Follow up pending results.     @Body mass index is 21.55 kg/m².              Understands disease processes and need for medications.  Understands reasons for urgent and emergent care.  Patient (& family) verbalized agreement for treatment plan.   Emotional support and active listening provided.  Patient provided time to verbalize feelings.               This document has been electronically signed by JADE Murphy   January 25, 2022 15:46 EST

## 2022-01-26 LAB
ALBUMIN SERPL-MCNC: 3.1 G/DL (ref 3.5–5.2)
ALBUMIN/GLOB SERPL: 1.5 G/DL
ALP SERPL-CCNC: 99 U/L (ref 39–117)
ALT SERPL W P-5'-P-CCNC: 16 U/L (ref 1–41)
ANION GAP SERPL CALCULATED.3IONS-SCNC: 10.3 MMOL/L (ref 5–15)
AST SERPL-CCNC: 19 U/L (ref 1–40)
BASOPHILS # BLD AUTO: 0.01 10*3/MM3 (ref 0–0.2)
BASOPHILS NFR BLD AUTO: 0.2 % (ref 0–1.5)
BILIRUB SERPL-MCNC: 0.3 MG/DL (ref 0–1.2)
BUN SERPL-MCNC: 16 MG/DL (ref 8–23)
BUN/CREAT SERPL: 17.6 (ref 7–25)
CALCIUM SPEC-SCNC: 8.6 MG/DL (ref 8.6–10.5)
CHLORIDE SERPL-SCNC: 105 MMOL/L (ref 98–107)
CO2 SERPL-SCNC: 23.7 MMOL/L (ref 22–29)
CREAT SERPL-MCNC: 0.91 MG/DL (ref 0.76–1.27)
DEPRECATED RDW RBC AUTO: 42.9 FL (ref 37–54)
EOSINOPHIL # BLD AUTO: 0.04 10*3/MM3 (ref 0–0.4)
EOSINOPHIL NFR BLD AUTO: 0.7 % (ref 0.3–6.2)
ERYTHROCYTE [DISTWIDTH] IN BLOOD BY AUTOMATED COUNT: 12.4 % (ref 12.3–15.4)
GFR SERPL CREATININE-BSD FRML MDRD: 81 ML/MIN/1.73
GLOBULIN UR ELPH-MCNC: 2.1 GM/DL
GLUCOSE SERPL-MCNC: 168 MG/DL (ref 65–99)
HCT VFR BLD AUTO: 36.2 % (ref 37.5–51)
HGB BLD-MCNC: 12 G/DL (ref 13–17.7)
IMM GRANULOCYTES # BLD AUTO: 0.04 10*3/MM3 (ref 0–0.05)
IMM GRANULOCYTES NFR BLD AUTO: 0.7 % (ref 0–0.5)
LYMPHOCYTES # BLD AUTO: 0.79 10*3/MM3 (ref 0.7–3.1)
LYMPHOCYTES NFR BLD AUTO: 13.8 % (ref 19.6–45.3)
MCH RBC QN AUTO: 31.7 PG (ref 26.6–33)
MCHC RBC AUTO-ENTMCNC: 33.1 G/DL (ref 31.5–35.7)
MCV RBC AUTO: 95.8 FL (ref 79–97)
MONOCYTES # BLD AUTO: 0.64 10*3/MM3 (ref 0.1–0.9)
MONOCYTES NFR BLD AUTO: 11.2 % (ref 5–12)
NEUTROPHILS NFR BLD AUTO: 4.21 10*3/MM3 (ref 1.7–7)
NEUTROPHILS NFR BLD AUTO: 73.4 % (ref 42.7–76)
NRBC BLD AUTO-RTO: 0 /100 WBC (ref 0–0.2)
PLATELET # BLD AUTO: 292 10*3/MM3 (ref 140–450)
PMV BLD AUTO: 10.3 FL (ref 6–12)
POTASSIUM SERPL-SCNC: 4.9 MMOL/L (ref 3.5–5.2)
PROT SERPL-MCNC: 5.2 G/DL (ref 6–8.5)
RBC # BLD AUTO: 3.78 10*6/MM3 (ref 4.14–5.8)
SODIUM SERPL-SCNC: 139 MMOL/L (ref 136–145)
WBC NRBC COR # BLD: 5.73 10*3/MM3 (ref 3.4–10.8)

## 2022-01-27 ENCOUNTER — TELEPHONE (OUTPATIENT)
Dept: FAMILY MEDICINE CLINIC | Facility: CLINIC | Age: 77
End: 2022-01-27

## 2022-01-27 LAB
SARS-COV-2 AB SERPL-IMP: POSITIVE
SARS-COV-2 IGG SERPL IA-ACNC: >800 AU/ML
SARS-COV-2 IGM SERPL QL IA: NEGATIVE

## 2022-01-27 NOTE — TELEPHONE ENCOUNTER
Caller: Jean Noriega    Relationship: Self    Best call back number: [phone]    What test was performed: LABS     When was the test performed: 1/25/22    Where was the test performed: IN OFFICE     Additional notes:

## 2022-02-14 ENCOUNTER — OFFICE VISIT (OUTPATIENT)
Dept: FAMILY MEDICINE CLINIC | Facility: CLINIC | Age: 77
End: 2022-02-14

## 2022-02-14 VITALS
TEMPERATURE: 97.3 F | HEART RATE: 74 BPM | SYSTOLIC BLOOD PRESSURE: 126 MMHG | OXYGEN SATURATION: 98 % | BODY MASS INDEX: 22.07 KG/M2 | WEIGHT: 149 LBS | HEIGHT: 69 IN | DIASTOLIC BLOOD PRESSURE: 66 MMHG

## 2022-02-14 DIAGNOSIS — I10 BENIGN ESSENTIAL HTN: Chronic | ICD-10-CM

## 2022-02-14 DIAGNOSIS — E11.9 TYPE 2 DIABETES MELLITUS WITHOUT COMPLICATION, WITHOUT LONG-TERM CURRENT USE OF INSULIN: Primary | Chronic | ICD-10-CM

## 2022-02-14 DIAGNOSIS — J31.0 CHRONIC RHINITIS: ICD-10-CM

## 2022-02-14 DIAGNOSIS — E78.2 MIXED HYPERLIPIDEMIA: Chronic | ICD-10-CM

## 2022-02-14 PROCEDURE — 99214 OFFICE O/P EST MOD 30 MIN: CPT | Performed by: FAMILY MEDICINE

## 2022-02-14 PROCEDURE — 80061 LIPID PANEL: CPT | Performed by: FAMILY MEDICINE

## 2022-02-14 PROCEDURE — 80048 BASIC METABOLIC PNL TOTAL CA: CPT | Performed by: FAMILY MEDICINE

## 2022-02-14 PROCEDURE — 36415 COLL VENOUS BLD VENIPUNCTURE: CPT | Performed by: FAMILY MEDICINE

## 2022-02-14 PROCEDURE — 83036 HEMOGLOBIN GLYCOSYLATED A1C: CPT | Performed by: FAMILY MEDICINE

## 2022-02-14 RX ORDER — FLUTICASONE PROPIONATE 50 MCG
2 SPRAY, SUSPENSION (ML) NASAL DAILY
Qty: 16 G | Refills: 1 | Status: SHIPPED | OUTPATIENT
Start: 2022-02-14 | End: 2022-08-02 | Stop reason: SDUPTHER

## 2022-02-14 RX ORDER — PANCRELIPASE 36000; 180000; 114000 [USP'U]/1; [USP'U]/1; [USP'U]/1
CAPSULE, DELAYED RELEASE PELLETS ORAL
COMMUNITY
Start: 2022-02-10

## 2022-02-14 NOTE — PROGRESS NOTES
Venipuncture Blood Specimen Collection  Venipuncture performed in left arm by Jessica Foley MA with good hemostasis. Patient tolerated the procedure well without complications.   02/14/22   Jessica Foley MA

## 2022-02-14 NOTE — PROGRESS NOTES
Subjective   Jean Noriega is a 77 y.o. male.     History of Present Illness follow-up diabetes hypertension dyslipidemia.  Has been visiting with GI and new medications are noted.  Records reviewed.  Lab testing imaging results reviewed from January.  Had negative Covid test and essentially feels pretty good.  Stays very active.  Denies respiratory CDV GI  changes at this time.  Concerned about weight staying low.  Liberalized diet some.    The following portions of the patient's history were reviewed and updated as appropriate: allergies, past family history, past medical history, past social history, past surgical history and problem list.    Review of Systems  See history of Present Illness     Objective     Physical Exam  Vitals reviewed.   Constitutional:       Appearance: Normal appearance.   HENT:      Head: Normocephalic.   Cardiovascular:      Rate and Rhythm: Normal rate and regular rhythm.      Heart sounds: Normal heart sounds.   Pulmonary:      Effort: Pulmonary effort is normal.      Breath sounds: Normal breath sounds.   Musculoskeletal:      Cervical back: Normal range of motion and neck supple.   Skin:     General: Skin is warm and dry.   Neurological:      Mental Status: He is alert and oriented to person, place, and time.   Psychiatric:         Mood and Affect: Mood normal.         PHQ-9 Total Score:      Body mass index is 21.99 kg/m².       Assessment/Plan     Diagnoses and all orders for this visit:    1. Type 2 diabetes mellitus without complication, without long-term current use of insulin (HCC) (Primary)  -     Basic Metabolic Panel  -     Hemoglobin A1c  -     Lipid Panel    2. Mixed hyperlipidemia    3. Benign essential HTN    4. Chronic rhinitis  -     fluticasone (Flonase) 50 MCG/ACT nasal spray; 2 sprays into the nostril(s) as directed by provider Daily.  Dispense: 16 g; Refill: 1    Overall you seem fairly stable.  We will check labs as noted.  Continue medications as reconciled  ordered.  Keep follow-up course with GI.  You expressed no interest in third Covid vaccine and actually states she may never take another flu shot.  I counseled about the benefits of both.  Recheck in 4 to 6 months or as needed.                     This document has been electronically signed by Demetrius Wilkerson MD   February 14, 2022 12:34 EST    Part of this note may be an electronic transcription/translation of spoken language to printed text using the Dragon Dictation System.

## 2022-02-15 LAB
ANION GAP SERPL CALCULATED.3IONS-SCNC: 6.6 MMOL/L (ref 5–15)
BUN SERPL-MCNC: 13 MG/DL (ref 8–23)
BUN/CREAT SERPL: 13 (ref 7–25)
CALCIUM SPEC-SCNC: 8.4 MG/DL (ref 8.6–10.5)
CHLORIDE SERPL-SCNC: 102 MMOL/L (ref 98–107)
CHOLEST SERPL-MCNC: 101 MG/DL (ref 0–200)
CO2 SERPL-SCNC: 27.4 MMOL/L (ref 22–29)
CREAT SERPL-MCNC: 1 MG/DL (ref 0.76–1.27)
GFR SERPL CREATININE-BSD FRML MDRD: 73 ML/MIN/1.73
GLUCOSE SERPL-MCNC: 238 MG/DL (ref 65–99)
HBA1C MFR BLD: 6.7 % (ref 4.8–5.6)
HDLC SERPL-MCNC: 48 MG/DL (ref 40–60)
LDLC SERPL CALC-MCNC: 34 MG/DL (ref 0–100)
LDLC/HDLC SERPL: 0.68 {RATIO}
POTASSIUM SERPL-SCNC: 4.3 MMOL/L (ref 3.5–5.2)
SODIUM SERPL-SCNC: 136 MMOL/L (ref 136–145)
TRIGL SERPL-MCNC: 103 MG/DL (ref 0–150)
VLDLC SERPL-MCNC: 19 MG/DL (ref 5–40)

## 2022-02-15 NOTE — PROGRESS NOTES
Lab testing shows good stable results.  Continue all same.  Cholesterol profile good.  Average blood sugar good.

## 2022-03-07 DIAGNOSIS — E11.9 TYPE 2 DIABETES MELLITUS WITHOUT COMPLICATION, WITHOUT LONG-TERM CURRENT USE OF INSULIN: ICD-10-CM

## 2022-03-07 RX ORDER — BLOOD SUGAR DIAGNOSTIC
1 STRIP MISCELLANEOUS DAILY
Qty: 50 EACH | Refills: 0 | Status: SHIPPED | OUTPATIENT
Start: 2022-03-07 | End: 2022-12-09 | Stop reason: SDUPTHER

## 2022-03-07 RX ORDER — LANCETS 33 GAUGE
1 EACH MISCELLANEOUS DAILY
Qty: 100 EACH | Refills: 0 | Status: SHIPPED | OUTPATIENT
Start: 2022-03-07 | End: 2023-02-03 | Stop reason: SDUPTHER

## 2022-03-07 NOTE — TELEPHONE ENCOUNTER
Caller: Jean Noriega    Relationship: Self    Best call back number: [phone]    Requested Prescriptions:   Requested Prescriptions     Pending Prescriptions Disp Refills   • glucose blood (OneTouch Verio) test strip 50 each 0     Si each by Other route Daily. Use as instructed   • Lancets (OneTouch Delica Plus [id number]) misc 100 each 0     Si each by Other route Daily.        Pharmacy where request should be sent: [facility] [address] [place], [place] [id number] [place], [place] [phone] FX     Additional details provided by patient: PATIENT HAS 3 DAYS REMAINING ON THESE MEDICATIONS    Does the patient have less than a 3 day supply:  [x] Yes  [] No    Trish Padilla Rep   22 10:15 EST

## 2022-04-05 RX ORDER — GLIMEPIRIDE 2 MG/1
TABLET ORAL
Qty: 90 TABLET | Refills: 1 | Status: SHIPPED | OUTPATIENT
Start: 2022-04-05 | End: 2022-10-05 | Stop reason: SDUPTHER

## 2022-04-26 ENCOUNTER — HOSPITAL ENCOUNTER (EMERGENCY)
Facility: HOSPITAL | Age: 77
Discharge: HOME | End: 2022-04-27
Attending: STUDENT IN AN ORGANIZED HEALTH CARE EDUCATION/TRAINING PROGRAM | Admitting: STUDENT IN AN ORGANIZED HEALTH CARE EDUCATION/TRAINING PROGRAM

## 2022-04-26 DIAGNOSIS — R55 SYNCOPE, UNSPECIFIED SYNCOPE TYPE: Primary | ICD-10-CM

## 2022-04-26 LAB
ALBUMIN SERPL-MCNC: 3.22 G/DL (ref 3.5–5.2)
ALBUMIN/GLOB SERPL: 1.5 G/DL
ALP SERPL-CCNC: 70 U/L (ref 39–117)
ALT SERPL W P-5'-P-CCNC: 21 U/L (ref 1–41)
ANION GAP SERPL CALCULATED.3IONS-SCNC: 12.3 MMOL/L (ref 5–15)
AST SERPL-CCNC: 23 U/L (ref 1–40)
BASOPHILS # BLD AUTO: 0.03 10*3/MM3 (ref 0–0.2)
BASOPHILS NFR BLD AUTO: 0.3 % (ref 0–1.5)
BILIRUB SERPL-MCNC: 0.2 MG/DL (ref 0–1.2)
BUN SERPL-MCNC: 13 MG/DL (ref 8–23)
BUN/CREAT SERPL: 11.6 (ref 7–25)
CALCIUM SPEC-SCNC: 8.4 MG/DL (ref 8.6–10.5)
CHLORIDE SERPL-SCNC: 103 MMOL/L (ref 98–107)
CO2 SERPL-SCNC: 23.7 MMOL/L (ref 22–29)
CREAT SERPL-MCNC: 1.12 MG/DL (ref 0.76–1.27)
CRP SERPL-MCNC: <0.3 MG/DL (ref 0–0.5)
DEPRECATED RDW RBC AUTO: 45.4 FL (ref 37–54)
EGFRCR SERPLBLD CKD-EPI 2021: 68.5 ML/MIN/1.73
EOSINOPHIL # BLD AUTO: 0.08 10*3/MM3 (ref 0–0.4)
EOSINOPHIL NFR BLD AUTO: 0.7 % (ref 0.3–6.2)
ERYTHROCYTE [DISTWIDTH] IN BLOOD BY AUTOMATED COUNT: 13 % (ref 12.3–15.4)
FLUAV RNA RESP QL NAA+PROBE: NOT DETECTED
FLUBV RNA RESP QL NAA+PROBE: NOT DETECTED
GLOBULIN UR ELPH-MCNC: 2.1 GM/DL
GLUCOSE BLDC GLUCOMTR-MCNC: 177 MG/DL (ref 70–130)
GLUCOSE SERPL-MCNC: 200 MG/DL (ref 65–99)
HCT VFR BLD AUTO: 35.6 % (ref 37.5–51)
HGB BLD-MCNC: 11.9 G/DL (ref 13–17.7)
IMM GRANULOCYTES # BLD AUTO: 0.05 10*3/MM3 (ref 0–0.05)
IMM GRANULOCYTES NFR BLD AUTO: 0.4 % (ref 0–0.5)
LYMPHOCYTES # BLD AUTO: 0.81 10*3/MM3 (ref 0.7–3.1)
LYMPHOCYTES NFR BLD AUTO: 6.8 % (ref 19.6–45.3)
MAGNESIUM SERPL-MCNC: 1.6 MG/DL (ref 1.6–2.4)
MCH RBC QN AUTO: 32.1 PG (ref 26.6–33)
MCHC RBC AUTO-ENTMCNC: 33.4 G/DL (ref 31.5–35.7)
MCV RBC AUTO: 96 FL (ref 79–97)
MONOCYTES # BLD AUTO: 1.15 10*3/MM3 (ref 0.1–0.9)
MONOCYTES NFR BLD AUTO: 9.6 % (ref 5–12)
NEUTROPHILS NFR BLD AUTO: 82.2 % (ref 42.7–76)
NEUTROPHILS NFR BLD AUTO: 9.86 10*3/MM3 (ref 1.7–7)
NRBC BLD AUTO-RTO: 0 /100 WBC (ref 0–0.2)
NT-PROBNP SERPL-MCNC: 48.3 PG/ML (ref 0–1800)
PLATELET # BLD AUTO: 192 10*3/MM3 (ref 140–450)
PMV BLD AUTO: 9.5 FL (ref 6–12)
POTASSIUM SERPL-SCNC: 4.8 MMOL/L (ref 3.5–5.2)
PROT SERPL-MCNC: 5.3 G/DL (ref 6–8.5)
RBC # BLD AUTO: 3.71 10*6/MM3 (ref 4.14–5.8)
SARS-COV-2 RNA RESP QL NAA+PROBE: NOT DETECTED
SODIUM SERPL-SCNC: 139 MMOL/L (ref 136–145)
TROPONIN T SERPL-MCNC: <0.01 NG/ML (ref 0–0.03)
TSH SERPL DL<=0.05 MIU/L-ACNC: 2.46 UIU/ML (ref 0.27–4.2)
WBC NRBC COR # BLD: 11.98 10*3/MM3 (ref 3.4–10.8)

## 2022-04-26 PROCEDURE — 83735 ASSAY OF MAGNESIUM: CPT | Performed by: STUDENT IN AN ORGANIZED HEALTH CARE EDUCATION/TRAINING PROGRAM

## 2022-04-26 PROCEDURE — 93010 ELECTROCARDIOGRAM REPORT: CPT | Performed by: INTERNAL MEDICINE

## 2022-04-26 PROCEDURE — 36415 COLL VENOUS BLD VENIPUNCTURE: CPT

## 2022-04-26 PROCEDURE — 82962 GLUCOSE BLOOD TEST: CPT

## 2022-04-26 PROCEDURE — 84443 ASSAY THYROID STIM HORMONE: CPT | Performed by: STUDENT IN AN ORGANIZED HEALTH CARE EDUCATION/TRAINING PROGRAM

## 2022-04-26 PROCEDURE — 84484 ASSAY OF TROPONIN QUANT: CPT | Performed by: STUDENT IN AN ORGANIZED HEALTH CARE EDUCATION/TRAINING PROGRAM

## 2022-04-26 PROCEDURE — 87636 SARSCOV2 & INF A&B AMP PRB: CPT | Performed by: STUDENT IN AN ORGANIZED HEALTH CARE EDUCATION/TRAINING PROGRAM

## 2022-04-26 PROCEDURE — 99283 EMERGENCY DEPT VISIT LOW MDM: CPT

## 2022-04-26 PROCEDURE — 85652 RBC SED RATE AUTOMATED: CPT | Performed by: STUDENT IN AN ORGANIZED HEALTH CARE EDUCATION/TRAINING PROGRAM

## 2022-04-26 PROCEDURE — C9803 HOPD COVID-19 SPEC COLLECT: HCPCS

## 2022-04-26 PROCEDURE — 93005 ELECTROCARDIOGRAM TRACING: CPT | Performed by: EMERGENCY MEDICINE

## 2022-04-26 PROCEDURE — 80053 COMPREHEN METABOLIC PANEL: CPT | Performed by: STUDENT IN AN ORGANIZED HEALTH CARE EDUCATION/TRAINING PROGRAM

## 2022-04-26 PROCEDURE — 93005 ELECTROCARDIOGRAM TRACING: CPT | Performed by: STUDENT IN AN ORGANIZED HEALTH CARE EDUCATION/TRAINING PROGRAM

## 2022-04-26 PROCEDURE — 86140 C-REACTIVE PROTEIN: CPT | Performed by: STUDENT IN AN ORGANIZED HEALTH CARE EDUCATION/TRAINING PROGRAM

## 2022-04-26 PROCEDURE — 85025 COMPLETE CBC W/AUTO DIFF WBC: CPT | Performed by: STUDENT IN AN ORGANIZED HEALTH CARE EDUCATION/TRAINING PROGRAM

## 2022-04-26 PROCEDURE — 83880 ASSAY OF NATRIURETIC PEPTIDE: CPT | Performed by: STUDENT IN AN ORGANIZED HEALTH CARE EDUCATION/TRAINING PROGRAM

## 2022-04-27 VITALS
HEIGHT: 69 IN | OXYGEN SATURATION: 98 % | HEART RATE: 61 BPM | RESPIRATION RATE: 16 BRPM | BODY MASS INDEX: 21.48 KG/M2 | DIASTOLIC BLOOD PRESSURE: 63 MMHG | SYSTOLIC BLOOD PRESSURE: 116 MMHG | TEMPERATURE: 98.4 F | WEIGHT: 145 LBS

## 2022-04-27 LAB
ERYTHROCYTE [SEDIMENTATION RATE] IN BLOOD: <1 MM/HR (ref 0–20)
HOLD SPECIMEN: NORMAL
HOLD SPECIMEN: NORMAL
QT INTERVAL: 400 MS
QTC INTERVAL: 400 MS
WHOLE BLOOD HOLD SPECIMEN: NORMAL
WHOLE BLOOD HOLD SPECIMEN: NORMAL

## 2022-04-28 ENCOUNTER — OFFICE VISIT (OUTPATIENT)
Dept: FAMILY MEDICINE CLINIC | Facility: CLINIC | Age: 77
End: 2022-04-28

## 2022-04-28 ENCOUNTER — HOSPITAL ENCOUNTER (OUTPATIENT)
Dept: RESPIRATORY THERAPY | Facility: HOSPITAL | Age: 77
Discharge: HOME | End: 2022-04-28
Admitting: NURSE PRACTITIONER

## 2022-04-28 VITALS
BODY MASS INDEX: 21.53 KG/M2 | HEIGHT: 69 IN | OXYGEN SATURATION: 98 % | DIASTOLIC BLOOD PRESSURE: 63 MMHG | HEART RATE: 53 BPM | TEMPERATURE: 97.5 F | WEIGHT: 145.4 LBS | SYSTOLIC BLOOD PRESSURE: 122 MMHG

## 2022-04-28 DIAGNOSIS — R55 SYNCOPE, UNSPECIFIED SYNCOPE TYPE: Primary | ICD-10-CM

## 2022-04-28 DIAGNOSIS — D64.9 ANEMIA, UNSPECIFIED TYPE: ICD-10-CM

## 2022-04-28 DIAGNOSIS — C17.0 DUODENAL CANCER: ICD-10-CM

## 2022-04-28 DIAGNOSIS — R55 SYNCOPE, UNSPECIFIED SYNCOPE TYPE: ICD-10-CM

## 2022-04-28 PROCEDURE — 99213 OFFICE O/P EST LOW 20 MIN: CPT | Performed by: NURSE PRACTITIONER

## 2022-04-28 PROCEDURE — 84466 ASSAY OF TRANSFERRIN: CPT | Performed by: NURSE PRACTITIONER

## 2022-04-28 PROCEDURE — 93246 EXT ECG>7D<15D RECORDING: CPT

## 2022-04-28 PROCEDURE — 83540 ASSAY OF IRON: CPT | Performed by: NURSE PRACTITIONER

## 2022-04-28 PROCEDURE — 36415 COLL VENOUS BLD VENIPUNCTURE: CPT | Performed by: NURSE PRACTITIONER

## 2022-04-28 RX ORDER — CYANOCOBALAMIN 1000 UG/ML
1000 INJECTION, SOLUTION INTRAMUSCULAR; SUBCUTANEOUS
Status: DISCONTINUED | OUTPATIENT
Start: 2022-04-28 | End: 2022-05-02

## 2022-04-29 LAB
IRON 24H UR-MRATE: 87 MCG/DL (ref 59–158)
IRON SATN MFR SERPL: 19 % (ref 20–50)
TIBC SERPL-MCNC: 447 MCG/DL (ref 298–536)
TRANSFERRIN SERPL-MCNC: 300 MG/DL (ref 200–360)

## 2022-05-02 ENCOUNTER — TELEPHONE (OUTPATIENT)
Dept: FAMILY MEDICINE CLINIC | Facility: CLINIC | Age: 77
End: 2022-05-02

## 2022-05-02 DIAGNOSIS — R53.83 OTHER FATIGUE: Primary | ICD-10-CM

## 2022-05-02 RX ORDER — CYANOCOBALAMIN 1000 UG/ML
1000 INJECTION, SOLUTION INTRAMUSCULAR; SUBCUTANEOUS WEEKLY
Status: SHIPPED | OUTPATIENT
Start: 2022-05-02

## 2022-05-02 NOTE — TELEPHONE ENCOUNTER
Caller: Aliya Noriega    Relationship: Emergency Contact    Best call back number: [phone]    What is the best time to reach you: ANYTIME    Who are you requesting to speak with (clinical staff, provider,  specific staff member): CLINICAL STAFF     What was the call regarding: PATIENT'S WIFE IS CALLING IN REGARDS TO THE B-12 INJECTION. SHE STATES THAT WHILE THEY WERE IN THE OFFICE SHE DECIDED JUST TO BRING THE PATIENT INTO THE OFFICE TO HAVE THEM ADMINISTERED. HOWEVER, THERE IS A PRESCRIPTION AT THE PHARMACY FOR THE B-12. SHE WOULD LIKE TO KNOW IF THIS NEEDS TO BE BROUGHT INTO THE OFFICE FOR IT TO BE ADMINISTERED TO THE PATIENT.     Do you require a callback: YES

## 2022-05-02 NOTE — TELEPHONE ENCOUNTER
Aliya called back stated she isn't going to  the medication she is just going to send him down here to get the shot here instead. Please place orders in the system to accommodate the frequency you want for patient.

## 2022-05-04 ENCOUNTER — REFERRAL TRIAGE (OUTPATIENT)
Dept: CASE MANAGEMENT | Facility: OTHER | Age: 77
End: 2022-05-04

## 2022-05-04 ENCOUNTER — CLINICAL SUPPORT (OUTPATIENT)
Dept: FAMILY MEDICINE CLINIC | Facility: CLINIC | Age: 77
End: 2022-05-04

## 2022-05-04 DIAGNOSIS — E63.9 NUTRITIONAL DEFICIENCY: Primary | ICD-10-CM

## 2022-05-04 DIAGNOSIS — E53.8 B12 DEFICIENCY: ICD-10-CM

## 2022-05-04 PROCEDURE — 96372 THER/PROPH/DIAG INJ SC/IM: CPT | Performed by: NURSE PRACTITIONER

## 2022-05-04 RX ADMIN — CYANOCOBALAMIN 1000 MCG: 1000 INJECTION, SOLUTION INTRAMUSCULAR; SUBCUTANEOUS at 10:29

## 2022-05-04 NOTE — PROGRESS NOTES
Injection  Injection performed in Left deltoid by Jessica Foley MA. Patient tolerated the procedure well without complications.  05/04/22   Jessica Foley MA       -

## 2022-05-05 ENCOUNTER — PATIENT OUTREACH (OUTPATIENT)
Dept: CASE MANAGEMENT | Facility: OTHER | Age: 77
End: 2022-05-05

## 2022-05-05 NOTE — OUTREACH NOTE
AMBULATORY CASE MANAGEMENT NOTE    Name and Relationship of Patient/Support Person: Jean Noriega S - Self    Patient Outreach    RN-ACM outreach with patient referred for assistance and support with nutritional deficiency.  Nursing care plan, assessment, and education modules created this date.  General discussion with patient who was traveling at the time of call.  Additional outreach scheduled.    Patient Outreach    Extended conversation with patient's spouse, Aliya (per patient's request).  Patient follows regularly with GI and has discussed concerns related to weight loss.  Patient/spouse attribute the loss to mal-absorption.  Patient had a Whipple in 2004.  Education provided on protein drinks and powder supplements as well as measures to make the products more palatable.  Patient is encouraged to add weight to his daily bs/bp log.   Six small meals/snacks encouraged over three large meals per day.      Adult Patient Profile  Questions/Answers    Flowsheet Row Most Recent Value   Symptoms/Conditions Managed at Home gastrointestinal, cancer   Barriers to Managing Health treatment side effects, stress of chronic illness   Cancer Symptoms/Conditions bladder, other (see comments)  [duodenal cancer (whipple)]   Cancer Management Strategies diet modification, medication therapy   Gastrointestinal Symptoms/Conditions reflux/heartburn, other (see comments)  [nutritional deficiency]   Gastrointestinal Management Strategies diet modification, medication therapy   Identifying Health Goals keep illness under control   Nutrition Risk Screen other (see comments), reduced oral intake over the last month  [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  ]   Have you recently lost weight without trying?  If yes, how much weight have you lost? 1--> Yes, 2-13 lbs   Have you been eating poorly because of a decreased appetite? 1--> Yes   MST score 2          SLY TRIVEDI  Ambulatory Case Management    5/5/2022, 15:31 EDT

## 2022-05-11 ENCOUNTER — CLINICAL SUPPORT (OUTPATIENT)
Dept: FAMILY MEDICINE CLINIC | Facility: CLINIC | Age: 77
End: 2022-05-11

## 2022-05-11 DIAGNOSIS — E53.8 B12 DEFICIENCY: ICD-10-CM

## 2022-05-11 PROCEDURE — 96372 THER/PROPH/DIAG INJ SC/IM: CPT | Performed by: NURSE PRACTITIONER

## 2022-05-11 RX ADMIN — CYANOCOBALAMIN 1000 MCG: 1000 INJECTION, SOLUTION INTRAMUSCULAR; SUBCUTANEOUS at 09:03

## 2022-05-11 NOTE — PROGRESS NOTES
Injection  Injection performed in left deltoid by Jessica Foley MA. Patient tolerated the procedure well without complications.  05/11/22   Jessica Foley MA

## 2022-05-16 ENCOUNTER — TELEPHONE (OUTPATIENT)
Dept: FAMILY MEDICINE CLINIC | Facility: CLINIC | Age: 77
End: 2022-05-16

## 2022-05-16 NOTE — TELEPHONE ENCOUNTER
Patients wife called in and states that [facility] Cardiologist DR Brandon Lua in [place] wants a copy of the data and reports from his recent holter monitor he has an appt on 05/23 with him.

## 2022-05-18 ENCOUNTER — CLINICAL SUPPORT (OUTPATIENT)
Dept: FAMILY MEDICINE CLINIC | Facility: CLINIC | Age: 77
End: 2022-05-18

## 2022-05-18 DIAGNOSIS — E53.8 B12 DEFICIENCY: ICD-10-CM

## 2022-05-18 PROCEDURE — 96372 THER/PROPH/DIAG INJ SC/IM: CPT | Performed by: NURSE PRACTITIONER

## 2022-05-18 RX ADMIN — CYANOCOBALAMIN 1000 MCG: 1000 INJECTION, SOLUTION INTRAMUSCULAR; SUBCUTANEOUS at 09:39

## 2022-05-18 NOTE — PROGRESS NOTES
Injection  Injection performed in Left deltoid  by Jessica Foley MA. Patient tolerated the procedure well without complications.  05/18/22   Jessica Foley MA

## 2022-05-20 ENCOUNTER — TELEPHONE (OUTPATIENT)
Dept: FAMILY MEDICINE CLINIC | Facility: CLINIC | Age: 77
End: 2022-05-20

## 2022-05-20 PROCEDURE — 93248 EXT ECG>7D<15D REV&INTERPJ: CPT | Performed by: SPECIALIST

## 2022-05-20 NOTE — TELEPHONE ENCOUNTER
Caller: Aliya Noriega    Relationship: Emergency Contact    Best call back number: [phone]    What is the best time to reach you: ANYTIME     Who are you requesting to speak with (clinical staff, provider,  specific staff member): CLINICAL STAFF     What was the call regarding: PATIENT'S WIFE IS CALLING TO SEE IF THEY CAN RECEIVE THE RESULTS OF THE HEART MONITOR. SHE SAYS THAT THE PATIENT HAS AN UPCOMING APPOINTMENT IN [place] WITH THE CARDIOLOGIST AND THEY ARE NEEDING THESE RESULTS.     Do you require a callback: YES

## 2022-06-13 ENCOUNTER — OFFICE VISIT (OUTPATIENT)
Dept: FAMILY MEDICINE CLINIC | Facility: CLINIC | Age: 77
End: 2022-06-13

## 2022-06-13 VITALS
SYSTOLIC BLOOD PRESSURE: 110 MMHG | BODY MASS INDEX: 21.48 KG/M2 | WEIGHT: 145 LBS | OXYGEN SATURATION: 98 % | TEMPERATURE: 97.8 F | RESPIRATION RATE: 18 BRPM | DIASTOLIC BLOOD PRESSURE: 56 MMHG | HEART RATE: 64 BPM | HEIGHT: 69 IN

## 2022-06-13 DIAGNOSIS — S51.811A LACERATION OF RIGHT FOREARM, INITIAL ENCOUNTER: Primary | ICD-10-CM

## 2022-06-13 DIAGNOSIS — S51.832A PUNCTURE WOUND OF LEFT FOREARM, INITIAL ENCOUNTER: ICD-10-CM

## 2022-06-13 DIAGNOSIS — S80.811A ABRASION, RIGHT LOWER LEG, INITIAL ENCOUNTER: ICD-10-CM

## 2022-06-13 PROCEDURE — 99213 OFFICE O/P EST LOW 20 MIN: CPT | Performed by: NURSE PRACTITIONER

## 2022-06-13 RX ORDER — CEPHALEXIN 500 MG/1
500 CAPSULE ORAL 4 TIMES DAILY
Qty: 40 CAPSULE | Refills: 0 | Status: SHIPPED | OUTPATIENT
Start: 2022-06-13 | End: 2022-08-02
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
## 2022-06-20 ENCOUNTER — OFFICE VISIT (OUTPATIENT)
Dept: FAMILY MEDICINE CLINIC | Facility: CLINIC | Age: 77
End: 2022-06-20

## 2022-06-20 VITALS
DIASTOLIC BLOOD PRESSURE: 66 MMHG | HEIGHT: 69 IN | TEMPERATURE: 97.3 F | SYSTOLIC BLOOD PRESSURE: 126 MMHG | BODY MASS INDEX: 22.19 KG/M2 | WEIGHT: 149.8 LBS | OXYGEN SATURATION: 99 % | HEART RATE: 66 BPM

## 2022-06-20 DIAGNOSIS — E78.2 MIXED HYPERLIPIDEMIA: ICD-10-CM

## 2022-06-20 DIAGNOSIS — E11.9 TYPE 2 DIABETES MELLITUS WITHOUT COMPLICATION, WITHOUT LONG-TERM CURRENT USE OF INSULIN: Primary | ICD-10-CM

## 2022-06-20 DIAGNOSIS — E53.8 B12 DEFICIENCY: ICD-10-CM

## 2022-06-20 DIAGNOSIS — E63.9 NUTRITIONAL DEFICIENCY: ICD-10-CM

## 2022-06-20 DIAGNOSIS — I10 BENIGN ESSENTIAL HTN: ICD-10-CM

## 2022-06-20 PROCEDURE — 36415 COLL VENOUS BLD VENIPUNCTURE: CPT | Performed by: FAMILY MEDICINE

## 2022-06-20 PROCEDURE — 85025 COMPLETE CBC W/AUTO DIFF WBC: CPT | Performed by: FAMILY MEDICINE

## 2022-06-20 PROCEDURE — 80053 COMPREHEN METABOLIC PANEL: CPT | Performed by: FAMILY MEDICINE

## 2022-06-20 PROCEDURE — 82607 VITAMIN B-12: CPT | Performed by: FAMILY MEDICINE

## 2022-06-20 PROCEDURE — 83036 HEMOGLOBIN GLYCOSYLATED A1C: CPT | Performed by: FAMILY MEDICINE

## 2022-06-20 PROCEDURE — 99214 OFFICE O/P EST MOD 30 MIN: CPT | Performed by: FAMILY MEDICINE

## 2022-06-20 NOTE — PROGRESS NOTES
Venipuncture Blood Specimen Collection  Venipuncture performed in Left arm by Jessica Foley MA with good hemostasis. Patient tolerated the procedure well without complications.   06/20/22   Jessica Foley MA

## 2022-06-21 LAB
ALBUMIN SERPL-MCNC: 3.6 G/DL (ref 3.5–5.2)
ALBUMIN/GLOB SERPL: 2.1 G/DL
ALP SERPL-CCNC: 70 U/L (ref 39–117)
ALT SERPL W P-5'-P-CCNC: 20 U/L (ref 1–41)
ANION GAP SERPL CALCULATED.3IONS-SCNC: 10.2 MMOL/L (ref 5–15)
AST SERPL-CCNC: 20 U/L (ref 1–40)
BASOPHILS # BLD AUTO: 0.03 10*3/MM3 (ref 0–0.2)
BASOPHILS NFR BLD AUTO: 0.5 % (ref 0–1.5)
BILIRUB SERPL-MCNC: 0.2 MG/DL (ref 0–1.2)
BUN SERPL-MCNC: 15 MG/DL (ref 8–23)
BUN/CREAT SERPL: 15.5 (ref 7–25)
CALCIUM SPEC-SCNC: 9.1 MG/DL (ref 8.6–10.5)
CHLORIDE SERPL-SCNC: 104 MMOL/L (ref 98–107)
CO2 SERPL-SCNC: 22.8 MMOL/L (ref 22–29)
CREAT SERPL-MCNC: 0.97 MG/DL (ref 0.76–1.27)
DEPRECATED RDW RBC AUTO: 44.4 FL (ref 37–54)
EGFRCR SERPLBLD CKD-EPI 2021: 81.4 ML/MIN/1.73
EOSINOPHIL # BLD AUTO: 0.1 10*3/MM3 (ref 0–0.4)
EOSINOPHIL NFR BLD AUTO: 1.6 % (ref 0.3–6.2)
ERYTHROCYTE [DISTWIDTH] IN BLOOD BY AUTOMATED COUNT: 12.4 % (ref 12.3–15.4)
GLOBULIN UR ELPH-MCNC: 1.7 GM/DL
GLUCOSE SERPL-MCNC: 223 MG/DL (ref 65–99)
HBA1C MFR BLD: 7 % (ref 4.8–5.6)
HCT VFR BLD AUTO: 38.1 % (ref 37.5–51)
HGB BLD-MCNC: 12.4 G/DL (ref 13–17.7)
IMM GRANULOCYTES # BLD AUTO: 0.04 10*3/MM3 (ref 0–0.05)
IMM GRANULOCYTES NFR BLD AUTO: 0.7 % (ref 0–0.5)
LYMPHOCYTES # BLD AUTO: 1.02 10*3/MM3 (ref 0.7–3.1)
LYMPHOCYTES NFR BLD AUTO: 16.6 % (ref 19.6–45.3)
MCH RBC QN AUTO: 32 PG (ref 26.6–33)
MCHC RBC AUTO-ENTMCNC: 32.5 G/DL (ref 31.5–35.7)
MCV RBC AUTO: 98.2 FL (ref 79–97)
MONOCYTES # BLD AUTO: 0.51 10*3/MM3 (ref 0.1–0.9)
MONOCYTES NFR BLD AUTO: 8.3 % (ref 5–12)
NEUTROPHILS NFR BLD AUTO: 4.43 10*3/MM3 (ref 1.7–7)
NEUTROPHILS NFR BLD AUTO: 72.3 % (ref 42.7–76)
NRBC BLD AUTO-RTO: 0 /100 WBC (ref 0–0.2)
PLATELET # BLD AUTO: 212 10*3/MM3 (ref 140–450)
PMV BLD AUTO: 10.9 FL (ref 6–12)
POTASSIUM SERPL-SCNC: 4.7 MMOL/L (ref 3.5–5.2)
PROT SERPL-MCNC: 5.3 G/DL (ref 6–8.5)
RBC # BLD AUTO: 3.88 10*6/MM3 (ref 4.14–5.8)
SODIUM SERPL-SCNC: 137 MMOL/L (ref 136–145)
VIT B12 BLD-MCNC: 558 PG/ML (ref 211–946)
WBC NRBC COR # BLD: 6.13 10*3/MM3 (ref 3.4–10.8)

## 2022-06-21 NOTE — PROGRESS NOTES
Lab testing stable.  Blood sugar good.  Needs to be eating more protein as discussed.  Stay well-hydrated.

## 2022-07-08 ENCOUNTER — TELEPHONE (OUTPATIENT)
Dept: FAMILY MEDICINE CLINIC | Facility: CLINIC | Age: 77
End: 2022-07-08

## 2022-07-08 NOTE — TELEPHONE ENCOUNTER
Called [phone] spoke with Aliya notified to have patient increase water intake and add Gatorade half and half, take frequent breaks and avoid the heat during the hottest points of the day. Instructed to not take any potassium supplements. She verbalized understanding

## 2022-07-08 NOTE — TELEPHONE ENCOUNTER
Caller: Alyia Noriega     Relationship: WIFE    Best call back number: [phone]    What is your medical concern? 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 SHE WOULD LIKE TO KNOW WHAT HE CAN TAKE TO HELP RETAIN NUTRIENTS. CAN HE TAKE SOMETHING LIKE POTASSIUM AND OTHER SUPPLEMENTS.     HE HAS HAD A HARD TIME SLEEPING BECAUSE OF HIS BODY CRAMPING AT NIGHT.    PLEASE CALL AND ADVISE

## 2022-08-02 ENCOUNTER — OFFICE VISIT (OUTPATIENT)
Dept: FAMILY MEDICINE CLINIC | Facility: CLINIC | Age: 77
End: 2022-08-02

## 2022-08-02 VITALS
BODY MASS INDEX: 21.59 KG/M2 | TEMPERATURE: 97.8 F | OXYGEN SATURATION: 96 % | HEART RATE: 74 BPM | WEIGHT: 145.8 LBS | HEIGHT: 69 IN | DIASTOLIC BLOOD PRESSURE: 65 MMHG | SYSTOLIC BLOOD PRESSURE: 112 MMHG

## 2022-08-02 DIAGNOSIS — M65.311 TRIGGER FINGER OF RIGHT THUMB: Primary | ICD-10-CM

## 2022-08-02 DIAGNOSIS — J31.0 CHRONIC RHINITIS: ICD-10-CM

## 2022-08-02 PROCEDURE — 99213 OFFICE O/P EST LOW 20 MIN: CPT | Performed by: INTERNAL MEDICINE

## 2022-08-02 RX ORDER — FLUTICASONE PROPIONATE 50 MCG
2 SPRAY, SUSPENSION (ML) NASAL DAILY
Qty: 16 G | Refills: 5 | Status: SHIPPED | OUTPATIENT
Start: 2022-08-02 | End: 2023-03-22

## 2022-08-02 NOTE — PROGRESS NOTES
Patient Name: Jean Noriega Today's Date: 2022   Patient MRN / CSN: [id number] / [id number] Date of Encounter: 2022   Patient Age / : 77 y.o. / 1945 Encounter Provider: Fannie Haynes DO   Referring Physician: No ref. provider found          Jean is a 77 y.o. male who is being seen today for finger problem and transition of care       Jean presents today to transition care from Dr. Wilkerson to myself.  He complains of right thumb pain and difficulty extending his right thumb over the past week.  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  He has noted being very active lately as he is trying to rebuild an old barn.      Allergies include:Percocet [oxycodone-acetaminophen]  Current Outpatient Medications   Medication Sig Dispense Refill   • ASPIRIN 81 PO aspirin 81 mg tablet     • atorvastatin (LIPITOR) 40 MG tablet Take 40 mg by mouth Daily.     • Creon [id number] units capsule delayed-release particles capsule TAKE 3 CAPSULES BY MOUTH BEFORE MEAL(S) AND 1 CAPSULE BEFORE SNACKS     • fluticasone (Flonase) 50 MCG/ACT nasal spray 2 sprays into the nostril(s) as directed by provider Daily. 16 g 5   • glimepiride (AMARYL) 2 MG tablet TAKE 1 TABLET BY MOUTH ONCE DAILY IN THE MORNING BEFORE BREAKFAST 90 tablet 1   • glucose blood (OneTouch Verio) test strip 1 each by Other route Daily. Use as instructed 50 each 0   • Lancets (OneTouch Delica Plus [id number]) misc 1 each by Other route Daily. 100 each 0   • losartan (COZAAR) 100 MG tablet      • metFORMIN (GLUCOPHAGE) 1000 MG tablet Take 1 tablet by mouth 2 (Two) Times a Day With Meals. 180 tablet 2   • MULTIPLE VITAMIN PO Take 1 tablet by mouth Daily.     • mupirocin (BACTROBAN) 2 % ointment      • pantoprazole (PROTONIX) 40 MG EC tablet Take 40 mg by mouth 2 (Two) Times a Day.     • ursodiol (ACTIGALL) 500 MG tablet Take 500 mg by mouth 3 (Three) Times a Day.     • cephalexin (Keflex) 500 MG capsule Take 1 capsule by mouth 4 (Four) Times a Day.  40 capsule 0   • traMADol (Ultram) 50 MG tablet Take 1 tablet by mouth Every 8 (Eight) Hours As Needed for Moderate Pain . 30 tablet 0     Current Facility-Administered Medications   Medication Dose Route Frequency Provider Last Rate Last Admin   • cyanocobalamin injection 1,000 mcg  1,000 mcg Intramuscular Weekly Aileen He APRROSEMARY   1,000 mcg at 22 0939     Past Medical History:   Diagnosis Date   • Anemia, recent acute blood loss 3/30/2017    ALB anemia in March after ureteral stent removed   • Bladder cancer (HCC)    • Diabetes mellitus (HCC)    • Duodenal cancer (HCC)    • Dyspnea on exertion 2016   • Foreign body (FB) in soft tissue 2019   • Gallstones    • History of recent left nephrolithiasis s/p ureteral stent placement  3/30/2017    2/17/17 - ureteral stent placed at [facility], s/p stent and stone removal 17   • Hyperlipidemia    • Kidney stone    • Kidney stone on left side 2017   • Nephrolithiasis    • Pneumonia      Family History   Problem Relation Age of Onset   • Heart disease Father    • Hypertension Brother    • Diabetes Brother      Past Surgical History:   Procedure Laterality Date   • CYSTOSCOPY W/ URETERAL STENT REMOVAL Left 2017   • EYE MUSCLE SURGERY     • HERNIA REPAIR     • INTERVENTIONAL RADIOLOGY PROCEDURE Right 3/31/2017    Procedure: PTC & Drain placement;  Surgeon: Serjio Wall MD;  Location: [facility] INVASIVE LOCATION;  Service:    • LIVER SURGERY  06/10/2019   • URETERAL STENT INSERTION Left    • WHIPPLE PROCEDURE       Social History     Substance and Sexual Activity   Alcohol Use No     Social History     Tobacco Use   Smoking Status Former Smoker   • Packs/day: 1.00   • Years: 5.00   • Pack years: 5.00   • Types: Cigarettes, Cigars, Pipe   • Start date:    • Quit date:    • Years since quittin.6   Smokeless Tobacco Current User   • Types: Snuff     Social History     Substance and Sexual Activity   Drug Use No  "    Review of Systems   Musculoskeletal: Positive for arthralgias.   Skin:        Healing wound on left thumb from recent hammer injury.  His tetanus shot is up-to-date and he reports this wound is healing well.        Depression Assessment Review:  PHQ-9 Total Score:    Vital Signs & Measurements Taken This Encounter  /65 (BP Location: Left arm, Patient Position: Sitting, Cuff Size: Adult)   Pulse 74   Temp 97.8 °F (36.6 °C) (Temporal)   Ht 175.3 cm (69.02\")   Wt 66.1 kg (145 lb 12.8 oz)   SpO2 96%   BMI 21.52 kg/m²    SpO2 Percentage    08/02/22 1509   SpO2: 96%        BMI is within normal parameters. No other follow-up for BMI required.      Physical Exam  Vitals reviewed.   Constitutional:       General: He is not in acute distress.  HENT:      Head: Normocephalic and atraumatic.   Eyes:      General: No scleral icterus.     Extraocular Movements: Extraocular movements intact.      Conjunctiva/sclera: Conjunctivae normal.      Pupils: Pupils are equal, round, and reactive to light.   Cardiovascular:      Rate and Rhythm: Normal rate and regular rhythm.   Pulmonary:      Effort: Pulmonary effort is normal. No respiratory distress.      Breath sounds: Normal breath sounds.   Musculoskeletal:         General: No swelling.      Comments: Heberden's and Cherelle's nodes noted on the hands bilaterally.  Trigger joint of right thumb.   Skin:     General: Skin is warm and dry.      Coloration: Skin is not jaundiced.   Neurological:      Mental Status: He is alert.   Psychiatric:         Mood and Affect: Mood normal.         Behavior: Behavior normal.              Assessment & Plan  Patient Active Problem List   Diagnosis   • Epigastric pain   • Gastroesophageal reflux disease   • Chronic rhinitis   • Type 2 diabetes mellitus without complication, without long-term current use of insulin (HCC)   • Low back pain   • Mixed hyperlipidemia   • Hx of duodenal cancer, s/p Whipple 2004   • Hx of bladder cancer, s/p " "\"scraping\",    • History of recent left nephrolithiasis s/p ureteral stent placement    • Benign essential HTN   • Arthritis   • Carcinoma of duodenum (HCC)   • Chronic diastolic heart failure (HCC)   • Peptic ulcer   • Taking multiple medications for chronic disease   • Varicose veins of lower extremity with inflammation   • Malignant neoplasm of urinary bladder (HCC)   • Degenerative joint disease (DJD) of lumbar spine   • Spondylolisthesis of lumbar region   • Spinal stenosis of lumbar region       ICD-10-CM ICD-9-CM   1. Trigger finger of right thumb  M65.311 727.03   2. Chronic rhinitis  J31.0 472.0     Orders Placed This Encounter   Procedures   • Ambulatory Referral to Orthopedic Surgery     Referral Priority:   Routine     Referral Type:   Consultation     Referral Reason:   Specialty Services Required     Requested Specialty:   Orthopedic Surgery     Number of Visits Requested:   1       Meds Ordered During Visit:  New Medications Ordered This Visit   Medications   • fluticasone (Flonase) 50 MCG/ACT nasal spray     Si sprays into the nostril(s) as directed by provider Daily.     Dispense:  16 g     Refill:  5     I recommended patient follow-up with Ortho for his recently developed trigger thumb.  Flonase refilled as requested.  We will continue his current medicine regimen.  We will plan to follow-up as previously arranged or certainly sooner if needed.      Return in about 4 months (around 2022), or if symptoms worsen or fail to improve, for Recheck.          Referring Provider (if known): No ref. provider found      This document has been electronically signed by Fannie Haynes DO  2022 18:02 EDT    Fannie Haynes DO, FACOI  [address] [place] [address]  [phone] (office)    Part of this note may be an electronic transcription/translation of spoken language to printed text using the Dragon Dictation System.  "

## 2022-08-04 ENCOUNTER — TELEPHONE (OUTPATIENT)
Dept: FAMILY MEDICINE CLINIC | Facility: CLINIC | Age: 77
End: 2022-08-04

## 2022-08-04 NOTE — TELEPHONE ENCOUNTER
Caller: Jean Noriega    Relationship: Self    Best call back number: [phone] ( [phone]    PATIENT CALLING TO CHECK THE STATUS OF HIS REFERRAL TO ORTHOPEDICS

## 2022-08-05 DIAGNOSIS — M79.641 RIGHT HAND PAIN: Primary | ICD-10-CM

## 2022-08-10 ENCOUNTER — HOSPITAL ENCOUNTER (OUTPATIENT)
Dept: GENERAL RADIOLOGY | Facility: HOSPITAL | Age: 77
Discharge: HOME | End: 2022-08-10
Admitting: PHYSICIAN ASSISTANT

## 2022-08-10 ENCOUNTER — OFFICE VISIT (OUTPATIENT)
Dept: ORTHOPEDIC SURGERY | Facility: CLINIC | Age: 77
End: 2022-08-10

## 2022-08-10 VITALS
HEIGHT: 69 IN | HEART RATE: 68 BPM | WEIGHT: 145 LBS | SYSTOLIC BLOOD PRESSURE: 123 MMHG | DIASTOLIC BLOOD PRESSURE: 66 MMHG | BODY MASS INDEX: 21.48 KG/M2

## 2022-08-10 DIAGNOSIS — M65.311 TRIGGER THUMB OF RIGHT HAND: Primary | ICD-10-CM

## 2022-08-10 DIAGNOSIS — M79.641 RIGHT HAND PAIN: ICD-10-CM

## 2022-08-10 PROCEDURE — 73130 X-RAY EXAM OF HAND: CPT

## 2022-08-10 PROCEDURE — 73130 X-RAY EXAM OF HAND: CPT | Performed by: RADIOLOGY

## 2022-08-10 PROCEDURE — 99203 OFFICE O/P NEW LOW 30 MIN: CPT | Performed by: PHYSICIAN ASSISTANT

## 2022-08-10 NOTE — PROGRESS NOTES
"    [facility] Orthopaedic Surgery New Patient Visit          Patient: Jean Noriega  YOB: 1945  Date of Encounter: 08/10/2022  PCP: Fannie Haynes DO      Subjective     Chief Complaint   Patient presents with   • Right Hand - Initial Evaluation, Pain           History of Present Illness:     Jean Noriega is a 77 y.o. male presents today for 3-week history of progressive pain and swelling and triggering right thumb.  Patient states that he is performing an ulnar bourne has been frequently using a drill in his right hand this tends to exacerbate and worsen the pain symptoms that he is experiencing.  Patient describes a throbbing aching pain to the volar aspect of the thumb with catching of the evening when he attempts to fully flex or extend.  He is able to fully straighten and flex the digit however it does get caught at approximately 90 degrees.  He is undergone no conservative treatment and is unable to take oral NSAIDs secondary to gastric complaints.         Patient Active Problem List   Diagnosis   • Epigastric pain   • Gastroesophageal reflux disease   • Chronic rhinitis   • Type 2 diabetes mellitus without complication, without long-term current use of insulin (HCC)   • Low back pain   • Mixed hyperlipidemia   • Hx of duodenal cancer, s/p Whipple 2004   • Hx of bladder cancer, s/p \"scraping\", 2004   • History of recent left nephrolithiasis s/p ureteral stent placement    • Benign essential HTN   • Arthritis   • Carcinoma of duodenum (HCC)   • Chronic diastolic heart failure (HCC)   • Peptic ulcer   • Taking multiple medications for chronic disease   • Varicose veins of lower extremity with inflammation   • Malignant neoplasm of urinary bladder (HCC)   • Degenerative joint disease (DJD) of lumbar spine   • Spondylolisthesis of lumbar region   • Spinal stenosis of lumbar region     Past Medical History:   Diagnosis Date   • Anemia, recent acute blood loss 3/30/2017    ALB anemia in March after " ureteral stent removed   • Bladder cancer (HCC)    • Diabetes mellitus (HCC)    • Duodenal cancer (HCC)    • Dyspnea on exertion 2016   • Foreign body (FB) in soft tissue 2019   • Gallstones    • History of recent left nephrolithiasis s/p ureteral stent placement  3/30/2017    2/17/17 - ureteral stent placed at [facility], s/p stent and stone removal 17   • Hyperlipidemia    • Kidney stone    • Kidney stone on left side 2017   • Nephrolithiasis    • Pneumonia      Past Surgical History:   Procedure Laterality Date   • CYSTOSCOPY W/ URETERAL STENT REMOVAL Left 2017   • EYE MUSCLE SURGERY     • HERNIA REPAIR     • INTERVENTIONAL RADIOLOGY PROCEDURE Right 3/31/2017    Procedure: PTC & Drain placement;  Surgeon: Serjio Wall MD;  Location: [facility] INVASIVE LOCATION;  Service:    • LIVER SURGERY  06/10/2019   • URETERAL STENT INSERTION Left    • WHIPPLE PROCEDURE       Social History     Occupational History   • Not on file   Tobacco Use   • Smoking status: Former Smoker     Packs/day: 1.00     Years: 5.00     Pack years: 5.00     Types: Cigarettes, Cigars, Pipe     Start date:      Quit date: 1975     Years since quittin.6   • Smokeless tobacco: Current User     Types: Snuff   Vaping Use   • Vaping Use: Never used   Substance and Sexual Activity   • Alcohol use: No   • Drug use: No   • Sexual activity: Leah Noriega  reports that he quit smoking about 47 years ago. His smoking use included cigarettes, cigars, and pipe. He started smoking about 52 years ago. He has a 5.00 pack-year smoking history. His smokeless tobacco use includes snuff.. I have educated him on the risk of diseases from using tobacco products such as cancer, COPD and heart disease.          Social History     Social History Narrative   • Not on file     Family History   Problem Relation Age of Onset   • Heart disease Father    • Arthritis Sister    • Hypertension Brother    • Diabetes Brother       Current Outpatient Medications   Medication Sig Dispense Refill   • ASPIRIN 81 PO aspirin 81 mg tablet     • atorvastatin (LIPITOR) 40 MG tablet Take 40 mg by mouth Daily.     • Creon 37773-040950 units capsule delayed-release particles capsule TAKE 3 CAPSULES BY MOUTH BEFORE MEAL(S) AND 1 CAPSULE BEFORE SNACKS     • fluticasone (Flonase) 50 MCG/ACT nasal spray 2 sprays into the nostril(s) as directed by provider Daily. 16 g 5   • glimepiride (AMARYL) 2 MG tablet TAKE 1 TABLET BY MOUTH ONCE DAILY IN THE MORNING BEFORE BREAKFAST 90 tablet 1   • glucose blood (OneTouch Verio) test strip 1 each by Other route Daily. Use as instructed 50 each 0   • Lancets (OneTouch Delica Plus [id number]) misc 1 each by Other route Daily. 100 each 0   • losartan (COZAAR) 100 MG tablet      • metFORMIN (GLUCOPHAGE) 1000 MG tablet Take 1 tablet by mouth 2 (Two) Times a Day With Meals. 180 tablet 2   • MULTIPLE VITAMIN PO Take 1 tablet by mouth Daily.     • mupirocin (BACTROBAN) 2 % ointment      • pantoprazole (PROTONIX) 40 MG EC tablet Take 40 mg by mouth 2 (Two) Times a Day.     • traMADol (Ultram) 50 MG tablet Take 1 tablet by mouth Every 8 (Eight) Hours As Needed for Moderate Pain . 30 tablet 0   • ursodiol (ACTIGALL) 500 MG tablet Take 500 mg by mouth 3 (Three) Times a Day.     • Diclofenac Sodium (VOLTAREN) 1 % gel gel Apply 4 g topically to the appropriate area as directed 4 (Four) Times a Day. 350 g 2     Current Facility-Administered Medications   Medication Dose Route Frequency Provider Last Rate Last Admin   • cyanocobalamin injection 1,000 mcg  1,000 mcg Intramuscular Weekly Aileen He APRN   1,000 mcg at 05/18/22 0939     Allergies   Allergen Reactions   • Percocet [Oxycodone-Acetaminophen] Itching            Review of Systems   Constitutional: Negative.   HENT: Negative.    Eyes: Negative.    Cardiovascular: Negative.    Respiratory: Negative.    Endocrine: Negative.    Hematologic/Lymphatic: Bruises/bleeds  "easily.   Skin: Negative.    Musculoskeletal: Positive for back pain, joint pain and neck pain.        Pertinent positives listed in HPI   Gastrointestinal: Negative.    Genitourinary: Positive for frequency.   Neurological: Negative.    Psychiatric/Behavioral: Negative.    Allergic/Immunologic: Negative.          Objective      Vitals:    08/10/22 0908   BP: 123/66   Pulse: 68   Weight: 65.8 kg (145 lb)   Height: 175.3 cm (69\")      BMI is within normal parameters. No other follow-up for BMI required.      Physical Exam  Vitals and nursing note reviewed.   Constitutional:       General: He is not in acute distress.     Appearance: Normal appearance. He is not ill-appearing.   HENT:      Head: Normocephalic and atraumatic.      Right Ear: External ear normal.      Left Ear: External ear normal.      Nose: Nose normal.      Mouth/Throat:      Mouth: Mucous membranes are moist.      Pharynx: Oropharynx is clear.   Eyes:      Extraocular Movements: Extraocular movements intact.      Conjunctiva/sclera: Conjunctivae normal.      Pupils: Pupils are equal, round, and reactive to light.   Cardiovascular:      Rate and Rhythm: Normal rate.      Pulses: Normal pulses.   Pulmonary:      Effort: Pulmonary effort is normal.   Abdominal:      General: There is no distension.   Musculoskeletal:      Cervical back: Normal range of motion. No rigidity.      Comments: Examination today of patient's right hand first digit reveals mild swelling and pain to the volar aspect of the first digit at the A1 pulley.  There is active triggering at 90 degrees flexion of the IP joint.  This is to be reduced actively.  There is no skin opening or drainage.  No significant erythema or ecchymosis.  Neurovascular is grossly intact right upper extremity.   Skin:     General: Skin is warm and dry.      Capillary Refill: Capillary refill takes less than 2 seconds.   Neurological:      General: No focal deficit present.      Mental Status: He is alert " and oriented to person, place, and time.      Cranial Nerves: Cranial nerves are intact.   Psychiatric:         Mood and Affect: Mood normal.         Behavior: Behavior normal.                 Radiology:      XR Hand 3+ View Right    Result Date: 8/10/2022  1.  Positive ulnar variance noted. 2.  Osteoarthritic change throughout the proximal carpal row and digits. 3.  Metallic density noted in the thumb soft tissues.  This report was finalized on 8/10/2022 9:06 AM by Dr. Fede Shearer MD.              Assessment/Plan        ICD-10-CM ICD-9-CM   1. Trigger thumb of right hand  M65.311 727.03       77-year-old male with 3-week history of progressive right first trigger thumb.  Further discussion was had with the patient and he was provided with a thumb spica brace to be worn full-time over the next 2 weeks.  He remove his radiographs.  Patient also was provided with a prescription for Voltaren gel to be applied topically 4 times daily to affected area.  He will return back in 2 weeks in which we will discuss the potential for A1 pulley tendon sheath injection if no notable improvement versus PT for progressive range of motion activity dependent on the efficacy of conservative treatment.                      This document was signed by Vinny Suggs PA-C August 10, 2022     CC: Fannie Haynes DO      Dictated Utilizing Dragon Dictation: Part of this note may be an electronic transcription/translation of spoken language to printed text using the Dragon Dictation System.

## 2022-08-26 ENCOUNTER — OFFICE VISIT (OUTPATIENT)
Dept: ORTHOPEDIC SURGERY | Facility: CLINIC | Age: 77
End: 2022-08-26

## 2022-08-26 VITALS — BODY MASS INDEX: 21.48 KG/M2 | HEIGHT: 69 IN | WEIGHT: 145 LBS

## 2022-08-26 DIAGNOSIS — M65.311 TRIGGER THUMB OF RIGHT HAND: Primary | ICD-10-CM

## 2022-08-26 PROCEDURE — 99213 OFFICE O/P EST LOW 20 MIN: CPT | Performed by: PHYSICIAN ASSISTANT

## 2022-08-26 NOTE — PROGRESS NOTES
"    [facility] [place], [place] Orthopaedic Surgery New Patient Visit          Patient: Jean Noriega  YOB: 1945  Date of Encounter: 08/26/2022  PCP: Fannie Haynes DO      Subjective     Chief Complaint   Patient presents with   • Right Thumb - Follow-up, Pain           History of Present Illness:     Jean Noriega is a 77 y.o. male presents today for possible problem history of progressive pain and swelling and triggering of right thumb.  He attempted the medication and bracing with no alleviation.  He reports that he continues to have mild pain with triggering.  He reports no other new complications.  Patient states that he would like to hold off on an injection as result of the pain on the next week and I will overdo it following the injection.  He has been compliant with using the brace.       Patient Active Problem List   Diagnosis   • Epigastric pain   • Gastroesophageal reflux disease   • Chronic rhinitis   • Type 2 diabetes mellitus without complication, without long-term current use of insulin (HCC)   • Low back pain   • Mixed hyperlipidemia   • Hx of duodenal cancer, s/p Whipple 2004   • Hx of bladder cancer, s/p \"scraping\", 2004   • History of recent left nephrolithiasis s/p ureteral stent placement    • Benign essential HTN   • Arthritis   • Carcinoma of duodenum (HCC)   • Chronic diastolic heart failure (HCC)   • Peptic ulcer   • Taking multiple medications for chronic disease   • Varicose veins of lower extremity with inflammation   • Malignant neoplasm of urinary bladder (HCC)   • Degenerative joint disease (DJD) of lumbar spine   • Spondylolisthesis of lumbar region   • Spinal stenosis of lumbar region     Past Medical History:   Diagnosis Date   • Anemia, recent acute blood loss 3/30/2017    ALB anemia in March after ureteral stent removed   • Bladder cancer (HCC)    • Diabetes mellitus (HCC)    • Duodenal cancer (HCC)    • Dyspnea on exertion 2/1/2016   • Foreign body (FB) in soft tissue 2/26/2019   • " Gallstones    • History of recent left nephrolithiasis s/p ureteral stent placement  3/30/2017    2/17/17 - ureteral stent placed at [facility], s/p stent and stone removal 17   • Hyperlipidemia    • Kidney stone    • Kidney stone on left side 2017   • Nephrolithiasis    • Pneumonia      Past Surgical History:   Procedure Laterality Date   • CYSTOSCOPY W/ URETERAL STENT REMOVAL Left 2017   • EYE MUSCLE SURGERY     • HERNIA REPAIR     • INTERVENTIONAL RADIOLOGY PROCEDURE Right 3/31/2017    Procedure: PTC & Drain placement;  Surgeon: Serjio Wall MD;  Location: [facility] INVASIVE LOCATION;  Service:    • LIVER SURGERY  06/10/2019   • URETERAL STENT INSERTION Left    • WHIPPLE PROCEDURE       Social History     Occupational History   • Not on file   Tobacco Use   • Smoking status: Former Smoker     Packs/day: 1.00     Years: 5.00     Pack years: 5.00     Types: Cigarettes, Cigars, Pipe     Start date:      Quit date:      Years since quittin.6   • Smokeless tobacco: Current User     Types: Snuff   Vaping Use   • Vaping Use: Never used   Substance and Sexual Activity   • Alcohol use: No   • Drug use: No   • Sexual activity: Defer    Jean Noriega  reports that he quit smoking about 47 years ago. His smoking use included cigarettes, cigars, and pipe. He started smoking about 52 years ago. He has a 5.00 pack-year smoking history. His smokeless tobacco use includes snuff.. I have educated him on the risk of diseases from using tobacco products such as cancer, COPD and heart disease.          Social History     Social History Narrative   • Not on file     Family History   Problem Relation Age of Onset   • Heart disease Father    • Arthritis Sister    • Hypertension Brother    • Diabetes Brother      Current Outpatient Medications   Medication Sig Dispense Refill   • ASPIRIN 81 PO aspirin 81 mg tablet     • atorvastatin (LIPITOR) 40 MG tablet Take 40 mg by mouth Daily.     • Creon  [id number] units capsule delayed-release particles capsule TAKE 3 CAPSULES BY MOUTH BEFORE MEAL(S) AND 1 CAPSULE BEFORE SNACKS     • fluticasone (Flonase) 50 MCG/ACT nasal spray 2 sprays into the nostril(s) as directed by provider Daily. 16 g 5   • glimepiride (AMARYL) 2 MG tablet TAKE 1 TABLET BY MOUTH ONCE DAILY IN THE MORNING BEFORE BREAKFAST 90 tablet 1   • glucose blood (OneTouch Verio) test strip 1 each by Other route Daily. Use as instructed 50 each 0   • Lancets (OneTouch Delica Plus [id number]) misc 1 each by Other route Daily. 100 each 0   • losartan (COZAAR) 100 MG tablet      • metFORMIN (GLUCOPHAGE) 1000 MG tablet Take 1 tablet by mouth 2 (Two) Times a Day With Meals. 180 tablet 2   • MULTIPLE VITAMIN PO Take 1 tablet by mouth Daily.     • mupirocin (BACTROBAN) 2 % ointment      • pantoprazole (PROTONIX) 40 MG EC tablet Take 40 mg by mouth 2 (Two) Times a Day.     • traMADol (Ultram) 50 MG tablet Take 1 tablet by mouth Every 8 (Eight) Hours As Needed for Moderate Pain . 30 tablet 0   • ursodiol (ACTIGALL) 500 MG tablet Take 500 mg by mouth 3 (Three) Times a Day.     • Diclofenac Sodium (VOLTAREN) 1 % gel gel Apply 4 g topically to the appropriate area as directed 4 (Four) Times a Day. 350 g 2     Current Facility-Administered Medications   Medication Dose Route Frequency Provider Last Rate Last Admin   • cyanocobalamin injection 1,000 mcg  1,000 mcg Intramuscular Weekly Aileen He APRN   1,000 mcg at 05/18/22 0939     Allergies   Allergen Reactions   • Percocet [Oxycodone-Acetaminophen] Itching            Review of Systems   Constitutional: Negative.   HENT: Negative.    Eyes: Negative.    Cardiovascular: Negative.    Respiratory: Negative.    Endocrine: Negative.    Hematologic/Lymphatic: Bruises/bleeds easily.   Skin: Negative.    Musculoskeletal: Positive for back pain, joint pain and neck pain.        Pertinent positives listed in HPI   Gastrointestinal: Negative.    Genitourinary:  "Positive for frequency.   Neurological: Negative.    Psychiatric/Behavioral: Negative.    Allergic/Immunologic: Negative.          Objective      Vitals:    08/26/22 1014   Weight: 65.8 kg (145 lb)   Height: 175.3 cm (69\")      BMI is within normal parameters. No other follow-up for BMI required.      Physical Exam  Vitals and nursing note reviewed.   Constitutional:       General: He is not in acute distress.     Appearance: Normal appearance. He is not ill-appearing.   HENT:      Head: Normocephalic and atraumatic.      Right Ear: External ear normal.      Left Ear: External ear normal.      Nose: Nose normal.      Mouth/Throat:      Mouth: Mucous membranes are moist.      Pharynx: Oropharynx is clear.   Eyes:      Extraocular Movements: Extraocular movements intact.      Conjunctiva/sclera: Conjunctivae normal.      Pupils: Pupils are equal, round, and reactive to light.   Cardiovascular:      Rate and Rhythm: Normal rate.      Pulses: Normal pulses.   Pulmonary:      Effort: Pulmonary effort is normal.   Abdominal:      General: There is no distension.   Musculoskeletal:      Cervical back: Normal range of motion. No rigidity.      Comments: Examination today of patient's right hand first digit reveals mild swelling and pain to the volar aspect of the first digit at the A1 pulley.  There is active triggering at 90 degrees flexion of the IP joint.  This is to be reduced actively.  There is no skin opening or drainage.  No significant erythema or ecchymosis.  Neurovascular is grossly intact right upper extremity.   Skin:     General: Skin is warm and dry.      Capillary Refill: Capillary refill takes less than 2 seconds.   Neurological:      General: No focal deficit present.      Mental Status: He is alert and oriented to person, place, and time.      Cranial Nerves: Cranial nerves are intact.   Psychiatric:         Mood and Affect: Mood normal.         Behavior: Behavior normal.                 Radiology:  "     XR Hand 3+ View Right    Result Date: 8/10/2022  1.  Positive ulnar variance noted. 2.  Osteoarthritic change throughout the proximal carpal row and digits. 3.  Metallic density noted in the thumb soft tissues.  This report was finalized on 8/10/2022 9:06 AM by Dr. Fede Shearer MD.              Assessment/Plan        ICD-10-CM ICD-9-CM   1. Trigger thumb of right hand  M65.311 727.03       77-year-old male with a 5-week history of progressive right first trigger thumb.  The patient has failed NSAID topical usage and bracing.  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  He was instructed to return back in 2 weeks in which we will include a A1 pulley steroid injection right first digit.  He may continue with bracing with activity during this time.                      This document was signed by Vinny Suggs PA-C August 26, 2022     CC: Fannie Haynes DO      Dictated Utilizing Dragon Dictation: Part of this note may be an electronic transcription/translation of spoken language to printed text using the Dragon Dictation System.

## 2022-09-08 ENCOUNTER — OFFICE VISIT (OUTPATIENT)
Dept: ORTHOPEDIC SURGERY | Facility: CLINIC | Age: 77
End: 2022-09-08

## 2022-09-08 VITALS — BODY MASS INDEX: 21.48 KG/M2 | HEIGHT: 69 IN | WEIGHT: 145 LBS

## 2022-09-08 DIAGNOSIS — M65.311 TRIGGER THUMB OF RIGHT HAND: Primary | ICD-10-CM

## 2022-09-08 PROCEDURE — 20550 NJX 1 TENDON SHEATH/LIGAMENT: CPT | Performed by: PHYSICIAN ASSISTANT

## 2022-09-08 RX ADMIN — LIDOCAINE HYDROCHLORIDE 5 MG: 10 INJECTION, SOLUTION INFILTRATION; PERINEURAL at 12:24

## 2022-09-08 RX ADMIN — METHYLPREDNISOLONE ACETATE 80 MG: 80 INJECTION, SUSPENSION INTRA-ARTICULAR; INTRALESIONAL; INTRAMUSCULAR; SOFT TISSUE at 12:24

## 2022-09-20 ENCOUNTER — OFFICE VISIT (OUTPATIENT)
Dept: FAMILY MEDICINE CLINIC | Facility: CLINIC | Age: 77
End: 2022-09-20

## 2022-09-20 VITALS
SYSTOLIC BLOOD PRESSURE: 131 MMHG | HEART RATE: 51 BPM | DIASTOLIC BLOOD PRESSURE: 61 MMHG | WEIGHT: 148 LBS | BODY MASS INDEX: 21.92 KG/M2 | OXYGEN SATURATION: 98 % | HEIGHT: 69 IN

## 2022-09-20 DIAGNOSIS — Z00.00 HEALTH CARE MAINTENANCE: ICD-10-CM

## 2022-09-20 DIAGNOSIS — E53.8 VITAMIN B 12 DEFICIENCY: ICD-10-CM

## 2022-09-20 DIAGNOSIS — E78.2 MIXED HYPERLIPIDEMIA: ICD-10-CM

## 2022-09-20 DIAGNOSIS — Z00.00 ENCOUNTER FOR WELLNESS EXAMINATION: Primary | ICD-10-CM

## 2022-09-20 DIAGNOSIS — I10 BENIGN ESSENTIAL HTN: ICD-10-CM

## 2022-09-20 DIAGNOSIS — E11.9 TYPE 2 DIABETES MELLITUS WITHOUT COMPLICATION, WITHOUT LONG-TERM CURRENT USE OF INSULIN: ICD-10-CM

## 2022-09-20 LAB
ALBUMIN SERPL-MCNC: 4.2 G/DL (ref 3.5–5.2)
ALBUMIN/GLOB SERPL: 2.3 G/DL
ALP SERPL-CCNC: 74 U/L (ref 39–117)
ALT SERPL W P-5'-P-CCNC: 17 U/L (ref 1–41)
ANION GAP SERPL CALCULATED.3IONS-SCNC: 9.8 MMOL/L (ref 5–15)
AST SERPL-CCNC: 20 U/L (ref 1–40)
BILIRUB SERPL-MCNC: 0.4 MG/DL (ref 0–1.2)
BUN SERPL-MCNC: 12 MG/DL (ref 8–23)
BUN/CREAT SERPL: 12.4 (ref 7–25)
CALCIUM SPEC-SCNC: 9.1 MG/DL (ref 8.6–10.5)
CHLORIDE SERPL-SCNC: 101 MMOL/L (ref 98–107)
CHOLEST SERPL-MCNC: 133 MG/DL (ref 0–200)
CO2 SERPL-SCNC: 28.2 MMOL/L (ref 22–29)
CREAT SERPL-MCNC: 0.97 MG/DL (ref 0.76–1.27)
DEPRECATED RDW RBC AUTO: 45.3 FL (ref 37–54)
EGFRCR SERPLBLD CKD-EPI 2021: 81.4 ML/MIN/1.73
ERYTHROCYTE [DISTWIDTH] IN BLOOD BY AUTOMATED COUNT: 13 % (ref 12.3–15.4)
GLOBULIN UR ELPH-MCNC: 1.8 GM/DL
GLUCOSE SERPL-MCNC: 221 MG/DL (ref 65–99)
HBA1C MFR BLD: 8.1 % (ref 4.8–5.6)
HCT VFR BLD AUTO: 40 % (ref 37.5–51)
HDLC SERPL-MCNC: 53 MG/DL (ref 40–60)
HGB BLD-MCNC: 12.9 G/DL (ref 13–17.7)
LDLC SERPL CALC-MCNC: 52 MG/DL (ref 0–100)
LDLC/HDLC SERPL: 0.87 {RATIO}
MCH RBC QN AUTO: 31.2 PG (ref 26.6–33)
MCHC RBC AUTO-ENTMCNC: 32.3 G/DL (ref 31.5–35.7)
MCV RBC AUTO: 96.6 FL (ref 79–97)
PLATELET # BLD AUTO: 211 10*3/MM3 (ref 140–450)
PMV BLD AUTO: 10.3 FL (ref 6–12)
POTASSIUM SERPL-SCNC: 4.6 MMOL/L (ref 3.5–5.2)
PROT SERPL-MCNC: 6 G/DL (ref 6–8.5)
RBC # BLD AUTO: 4.14 10*6/MM3 (ref 4.14–5.8)
SODIUM SERPL-SCNC: 139 MMOL/L (ref 136–145)
TRIGL SERPL-MCNC: 170 MG/DL (ref 0–150)
TSH SERPL DL<=0.05 MIU/L-ACNC: 1.84 UIU/ML (ref 0.27–4.2)
VIT B12 BLD-MCNC: 521 PG/ML (ref 211–946)
VLDLC SERPL-MCNC: 28 MG/DL (ref 5–40)
WBC NRBC COR # BLD: 6.76 10*3/MM3 (ref 3.4–10.8)

## 2022-09-20 PROCEDURE — 80061 LIPID PANEL: CPT | Performed by: INTERNAL MEDICINE

## 2022-09-20 PROCEDURE — 82043 UR ALBUMIN QUANTITATIVE: CPT | Performed by: INTERNAL MEDICINE

## 2022-09-20 PROCEDURE — 82607 VITAMIN B-12: CPT | Performed by: INTERNAL MEDICINE

## 2022-09-20 PROCEDURE — 36415 COLL VENOUS BLD VENIPUNCTURE: CPT | Performed by: INTERNAL MEDICINE

## 2022-09-20 PROCEDURE — 80053 COMPREHEN METABOLIC PANEL: CPT | Performed by: INTERNAL MEDICINE

## 2022-09-20 PROCEDURE — G0439 PPPS, SUBSEQ VISIT: HCPCS | Performed by: INTERNAL MEDICINE

## 2022-09-20 PROCEDURE — 85027 COMPLETE CBC AUTOMATED: CPT | Performed by: INTERNAL MEDICINE

## 2022-09-20 PROCEDURE — 84443 ASSAY THYROID STIM HORMONE: CPT | Performed by: INTERNAL MEDICINE

## 2022-09-20 PROCEDURE — 83036 HEMOGLOBIN GLYCOSYLATED A1C: CPT | Performed by: INTERNAL MEDICINE

## 2022-09-20 PROCEDURE — 1160F RVW MEDS BY RX/DR IN RCRD: CPT | Performed by: INTERNAL MEDICINE

## 2022-09-20 PROCEDURE — 1170F FXNL STATUS ASSESSED: CPT | Performed by: INTERNAL MEDICINE

## 2022-09-20 NOTE — PROGRESS NOTES
Venipuncture Blood Specimen Collection  Venipuncture performed in Right arm by Jessica Foley MA with good hemostasis. Patient tolerated the procedure well without complications.   09/20/22   Jessica Foley MA

## 2022-09-20 NOTE — PROGRESS NOTES
The ABCs of the Annual Wellness Visit  Subsequent Medicare Wellness Visit    Chief Complaint   Patient presents with   • Medicare Wellness-subsequent      Subjective    History of Present Illness:  Jean Noriega is a 77 y.o. male who presents for a Subsequent Medicare Wellness Visit.    The following portions of the patient's history were reviewed and   updated as appropriate: allergies, current medications, past family history, past medical history, past social history, past surgical history and problem list.    Compared to one year ago, the patient feels his physical   health is the same.    Compared to one year ago, the patient feels his mental   health is the same.    Recent Hospitalizations:  He was not admitted to the hospital during the last year.       Current Medical Providers:  Patient Care Team:  Fannie Haynes DO as PCP - General (Family Medicine)  Serjio Wall MD as Consulting Physician (Radiology)  Maurizio Cuellar MD as Consulting Physician (Gastroenterology)  Christopher Robledo MD as Consulting Physician (Infectious Diseases)  Emily Mayfield, RN as Ambulatory  ([facility])    Outpatient Medications Prior to Visit   Medication Sig Dispense Refill   • ASPIRIN 81 PO aspirin 81 mg tablet     • atorvastatin (LIPITOR) 40 MG tablet Take 40 mg by mouth Daily.     • Creon [id number] units capsule delayed-release particles capsule TAKE 3 CAPSULES BY MOUTH BEFORE MEAL(S) AND 1 CAPSULE BEFORE SNACKS     • Diclofenac Sodium (VOLTAREN) 1 % gel gel Apply 4 g topically to the appropriate area as directed 4 (Four) Times a Day. 350 g 2   • fluticasone (Flonase) 50 MCG/ACT nasal spray 2 sprays into the nostril(s) as directed by provider Daily. 16 g 5   • glimepiride (AMARYL) 2 MG tablet TAKE 1 TABLET BY MOUTH ONCE DAILY IN THE MORNING BEFORE BREAKFAST 90 tablet 1   • glucose blood (OneTouch Verio) test strip 1 each by Other route Daily. Use as instructed 50 each 0   • Lancets (OneTouch  "Delica Plus [id number]) misc 1 each by Other route Daily. 100 each 0   • losartan (COZAAR) 100 MG tablet      • metFORMIN (GLUCOPHAGE) 1000 MG tablet Take 1 tablet by mouth 2 (Two) Times a Day With Meals. 180 tablet 2   • MULTIPLE VITAMIN PO Take 1 tablet by mouth Daily.     • mupirocin (BACTROBAN) 2 % ointment      • pantoprazole (PROTONIX) 40 MG EC tablet Take 40 mg by mouth 2 (Two) Times a Day.     • ursodiol (ACTIGALL) 500 MG tablet Take 500 mg by mouth 3 (Three) Times a Day.     • traMADol (Ultram) 50 MG tablet Take 1 tablet by mouth Every 8 (Eight) Hours As Needed for Moderate Pain . 30 tablet 0     Facility-Administered Medications Prior to Visit   Medication Dose Route Frequency Provider Last Rate Last Admin   • cyanocobalamin injection 1,000 mcg  1,000 mcg Intramuscular Weekly Aileen He APRN   1,000 mcg at 05/18/22 0939       Opioid medication/s are on active medication list.  and I have evaluated his active treatment plan and pain score trends (see table).    I have reviewed the chart for potential of high risk medication and harmful drug interactions in the elderly.            Aspirin is on active medication list. Aspirin use is indicated based on review of current medical condition/s. Pros and cons of this therapy have been discussed today. Benefits of this medication outweigh potential harm.  Patient has been encouraged to continue taking this medication.        Patient Active Problem List   Diagnosis   • Epigastric pain   • Gastroesophageal reflux disease   • Chronic rhinitis   • Type 2 diabetes mellitus without complication, without long-term current use of insulin (HCC)   • Low back pain   • Mixed hyperlipidemia   • Hx of duodenal cancer, s/p Whipple 2004   • Hx of bladder cancer, s/p \"scraping\", 2004   • History of recent left nephrolithiasis s/p ureteral stent placement    • Benign essential HTN   • Arthritis   • Carcinoma of duodenum (HCC)   • Chronic diastolic heart failure (HCC)   • " "Peptic ulcer   • Taking multiple medications for chronic disease   • Varicose veins of lower extremity with inflammation   • Malignant neoplasm of urinary bladder (HCC)   • Degenerative joint disease (DJD) of lumbar spine   • Spondylolisthesis of lumbar region   • Spinal stenosis of lumbar region     Advance Care Planning  Advance Directive is on file.  ACP discussion was held with the patient during this visit. Patient has an advance directive in EMR which is still valid.           Objective    Vitals:    09/20/22 0955   BP: 131/61   BP Location: Left arm   Patient Position: Sitting   Cuff Size: Adult   Pulse: 51   SpO2: 98%   Weight: 67.1 kg (148 lb)   Height: 175.3 cm (69\")     Estimated body mass index is 21.86 kg/m² as calculated from the following:    Height as of this encounter: 175.3 cm (69\").    Weight as of this encounter: 67.1 kg (148 lb).    BMI is within normal parameters. No other follow-up for BMI required.      Does the patient have evidence of cognitive impairment? No    Physical Exam  Vitals reviewed.   Constitutional:       General: He is not in acute distress.  HENT:      Head: Normocephalic and atraumatic.   Eyes:      General: No scleral icterus.     Extraocular Movements: Extraocular movements intact.      Conjunctiva/sclera: Conjunctivae normal.      Pupils: Pupils are equal, round, and reactive to light.   Cardiovascular:      Rate and Rhythm: Regular rhythm. Bradycardia present.   Pulmonary:      Effort: Pulmonary effort is normal. No respiratory distress.      Breath sounds: Normal breath sounds. No wheezing or rhonchi.   Abdominal:      Palpations: Abdomen is soft.      Tenderness: There is no abdominal tenderness.   Musculoskeletal:         General: No swelling.      Cervical back: Neck supple. No tenderness.   Lymphadenopathy:      Cervical: No cervical adenopathy.   Skin:     General: Skin is warm and dry.      Coloration: Skin is not jaundiced.   Neurological:      Mental Status: He " is alert.   Psychiatric:         Mood and Affect: Mood normal.         Behavior: Behavior normal.         Diabetic foot exam was performed today. Feet were without ulceration or open wounds. +2 PT and DP pulses were palpated bilaterally. Monofilament testing was normal bilaterally.          HEALTH RISK ASSESSMENT    Smoking Status:  Social History     Tobacco Use   Smoking Status Former Smoker   • Packs/day: 1.00   • Years: 5.00   • Pack years: 5.00   • Types: Cigarettes, Cigars, Pipe   • Start date:    • Quit date:    • Years since quittin.7   Smokeless Tobacco Current User   • Types: Snuff     Alcohol Consumption:  Social History     Substance and Sexual Activity   Alcohol Use No     Fall Risk Screen:    STEADI Fall Risk Assessment was completed, and patient is at LOW risk for falls.Assessment completed on:2022    Depression Screening:  PHQ-2/PHQ-9 Depression Screening 2022   Retired PHQ-9 Total Score -   Retired Total Score -   Little Interest or Pleasure in Doing Things 0-->not at all   Feeling Down, Depressed or Hopeless 0-->not at all   PHQ-9: Brief Depression Severity Measure Score 0       Health Habits and Functional and Cognitive Screening:  Functional & Cognitive Status 2022   Do you have difficulty preparing food and eating? No   Do you have difficulty bathing yourself, getting dressed or grooming yourself? No   Do you have difficulty using the toilet? No   Do you have difficulty moving around from place to place? No   Do you have trouble with steps or getting out of a bed or a chair? No   Current Diet Well Balanced Diet   Dental Exam Up to date   Eye Exam Up to date   Exercise (times per week) 6 times per week   Current Exercises Include Yard Work   Current Exercise Activities Include -   Do you need help using the phone?  No   Are you deaf or do you have serious difficulty hearing?  No   Do you need help with transportation? No   Do you need help shopping? No   Do you need  help preparing meals?  No   Do you need help with housework?  No   Do you need help with laundry? No   Do you need help taking your medications? No   Do you need help managing money? No   Do you ever drive or ride in a car without wearing a seat belt? No   Have you felt unusual stress, anger or loneliness in the last month? -   Who do you live with? -   If you need help, do you have trouble finding someone available to you? -   Have you been bothered in the last four weeks by sexual problems? -   Do you have difficulty concentrating, remembering or making decisions? -       Age-appropriate Screening Schedule:  Refer to the list below for future screening recommendations based on patient's age, sex and/or medical conditions. Orders for these recommended tests are listed in the plan section. The patient has been provided with a written plan.    Health Maintenance   Topic Date Due   • ZOSTER VACCINE (2 of 3) 12/13/2014   • DIABETIC EYE EXAM  05/04/2022   • URINE MICROALBUMIN  08/12/2022   • INFLUENZA VACCINE  10/01/2022   • HEMOGLOBIN A1C  12/20/2022   • LIPID PANEL  05/16/2023   • DIABETIC FOOT EXAM  09/20/2023   • TDAP/TD VACCINES (3 - Td or Tdap) 02/22/2029              Assessment & Plan   CMS Preventative Services Quick Reference  Risk Factors Identified During Encounter    Immunizations Discussed/Encouraged (specific Immunizations; Shingrix and COVID19. Patient declines covid booster and flu shot. He will consider getting Shingrix at the pharmacy.     Age appropriate screenings were discussed with patient today. We will update metabolic screenings today.  Foot exam was also updated today.  We will request recent eye exam from Dr. Reid's office.  Patient is up-to-date on colonoscopy.    I reviewed oncology, gastroenterology, dermatology, and Ortho notes today.  I encouraged patient to follow-up with his specialist as planned.    Medications were reviewed today.  I encouraged patient to continue his current  medicine regimen.  He may also take Tylenol arthritis as needed for joint pain.      The above risks/problems have been discussed with the patient.  Follow up actions/plans if indicated are seen below in the Assessment/Plan Section.  Pertinent information has been shared with the patient in the After Visit Summary.    Diagnoses and all orders for this visit:    1. Encounter for wellness examination (Primary)  -     Hemoglobin A1c  -     Comprehensive Metabolic Panel  -     CBC (No Diff)  -     Lipid Panel  -     MicroAlbumin, Urine, Random - Urine, Clean Catch  -     TSH    2. Health care maintenance  -     Hemoglobin A1c  -     Comprehensive Metabolic Panel  -     CBC (No Diff)  -     Lipid Panel  -     MicroAlbumin, Urine, Random - Urine, Clean Catch  -     TSH    3. Type 2 diabetes mellitus without complication, without long-term current use of insulin (HCC)  -     Hemoglobin A1c  -     Comprehensive Metabolic Panel  -     CBC (No Diff)  -     Lipid Panel  -     MicroAlbumin, Urine, Random - Urine, Clean Catch  -     TSH    4. Benign essential HTN  -     Hemoglobin A1c  -     Comprehensive Metabolic Panel  -     CBC (No Diff)  -     Lipid Panel  -     MicroAlbumin, Urine, Random - Urine, Clean Catch  -     TSH    5. Mixed hyperlipidemia  -     Hemoglobin A1c  -     Comprehensive Metabolic Panel  -     CBC (No Diff)  -     Lipid Panel  -     MicroAlbumin, Urine, Random - Urine, Clean Catch  -     TSH    6. Vitamin B 12 deficiency  -     Vitamin B12        Follow Up:   Return in about 6 months (around 3/20/2023), or if symptoms worsen or fail to improve, for Recheck. Please obtain eye exam from Dr. Reid.     An After Visit Summary and PPPS were made available to the patient.

## 2022-09-21 LAB — ALBUMIN UR-MCNC: <1.2 MG/DL

## 2022-09-25 RX ORDER — LIDOCAINE HYDROCHLORIDE 10 MG/ML
5 INJECTION, SOLUTION INFILTRATION; PERINEURAL
Status: COMPLETED | OUTPATIENT
Start: 2022-09-08 | End: 2022-09-08

## 2022-09-25 RX ORDER — METHYLPREDNISOLONE ACETATE 80 MG/ML
80 INJECTION, SUSPENSION INTRA-ARTICULAR; INTRALESIONAL; INTRAMUSCULAR; SOFT TISSUE
Status: COMPLETED | OUTPATIENT
Start: 2022-09-08 | End: 2022-09-08

## 2022-09-26 NOTE — PROGRESS NOTES
"    [facility] [place] Orthopaedic Surgery Established Patient Visit          Patient: Jean Noriega  YOB: 1945  Date of Encounter: 9/8/2022  PCP: Fannie Haynes DO      Subjective     Chief Complaint   Patient presents with   • Right Hand - Follow-up           History of Present Illness:     Jean Noriega is a 77 y.o. male presents today for follow-up evaluation right first trigger thumb.  The patient has attempted conservative treatment options implementing bracing as well as the topical NSAIDs and avoidance of aggravating factors.  He reports no significant improvement with this with continuation of triggering and worsening with repetitive activity.  He would like to discuss potential injection. He has been compliant with using the brace.  He reports no other new complaints.  He denies any paresthesias.      Patient Active Problem List   Diagnosis   • Epigastric pain   • Gastroesophageal reflux disease   • Chronic rhinitis   • Type 2 diabetes mellitus without complication, without long-term current use of insulin (HCC)   • Low back pain   • Mixed hyperlipidemia   • Hx of duodenal cancer, s/p Whipple 2004   • Hx of bladder cancer, s/p \"scraping\", 2004   • History of recent left nephrolithiasis s/p ureteral stent placement    • Benign essential HTN   • Arthritis   • Carcinoma of duodenum (HCC)   • Chronic diastolic heart failure (HCC)   • Peptic ulcer   • Taking multiple medications for chronic disease   • Varicose veins of lower extremity with inflammation   • Malignant neoplasm of urinary bladder (HCC)   • Degenerative joint disease (DJD) of lumbar spine   • Spondylolisthesis of lumbar region   • Spinal stenosis of lumbar region     Past Medical History:   Diagnosis Date   • Anemia, recent acute blood loss 3/30/2017    ALB anemia in March after ureteral stent removed   • Bladder cancer (HCC)    • Diabetes mellitus (HCC)    • Duodenal cancer (HCC)    • Dyspnea on exertion 2/1/2016   • Foreign body (FB) in soft " tissue 2019   • Gallstones    • History of recent left nephrolithiasis s/p ureteral stent placement  3/30/2017    2/17/17 - ureteral stent placed at [facility], s/p stent and stone removal 17   • Hyperlipidemia    • Kidney stone    • Kidney stone on left side 2017   • Nephrolithiasis    • Pneumonia      Past Surgical History:   Procedure Laterality Date   • CYSTOSCOPY W/ URETERAL STENT REMOVAL Left 2017   • EYE MUSCLE SURGERY     • HERNIA REPAIR     • INTERVENTIONAL RADIOLOGY PROCEDURE Right 3/31/2017    Procedure: PTC & Drain placement;  Surgeon: Serjio Wall MD;  Location: [facility] INVASIVE LOCATION;  Service:    • LIVER SURGERY  06/10/2019   • URETERAL STENT INSERTION Left    • WHIPPLE PROCEDURE       Social History     Occupational History   • Not on file   Tobacco Use   • Smoking status: Former Smoker     Packs/day: 1.00     Years: 5.00     Pack years: 5.00     Types: Cigarettes, Cigars, Pipe     Start date:      Quit date:      Years since quittin.7   • Smokeless tobacco: Current User     Types: Snuff   Vaping Use   • Vaping Use: Never used   Substance and Sexual Activity   • Alcohol use: No   • Drug use: No   • Sexual activity: Leah Noriega  reports that he quit smoking about 47 years ago. His smoking use included cigarettes, cigars, and pipe. He started smoking about 52 years ago. He has a 5.00 pack-year smoking history. His smokeless tobacco use includes snuff.. I have educated him on the risk of diseases from using tobacco products such as cancer, COPD and heart disease.          Social History     Social History Narrative   • Not on file     Family History   Problem Relation Age of Onset   • Heart disease Father    • Arthritis Sister    • Hypertension Brother    • Diabetes Brother      Current Outpatient Medications   Medication Sig Dispense Refill   • ASPIRIN 81 PO aspirin 81 mg tablet     • atorvastatin (LIPITOR) 40 MG tablet Take 40 mg by mouth  Daily.     • Creon 26340-614052 units capsule delayed-release particles capsule TAKE 3 CAPSULES BY MOUTH BEFORE MEAL(S) AND 1 CAPSULE BEFORE SNACKS     • Diclofenac Sodium (VOLTAREN) 1 % gel gel Apply 4 g topically to the appropriate area as directed 4 (Four) Times a Day. 350 g 2   • fluticasone (Flonase) 50 MCG/ACT nasal spray 2 sprays into the nostril(s) as directed by provider Daily. 16 g 5   • glimepiride (AMARYL) 2 MG tablet TAKE 1 TABLET BY MOUTH ONCE DAILY IN THE MORNING BEFORE BREAKFAST 90 tablet 1   • glucose blood (OneTouch Verio) test strip 1 each by Other route Daily. Use as instructed 50 each 0   • Lancets (OneTouch Delica Plus [id number]) misc 1 each by Other route Daily. 100 each 0   • losartan (COZAAR) 100 MG tablet      • metFORMIN (GLUCOPHAGE) 1000 MG tablet Take 1 tablet by mouth 2 (Two) Times a Day With Meals. 180 tablet 2   • MULTIPLE VITAMIN PO Take 1 tablet by mouth Daily.     • mupirocin (BACTROBAN) 2 % ointment      • pantoprazole (PROTONIX) 40 MG EC tablet Take 40 mg by mouth 2 (Two) Times a Day.     • ursodiol (ACTIGALL) 500 MG tablet Take 500 mg by mouth 3 (Three) Times a Day.       Current Facility-Administered Medications   Medication Dose Route Frequency Provider Last Rate Last Admin   • cyanocobalamin injection 1,000 mcg  1,000 mcg Intramuscular Weekly Aileen He APRN   1,000 mcg at 05/18/22 0939     Allergies   Allergen Reactions   • Percocet [Oxycodone-Acetaminophen] Itching            Review of Systems   Constitutional: Negative.   HENT: Negative.    Eyes: Negative.    Cardiovascular: Negative.    Respiratory: Negative.    Endocrine: Negative.    Hematologic/Lymphatic: Bruises/bleeds easily.   Skin: Negative.    Musculoskeletal: Positive for back pain, joint pain and neck pain.        Pertinent positives listed in HPI   Gastrointestinal: Negative.    Genitourinary: Positive for frequency.   Neurological: Negative.    Psychiatric/Behavioral: Negative.   "  Allergic/Immunologic: Negative.          Objective      Vitals:    09/08/22 1002   Weight: 65.8 kg (145 lb)   Height: 175.3 cm (69\")      BMI is within normal parameters. No other follow-up for BMI required.      Physical Exam  Vitals and nursing note reviewed.   Constitutional:       General: He is not in acute distress.     Appearance: Normal appearance. He is not ill-appearing.   HENT:      Head: Normocephalic and atraumatic.      Right Ear: External ear normal.      Left Ear: External ear normal.      Nose: Nose normal.      Mouth/Throat:      Mouth: Mucous membranes are moist.      Pharynx: Oropharynx is clear.   Eyes:      Extraocular Movements: Extraocular movements intact.      Conjunctiva/sclera: Conjunctivae normal.      Pupils: Pupils are equal, round, and reactive to light.   Cardiovascular:      Rate and Rhythm: Normal rate.      Pulses: Normal pulses.   Pulmonary:      Effort: Pulmonary effort is normal.   Abdominal:      General: There is no distension.   Musculoskeletal:      Cervical back: Normal range of motion. No rigidity.      Comments: Examination today of patient's right hand first digit reveals mild swelling and pain to the volar aspect of the first digit at the A1 pulley.  There is active triggering at 90 degrees flexion of the IP joint.  This is to be reduced actively.  There is no skin opening or drainage.  No significant erythema or ecchymosis.  Neurovascular is grossly intact right upper extremity.   Skin:     General: Skin is warm and dry.      Capillary Refill: Capillary refill takes less than 2 seconds.   Neurological:      General: No focal deficit present.      Mental Status: He is alert and oriented to person, place, and time.      Cranial Nerves: Cranial nerves are intact.   Psychiatric:         Mood and Affect: Mood normal.         Behavior: Behavior normal.                   Radiology:      XR Hand 3+ View Right    Result Date: 8/10/2022  1.  Positive ulnar variance noted. 2.  " Osteoarthritic change throughout the proximal carpal row and digits. 3.  Metallic density noted in the thumb soft tissues.  This report was finalized on 8/10/2022 9:06 AM by Dr. Fede Shearer MD.                Assessment/Plan      No diagnosis found.    77-year-old male with a several week history of progressive right first trigger thumb.  The patient has failed NSAIDs as well as topical creams, bracing, etc.  He continues to have ongoing pain and symptoms.  As result of this we proceeded with an A1 pulley injection right first digit with 80 mg Depo-Medrol and lidocaine block.  Patient tolerated this procedure well and the patient was provided with a dorsal splint.  He will wear the splint over the next 72 hours and he will return back in 3 weeks for further evaluation of the efficacy of the conservative treatment.    Right first A1 pulley trigger finger injection    Date/Time: 9/8/2022 12:24 PM  Performed by: Vinny Suggs PA  Authorized by: Vinny Suggs PA   Consent: Verbal consent obtained. Written consent obtained.  Risks and benefits: risks, benefits and alternatives were discussed  Consent given by: patient  Patient understanding: patient states understanding of the procedure being performed  Patient consent: the patient's understanding of the procedure matches consent given  Site marked: the operative site was marked  Imaging studies: imaging studies available  Patient identity confirmed: verbally with patient  Local anesthesia used: yes  Anesthesia: local infiltration    Anesthesia:  Local anesthesia used: yes  Local Anesthetic: lidocaine 1% without epinephrine  Anesthetic total: 0.5 mL  Patient tolerance: patient tolerated the procedure well with no immediate complications  Medications administered: 80 mg methylPREDNISolone acetate 80 MG/ML; 5 mg lidocaine 1 %                      This document was signed by Vinny Suggs PA-C September 8, 2022     CC: Fannie Haynes DO Dictated Utilizing  Dragon Dictation: Part of this note may be an electronic transcription/translation of spoken language to printed text using the Dragon Dictation System.

## 2022-09-28 ENCOUNTER — OFFICE VISIT (OUTPATIENT)
Dept: ORTHOPEDIC SURGERY | Facility: CLINIC | Age: 77
End: 2022-09-28

## 2022-09-28 VITALS — WEIGHT: 148 LBS | HEIGHT: 69 IN | BODY MASS INDEX: 21.92 KG/M2

## 2022-09-28 DIAGNOSIS — M65.311 TRIGGER THUMB OF RIGHT HAND: Primary | ICD-10-CM

## 2022-09-28 PROCEDURE — 99213 OFFICE O/P EST LOW 20 MIN: CPT | Performed by: PHYSICIAN ASSISTANT

## 2022-09-28 NOTE — PROGRESS NOTES
"    [facility] [place] Orthopaedic Surgery Established Patient Visit          Patient: Jean Noriega  YOB: 1945  Date of Encounter: 9/28/2022  PCP: Fannie Haynes DO      Subjective     Chief Complaint   Patient presents with   • Right Hand - Follow-up           History of Present Illness:     Jean Noriega is a 77 y.o. male presents today for follow-up evaluation right first trigger thumb.  The patient has attempted conservative treatment options implementing bracing as well as the topical NSAIDs and avoidance of aggravating factors.  He reported no significant improvement and received A1 pulley tendon sheath injection at last office visit with Depo-Medrol Marcaine block.  Patient upon receiving this and the splinting had resolution of the previous  triggering and improvement of his pain and activity.  He reports no other new complaints. He denies any paresthesias.      Patient Active Problem List   Diagnosis   • Epigastric pain   • Gastroesophageal reflux disease   • Chronic rhinitis   • Type 2 diabetes mellitus without complication, without long-term current use of insulin (HCC)   • Low back pain   • Mixed hyperlipidemia   • Hx of duodenal cancer, s/p Whipple 2004   • Hx of bladder cancer, s/p \"scraping\", 2004   • History of recent left nephrolithiasis s/p ureteral stent placement    • Benign essential HTN   • Arthritis   • Carcinoma of duodenum (HCC)   • Chronic diastolic heart failure (HCC)   • Peptic ulcer   • Taking multiple medications for chronic disease   • Varicose veins of lower extremity with inflammation   • Malignant neoplasm of urinary bladder (HCC)   • Degenerative joint disease (DJD) of lumbar spine   • Spondylolisthesis of lumbar region   • Spinal stenosis of lumbar region     Past Medical History:   Diagnosis Date   • Anemia, recent acute blood loss 3/30/2017    ALB anemia in March after ureteral stent removed   • Bladder cancer (HCC)    • Diabetes mellitus (HCC)    • Duodenal cancer (HCC)  "   • Dyspnea on exertion 2016   • Foreign body (FB) in soft tissue 2019   • Gallstones    • History of recent left nephrolithiasis s/p ureteral stent placement  3/30/2017    2/17/17 - ureteral stent placed at [facility], s/p stent and stone removal 17   • Hyperlipidemia    • Kidney stone    • Kidney stone on left side 2017   • Nephrolithiasis    • Pneumonia      Past Surgical History:   Procedure Laterality Date   • CYSTOSCOPY W/ URETERAL STENT REMOVAL Left 2017   • EYE MUSCLE SURGERY     • HERNIA REPAIR     • INTERVENTIONAL RADIOLOGY PROCEDURE Right 3/31/2017    Procedure: PTC & Drain placement;  Surgeon: Serjio Wall MD;  Location: [facility] INVASIVE LOCATION;  Service:    • LIVER SURGERY  06/10/2019   • URETERAL STENT INSERTION Left    • WHIPPLE PROCEDURE       Social History     Occupational History   • Not on file   Tobacco Use   • Smoking status: Former Smoker     Packs/day: 1.00     Years: 5.00     Pack years: 5.00     Types: Cigarettes, Cigars, Pipe     Start date:      Quit date:      Years since quittin.7   • Smokeless tobacco: Current User     Types: Snuff   Vaping Use   • Vaping Use: Never used   Substance and Sexual Activity   • Alcohol use: No   • Drug use: No   • Sexual activity: Leah Noriega  reports that he quit smoking about 47 years ago. His smoking use included cigarettes, cigars, and pipe. He started smoking about 52 years ago. He has a 5.00 pack-year smoking history. His smokeless tobacco use includes snuff. I have educated him on the risk of diseases from using tobacco products such as cancer, COPD and heart disease.          Social History     Social History Narrative   • Not on file     Family History   Problem Relation Age of Onset   • Heart disease Father    • Arthritis Sister    • Hypertension Brother    • Diabetes Brother      Current Outpatient Medications   Medication Sig Dispense Refill   • ASPIRIN 81 PO aspirin 81 mg tablet      • atorvastatin (LIPITOR) 40 MG tablet Take 40 mg by mouth Daily.     • Creon 67423-036540 units capsule delayed-release particles capsule TAKE 3 CAPSULES BY MOUTH BEFORE MEAL(S) AND 1 CAPSULE BEFORE SNACKS     • Diclofenac Sodium (VOLTAREN) 1 % gel gel Apply 4 g topically to the appropriate area as directed 4 (Four) Times a Day. 350 g 2   • fluticasone (Flonase) 50 MCG/ACT nasal spray 2 sprays into the nostril(s) as directed by provider Daily. 16 g 5   • glimepiride (AMARYL) 2 MG tablet TAKE 1 TABLET BY MOUTH ONCE DAILY IN THE MORNING BEFORE BREAKFAST 90 tablet 1   • glucose blood (OneTouch Verio) test strip 1 each by Other route Daily. Use as instructed 50 each 0   • Lancets (OneTouch Delica Plus [id number]) misc 1 each by Other route Daily. 100 each 0   • losartan (COZAAR) 100 MG tablet      • metFORMIN (GLUCOPHAGE) 1000 MG tablet Take 1 tablet by mouth 2 (Two) Times a Day With Meals. 180 tablet 1   • MULTIPLE VITAMIN PO Take 1 tablet by mouth Daily.     • mupirocin (BACTROBAN) 2 % ointment      • pantoprazole (PROTONIX) 40 MG EC tablet Take 40 mg by mouth 2 (Two) Times a Day.     • ursodiol (ACTIGALL) 500 MG tablet Take 500 mg by mouth 3 (Three) Times a Day.       Current Facility-Administered Medications   Medication Dose Route Frequency Provider Last Rate Last Admin   • cyanocobalamin injection 1,000 mcg  1,000 mcg Intramuscular Weekly Aileen He APRN   1,000 mcg at 05/18/22 0939     Allergies   Allergen Reactions   • Percocet [Oxycodone-Acetaminophen] Itching            Review of Systems   Constitutional: Negative.   HENT: Negative.    Eyes: Negative.    Cardiovascular: Negative.    Respiratory: Negative.    Endocrine: Negative.    Hematologic/Lymphatic: Bruises/bleeds easily.   Skin: Negative.    Musculoskeletal: Positive for back pain, joint pain and neck pain.        Pertinent positives listed in HPI   Gastrointestinal: Negative.    Genitourinary: Positive for frequency.   Neurological: Negative.  "   Psychiatric/Behavioral: Negative.    Allergic/Immunologic: Negative.          Objective      Vitals:    09/28/22 0840   Weight: 67.1 kg (148 lb)   Height: 175.3 cm (69\")      BMI is within normal parameters. No other follow-up for BMI required.      Physical Exam  Vitals and nursing note reviewed.   Constitutional:       General: He is not in acute distress.     Appearance: Normal appearance. He is not ill-appearing.   HENT:      Head: Normocephalic and atraumatic.      Right Ear: External ear normal.      Left Ear: External ear normal.      Nose: Nose normal.      Mouth/Throat:      Mouth: Mucous membranes are moist.      Pharynx: Oropharynx is clear.   Eyes:      Extraocular Movements: Extraocular movements intact.      Conjunctiva/sclera: Conjunctivae normal.      Pupils: Pupils are equal, round, and reactive to light.   Cardiovascular:      Rate and Rhythm: Normal rate.      Pulses: Normal pulses.   Pulmonary:      Effort: Pulmonary effort is normal.   Abdominal:      General: There is no distension.   Musculoskeletal:      Cervical back: Normal range of motion. No rigidity.      Comments: Examination today of patient's right hand first digit reveals reduction of the swelling and pain to the volar aspect of the first digit at the A1 pulley.  There is no active triggering.  There is no skin opening or drainage.  No significant erythema or ecchymosis.  Neurovascular is grossly intact right upper extremity.   Skin:     General: Skin is warm and dry.      Capillary Refill: Capillary refill takes less than 2 seconds.   Neurological:      General: No focal deficit present.      Mental Status: He is alert and oriented to person, place, and time.      Cranial Nerves: Cranial nerves are intact.   Psychiatric:         Mood and Affect: Mood normal.         Behavior: Behavior normal.                   Radiology:      XR Hand 3+ View Right    Result Date: 8/10/2022  1.  Positive ulnar variance noted. 2.  Osteoarthritic " change throughout the proximal carpal row and digits. 3.  Metallic density noted in the thumb soft tissues.  This report was finalized on 8/10/2022 9:06 AM by Dr. Fede Shearer MD.                Assessment/Plan        ICD-10-CM ICD-9-CM   1. Trigger thumb of right hand  M65.311 727.03       77-year-old male with a several week history of progressive right first trigger thumb.  The patient has tolerated  Previous A1 pulley injection right first digit with 80 mg Depo-Medrol and lidocaine block at last office visit with resolution of pain/symptoms. he will progress his activity as tolerated and return back for any complication or worsening/return of pain/symptoms.  Follow-up as needed.                      This document was signed by Vinny Suggs PA-C September 28, 2022     CC: Fannie Haynes DO      Dictated Utilizing Dragon Dictation: Part of this note may be an electronic transcription/translation of spoken language to printed text using the Dragon Dictation System.

## 2022-10-05 RX ORDER — GLIMEPIRIDE 2 MG/1
2 TABLET ORAL
Qty: 90 TABLET | Refills: 1 | Status: SHIPPED | OUTPATIENT
Start: 2022-10-05 | End: 2022-10-06 | Stop reason: SDUPTHER

## 2022-10-05 NOTE — TELEPHONE ENCOUNTER
Caller: Jean Noriega    Relationship: Self    Best call back number:[phone]     Requested Prescriptions:   Requested Prescriptions     Pending Prescriptions Disp Refills   • glimepiride (AMARYL) 2 MG tablet 90 tablet 1     Sig: Take 1 tablet by mouth.        Pharmacy where request should be sent: [facility] [address] [place] [address] [phone] PH - [phone] FX     Additional details provided by patient:   Does the patient have less than a 3 day supply:  [] Yes  [x] No    Trish Smith Rep   10/05/22 16:13 EDT

## 2022-10-06 RX ORDER — GLIMEPIRIDE 2 MG/1
2 TABLET ORAL
Qty: 90 TABLET | Refills: 3 | Status: SHIPPED | OUTPATIENT
Start: 2022-10-06 | End: 2023-04-04

## 2022-11-14 ENCOUNTER — OFFICE VISIT (OUTPATIENT)
Dept: FAMILY MEDICINE CLINIC | Facility: CLINIC | Age: 77
End: 2022-11-14

## 2022-11-14 VITALS
OXYGEN SATURATION: 97 % | WEIGHT: 152.2 LBS | HEART RATE: 64 BPM | SYSTOLIC BLOOD PRESSURE: 130 MMHG | DIASTOLIC BLOOD PRESSURE: 58 MMHG | HEIGHT: 69 IN | BODY MASS INDEX: 22.54 KG/M2 | TEMPERATURE: 97.7 F

## 2022-11-14 DIAGNOSIS — R20.9 BILATERAL COLD FEET: Primary | ICD-10-CM

## 2022-11-14 DIAGNOSIS — R09.89 DECREASED DORSALIS PEDIS PULSE: ICD-10-CM

## 2022-11-14 PROCEDURE — 99213 OFFICE O/P EST LOW 20 MIN: CPT | Performed by: INTERNAL MEDICINE

## 2022-11-14 NOTE — PROGRESS NOTES
Patient Name: Jean Noriega Today's Date: 2022   Patient MRN / CSN: [id number] / [id number] Date of Encounter: 2022   Patient Age / : 77 y.o. / 1945 Encounter Provider: Fannie Haynes DO   Referring Physician: No ref. provider found          Jean is a 77 y.o. male who is being seen today for Cold Extremity (Hands and feet)      History of Present Illness     Jean presents today for an acute evaluation with complaints of cold hands and feet.  He has noticed these symptoms over the past 1 month.  He denies any discoloration of his hands or feet.  He reports that wearing gloves and thick socks does not seem to help.  He denies any claudication, chest pain, or shortness of air.    Allergies include:Percocet [oxycodone-acetaminophen]  Current Outpatient Medications   Medication Sig Dispense Refill   • ASPIRIN 81 PO aspirin 81 mg tablet     • atorvastatin (LIPITOR) 40 MG tablet Take 40 mg by mouth Daily.     • Creon [id number] units capsule delayed-release particles capsule TAKE 3 CAPSULES BY MOUTH BEFORE MEAL(S) AND 1 CAPSULE BEFORE SNACKS     • fluticasone (Flonase) 50 MCG/ACT nasal spray 2 sprays into the nostril(s) as directed by provider Daily. 16 g 5   • glimepiride (AMARYL) 2 MG tablet Take 1 tablet by mouth Every Morning Before Breakfast. 90 tablet 3   • glucose blood (OneTouch Verio) test strip 1 each by Other route Daily. Use as instructed 50 each 0   • Lancets (OneTouch Delica Plus [id number]) misc 1 each by Other route Daily. 100 each 0   • losartan (COZAAR) 100 MG tablet      • metFORMIN (GLUCOPHAGE) 1000 MG tablet Take 1 tablet by mouth 2 (Two) Times a Day With Meals. 180 tablet 1   • MULTIPLE VITAMIN PO Take 1 tablet by mouth Daily.     • pantoprazole (PROTONIX) 40 MG EC tablet Take 40 mg by mouth 2 (Two) Times a Day.     • ursodiol (ACTIGALL) 500 MG tablet Take 500 mg by mouth 3 (Three) Times a Day.       Current Facility-Administered Medications   Medication Dose Route Frequency  Provider Last Rate Last Admin   • cyanocobalamin injection 1,000 mcg  1,000 mcg Intramuscular Weekly Aileen He APRN   1,000 mcg at 22 0939     Past Medical History:   Diagnosis Date   • Anemia, recent acute blood loss 3/30/2017    ALB anemia in March after ureteral stent removed   • Bladder cancer (HCC)    • Diabetes mellitus (HCC)    • Duodenal cancer (HCC)    • Dyspnea on exertion 2016   • Foreign body (FB) in soft tissue 2019   • Gallstones    • History of recent left nephrolithiasis s/p ureteral stent placement  3/30/2017    2/17/17 - ureteral stent placed at [facility], s/p stent and stone removal 17   • Hyperlipidemia    • Kidney stone    • Kidney stone on left side 2017   • Nephrolithiasis    • Pneumonia      Family History   Problem Relation Age of Onset   • Heart disease Father    • Arthritis Sister    • Hypertension Brother    • Diabetes Brother      Past Surgical History:   Procedure Laterality Date   • CYSTOSCOPY W/ URETERAL STENT REMOVAL Left 2017   • EYE MUSCLE SURGERY     • HERNIA REPAIR     • INTERVENTIONAL RADIOLOGY PROCEDURE Right 2017    Procedure: PTC & Drain placement;  Surgeon: Serjio Wall MD;  Location: [facility] INVASIVE LOCATION;  Service:    • JOINT ASPIRATION AND/OR INJECTION Right     right hand   • LIVER SURGERY  06/10/2019   • URETERAL STENT INSERTION Left    • WHIPPLE PROCEDURE       Social History     Substance and Sexual Activity   Alcohol Use No     Social History     Tobacco Use   Smoking Status Former   • Packs/day: 1.00   • Years: 5.00   • Pack years: 5.00   • Types: Cigarettes, Cigars, Pipe   • Start date:    • Quit date:    • Years since quittin.9   Smokeless Tobacco Current   • Types: Snuff     Social History     Substance and Sexual Activity   Drug Use No     Review of Systems   Constitutional: Negative for fever.   Respiratory: Negative for shortness of breath.    Cardiovascular: Negative for chest pain.  "       Depression Assessment Review:  PHQ-9 Total Score:    Vital Signs & Measurements Taken This Encounter  /58 (BP Location: Left arm, Patient Position: Sitting, Cuff Size: Adult)   Pulse 64   Temp 97.7 °F (36.5 °C) (Temporal)   Ht 175.3 cm (69.02\")   Wt 69 kg (152 lb 3.2 oz)   SpO2 97%   BMI 22.47 kg/m²    SpO2 Percentage    11/14/22 1343   SpO2: 97%        BMI is within normal parameters. No other follow-up for BMI required.      Physical Exam  Vitals reviewed.   Constitutional:       General: He is not in acute distress.  HENT:      Head: Normocephalic and atraumatic.   Eyes:      General: No scleral icterus.     Extraocular Movements: Extraocular movements intact.      Conjunctiva/sclera: Conjunctivae normal.      Pupils: Pupils are equal, round, and reactive to light.   Cardiovascular:      Rate and Rhythm: Normal rate and regular rhythm.   Pulmonary:      Effort: Pulmonary effort is normal. No respiratory distress.      Breath sounds: Normal breath sounds. No wheezing or rhonchi.   Musculoskeletal:         General: No swelling.   Skin:     General: Skin is warm and dry.      Coloration: Skin is not jaundiced.      Comments: +2 radial pulses bilaterally.  There is no discoloration of the hands or feet.  Normal capillary refill noted on the hands.  Decreased capillary refill noted on the great toes bilaterally.  +2 PT pulses bilaterally.  +1 DP pulses bilaterally.  There are no open wounds on the hands or feet.   Neurological:      Mental Status: He is alert.   Psychiatric:         Mood and Affect: Mood normal.         Behavior: Behavior normal.              Assessment & Plan  Patient Active Problem List   Diagnosis   • Epigastric pain   • Gastroesophageal reflux disease   • Chronic rhinitis   • Type 2 diabetes mellitus without complication, without long-term current use of insulin (HCC)   • Low back pain   • Mixed hyperlipidemia   • Hx of duodenal cancer, s/p Whipple 2004   • Hx of bladder cancer, " "s/p \"scraping\", 2004   • History of recent left nephrolithiasis s/p ureteral stent placement    • Benign essential HTN   • Arthritis   • Carcinoma of duodenum (HCC)   • Chronic diastolic heart failure (HCC)   • Peptic ulcer   • Taking multiple medications for chronic disease   • Varicose veins of lower extremity with inflammation   • Malignant neoplasm of urinary bladder (HCC)   • Degenerative joint disease (DJD) of lumbar spine   • Spondylolisthesis of lumbar region   • Spinal stenosis of lumbar region       ICD-10-CM ICD-9-CM   1. Bilateral cold feet  R20.9 782.9   2. Decreased dorsalis pedis pulse  R09.89 785.9     Orders Placed This Encounter   Procedures   • US Ankle / Brachial Indices Extremity Limited     Standing Status:   Future     Standing Expiration Date:   11/14/2023     Order Specific Question:   Reason for Exam:     Answer:   cold feet, decreased DP pulses     Order Specific Question:   Release to patient     Answer:   Routine Release       Meds Ordered During Visit:  No orders of the defined types were placed in this encounter.    I encourage patient to wear thick gloves and socks in efforts to prevent getting cold.  We will check ABIs and await those results for further recommendations.    Return if symptoms worsen or fail to improve, for Recheck, Next scheduled follow up.          Referring Provider (if known): No ref. provider found      This document has been electronically signed by Fannie Haynes DO  November 14, 2022 14:20 EST    Fannie Haynes DO, FACOI  [address] [place] [address]  [phone] (office)    Part of this note may be an electronic transcription/translation of spoken language to printed text using the Dragon Dictation System.  "

## 2022-11-21 ENCOUNTER — HOSPITAL ENCOUNTER (OUTPATIENT)
Dept: ULTRASOUND IMAGING | Facility: HOSPITAL | Age: 77
Discharge: HOME | End: 2022-11-21
Admitting: INTERNAL MEDICINE

## 2022-11-21 DIAGNOSIS — R20.9 BILATERAL COLD FEET: ICD-10-CM

## 2022-11-21 DIAGNOSIS — R09.89 DECREASED DORSALIS PEDIS PULSE: ICD-10-CM

## 2022-11-21 PROCEDURE — 93922 UPR/L XTREMITY ART 2 LEVELS: CPT

## 2022-11-21 PROCEDURE — 93922 UPR/L XTREMITY ART 2 LEVELS: CPT | Performed by: RADIOLOGY

## 2022-12-09 DIAGNOSIS — E11.9 TYPE 2 DIABETES MELLITUS WITHOUT COMPLICATION, WITHOUT LONG-TERM CURRENT USE OF INSULIN: ICD-10-CM

## 2022-12-09 NOTE — TELEPHONE ENCOUNTER
Caller: Jean Noriega    Relationship: Self    Best call back number: [phone]    Requested Prescriptions:   Requested Prescriptions     Pending Prescriptions Disp Refills   • glucose blood (OneTouch Verio) test strip 50 each 0     Si each by Other route Daily. Use as instructed        Pharmacy where request should be sent: [facility] [address] [place] [address] [phone] [facility] [phone] FX     Additional details provided by patient:     Does the patient have less than a 3 day supply:  [x] Yes  [] No    Would you like a call back once the refill request has been completed: [x] Yes [] No    If the office needs to give you a call back, can they leave a voicemail: [x] Yes [] No    Trish Rodriguez Rep   22 09:47 EST

## 2022-12-14 ENCOUNTER — OFFICE VISIT (OUTPATIENT)
Dept: FAMILY MEDICINE CLINIC | Facility: CLINIC | Age: 77
End: 2022-12-14

## 2022-12-14 DIAGNOSIS — R09.81 NASAL CONGESTION: ICD-10-CM

## 2022-12-14 DIAGNOSIS — J02.9 SORE THROAT: ICD-10-CM

## 2022-12-14 DIAGNOSIS — J01.10 ACUTE NON-RECURRENT FRONTAL SINUSITIS: Primary | ICD-10-CM

## 2022-12-14 DIAGNOSIS — R52 BODY ACHES: ICD-10-CM

## 2022-12-14 DIAGNOSIS — R51.9 NONINTRACTABLE HEADACHE, UNSPECIFIED CHRONICITY PATTERN, UNSPECIFIED HEADACHE TYPE: ICD-10-CM

## 2022-12-14 LAB
EXPIRATION DATE: NORMAL
FLUAV AG UPPER RESP QL IA.RAPID: NOT DETECTED
FLUBV AG UPPER RESP QL IA.RAPID: NOT DETECTED
INTERNAL CONTROL: NORMAL
Lab: NORMAL
SARS-COV-2 AG UPPER RESP QL IA.RAPID: NOT DETECTED

## 2022-12-14 PROCEDURE — 99213 OFFICE O/P EST LOW 20 MIN: CPT | Performed by: NURSE PRACTITIONER

## 2022-12-14 PROCEDURE — 87428 SARSCOV & INF VIR A&B AG IA: CPT | Performed by: NURSE PRACTITIONER

## 2022-12-14 RX ORDER — AMOXICILLIN AND CLAVULANATE POTASSIUM 875; 125 MG/1; MG/1
1 TABLET, FILM COATED ORAL 2 TIMES DAILY
Qty: 14 TABLET | Refills: 0 | Status: SHIPPED | OUTPATIENT
Start: 2022-12-14 | End: 2022-12-21

## 2022-12-14 RX ORDER — LEVOCETIRIZINE DIHYDROCHLORIDE 5 MG/1
5 TABLET, FILM COATED ORAL EVERY EVENING
Qty: 30 TABLET | Refills: 0 | Status: SHIPPED | OUTPATIENT
Start: 2022-12-14 | End: 2023-03-22 | Stop reason: SDUPTHER

## 2022-12-14 NOTE — PROGRESS NOTES
Patient Name: Jean Noriega Today's Date: 2022   Patient MRN / CSN: [id number] / [id number] Date of Encounter: 2022   Patient Age / : 77 y.o. / 1945 Encounter Provider: JADE Pedraza   Referring Physician: No ref. provider found          Jean is a 77 y.o. male who is being seen today for Sore Throat, Headache, Nasal Congestion, and Generalized Body Aches      Sore Throat   This is a new problem. The current episode started yesterday. The problem has been gradually worsening. Neither side of throat is experiencing more pain than the other. There has been no fever. The pain is moderate. Associated symptoms include congestion, coughing and headaches. He has tried nothing for the symptoms. The treatment provided no relief.   Headache  Headache pattern:  Headache sometimes there, sometimes not at all  Providers seen:  None  Time of day symptoms are worse:  No specific time of day  Days of the week symptoms are worse:  No specific day of the week  Quality:  Squeezing  Aggravating factors:  None      Allergies include:Percocet [oxycodone-acetaminophen]  Current Outpatient Medications   Medication Sig Dispense Refill   • ASPIRIN 81 PO aspirin 81 mg tablet     • atorvastatin (LIPITOR) 40 MG tablet Take 40 mg by mouth Daily.     • Creon [id number] units capsule delayed-release particles capsule TAKE 3 CAPSULES BY MOUTH BEFORE MEAL(S) AND 1 CAPSULE BEFORE SNACKS     • fluticasone (Flonase) 50 MCG/ACT nasal spray 2 sprays into the nostril(s) as directed by provider Daily. 16 g 5   • glimepiride (AMARYL) 2 MG tablet Take 1 tablet by mouth Every Morning Before Breakfast. 90 tablet 3   • glucose blood (OneTouch Verio) test strip 1 each by Other route Daily. Use as instructed 50 each 0   • Lancets (OneTouch Delica Plus [id number]) misc 1 each by Other route Daily. 100 each 0   • losartan (COZAAR) 100 MG tablet      • metFORMIN (GLUCOPHAGE) 1000 MG tablet Take 1 tablet by mouth 2 (Two) Times a Day With  Meals. 180 tablet 1   • metFORMIN (GLUCOPHAGE) 1000 MG tablet Take 1,000 mg by mouth.     • MULTIPLE VITAMIN PO Take 1 tablet by mouth Daily.     • pantoprazole (PROTONIX) 40 MG EC tablet Take 40 mg by mouth 2 (Two) Times a Day.     • ursodiol (ACTIGALL) 500 MG tablet Take 500 mg by mouth 3 (Three) Times a Day.     • amoxicillin-clavulanate (Augmentin) 875-125 MG per tablet Take 1 tablet by mouth 2 (Two) Times a Day for 7 days. 14 tablet 0   • levocetirizine (XYZAL) 5 MG tablet Take 1 tablet by mouth Every Evening. 30 tablet 0     Current Facility-Administered Medications   Medication Dose Route Frequency Provider Last Rate Last Admin   • cyanocobalamin injection 1,000 mcg  1,000 mcg Intramuscular Weekly Aileen He APRN   1,000 mcg at 05/18/22 0939     Past Medical History:   Diagnosis Date   • Anemia, recent acute blood loss 3/30/2017    ALB anemia in March after ureteral stent removed   • Bladder cancer (HCC)    • Diabetes mellitus (HCC)    • Duodenal cancer (HCC)    • Dyspnea on exertion 2/1/2016   • Foreign body (FB) in soft tissue 2/26/2019   • Gallstones    • History of recent left nephrolithiasis s/p ureteral stent placement  3/30/2017    2/17/17 - ureteral stent placed at [facility], s/p stent and stone removal 2/13/17   • Hyperlipidemia    • Kidney stone    • Kidney stone on left side 02/2017   • Nephrolithiasis    • Pneumonia      Family History   Problem Relation Age of Onset   • Heart disease Father    • Arthritis Sister    • Hypertension Brother    • Diabetes Brother      Past Surgical History:   Procedure Laterality Date   • CYSTOSCOPY W/ URETERAL STENT REMOVAL Left 03/13/2017   • EYE MUSCLE SURGERY     • HERNIA REPAIR     • INTERVENTIONAL RADIOLOGY PROCEDURE Right 03/31/2017    Procedure: PTC & Drain placement;  Surgeon: Serjio Wall MD;  Location: [facility] INVASIVE LOCATION;  Service:    • JOINT ASPIRATION AND/OR INJECTION Right     right hand   • LIVER SURGERY  06/10/2019   •  URETERAL STENT INSERTION Left    • WHIPPLE PROCEDURE       Social History     Substance and Sexual Activity   Alcohol Use No     Social History     Tobacco Use   Smoking Status Former   • Packs/day: 1.00   • Years: 5.00   • Pack years: 5.00   • Types: Cigarettes, Cigars, Pipe   • Start date:    • Quit date:    • Years since quittin.9   Smokeless Tobacco Current   • Types: Snuff     Social History     Substance and Sexual Activity   Drug Use No     Review of Systems   Constitutional: Negative.    HENT: Positive for congestion and sore throat.    Eyes: Negative.    Respiratory: Positive for cough.    Cardiovascular: Negative.    Gastrointestinal: Negative.    Endocrine: Negative.    Genitourinary: Negative.    Musculoskeletal: Negative.    Skin: Negative.    Allergic/Immunologic: Negative.    Neurological: Positive for headaches.   Hematological: Negative.    Psychiatric/Behavioral: Negative.         Depression Assessment Review:  PHQ-9 Total Score:    Vital Signs & Measurements Taken This Encounter  There were no vitals taken for this visit.   There were no vitals filed for this visit.     BMI is within normal parameters. No other follow-up for BMI required.      Physical Exam  Constitutional:       Appearance: Normal appearance.   HENT:      Nose: Congestion and rhinorrhea present.      Right Sinus: Maxillary sinus tenderness and frontal sinus tenderness present.      Left Sinus: Maxillary sinus tenderness and frontal sinus tenderness present.   Cardiovascular:      Rate and Rhythm: Normal rate and regular rhythm.      Pulses: Normal pulses.      Heart sounds: Normal heart sounds.   Pulmonary:      Effort: Pulmonary effort is normal.      Breath sounds: Normal breath sounds.   Abdominal:      Palpations: Abdomen is soft.   Musculoskeletal:         General: Normal range of motion.      Cervical back: Normal range of motion.   Skin:     General: Skin is warm and dry.   Neurological:      General: No  "focal deficit present.      Mental Status: He is alert and oriented to person, place, and time.   Psychiatric:         Mood and Affect: Mood normal.         Behavior: Behavior normal.          Fall Risk Assessment:  Fall Risk Assessment was completed, and patient is at low risk for falls.    Assessment & Plan    -- Patient has complaints today of cough, congestion, sinus pressure and pain.  He states this has been going on for multiple days.  Flu and COVID test were negative today.  He does have sinus pain and tenderness upon palpation.  For his sinusitis I will treat with Augmentin twice daily for  Days.    Patient Active Problem List   Diagnosis   • Epigastric pain   • Gastroesophageal reflux disease   • Chronic rhinitis   • Type 2 diabetes mellitus without complication, without long-term current use of insulin (HCC)   • Low back pain   • Mixed hyperlipidemia   • Hx of duodenal cancer, s/p Whipple 2004   • Hx of bladder cancer, s/p \"scraping\", 2004   • History of recent left nephrolithiasis s/p ureteral stent placement    • Benign essential HTN   • Arthritis   • Carcinoma of duodenum (HCC)   • Chronic diastolic heart failure (HCC)   • Peptic ulcer   • Taking multiple medications for chronic disease   • Varicose veins of lower extremity with inflammation   • Malignant neoplasm of urinary bladder (HCC)   • Degenerative joint disease (DJD) of lumbar spine   • Spondylolisthesis of lumbar region   • Spinal stenosis of lumbar region       ICD-10-CM ICD-9-CM   1. Nonintractable headache, unspecified chronicity pattern, unspecified headache type  R51.9 784.0   2. Body aches  R52 780.96   3. Sore throat  J02.9 462   4. Nasal congestion  R09.81 478.19     Orders Placed This Encounter   Procedures   • POCT SARS-CoV-2 Antigen JESSI + Flu     Order Specific Question:   Release to patient     Answer:   Routine Release       Meds Ordered During Visit:  New Medications Ordered This Visit   Medications   • amoxicillin-clavulanate " (Augmentin) 875-125 MG per tablet     Sig: Take 1 tablet by mouth 2 (Two) Times a Day for 7 days.     Dispense:  14 tablet     Refill:  0   • levocetirizine (XYZAL) 5 MG tablet     Sig: Take 1 tablet by mouth Every Evening.     Dispense:  30 tablet     Refill:  0     Follow-up on file with Dr. Haynes.           This document has been electronically signed by JADE Pedraza  December 14, 2022 13:16 EST    JADE Pedraza  [address] [place] [address]  [phone] (office)    Part of this note may be an electronic transcription/translation of spoken language to printed text using the Dragon Dictation System.

## 2022-12-16 ENCOUNTER — OFFICE VISIT (OUTPATIENT)
Dept: FAMILY MEDICINE CLINIC | Facility: CLINIC | Age: 77
End: 2022-12-16

## 2022-12-16 VITALS
OXYGEN SATURATION: 98 % | DIASTOLIC BLOOD PRESSURE: 60 MMHG | TEMPERATURE: 97.8 F | WEIGHT: 146.6 LBS | HEIGHT: 69 IN | HEART RATE: 67 BPM | SYSTOLIC BLOOD PRESSURE: 130 MMHG | BODY MASS INDEX: 21.71 KG/M2

## 2022-12-16 DIAGNOSIS — J06.9 ACUTE URI: Primary | ICD-10-CM

## 2022-12-16 LAB
EXPIRATION DATE: NORMAL
FLUAV RNA RESP QL NAA+PROBE: NOT DETECTED
FLUBV RNA RESP QL NAA+PROBE: NOT DETECTED
INTERNAL CONTROL: NORMAL
Lab: NORMAL
RSV RNA NPH QL NAA+NON-PROBE: NOT DETECTED
S PYO AG THROAT QL: NEGATIVE
SARS-COV-2 RNA RESP QL NAA+PROBE: NOT DETECTED

## 2022-12-16 PROCEDURE — 99213 OFFICE O/P EST LOW 20 MIN: CPT | Performed by: NURSE PRACTITIONER

## 2022-12-16 PROCEDURE — 87637 SARSCOV2&INF A&B&RSV AMP PRB: CPT | Performed by: NURSE PRACTITIONER

## 2022-12-16 PROCEDURE — 87880 STREP A ASSAY W/OPTIC: CPT | Performed by: NURSE PRACTITIONER

## 2022-12-16 RX ORDER — DEXTROMETHORPHAN HYDROBROMIDE AND PROMETHAZINE HYDROCHLORIDE 15; 6.25 MG/5ML; MG/5ML
5 SYRUP ORAL 4 TIMES DAILY PRN
Qty: 240 ML | Refills: 0 | Status: SHIPPED | OUTPATIENT
Start: 2022-12-16 | End: 2023-01-06 | Stop reason: SDUPTHER

## 2022-12-16 NOTE — PROGRESS NOTES
"Zander Noriega is a 77 y.o. male.     Chief Complaint   Patient presents with   • Cough   • Headache   • Sore Throat   • Nasal Congestion       History of Present Illness  He presents with c/o fever, cough, sore throat, runny nose. He is coughing up very little clear sputum. He is taking amoxicillin and xyzal. He states his sinuses feel like they are on fire.       The following portions of the patient's history were reviewed and updated as appropriate: allergies, current medications, past family history, past medical history, past social history, past surgical history and problem list.    Review of Systems   Constitutional: Positive for fatigue and fever. Negative for unexpected weight change.   HENT: Positive for sinus pain and sore throat. Negative for ear pain, rhinorrhea and trouble swallowing.    Eyes: Negative for visual disturbance.   Respiratory: Positive for cough. Negative for shortness of breath and wheezing.    Cardiovascular: Negative for chest pain and palpitations.   Gastrointestinal: Negative for abdominal pain, blood in stool, constipation, diarrhea, nausea and vomiting.   Genitourinary: Negative for dysuria and hematuria.   Musculoskeletal: Negative for arthralgias and myalgias.   Allergic/Immunologic: Negative for environmental allergies.   Neurological: Positive for headaches. Negative for dizziness.   Hematological: Negative for adenopathy.   Psychiatric/Behavioral: Negative for sleep disturbance and suicidal ideas. The patient is not nervous/anxious.        Objective     /60 (BP Location: Left arm, Patient Position: Sitting, Cuff Size: Adult)   Pulse 67   Temp 97.8 °F (36.6 °C) (Temporal)   Ht 175.3 cm (69.02\")   Wt 66.5 kg (146 lb 9.6 oz)   SpO2 98%   BMI 21.64 kg/m²     Physical Exam  Vitals reviewed.   Constitutional:       General: He is not in acute distress.     Appearance: He is well-developed. He is not diaphoretic.   HENT:      Head: Normocephalic and " atraumatic.      Right Ear: Hearing, tympanic membrane, ear canal and external ear normal.      Left Ear: Hearing, tympanic membrane, ear canal and external ear normal.      Nose: Nose normal.      Right Sinus: No maxillary sinus tenderness or frontal sinus tenderness.      Left Sinus: No maxillary sinus tenderness or frontal sinus tenderness.      Mouth/Throat:      Pharynx: Uvula midline. Pharyngeal swelling and posterior oropharyngeal erythema present.      Comments: Post nasal drainage.  Eyes:      General: Lids are normal.      Conjunctiva/sclera: Conjunctivae normal.      Pupils: Pupils are equal, round, and reactive to light.   Neck:      Trachea: Trachea normal. No tracheal tenderness or tracheal deviation.   Cardiovascular:      Rate and Rhythm: Normal rate and regular rhythm.      Heart sounds: Normal heart sounds, S1 normal and S2 normal. No murmur heard.    No friction rub. No gallop.   Pulmonary:      Effort: Pulmonary effort is normal. No respiratory distress.      Breath sounds: Normal breath sounds.   Abdominal:      General: Bowel sounds are normal. There is no distension.      Palpations: Abdomen is soft.      Tenderness: There is no abdominal tenderness.   Skin:     General: Skin is warm and dry.   Neurological:      Mental Status: He is alert and oriented to person, place, and time.   Psychiatric:         Behavior: Behavior normal.         Thought Content: Thought content normal.         Judgment: Judgment normal.         Current Outpatient Medications   Medication Sig Dispense Refill   • amoxicillin-clavulanate (Augmentin) 875-125 MG per tablet Take 1 tablet by mouth 2 (Two) Times a Day for 7 days. 14 tablet 0   • ASPIRIN 81 PO aspirin 81 mg tablet     • atorvastatin (LIPITOR) 40 MG tablet Take 40 mg by mouth Daily.     • Creon [id number] units capsule delayed-release particles capsule TAKE 3 CAPSULES BY MOUTH BEFORE MEAL(S) AND 1 CAPSULE BEFORE SNACKS     • fluticasone (Flonase) 50 MCG/ACT  nasal spray 2 sprays into the nostril(s) as directed by provider Daily. 16 g 5   • glimepiride (AMARYL) 2 MG tablet Take 1 tablet by mouth Every Morning Before Breakfast. 90 tablet 3   • glucose blood (OneTouch Verio) test strip 1 each by Other route Daily. Use as instructed 50 each 0   • Lancets (OneTouch Delica Plus [id number]) misc 1 each by Other route Daily. 100 each 0   • levocetirizine (XYZAL) 5 MG tablet Take 1 tablet by mouth Every Evening. 30 tablet 0   • losartan (COZAAR) 100 MG tablet      • metFORMIN (GLUCOPHAGE) 1000 MG tablet Take 1 tablet by mouth 2 (Two) Times a Day With Meals. 180 tablet 1   • metFORMIN (GLUCOPHAGE) 1000 MG tablet Take 1,000 mg by mouth.     • MULTIPLE VITAMIN PO Take 1 tablet by mouth Daily.     • pantoprazole (PROTONIX) 40 MG EC tablet Take 40 mg by mouth 2 (Two) Times a Day.     • ursodiol (ACTIGALL) 500 MG tablet Take 500 mg by mouth 3 (Three) Times a Day.     • promethazine-dextromethorphan (PROMETHAZINE-DM) 6.25-15 MG/5ML syrup Take 5 mL by mouth 4 (Four) Times a Day As Needed for Cough. 240 mL 0     Current Facility-Administered Medications   Medication Dose Route Frequency Provider Last Rate Last Admin   • cyanocobalamin injection 1,000 mcg  1,000 mcg Intramuscular Weekly Aileen He APRN   1,000 mcg at 05/18/22 0939            Assessment & Plan     Problem List Items Addressed This Visit    None  Visit Diagnoses     Acute URI    -  Primary    Relevant Medications    promethazine-dextromethorphan (PROMETHAZINE-DM) 6.25-15 MG/5ML syrup    Other Relevant Orders    COVID-19, FLU A/B, RSV PCR - Swab, Nasopharynx    POCT rapid strep A            ICD-10-CM ICD-9-CM   1. Acute URI  J06.9 465.9       Plan: Get pcr covid, flu, and rsv. Strep negative. Promethazine dm ordered as needed for cough. This medication may make you sleepy, do not take and drive, operate atv, or heavy machinery.  Rest, drink lots of fluids. Keep scheduled follow up and follow up as needed.    @Body  mass index is 21.64 kg/m².                Understands disease processes and need for medications.  Understands reasons for urgent and emergent care.  Patient (& family) verbalized agreement for treatment plan.   Emotional support and active listening provided.  Patient provided time to verbalize feelings.           BMI is within normal parameters. No other follow-up for BMI required.      This document has been electronically signed by JADE Murphy   December 16, 2022 12:49 EST

## 2022-12-28 ENCOUNTER — TELEPHONE (OUTPATIENT)
Dept: FAMILY MEDICINE CLINIC | Facility: CLINIC | Age: 77
End: 2022-12-28

## 2022-12-28 DIAGNOSIS — E11.9 TYPE 2 DIABETES MELLITUS WITHOUT COMPLICATION, WITHOUT LONG-TERM CURRENT USE OF INSULIN: Primary | Chronic | ICD-10-CM

## 2022-12-28 RX ORDER — DIPHENHYDRAMINE HYDROCHLORIDE 25 MG/1
1 CAPSULE, LIQUID FILLED ORAL DAILY
Qty: 1 EACH | Refills: 0 | Status: SHIPPED | OUTPATIENT
Start: 2022-12-28 | End: 2023-02-03 | Stop reason: SDUPTHER

## 2022-12-28 NOTE — TELEPHONE ENCOUNTER
Caller: Jean Noriega    Relationship: Self    Best call back number: [phone]    What medication are you requesting: GLUCOSE BLOOD MONITOR KIT     If a prescription is needed, what is your preferred pharmacy and phone number: [facility] [address] [place] [address] [phone] [facility] [phone]      Additional notes: PATIENT STATES THAT HIS MONITOR STOPPED WORKING

## 2023-01-06 ENCOUNTER — OFFICE VISIT (OUTPATIENT)
Dept: FAMILY MEDICINE CLINIC | Facility: CLINIC | Age: 78
End: 2023-01-06
Payer: MEDICARE

## 2023-01-06 VITALS
TEMPERATURE: 97.3 F | SYSTOLIC BLOOD PRESSURE: 108 MMHG | BODY MASS INDEX: 21.62 KG/M2 | WEIGHT: 146 LBS | DIASTOLIC BLOOD PRESSURE: 60 MMHG | HEART RATE: 76 BPM | OXYGEN SATURATION: 98 % | HEIGHT: 69 IN | RESPIRATION RATE: 18 BRPM

## 2023-01-06 DIAGNOSIS — J06.9 ACUTE URI: ICD-10-CM

## 2023-01-06 PROCEDURE — 87428 SARSCOV & INF VIR A&B AG IA: CPT | Performed by: NURSE PRACTITIONER

## 2023-01-06 PROCEDURE — 99213 OFFICE O/P EST LOW 20 MIN: CPT | Performed by: NURSE PRACTITIONER

## 2023-01-06 RX ORDER — CEFDINIR 300 MG/1
300 CAPSULE ORAL 2 TIMES DAILY
Qty: 20 CAPSULE | Refills: 0 | Status: SHIPPED | OUTPATIENT
Start: 2023-01-06 | End: 2023-03-15

## 2023-01-06 RX ORDER — DEXTROMETHORPHAN HYDROBROMIDE AND PROMETHAZINE HYDROCHLORIDE 15; 6.25 MG/5ML; MG/5ML
5 SYRUP ORAL 4 TIMES DAILY PRN
Qty: 240 ML | Refills: 0 | Status: SHIPPED | OUTPATIENT
Start: 2023-01-06 | End: 2023-03-15

## 2023-01-09 ENCOUNTER — TELEPHONE (OUTPATIENT)
Dept: FAMILY MEDICINE CLINIC | Facility: CLINIC | Age: 78
End: 2023-01-09
Payer: MEDICARE

## 2023-01-09 DIAGNOSIS — E11.65 TYPE 2 DIABETES MELLITUS WITH HYPERGLYCEMIA, WITHOUT LONG-TERM CURRENT USE OF INSULIN: ICD-10-CM

## 2023-01-09 DIAGNOSIS — E11.9 TYPE 2 DIABETES MELLITUS WITHOUT COMPLICATION, WITHOUT LONG-TERM CURRENT USE OF INSULIN: ICD-10-CM

## 2023-01-09 RX ORDER — DIPHENHYDRAMINE HYDROCHLORIDE 25 MG/1
1 CAPSULE, LIQUID FILLED ORAL DAILY
Qty: 1 EACH | Refills: 0 | Status: SHIPPED | OUTPATIENT
Start: 2023-01-09

## 2023-01-09 RX ORDER — DIPHENHYDRAMINE HYDROCHLORIDE 25 MG/1
1 CAPSULE, LIQUID FILLED ORAL DAILY
Qty: 1 EACH | Refills: 0 | Status: CANCELLED | OUTPATIENT
Start: 2023-01-09

## 2023-01-09 NOTE — TELEPHONE ENCOUNTER
Caller: Jean Noriega    Relationship: Self    Best call back number: [phone]      What medications are you currently taking:   Current Outpatient Medications on File Prior to Visit   Medication Sig Dispense Refill   • ASPIRIN 81 PO aspirin 81 mg tablet     • atorvastatin (LIPITOR) 40 MG tablet Take 40 mg by mouth Daily.     • Blood Glucose Monitoring Suppl (Blood Glucose Monitor System) w/Device kit 1 Device Daily. 1 each 0   • cefdinir (OMNICEF) 300 MG capsule Take 1 capsule by mouth 2 (Two) Times a Day. 20 capsule 0   • Creon 60278-910457 units capsule delayed-release particles capsule TAKE 3 CAPSULES BY MOUTH BEFORE MEAL(S) AND 1 CAPSULE BEFORE SNACKS     • fluticasone (Flonase) 50 MCG/ACT nasal spray 2 sprays into the nostril(s) as directed by provider Daily. 16 g 5   • glimepiride (AMARYL) 2 MG tablet Take 1 tablet by mouth Every Morning Before Breakfast. 90 tablet 3   • glucose blood (OneTouch Verio) test strip 1 each by Other route Daily. Use as instructed 50 each 0   • Lancets (OneTouch Delica Plus [id number]) misc 1 each by Other route Daily. 100 each 0   • levocetirizine (XYZAL) 5 MG tablet Take 1 tablet by mouth Every Evening. 30 tablet 0   • losartan (COZAAR) 100 MG tablet      • metFORMIN (GLUCOPHAGE) 1000 MG tablet Take 1 tablet by mouth 2 (Two) Times a Day With Meals. 180 tablet 1   • metFORMIN (GLUCOPHAGE) 1000 MG tablet Take 1,000 mg by mouth.     • MULTIPLE VITAMIN PO Take 1 tablet by mouth Daily.     • pantoprazole (PROTONIX) 40 MG EC tablet Take 40 mg by mouth 2 (Two) Times a Day.     • promethazine-dextromethorphan (PROMETHAZINE-DM) 6.25-15 MG/5ML syrup Take 5 mL by mouth 4 (Four) Times a Day As Needed for Cough. 240 mL 0   • ursodiol (ACTIGALL) 500 MG tablet Take 500 mg by mouth 3 (Three) Times a Day.       Current Facility-Administered Medications on File Prior to Visit   Medication Dose Route Frequency Provider Last Rate Last Admin   • cyanocobalamin injection 1,000 mcg  1,000 mcg  Intramuscular Weekly Aileen He, APRN   1,000 mcg at 05/18/22 0939          What are your concerns: CALLED STATED THAT [facility] NO LONGER HAS SUGAR TEST KIT THAT PROVIDER PRESCRIBED AND REQUEST TO HAVE ICD TEN CODE TEST KIT SUBMITTED AND NOT TO SPECIFY BRAND TYPE.    [facility] [address] [place] [address] [phone] [facility] [phone] FX

## 2023-01-16 RX ORDER — LANCETS 30 GAUGE
EACH MISCELLANEOUS
COMMUNITY
End: 2023-02-03 | Stop reason: SDUPTHER

## 2023-01-16 RX ORDER — LANCETS 30 GAUGE
1 EACH MISCELLANEOUS DAILY
Qty: 100 EACH | Refills: 12 | Status: SHIPPED | OUTPATIENT
Start: 2023-01-16 | End: 2023-02-03 | Stop reason: SDUPTHER

## 2023-01-16 NOTE — TELEPHONE ENCOUNTER
Caller: Jean Noriega    Relationship: Self    Best call back number: [phone]    What medication are you requesting: ACCU CHECK GUIDE ME LANCETS AND TEST STRIPS.      If a prescription is needed, what is your preferred pharmacy and phone number: [facility] [address] [place] [address] [phone] [facility] [phone]      Additional notes:

## 2023-01-16 NOTE — TELEPHONE ENCOUNTER
I called pt to notify that these had been sent to pharmacy, He did not receive any lancets. I have pended the order

## 2023-02-03 DIAGNOSIS — E11.9 TYPE 2 DIABETES MELLITUS WITHOUT COMPLICATION, WITHOUT LONG-TERM CURRENT USE OF INSULIN: Primary | Chronic | ICD-10-CM

## 2023-02-06 RX ORDER — LANCETS 30 GAUGE
1 EACH MISCELLANEOUS DAILY
Qty: 100 EACH | Refills: 12 | Status: SHIPPED | OUTPATIENT
Start: 2023-02-06 | End: 2023-03-02 | Stop reason: SDUPTHER

## 2023-03-02 ENCOUNTER — TELEPHONE (OUTPATIENT)
Dept: FAMILY MEDICINE CLINIC | Facility: CLINIC | Age: 78
End: 2023-03-02
Payer: MEDICARE

## 2023-03-02 DIAGNOSIS — E11.65 TYPE 2 DIABETES MELLITUS WITH HYPERGLYCEMIA, WITHOUT LONG-TERM CURRENT USE OF INSULIN: ICD-10-CM

## 2023-03-02 DIAGNOSIS — E11.9 TYPE 2 DIABETES MELLITUS WITHOUT COMPLICATION, WITHOUT LONG-TERM CURRENT USE OF INSULIN: Chronic | ICD-10-CM

## 2023-03-02 RX ORDER — LANCETS 30 GAUGE
1 EACH MISCELLANEOUS 2 TIMES DAILY
Qty: 100 EACH | Refills: 12 | Status: SHIPPED | OUTPATIENT
Start: 2023-03-02

## 2023-03-02 NOTE — TELEPHONE ENCOUNTER
Caller: Aliya Noriega    Relationship: Emergency Contact    Best call back number: [phone]    What medication are you requesting: NEW PRESCRIPTION FOR ACCU-CHECK TEST STRIPS AND LANCETS TESTING 2 TIMES DAILY    If a prescription is needed, what is your preferred pharmacy and phone number:      Additional notes:

## 2023-03-03 ENCOUNTER — TELEPHONE (OUTPATIENT)
Dept: FAMILY MEDICINE CLINIC | Facility: CLINIC | Age: 78
End: 2023-03-03
Payer: MEDICARE

## 2023-03-03 NOTE — TELEPHONE ENCOUNTER
Caller: Aliya Noriega    Relationship: Emergency Contact    Best call back number:[phone]    Who are you requesting to speak with (clinical staff, provider,  specific staff member): CLINICAL  What was the call regarding: PAPERWORK BEING SENT TO OFFICE FOR HIM TO TEST TWICE DAILY FOR MEDICARE DUE TO HIS CANCER AND HIS DIGESTIVE SYSTEM NOT WORKING, JUST TO GET TESTING APPROVED  Do you require a callback: YES

## 2023-03-06 NOTE — TELEPHONE ENCOUNTER
I sent in prescription for test strips for twice daily last week.  I do not have the paperwork mentioned in this task.

## 2023-03-06 NOTE — TELEPHONE ENCOUNTER
Called [phone] spoke with patient notified of test strips and paperwork he stated he would check with wife regarding the paperwork and verbalized understanding

## 2023-03-15 ENCOUNTER — TELEPHONE (OUTPATIENT)
Dept: FAMILY MEDICINE CLINIC | Facility: CLINIC | Age: 78
End: 2023-03-15

## 2023-03-15 ENCOUNTER — OFFICE VISIT (OUTPATIENT)
Dept: FAMILY MEDICINE CLINIC | Facility: CLINIC | Age: 78
End: 2023-03-15
Payer: MEDICARE

## 2023-03-15 VITALS
BODY MASS INDEX: 21.46 KG/M2 | TEMPERATURE: 97.7 F | SYSTOLIC BLOOD PRESSURE: 126 MMHG | OXYGEN SATURATION: 97 % | WEIGHT: 145.4 LBS | DIASTOLIC BLOOD PRESSURE: 56 MMHG | HEART RATE: 68 BPM

## 2023-03-15 DIAGNOSIS — E11.65 TYPE 2 DIABETES MELLITUS WITH HYPERGLYCEMIA, WITHOUT LONG-TERM CURRENT USE OF INSULIN: Chronic | ICD-10-CM

## 2023-03-15 DIAGNOSIS — J01.01 ACUTE RECURRENT MAXILLARY SINUSITIS: Primary | ICD-10-CM

## 2023-03-15 DIAGNOSIS — I10 BENIGN ESSENTIAL HTN: Chronic | ICD-10-CM

## 2023-03-15 DIAGNOSIS — C17.0 DUODENAL CANCER: Chronic | ICD-10-CM

## 2023-03-15 DIAGNOSIS — E78.2 MIXED HYPERLIPIDEMIA: Chronic | ICD-10-CM

## 2023-03-15 LAB
EXPIRATION DATE: NORMAL
EXPIRATION DATE: NORMAL
FLUAV AG UPPER RESP QL IA.RAPID: NOT DETECTED
FLUBV AG UPPER RESP QL IA.RAPID: NOT DETECTED
INTERNAL CONTROL: NORMAL
INTERNAL CONTROL: NORMAL
Lab: NORMAL
Lab: NORMAL
S PYO AG THROAT QL: NEGATIVE
SARS-COV-2 AG UPPER RESP QL IA.RAPID: NOT DETECTED

## 2023-03-15 PROCEDURE — 87880 STREP A ASSAY W/OPTIC: CPT | Performed by: INTERNAL MEDICINE

## 2023-03-15 PROCEDURE — 3078F DIAST BP <80 MM HG: CPT | Performed by: INTERNAL MEDICINE

## 2023-03-15 PROCEDURE — 3074F SYST BP LT 130 MM HG: CPT | Performed by: INTERNAL MEDICINE

## 2023-03-15 PROCEDURE — 99214 OFFICE O/P EST MOD 30 MIN: CPT | Performed by: INTERNAL MEDICINE

## 2023-03-15 PROCEDURE — 1159F MED LIST DOCD IN RCRD: CPT | Performed by: INTERNAL MEDICINE

## 2023-03-15 PROCEDURE — 1160F RVW MEDS BY RX/DR IN RCRD: CPT | Performed by: INTERNAL MEDICINE

## 2023-03-15 PROCEDURE — 87428 SARSCOV & INF VIR A&B AG IA: CPT | Performed by: INTERNAL MEDICINE

## 2023-03-15 RX ORDER — AMOXICILLIN AND CLAVULANATE POTASSIUM 875; 125 MG/1; MG/1
1 TABLET, FILM COATED ORAL 2 TIMES DAILY
Qty: 20 TABLET | Refills: 0 | Status: SHIPPED | OUTPATIENT
Start: 2023-03-15 | End: 2023-03-22

## 2023-03-15 NOTE — TELEPHONE ENCOUNTER
PATIENT FEELS HE HAS A SINUS INFECTION AND WOULD LIKE MEDICATION FOR THIS.      PHARMACY:  [facility]    PLEASE CALL [phone]

## 2023-03-15 NOTE — PROGRESS NOTES
Patient Name: Jean Noriega Today's Date: 3/15/2023   Patient MRN / CSN: [id number] / [id number] Date of Encounter: 3/15/2023   Patient Age / : 78 y.o. / 1945 Encounter Provider: Fannie Haynes DO   Referring Physician: No ref. provider found          Jean is a 78 y.o. male who is being seen today for Sinusitis      Sinusitis  This is a new problem. The current episode started in the past 7 days. There has been no fever. Associated symptoms include congestion, headaches and sinus pressure. Pertinent negatives include no coughing, shortness of breath or sore throat. Treatments tried: Flonase made symptoms worse. Xyzal is not helping much.   Diabetes  He presents for his follow-up diabetic visit. He has type 2 diabetes mellitus. His disease course has been stable. Hypoglycemia symptoms include headaches. Pertinent negatives for diabetes include no chest pain. Current diabetic treatment includes oral agent (dual therapy). He is compliant with treatment all of the time. An ACE inhibitor/angiotensin II receptor blocker is being taken.   Hypertension  This is a chronic problem. The current episode started more than 1 year ago. The problem is controlled. Associated symptoms include headaches. Pertinent negatives include no chest pain or shortness of breath. Current antihypertension treatment includes angiotensin blockers. The current treatment provides significant improvement. There are no compliance problems.        Allergies include:Percocet [oxycodone-acetaminophen]  Current Outpatient Medications   Medication Sig Dispense Refill   • ASPIRIN 81 PO aspirin 81 mg tablet     • atorvastatin (LIPITOR) 40 MG tablet Take 1 tablet by mouth Daily.     • Blood Glucose Monitoring Suppl (Blood Glucose Monitor System) w/Device kit 1 Device Daily. 1 each 0   • Creon 24517-152472 units capsule delayed-release particles capsule TAKE 3 CAPSULES BY MOUTH BEFORE MEAL(S) AND 1 CAPSULE BEFORE SNACKS     • fluticasone (Flonase)  50 MCG/ACT nasal spray 2 sprays into the nostril(s) as directed by provider Daily. 16 g 5   • glimepiride (AMARYL) 2 MG tablet Take 1 tablet by mouth Every Morning Before Breakfast. 90 tablet 3   • glucose blood test strip 1 each by Other route 2 (Two) Times a Day. Use as instructed 100 each 12   • Lancets misc 1 Device 2 (Two) Times a Day. 100 each 12   • levocetirizine (XYZAL) 5 MG tablet Take 1 tablet by mouth Every Evening. 30 tablet 0   • losartan (COZAAR) 100 MG tablet      • metFORMIN (GLUCOPHAGE) 1000 MG tablet Take 1 tablet by mouth 2 (Two) Times a Day With Meals. 180 tablet 1   • MULTIPLE VITAMIN PO Take 1 tablet by mouth Daily.     • pantoprazole (PROTONIX) 40 MG EC tablet Take 1 tablet by mouth 2 (Two) Times a Day.     • ursodiol (ACTIGALL) 500 MG tablet Take 1 tablet by mouth 3 (Three) Times a Day.     • amoxicillin-clavulanate (Augmentin) 875-125 MG per tablet Take 1 tablet by mouth 2 (Two) Times a Day for 10 days. 20 tablet 0     Current Facility-Administered Medications   Medication Dose Route Frequency Provider Last Rate Last Admin   • cyanocobalamin injection 1,000 mcg  1,000 mcg Intramuscular Weekly Aileen He APRN   1,000 mcg at 05/18/22 0939     Past Medical History:   Diagnosis Date   • Anemia, recent acute blood loss 3/30/2017    ALB anemia in March after ureteral stent removed   • Bladder cancer (HCC)    • Diabetes mellitus (HCC)    • Duodenal cancer (HCC)    • Dyspnea on exertion 2/1/2016   • Foreign body (FB) in soft tissue 2/26/2019   • Gallstones    • History of recent left nephrolithiasis s/p ureteral stent placement  3/30/2017    2/17/17 - ureteral stent placed at [facility], s/p stent and stone removal 2/13/17   • Hyperlipidemia    • Kidney stone    • Kidney stone on left side 02/2017   • Nephrolithiasis    • Pneumonia      Family History   Problem Relation Age of Onset   • Heart disease Father    • Arthritis Sister    • Hypertension Brother    • Diabetes Brother      Past  Surgical History:   Procedure Laterality Date   • CYSTOSCOPY W/ URETERAL STENT REMOVAL Left 2017   • EYE MUSCLE SURGERY     • HERNIA REPAIR     • INTERVENTIONAL RADIOLOGY PROCEDURE Right 2017    Procedure: PTC & Drain placement;  Surgeon: Serjio Wall MD;  Location: [facility] INVASIVE LOCATION;  Service:    • JOINT ASPIRATION AND/OR INJECTION Right     right hand   • LIVER SURGERY  06/10/2019   • URETERAL STENT INSERTION Left    • WHIPPLE PROCEDURE       Social History     Substance and Sexual Activity   Alcohol Use No     Social History     Tobacco Use   Smoking Status Former   • Packs/day: 1.00   • Years: 5.00   • Pack years: 5.00   • Types: Cigarettes, Cigars, Pipe   • Start date:    • Quit date:    • Years since quittin.2   Smokeless Tobacco Current   • Types: Snuff     Social History     Substance and Sexual Activity   Drug Use No     Review of Systems   HENT: Positive for congestion and sinus pressure. Negative for sore throat.    Respiratory: Negative for cough and shortness of breath.    Cardiovascular: Negative for chest pain.   Gastrointestinal: Positive for diarrhea.        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  He has been taking Creon and ursodiol since.  He reports doing very well with this regimen but he has tried to stop it due to the cost of the medications.  He reports it caused him over $1000 a month and he cannot maintain this.  He has tried taking a lower dose but it does not maintain his symptoms well.  He notices diarrhea and malabsorption when he decreases the dose.  When he takes his medicine as prescribed, he denies abdominal pain, nausea or diarrhea.  He is interested in any prescription medication assistance but may be available to him.   Neurological: Positive for headaches.        Depression Assessment Review:  PHQ-9 Total Score:    Vital Signs & Measurements Taken This Encounter  /56 (BP Location: Left arm, Patient  "Position: Sitting, Cuff Size: Adult)   Pulse 68   Temp 97.7 °F (36.5 °C) (Temporal)   Wt 66 kg (145 lb 6.4 oz)   SpO2 97%   BMI 21.46 kg/m²    SpO2 Percentage    03/15/23 1049   SpO2: 97%        BMI is within normal parameters. No other follow-up for BMI required.      Physical Exam  Vitals reviewed.   Constitutional:       General: He is not in acute distress.  HENT:      Head: Normocephalic and atraumatic.      Comments: Maxillary sinus tenderness to palpation bilaterally     Right Ear: Tympanic membrane normal.      Left Ear: Tympanic membrane normal.      Mouth/Throat:      Mouth: Mucous membranes are moist.      Pharynx: Posterior oropharyngeal erythema present. No oropharyngeal exudate.   Eyes:      General: No scleral icterus.     Extraocular Movements: Extraocular movements intact.      Conjunctiva/sclera: Conjunctivae normal.      Pupils: Pupils are equal, round, and reactive to light.   Cardiovascular:      Rate and Rhythm: Normal rate and regular rhythm.   Pulmonary:      Effort: Pulmonary effort is normal. No respiratory distress.      Breath sounds: Normal breath sounds. No wheezing or rhonchi.   Musculoskeletal:         General: No swelling.      Cervical back: Neck supple. Tenderness present.   Lymphadenopathy:      Cervical: Cervical adenopathy present.   Skin:     General: Skin is warm and dry.      Coloration: Skin is not jaundiced.   Neurological:      Mental Status: He is alert.   Psychiatric:         Mood and Affect: Mood normal.         Behavior: Behavior normal.              Assessment & Plan  Patient Active Problem List   Diagnosis   • Epigastric pain   • Gastroesophageal reflux disease   • Chronic rhinitis   • Type 2 diabetes mellitus with hyperglycemia, without long-term current use of insulin (HCC)   • Low back pain   • Mixed hyperlipidemia   • Hx of duodenal cancer, s/p Whipple 2004   • Hx of bladder cancer, s/p \"scraping\", 2004   • History of recent left nephrolithiasis s/p ureteral " stent placement    • Benign essential HTN   • Arthritis   • History of duodenal cancer   • Peptic ulcer   • Taking multiple medications for chronic disease   • Varicose veins of lower extremity with inflammation   • Malignant neoplasm of urinary bladder (HCC)   • Degenerative joint disease (DJD) of lumbar spine   • Spondylolisthesis of lumbar region   • Spinal stenosis of lumbar region   • Acute recurrent maxillary sinusitis       ICD-10-CM ICD-9-CM   1. Acute recurrent maxillary sinusitis  J01.01 461.0   2. Type 2 diabetes mellitus with hyperglycemia, without long-term current use of insulin (HCC)  E11.65 250.00     790.29   3. Mixed hyperlipidemia  E78.2 272.2   4. Benign essential HTN  I10 401.1   5. Hx of duodenal cancer, s/p Whipple 2004  C17.0 152.0     Orders Placed This Encounter   Procedures   • CBC (No Diff)     Standing Status:   Future     Standing Expiration Date:   3/15/2024     Order Specific Question:   Release to patient     Answer:   Routine Release   • Comprehensive Metabolic Panel     Standing Status:   Future     Standing Expiration Date:   3/15/2024     Order Specific Question:   Release to patient     Answer:   Routine Release   • Hemoglobin A1c     Standing Status:   Future     Standing Expiration Date:   3/15/2024     Order Specific Question:   Release to patient     Answer:   Routine Release   • Lipid Panel     Standing Status:   Future     Standing Expiration Date:   3/15/2024   • TSH     Standing Status:   Future     Standing Expiration Date:   3/15/2024     Order Specific Question:   Release to patient     Answer:   Routine Release   • CK     Standing Status:   Future     Standing Expiration Date:   3/15/2024     Order Specific Question:   Release to patient     Answer:   Routine Release   • POCT SARS-CoV-2 Antigen JESSI + Flu     Order Specific Question:   Release to patient     Answer:   Routine Release   • POC Rapid Strep A     Order Specific Question:   Release to patient     Answer:    Routine Release       Meds Ordered During Visit:  New Medications Ordered This Visit   Medications   • amoxicillin-clavulanate (Augmentin) 875-125 MG per tablet     Sig: Take 1 tablet by mouth 2 (Two) Times a Day for 10 days.     Dispense:  20 tablet     Refill:  0     Patient was negative for COVID, flu, and strep on today's evaluation.  I discussed Augmentin therapy to take twice daily with food.  We will continue his other medications as prescribed.  I have requested our staff to check into potential prescription medication assistance programs to help him with the Creon and ursodiol cost.  We will update labs as above within the next couple weeks.  Patient plans to come in fasting for these labs.    Return in about 6 months (around 9/15/2023), or if symptoms worsen or fail to improve, for Medicare Wellness (after 9/20/23).          Referring Provider (if known): No ref. provider found      This document has been electronically signed by Fannie Haynes DO  March 15, 2023 11:46 EDT    Fannie Haynes DO, [facility]  [address] [place] [address]  [phone] (office)    Part of this note may be an electronic transcription/translation of spoken language to printed text using the Dragon Dictation System.

## 2023-03-22 ENCOUNTER — TELEPHONE (OUTPATIENT)
Dept: FAMILY MEDICINE CLINIC | Facility: CLINIC | Age: 78
End: 2023-03-22
Payer: MEDICARE

## 2023-03-22 ENCOUNTER — TELEPHONE (OUTPATIENT)
Dept: FAMILY MEDICINE CLINIC | Facility: CLINIC | Age: 78
End: 2023-03-22

## 2023-03-22 ENCOUNTER — OFFICE VISIT (OUTPATIENT)
Dept: FAMILY MEDICINE CLINIC | Facility: CLINIC | Age: 78
End: 2023-03-22
Payer: MEDICARE

## 2023-03-22 VITALS
SYSTOLIC BLOOD PRESSURE: 136 MMHG | OXYGEN SATURATION: 97 % | WEIGHT: 145 LBS | TEMPERATURE: 97.5 F | BODY MASS INDEX: 21.4 KG/M2 | DIASTOLIC BLOOD PRESSURE: 60 MMHG | HEART RATE: 74 BPM

## 2023-03-22 DIAGNOSIS — J01.01 ACUTE RECURRENT MAXILLARY SINUSITIS: Primary | ICD-10-CM

## 2023-03-22 DIAGNOSIS — R05.1 ACUTE COUGH: ICD-10-CM

## 2023-03-22 DIAGNOSIS — J30.9 ALLERGIC RHINITIS, UNSPECIFIED SEASONALITY, UNSPECIFIED TRIGGER: ICD-10-CM

## 2023-03-22 PROCEDURE — 1159F MED LIST DOCD IN RCRD: CPT | Performed by: INTERNAL MEDICINE

## 2023-03-22 PROCEDURE — 3075F SYST BP GE 130 - 139MM HG: CPT | Performed by: INTERNAL MEDICINE

## 2023-03-22 PROCEDURE — 3078F DIAST BP <80 MM HG: CPT | Performed by: INTERNAL MEDICINE

## 2023-03-22 PROCEDURE — 99213 OFFICE O/P EST LOW 20 MIN: CPT | Performed by: INTERNAL MEDICINE

## 2023-03-22 PROCEDURE — 1160F RVW MEDS BY RX/DR IN RCRD: CPT | Performed by: INTERNAL MEDICINE

## 2023-03-22 RX ORDER — LEVOCETIRIZINE DIHYDROCHLORIDE 5 MG/1
5 TABLET, FILM COATED ORAL EVERY EVENING
Qty: 90 TABLET | Refills: 3 | Status: SHIPPED | OUTPATIENT
Start: 2023-03-22

## 2023-03-22 RX ORDER — DOXYCYCLINE HYCLATE 100 MG/1
100 CAPSULE ORAL 2 TIMES DAILY
Qty: 20 CAPSULE | Refills: 0 | Status: SHIPPED | OUTPATIENT
Start: 2023-03-22

## 2023-03-22 NOTE — PROGRESS NOTES
Patient Name: Jean Noriega Today's Date: 3/22/2023   Patient MRN / CSN: [id number] / [id number] Date of Encounter: 3/22/2023   Patient Age / : 78 y.o. / 1945 Encounter Provider: Fannie Haynes DO   Referring Physician: No ref. provider found          Jean is a 78 y.o. male who is being seen today for URI      URI   This is a new problem. The current episode started 1 to 4 weeks ago. Progression since onset: worse over past 3 days. The maximum temperature recorded prior to his arrival was 100.4 - 100.9 F (last night). Associated symptoms include congestion, coughing, headaches and rhinorrhea. Pertinent negatives include no abdominal pain, chest pain or wheezing. Associated symptoms comments: Patient was tested for strep, COVID, and flu at symptom onset.  All of these test were negative at that time.  His symptoms worsened over the past 3 days so he retested with a home COVID test this morning which was also negative.       Allergies include:Percocet [oxycodone-acetaminophen]  Current Outpatient Medications   Medication Sig Dispense Refill   • ASPIRIN 81 PO aspirin 81 mg tablet     • atorvastatin (LIPITOR) 40 MG tablet Take 1 tablet by mouth Daily.     • Blood Glucose Monitoring Suppl (Blood Glucose Monitor System) w/Device kit 1 Device Daily. 1 each 0   • Creon 58141-891736 units capsule delayed-release particles capsule TAKE 3 CAPSULES BY MOUTH BEFORE MEAL(S) AND 1 CAPSULE BEFORE SNACKS     • glimepiride (AMARYL) 2 MG tablet Take 1 tablet by mouth Every Morning Before Breakfast. 90 tablet 3   • glucose blood test strip 1 each by Other route 2 (Two) Times a Day. Use as instructed 100 each 12   • Lancets misc 1 Device 2 (Two) Times a Day. 100 each 12   • levocetirizine (XYZAL) 5 MG tablet Take 1 tablet by mouth Every Evening. 90 tablet 3   • losartan (COZAAR) 100 MG tablet      • metFORMIN (GLUCOPHAGE) 1000 MG tablet Take 1 tablet by mouth 2 (Two) Times a Day With Meals. 180 tablet 1   • MULTIPLE VITAMIN  PO Take 1 tablet by mouth Daily.     • pantoprazole (PROTONIX) 40 MG EC tablet Take 1 tablet by mouth 2 (Two) Times a Day.     • ursodiol (ACTIGALL) 500 MG tablet Take 1 tablet by mouth 3 (Three) Times a Day.     • doxycycline (VIBRAMYCIN) 100 MG capsule Take 1 capsule by mouth 2 (Two) Times a Day. 20 capsule 0     Current Facility-Administered Medications   Medication Dose Route Frequency Provider Last Rate Last Admin   • cyanocobalamin injection 1,000 mcg  1,000 mcg Intramuscular Weekly Aileen He APRN   1,000 mcg at 05/18/22 0939     Past Medical History:   Diagnosis Date   • Anemia, recent acute blood loss 3/30/2017    ALB anemia in March after ureteral stent removed   • Bladder cancer (HCC)    • Diabetes mellitus (HCC)    • Duodenal cancer (HCC)    • Dyspnea on exertion 2/1/2016   • Foreign body (FB) in soft tissue 2/26/2019   • Gallstones    • History of recent left nephrolithiasis s/p ureteral stent placement  3/30/2017    2/17/17 - ureteral stent placed at [facility], s/p stent and stone removal 2/13/17   • Hyperlipidemia    • Kidney stone    • Kidney stone on left side 02/2017   • Nephrolithiasis    • Pneumonia      Family History   Problem Relation Age of Onset   • Heart disease Father    • Arthritis Sister    • Hypertension Brother    • Diabetes Brother      Past Surgical History:   Procedure Laterality Date   • CYSTOSCOPY W/ URETERAL STENT REMOVAL Left 03/13/2017   • EYE MUSCLE SURGERY     • HERNIA REPAIR     • INTERVENTIONAL RADIOLOGY PROCEDURE Right 03/31/2017    Procedure: PTC & Drain placement;  Surgeon: Serjio Wall MD;  Location: [facility] INVASIVE LOCATION;  Service:    • JOINT ASPIRATION AND/OR INJECTION Right     right hand   • LIVER SURGERY  06/10/2019   • URETERAL STENT INSERTION Left    • WHIPPLE PROCEDURE  2004     Social History     Substance and Sexual Activity   Alcohol Use No     Social History     Tobacco Use   Smoking Status Former   • Packs/day: 1.00   • Years: 5.00    • Pack years: 5.00   • Types: Cigarettes, Cigars, Pipe   • Start date:    • Quit date:    • Years since quittin.2   Smokeless Tobacco Current   • Types: Snuff     Social History     Substance and Sexual Activity   Drug Use No     Review of Systems   Constitutional: Positive for fever.   HENT: Positive for congestion and rhinorrhea.    Respiratory: Positive for cough. Negative for wheezing.    Cardiovascular: Negative for chest pain.   Gastrointestinal: Negative for abdominal pain.   Neurological: Positive for headaches.        Depression Assessment Review:  PHQ-9 Total Score:    Vital Signs & Measurements Taken This Encounter  /60 (BP Location: Left arm, Patient Position: Sitting, Cuff Size: Adult)   Pulse 74   Temp 97.5 °F (36.4 °C) (Temporal)   Wt 65.8 kg (145 lb)   SpO2 97%   BMI 21.40 kg/m²    SpO2 Percentage    23 1517   SpO2: 97%        BMI is within normal parameters. No other follow-up for BMI required.      Physical Exam  Vitals reviewed.   Constitutional:       General: He is not in acute distress.  HENT:      Head: Normocephalic and atraumatic.      Comments: Maxillary sinus tenderness to palpation bilaterally     Right Ear: Tympanic membrane normal.      Left Ear: Tympanic membrane normal.      Mouth/Throat:      Mouth: Mucous membranes are moist.      Pharynx: Posterior oropharyngeal erythema present. No oropharyngeal exudate.   Eyes:      General: No scleral icterus.     Extraocular Movements: Extraocular movements intact.      Conjunctiva/sclera: Conjunctivae normal.      Pupils: Pupils are equal, round, and reactive to light.   Cardiovascular:      Rate and Rhythm: Normal rate and regular rhythm.   Pulmonary:      Effort: Pulmonary effort is normal. No respiratory distress.      Breath sounds: Normal breath sounds. No wheezing or rhonchi.   Musculoskeletal:         General: No swelling.      Cervical back: Neck supple. Tenderness present.   Lymphadenopathy:       "Cervical: Cervical adenopathy present.   Skin:     General: Skin is warm and dry.      Coloration: Skin is not jaundiced.   Neurological:      Mental Status: He is alert.   Psychiatric:         Mood and Affect: Mood normal.         Behavior: Behavior normal.              Assessment & Plan  Patient Active Problem List   Diagnosis   • Epigastric pain   • Gastroesophageal reflux disease   • Chronic rhinitis   • Type 2 diabetes mellitus with hyperglycemia, without long-term current use of insulin (HCC)   • Low back pain   • Mixed hyperlipidemia   • Hx of duodenal cancer, s/p Whipple 2004   • Hx of bladder cancer, s/p \"scraping\", 2004   • History of recent left nephrolithiasis s/p ureteral stent placement    • Benign essential HTN   • Arthritis   • History of duodenal cancer   • Peptic ulcer   • Taking multiple medications for chronic disease   • Varicose veins of lower extremity with inflammation   • Malignant neoplasm of urinary bladder (HCC)   • Degenerative joint disease (DJD) of lumbar spine   • Spondylolisthesis of lumbar region   • Spinal stenosis of lumbar region   • Acute recurrent maxillary sinusitis       ICD-10-CM ICD-9-CM   1. Acute recurrent maxillary sinusitis  J01.01 461.0   2. Acute cough  R05.1 786.2   3. Allergic rhinitis, unspecified seasonality, unspecified trigger  J30.9 477.9     No orders of the defined types were placed in this encounter.      Meds Ordered During Visit:  New Medications Ordered This Visit   Medications   • doxycycline (VIBRAMYCIN) 100 MG capsule     Sig: Take 1 capsule by mouth 2 (Two) Times a Day.     Dispense:  20 capsule     Refill:  0   • levocetirizine (XYZAL) 5 MG tablet     Sig: Take 1 tablet by mouth Every Evening.     Dispense:  90 tablet     Refill:  3     I recommended changing Augmentin to doxycycline given that patient has developed fever despite Augmentin therapy.  I also recommended continuing Xyzal and updated this prescription for patient today.    Return if " symptoms worsen or fail to improve, for Next scheduled follow up.          Referring Provider (if known): No ref. provider found      This document has been electronically signed by Fannie Haynes DO  March 22, 2023 16:07 EDT    Fannie Haynes DO, [facility]  [address] [place] [address]  [phone] (office)    Part of this note may be an electronic transcription/translation of spoken language to printed text using the Dragon Dictation System.

## 2023-03-22 NOTE — TELEPHONE ENCOUNTER
Patient takes Creon and ursodiol due to his history of duodenal cancer, status post Whipple and gallstones.  He has required percutaneous transhepatic cholangiography with drain placement previously.  He has severe diarrhea if he does not continue these medicines.  The cost of the medicines has been draining to him. Are there any medication assistance programs, perhaps through the hospital, that he would qualify for?

## 2023-03-22 NOTE — TELEPHONE ENCOUNTER
Called [phone] spoke with patient stated he is having runny nose, fever, headache and cough denies any shortness of breath at this time. Patient has a at home covid test. He is going to retest and call us back with results. Offered patient an appointment to been seen today by his PCP Dr Haynes. We are going to decide pending covid test results and recommendations.

## 2023-03-22 NOTE — TELEPHONE ENCOUNTER
Caller: Jean Noriega     Relationship: SELF    Best call back number: [phone]    What is your medical concern? FEVER, RUNNY NOSE, HEADACHE, BAD COUGH- EXPOSURE TO WIFE DIAGNOSED WITH STREP AND PNEUMONIA    How long has this issue been going on? BEGAN 3/15/23 WHEN HE THOUGHT HE JUST HAD A SINUS INFECTION, IS GETTING WORSE    Is your provider already aware of this issue? NO    Have you been treated for this issue? TAKING PRESCRIPTION COUGH MEDICATION PROMATAZINE BUT IS NOT HELPING, ALSO STILL TAKING AMPACILLIN THAT WAS PRESCRIBED 3/15/23- HAS 3 MORE DAYS LEFT      PHARMACY: [facility] [address] [place] [address] [phone] [facility] [phone] FX    PLEASE CALL WITH ADVISE

## 2023-03-23 NOTE — TELEPHONE ENCOUNTER
Per Darlene there is no assistance program for this medication, nor is there any alternate medications.  She recommended pt request a Tier reduction since there are no alternatives.  Spoke with patient and made him aware.  He states he is paying medication at the lowest Tier already.  He voiced understanding that we were unable to find anything else that could help him with the cost.

## 2023-04-03 ENCOUNTER — CLINICAL SUPPORT (OUTPATIENT)
Dept: FAMILY MEDICINE CLINIC | Facility: CLINIC | Age: 78
End: 2023-04-03
Payer: MEDICARE

## 2023-04-03 DIAGNOSIS — E11.65 TYPE 2 DIABETES MELLITUS WITH HYPERGLYCEMIA, WITHOUT LONG-TERM CURRENT USE OF INSULIN: Chronic | ICD-10-CM

## 2023-04-03 DIAGNOSIS — I10 BENIGN ESSENTIAL HTN: Chronic | ICD-10-CM

## 2023-04-03 DIAGNOSIS — E78.2 MIXED HYPERLIPIDEMIA: Chronic | ICD-10-CM

## 2023-04-03 PROCEDURE — 83036 HEMOGLOBIN GLYCOSYLATED A1C: CPT | Performed by: INTERNAL MEDICINE

## 2023-04-03 PROCEDURE — 80061 LIPID PANEL: CPT | Performed by: INTERNAL MEDICINE

## 2023-04-03 PROCEDURE — 85027 COMPLETE CBC AUTOMATED: CPT | Performed by: INTERNAL MEDICINE

## 2023-04-03 PROCEDURE — 36415 COLL VENOUS BLD VENIPUNCTURE: CPT | Performed by: NURSE PRACTITIONER

## 2023-04-03 PROCEDURE — 84443 ASSAY THYROID STIM HORMONE: CPT | Performed by: INTERNAL MEDICINE

## 2023-04-03 PROCEDURE — 82550 ASSAY OF CK (CPK): CPT | Performed by: INTERNAL MEDICINE

## 2023-04-03 PROCEDURE — 80053 COMPREHEN METABOLIC PANEL: CPT | Performed by: INTERNAL MEDICINE

## 2023-04-03 NOTE — PROGRESS NOTES
Venipuncture Blood Specimen Collection  Venipuncture performed in Right arm by Jessica Foley MA with good hemostasis. Patient tolerated the procedure well without complications.   04/03/23   Jessica Foley MA

## 2023-04-04 LAB
ALBUMIN SERPL-MCNC: 3.6 G/DL (ref 3.5–5.2)
ALBUMIN/GLOB SERPL: 1.7 G/DL
ALP SERPL-CCNC: 66 U/L (ref 39–117)
ALT SERPL W P-5'-P-CCNC: 14 U/L (ref 1–41)
ANION GAP SERPL CALCULATED.3IONS-SCNC: 6 MMOL/L (ref 5–15)
AST SERPL-CCNC: 20 U/L (ref 1–40)
BILIRUB SERPL-MCNC: 0.3 MG/DL (ref 0–1.2)
BUN SERPL-MCNC: 19 MG/DL (ref 8–23)
BUN/CREAT SERPL: 17.8 (ref 7–25)
CALCIUM SPEC-SCNC: 8.6 MG/DL (ref 8.6–10.5)
CHLORIDE SERPL-SCNC: 103 MMOL/L (ref 98–107)
CHOLEST SERPL-MCNC: 96 MG/DL (ref 0–200)
CK SERPL-CCNC: 65 U/L (ref 20–200)
CO2 SERPL-SCNC: 28 MMOL/L (ref 22–29)
CREAT SERPL-MCNC: 1.07 MG/DL (ref 0.76–1.27)
DEPRECATED RDW RBC AUTO: 45.7 FL (ref 37–54)
EGFRCR SERPLBLD CKD-EPI 2021: 71.9 ML/MIN/1.73
ERYTHROCYTE [DISTWIDTH] IN BLOOD BY AUTOMATED COUNT: 13.3 % (ref 12.3–15.4)
GLOBULIN UR ELPH-MCNC: 2.1 GM/DL
GLUCOSE SERPL-MCNC: 129 MG/DL (ref 65–99)
HBA1C MFR BLD: 8 % (ref 4.8–5.6)
HCT VFR BLD AUTO: 38.9 % (ref 37.5–51)
HDLC SERPL-MCNC: 44 MG/DL (ref 40–60)
HGB BLD-MCNC: 12.3 G/DL (ref 13–17.7)
LDLC SERPL CALC-MCNC: 30 MG/DL (ref 0–100)
LDLC/HDLC SERPL: 0.63 {RATIO}
MCH RBC QN AUTO: 29.5 PG (ref 26.6–33)
MCHC RBC AUTO-ENTMCNC: 31.6 G/DL (ref 31.5–35.7)
MCV RBC AUTO: 93.3 FL (ref 79–97)
PLATELET # BLD AUTO: 246 10*3/MM3 (ref 140–450)
PMV BLD AUTO: 10.8 FL (ref 6–12)
POTASSIUM SERPL-SCNC: 4.1 MMOL/L (ref 3.5–5.2)
PROT SERPL-MCNC: 5.7 G/DL (ref 6–8.5)
RBC # BLD AUTO: 4.17 10*6/MM3 (ref 4.14–5.8)
SODIUM SERPL-SCNC: 137 MMOL/L (ref 136–145)
TRIGL SERPL-MCNC: 121 MG/DL (ref 0–150)
TSH SERPL DL<=0.05 MIU/L-ACNC: 2.5 UIU/ML (ref 0.27–4.2)
VLDLC SERPL-MCNC: 22 MG/DL (ref 5–40)
WBC NRBC COR # BLD: 9.82 10*3/MM3 (ref 3.4–10.8)

## 2023-04-04 RX ORDER — GLIMEPIRIDE 2 MG/1
TABLET ORAL
Qty: 90 TABLET | Refills: 0 | Status: SHIPPED | OUTPATIENT
Start: 2023-04-04

## 2023-04-21 NOTE — TELEPHONE ENCOUNTER
Caller: Jean Noriega    Relationship: Self    Best call back number: [phone]    Requested Prescriptions:   Requested Prescriptions     Pending Prescriptions Disp Refills   • metFORMIN (GLUCOPHAGE) 1000 MG tablet 180 tablet 1     Sig: Take 1 tablet by mouth 2 (Two) Times a Day With Meals.   • pantoprazole (PROTONIX) 40 MG EC tablet       Sig: Take 1 tablet by mouth.        Pharmacy where request should be sent: [address] [phone] PH - [phone]     Last office visit with prescribing clinician: 3/22/2023   Last telemedicine visit with prescribing clinician: 9/21/2023   Next office visit with prescribing clinician: 9/21/2023     Additional details provided by patient: PLEASE REFILL. PATIENT ONLY SEES CANCER DOCTOR ONCE PER YEAR. ASKING DR BUSH TO TAKE OVER THE PROTONIX PRESCRIPTION REFILLS.    Does the patient have less than a 3 day supply:  [x] Yes  [] No  Trish Silva Rep   04/21/23 09:05 EDT

## 2023-04-24 RX ORDER — PANTOPRAZOLE SODIUM 40 MG/1
40 TABLET, DELAYED RELEASE ORAL DAILY
Qty: 90 TABLET | Refills: 1 | Status: SHIPPED | OUTPATIENT
Start: 2023-04-24

## 2023-04-28 ENCOUNTER — TELEPHONE (OUTPATIENT)
Dept: FAMILY MEDICINE CLINIC | Facility: CLINIC | Age: 78
End: 2023-04-28
Payer: MEDICARE

## 2023-06-07 RX ORDER — PANTOPRAZOLE SODIUM 40 MG/1
40 TABLET, DELAYED RELEASE ORAL DAILY
Qty: 90 TABLET | Refills: 1 | Status: SHIPPED | OUTPATIENT
Start: 2023-06-07 | End: 2023-06-09 | Stop reason: SDUPTHER

## 2023-06-07 NOTE — TELEPHONE ENCOUNTER
Caller: Jean Noriega    Relationship: Self    Best call back number: [phone]     Requested Prescriptions:   Requested Prescriptions     Pending Prescriptions Disp Refills    pantoprazole (PROTONIX) 40 MG EC tablet 90 tablet 1     Sig: Take 1 tablet by mouth Daily.        Pharmacy where request should be sent: [facility] [address] [place] [address] [phone] [facility] [phone] FX     Last office visit with prescribing clinician: 3/22/2023   Last telemedicine visit with prescribing clinician: Visit date not found   Next office visit with prescribing clinician: 9/21/2023     Additional details provided by patient: PATIENT HAS 3 PILLS LEFT.  PATIENT ASKED FOR THE PRESCRIPTION TO BE CHANGE TO TWICE A DAY INSTEAD OF ONCE A DAY.  PATIENT STATED THAT HIS GASTRO DOCTOR IN [place] SUGGESTED THAT HE TAKE TWO PILLS.     Does the patient have less than a 3 day supply:  [x] Yes  [] No    Would you like a call back once the refill request has been completed: [x] Yes [] No    If the office needs to give you a call back, can they leave a voicemail: [x] Yes [] No    Trish Mccain Rep   06/07/23 11:14 EDT

## 2023-06-09 ENCOUNTER — TELEPHONE (OUTPATIENT)
Dept: FAMILY MEDICINE CLINIC | Facility: CLINIC | Age: 78
End: 2023-06-09

## 2023-06-09 RX ORDER — PANTOPRAZOLE SODIUM 40 MG/1
40 TABLET, DELAYED RELEASE ORAL 2 TIMES DAILY
Qty: 90 TABLET | Refills: 1 | Status: SHIPPED | OUTPATIENT
Start: 2023-06-09

## 2023-06-09 NOTE — TELEPHONE ENCOUNTER
Caller: HariAliya    Relationship: Emergency Contact    Best call back number: [phone]    Requested Prescriptions:   Requested Prescriptions      No prescriptions requested or ordered in this encounter      pantoprazole (PROTONIX) 40 MG EC tablet     Pharmacy where request should be sent:      Last office visit with prescribing clinician: 3/22/2023   Last telemedicine visit with prescribing clinician: Visit date not found   Next office visit with prescribing clinician: 9/21/2023     Additional details provided by patient:     EVER SINCE PATIENT HAD WHIPPLE PROCEDURE HE HAS TAKEN TWO PROTONIX PER DAY.  PLEASE CALL IN PRESCRIPTION FOR TWO A DAY.  PATIENT IS COMPLETELY OUT OF MEDICATION    Does the patient have less than a 3 day supply:  [x] Yes  [] No    Would you like a call back once the refill request has been completed: [] Yes [x] No    If the office needs to give you a call back, can they leave a voicemail: [] Yes [x] No    Kena Victoria, PCT   06/09/23 09:58 EDT

## 2023-08-11 ENCOUNTER — OFFICE VISIT (OUTPATIENT)
Dept: FAMILY MEDICINE CLINIC | Facility: CLINIC | Age: 78
End: 2023-08-11
Payer: MEDICARE

## 2023-08-11 VITALS
SYSTOLIC BLOOD PRESSURE: 140 MMHG | OXYGEN SATURATION: 100 % | HEIGHT: 69 IN | RESPIRATION RATE: 18 BRPM | HEART RATE: 61 BPM | BODY MASS INDEX: 21.77 KG/M2 | WEIGHT: 147 LBS | TEMPERATURE: 98 F | DIASTOLIC BLOOD PRESSURE: 68 MMHG

## 2023-08-11 DIAGNOSIS — R05.1 ACUTE COUGH: ICD-10-CM

## 2023-08-11 DIAGNOSIS — J30.9 ALLERGIC RHINITIS, UNSPECIFIED SEASONALITY, UNSPECIFIED TRIGGER: ICD-10-CM

## 2023-08-11 DIAGNOSIS — J01.01 ACUTE RECURRENT MAXILLARY SINUSITIS: Primary | ICD-10-CM

## 2023-08-11 RX ORDER — AMOXICILLIN AND CLAVULANATE POTASSIUM 875; 125 MG/1; MG/1
1 TABLET, FILM COATED ORAL 2 TIMES DAILY
Qty: 20 TABLET | Refills: 0 | Status: SHIPPED | OUTPATIENT
Start: 2023-08-11 | End: 2023-08-21

## 2023-08-11 RX ORDER — METHYLPREDNISOLONE 4 MG/1
TABLET ORAL
Qty: 21 TABLET | Refills: 0 | Status: SHIPPED | OUTPATIENT
Start: 2023-08-11

## 2023-08-11 RX ORDER — LEVOCETIRIZINE DIHYDROCHLORIDE 5 MG/1
5 TABLET, FILM COATED ORAL EVERY EVENING
Qty: 90 TABLET | Refills: 3 | Status: SHIPPED | OUTPATIENT
Start: 2023-08-11

## 2023-08-11 RX ORDER — FLUTICASONE PROPIONATE 50 MCG
2 SPRAY, SUSPENSION (ML) NASAL DAILY
Qty: 11.1 ML | Refills: 1 | Status: SHIPPED | OUTPATIENT
Start: 2023-08-11

## 2023-08-11 NOTE — PROGRESS NOTES
Patient Name: Jean Noriega Today's Date: 2023   Patient MRN / CSN: [id number] / [id number] Date of Encounter: 2023   Patient Age / : 78 y.o. / 1945 Encounter Provider: JADE Pedraza   Referring Physician: No ref. provider found          Jean is a 78 y.o. male who is being seen today for Headache      Sinusitis  This is a new problem. The current episode started in the past 7 days. The problem has been gradually worsening since onset. There has been no fever. The pain is moderate. Associated symptoms include congestion, sinus pressure and a sore throat. Past treatments include acetaminophen.     Allergies include:Percocet [oxycodone-acetaminophen]  Current Outpatient Medications   Medication Sig Dispense Refill    ASPIRIN 81 PO aspirin 81 mg tablet      atorvastatin (LIPITOR) 40 MG tablet Take 1 tablet by mouth Daily.      Blood Glucose Monitoring Suppl (Blood Glucose Monitor System) w/Device kit 1 Device Daily. 1 each 0    Creon [id number] units capsule delayed-release particles capsule TAKE 3 CAPSULES BY MOUTH BEFORE MEAL(S) AND 1 CAPSULE BEFORE SNACKS      doxycycline (VIBRAMYCIN) 100 MG capsule Take 1 capsule by mouth 2 (Two) Times a Day. 20 capsule 0    glimepiride (AMARYL) 2 MG tablet TAKE 1 TABLET BY MOUTH ONCE DAILY IN THE MORNING BEFORE BREAKFAST 90 tablet 0    glucose blood test strip 1 each by Other route 2 (Two) Times a Day. Use as instructed 100 each 12    Lancets misc 1 Device 2 (Two) Times a Day. 100 each 12    levocetirizine (XYZAL) 5 MG tablet Take 1 tablet by mouth Every Evening. 90 tablet 3    losartan (COZAAR) 100 MG tablet       metFORMIN (GLUCOPHAGE) 1000 MG tablet Take 1 tablet by mouth 2 (Two) Times a Day With Meals. 180 tablet 1    MULTIPLE VITAMIN PO Take 1 tablet by mouth Daily.      pantoprazole (PROTONIX) 40 MG EC tablet Take 1 tablet by mouth 2 (Two) Times a Day. 90 tablet 1    ursodiol (ACTIGALL) 500 MG tablet Take 1 tablet by mouth 3 (Three) Times a Day.       amoxicillin-clavulanate (AUGMENTIN) 875-125 MG per tablet Take 1 tablet by mouth 2 (Two) Times a Day for 10 days. 20 tablet 0    fluticasone (FLONASE) 50 MCG/ACT nasal spray 2 sprays into the nostril(s) as directed by provider Daily. 11.1 mL 1    methylPREDNISolone (MEDROL) 4 MG dose pack Take as directed on package instructions. 21 tablet 0     Current Facility-Administered Medications   Medication Dose Route Frequency Provider Last Rate Last Admin    cyanocobalamin injection 1,000 mcg  1,000 mcg Intramuscular Weekly Aileen He APRN   1,000 mcg at 05/18/22 0939     Past Medical History:   Diagnosis Date    Anemia, recent acute blood loss 3/30/2017    ALB anemia in March after ureteral stent removed    Bladder cancer     Diabetes mellitus     Duodenal cancer     Dyspnea on exertion 2/1/2016    Foreign body (FB) in soft tissue 2/26/2019    Gallstones     History of recent left nephrolithiasis s/p ureteral stent placement  3/30/2017    2/17/17 - ureteral stent placed at [facility] s/p stent and stone removal 2/13/17    Hyperlipidemia     Kidney stone     Kidney stone on left side 02/2017    Nephrolithiasis     Pneumonia      Family History   Problem Relation Age of Onset    Heart disease Father     Arthritis Sister     Hypertension Brother     Diabetes Brother      Past Surgical History:   Procedure Laterality Date    CYSTOSCOPY W/ URETERAL STENT REMOVAL Left 03/13/2017    EYE MUSCLE SURGERY      HERNIA REPAIR      INTERVENTIONAL RADIOLOGY PROCEDURE Right 03/31/2017    Procedure: PTC & Drain placement;  Surgeon: Serjio Wall MD;  Location: [facility] INVASIVE LOCATION;  Service:     JOINT ASPIRATION AND/OR INJECTION Right     right hand    LIVER SURGERY  06/10/2019    URETERAL STENT INSERTION Left     WHIPPLE PROCEDURE  2004     Social History     Substance and Sexual Activity   Alcohol Use No     Social History     Tobacco Use   Smoking Status Former    Packs/day: 1.00    Years: 5.00    Pack  "years: 5.00    Types: Cigarettes, Cigars, Pipe    Start date:     Quit date:     Years since quittin.6   Smokeless Tobacco Current    Types: Snuff     Social History     Substance and Sexual Activity   Drug Use No     Review of Systems   HENT:  Positive for congestion, rhinorrhea, sinus pressure, sinus pain and sore throat.    All other systems reviewed and are negative.     Depression Assessment Review:  PHQ-9 Total Score: 0  Vital Signs & Measurements Taken This Encounter  /68 (BP Location: Right arm, Patient Position: Sitting, Cuff Size: Adult)   Pulse 61   Temp 98 øF (36.7 øC) (Temporal)   Resp 18   Ht 175.3 cm (69.02\")   Wt 66.7 kg (147 lb)   SpO2 100%   BMI 21.70 kg/mý    SpO2 Percentage    23 0928   SpO2: 100%        BMI is within normal parameters. No other follow-up for BMI required.      Physical Exam  Constitutional:       Appearance: Normal appearance.   HENT:      Head: Normocephalic and atraumatic.      Right Ear: Tympanic membrane, ear canal and external ear normal.      Left Ear: Tympanic membrane, ear canal and external ear normal.      Nose: Congestion and rhinorrhea present.      Right Sinus: Maxillary sinus tenderness and frontal sinus tenderness present.      Left Sinus: Maxillary sinus tenderness and frontal sinus tenderness present.   Eyes:      Pupils: Pupils are equal, round, and reactive to light.   Cardiovascular:      Rate and Rhythm: Normal rate and regular rhythm.      Pulses: Normal pulses.      Heart sounds: Normal heart sounds.   Pulmonary:      Effort: Pulmonary effort is normal.      Breath sounds: Normal breath sounds.   Abdominal:      Palpations: Abdomen is soft.   Musculoskeletal:         General: Normal range of motion.   Skin:     General: Skin is warm.   Neurological:      General: No focal deficit present.      Mental Status: He is alert and oriented to person, place, and time.   Psychiatric:         Mood and Affect: Mood normal.         " "Behavior: Behavior normal.        Fall Risk Assessment:  Fall Risk Assessment was completed, and patient is at low risk for falls.    Assessment & Plan    -- He presents today with complaints of cough, congestion, sinus pain and pressure.  He denies COVID testing in office today.  He states it started when he was cleaning out his brother's trailer for him to come home from the hospital.  He has been off for past week.  He keeps getting worse each day.  For the sinusitis I will trial him on Augmentin as well as a Medrol Dosepak.  He will also continue to use Zazole as well as Flonase nasal spray.    Patient Active Problem List   Diagnosis    Epigastric pain    Gastroesophageal reflux disease    Chronic rhinitis    Type 2 diabetes mellitus with hyperglycemia, without long-term current use of insulin    Low back pain    Mixed hyperlipidemia    Hx of duodenal cancer, s/p Whipple 2004    Hx of bladder cancer, s/p \"scraping\", 2004    History of recent left nephrolithiasis s/p ureteral stent placement     Benign essential HTN    Arthritis    History of duodenal cancer    Peptic ulcer    Taking multiple medications for chronic disease    Varicose veins of lower extremity with inflammation    Malignant neoplasm of urinary bladder    Degenerative joint disease (DJD) of lumbar spine    Spondylolisthesis of lumbar region    Spinal stenosis of lumbar region    Acute recurrent maxillary sinusitis    Acute cough    Allergic rhinitis       ICD-10-CM ICD-9-CM   1. Allergic rhinitis, unspecified seasonality, unspecified trigger  J30.9 477.9   2. Acute cough  R05.1 786.2     No orders of the defined types were placed in this encounter.      Meds Ordered During Visit:  New Medications Ordered This Visit   Medications    amoxicillin-clavulanate (AUGMENTIN) 875-125 MG per tablet     Sig: Take 1 tablet by mouth 2 (Two) Times a Day for 10 days.     Dispense:  20 tablet     Refill:  0    methylPREDNISolone (MEDROL) 4 MG dose pack     Sig: " Take as directed on package instructions.     Dispense:  21 tablet     Refill:  0    levocetirizine (XYZAL) 5 MG tablet     Sig: Take 1 tablet by mouth Every Evening.     Dispense:  90 tablet     Refill:  3    fluticasone (FLONASE) 50 MCG/ACT nasal spray     Si sprays into the nostril(s) as directed by provider Daily.     Dispense:  11.1 mL     Refill:  1       Follow-up on file with Dr. Haynes.           This document has been electronically signed by JADE Pedraza  2023 16:23 EDT    JADE Pedraza  [address] [place] [address] [phone] (office)    Part of this note may be an electronic transcription/translation of spoken language to printed text using the Dragon Dictation System.

## 2023-09-21 ENCOUNTER — OFFICE VISIT (OUTPATIENT)
Dept: FAMILY MEDICINE CLINIC | Facility: CLINIC | Age: 78
End: 2023-09-21
Payer: MEDICARE

## 2023-09-21 VITALS
HEIGHT: 69 IN | BODY MASS INDEX: 21.36 KG/M2 | DIASTOLIC BLOOD PRESSURE: 70 MMHG | SYSTOLIC BLOOD PRESSURE: 132 MMHG | WEIGHT: 144.2 LBS | TEMPERATURE: 97.1 F | HEART RATE: 58 BPM | OXYGEN SATURATION: 100 %

## 2023-09-21 DIAGNOSIS — E78.2 MIXED HYPERLIPIDEMIA: Chronic | ICD-10-CM

## 2023-09-21 DIAGNOSIS — Z00.00 HEALTH CARE MAINTENANCE: ICD-10-CM

## 2023-09-21 DIAGNOSIS — E11.65 TYPE 2 DIABETES MELLITUS WITH HYPERGLYCEMIA, WITHOUT LONG-TERM CURRENT USE OF INSULIN: Chronic | ICD-10-CM

## 2023-09-21 DIAGNOSIS — Z00.00 ENCOUNTER FOR WELLNESS EXAMINATION: Primary | ICD-10-CM

## 2023-09-21 DIAGNOSIS — I10 BENIGN ESSENTIAL HTN: Chronic | ICD-10-CM

## 2023-09-21 PROBLEM — J01.01 ACUTE RECURRENT MAXILLARY SINUSITIS: Status: RESOLVED | Noted: 2023-03-15 | Resolved: 2023-09-21

## 2023-09-21 PROBLEM — R05.1 ACUTE COUGH: Status: RESOLVED | Noted: 2023-03-22 | Resolved: 2023-09-21

## 2023-09-21 PROCEDURE — 80053 COMPREHEN METABOLIC PANEL: CPT | Performed by: INTERNAL MEDICINE

## 2023-09-21 PROCEDURE — 1170F FXNL STATUS ASSESSED: CPT | Performed by: INTERNAL MEDICINE

## 2023-09-21 PROCEDURE — 1159F MED LIST DOCD IN RCRD: CPT | Performed by: INTERNAL MEDICINE

## 2023-09-21 PROCEDURE — 80061 LIPID PANEL: CPT | Performed by: INTERNAL MEDICINE

## 2023-09-21 PROCEDURE — 84443 ASSAY THYROID STIM HORMONE: CPT | Performed by: INTERNAL MEDICINE

## 2023-09-21 PROCEDURE — 36415 COLL VENOUS BLD VENIPUNCTURE: CPT | Performed by: INTERNAL MEDICINE

## 2023-09-21 PROCEDURE — 3078F DIAST BP <80 MM HG: CPT | Performed by: INTERNAL MEDICINE

## 2023-09-21 PROCEDURE — G0439 PPPS, SUBSEQ VISIT: HCPCS | Performed by: INTERNAL MEDICINE

## 2023-09-21 PROCEDURE — 82043 UR ALBUMIN QUANTITATIVE: CPT | Performed by: INTERNAL MEDICINE

## 2023-09-21 PROCEDURE — 84439 ASSAY OF FREE THYROXINE: CPT | Performed by: INTERNAL MEDICINE

## 2023-09-21 PROCEDURE — 3075F SYST BP GE 130 - 139MM HG: CPT | Performed by: INTERNAL MEDICINE

## 2023-09-21 PROCEDURE — 82550 ASSAY OF CK (CPK): CPT | Performed by: INTERNAL MEDICINE

## 2023-09-21 PROCEDURE — 1160F RVW MEDS BY RX/DR IN RCRD: CPT | Performed by: INTERNAL MEDICINE

## 2023-09-21 PROCEDURE — 83036 HEMOGLOBIN GLYCOSYLATED A1C: CPT | Performed by: INTERNAL MEDICINE

## 2023-09-21 PROCEDURE — 85027 COMPLETE CBC AUTOMATED: CPT | Performed by: INTERNAL MEDICINE

## 2023-09-21 NOTE — PROGRESS NOTES
Venipuncture Blood Specimen Collection  Venipuncture performed in Right arm by Jessica Foley MA with good hemostasis. Patient tolerated the procedure well without complications.   09/21/23   Jessica Foley MA

## 2023-09-21 NOTE — PROGRESS NOTES
The ABCs of the Annual Wellness Visit  Subsequent Medicare Wellness Visit    Subjective      Jean Noriega is a 78 y.o. male who presents for a Subsequent Medicare Wellness Visit.    The following portions of the patient's history were reviewed and   updated as appropriate: allergies, current medications, past family history, past medical history, past social history, past surgical history, and problem list.    Compared to one year ago, the patient feels his physical   health is the same.    Compared to one year ago, the patient feels his mental   health is the same.    Recent Hospitalizations:  He was not admitted to the hospital during the last year.       Current Medical Providers:  Patient Care Team:  Fannie Haynes DO as PCP - General (Family Medicine)  Serjio Wall MD as Consulting Physician (Radiology)  Maurizio Cuellar MD as Consulting Physician (Gastroenterology)  Christopher Robledo MD as Consulting Physician (Infectious Diseases)    Outpatient Medications Prior to Visit   Medication Sig Dispense Refill    ASPIRIN 81 PO aspirin 81 mg tablet      atorvastatin (LIPITOR) 40 MG tablet Take 1 tablet by mouth Daily.      Blood Glucose Monitoring Suppl (Blood Glucose Monitor System) w/Device kit 1 Device Daily. 1 each 0    Creon [id number] units capsule delayed-release particles capsule TAKE 3 CAPSULES BY MOUTH BEFORE MEAL(S) AND 1 CAPSULE BEFORE SNACKS      fluticasone (FLONASE) 50 MCG/ACT nasal spray 2 sprays into the nostril(s) as directed by provider Daily. 11.1 mL 1    glimepiride (AMARYL) 2 MG tablet TAKE 1 TABLET BY MOUTH ONCE DAILY IN THE MORNING BEFORE BREAKFAST 90 tablet 0    glucose blood test strip 1 each by Other route 2 (Two) Times a Day. Use as instructed 100 each 12    Lancets misc 1 Device 2 (Two) Times a Day. 100 each 12    levocetirizine (XYZAL) 5 MG tablet Take 1 tablet by mouth Every Evening. 90 tablet 3    losartan (COZAAR) 100 MG tablet       metFORMIN (GLUCOPHAGE) 1000 MG tablet  "Take 1 tablet by mouth 2 (Two) Times a Day With Meals. 180 tablet 1    MULTIPLE VITAMIN PO Take 1 tablet by mouth Daily.      pantoprazole (PROTONIX) 40 MG EC tablet Take 1 tablet by mouth 2 (Two) Times a Day. 90 tablet 1    ursodiol (ACTIGALL) 500 MG tablet Take 1 tablet by mouth 3 (Three) Times a Day.      doxycycline (VIBRAMYCIN) 100 MG capsule Take 1 capsule by mouth 2 (Two) Times a Day. 20 capsule 0    methylPREDNISolone (MEDROL) 4 MG dose pack Take as directed on package instructions. 21 tablet 0     Facility-Administered Medications Prior to Visit   Medication Dose Route Frequency Provider Last Rate Last Admin    cyanocobalamin injection 1,000 mcg  1,000 mcg Intramuscular Weekly Aileen He APRN   1,000 mcg at 05/18/22 0939       No opioid medication identified on active medication list. I have reviewed chart for other potential  high risk medication/s and harmful drug interactions in the elderly.        Aspirin is on active medication list. Aspirin use is indicated based on review of current medical condition/s. Pros and cons of this therapy have been discussed today. Benefits of this medication outweigh potential harm.  Patient has been encouraged to continue taking this medication.        Patient Active Problem List   Diagnosis    Epigastric pain    Gastroesophageal reflux disease    Chronic rhinitis    Type 2 diabetes mellitus with hyperglycemia, without long-term current use of insulin    Low back pain    Mixed hyperlipidemia    Hx of duodenal cancer, s/p Whipple 2004    Hx of bladder cancer, s/p \"scraping\", 2004    History of recent left nephrolithiasis s/p ureteral stent placement     Benign essential HTN    Arthritis    History of duodenal cancer    Peptic ulcer    Taking multiple medications for chronic disease    Varicose veins of lower extremity with inflammation    Malignant neoplasm of urinary bladder    Degenerative joint disease (DJD) of lumbar spine    Spondylolisthesis of lumbar " "region    Spinal stenosis of lumbar region    Allergic rhinitis     Advance Care Planning   Advance Care Planning     Advance Directive is on file.  ACP discussion was held with the patient during this visit. Patient has an advance directive in EMR which is still valid.      Objective    Vitals:    23 1120   BP: 132/70   BP Location: Left arm   Patient Position: Sitting   Cuff Size: Adult   Pulse: 58   Temp: 97.1 °F (36.2 °C)   TempSrc: Temporal   SpO2: 100%   Weight: 65.4 kg (144 lb 3.2 oz)   Height: 175.3 cm (69.02\")     Estimated body mass index is 21.28 kg/m² as calculated from the following:    Height as of this encounter: 175.3 cm (69.02\").    Weight as of this encounter: 65.4 kg (144 lb 3.2 oz).    BMI is within normal parameters. No other follow-up for BMI required.      Does the patient have evidence of cognitive impairment?   No            HEALTH RISK ASSESSMENT    Smoking Status:  Social History     Tobacco Use   Smoking Status Former    Packs/day: 1.00    Years: 5.00    Pack years: 5.00    Types: Cigarettes, Cigars, Pipe    Start date:     Quit date:     Years since quittin.7   Smokeless Tobacco Current    Types: Snuff     Alcohol Consumption:  Social History     Substance and Sexual Activity   Alcohol Use No     Fall Risk Screen:    BRENDENADI Fall Risk Assessment was completed, and patient is at LOW risk for falls.Assessment completed on:2023    Depression Screenin/21/2023    11:22 AM   PHQ-2/PHQ-9 Depression Screening   Little Interest or Pleasure in Doing Things 0-->not at all   Feeling Down, Depressed or Hopeless 0-->not at all   PHQ-9: Brief Depression Severity Measure Score 0       Health Habits and Functional and Cognitive Screenin/21/2023    11:21 AM   Functional & Cognitive Status   Do you have difficulty preparing food and eating? No   Do you have difficulty bathing yourself, getting dressed or grooming yourself? No   Do you have difficulty using the " toilet? No   Do you have difficulty moving around from place to place? No   Do you have trouble with steps or getting out of a bed or a chair? No   Current Diet Well Balanced Diet   Dental Exam Up to date   Eye Exam Up to date   Exercise (times per week) 3 times per week   Current Exercises Include Walking;Yard Work   Do you need help using the phone?  No   Are you deaf or do you have serious difficulty hearing?  No   Do you need help to go to places out of walking distance? No   Do you need help shopping? No   Do you need help preparing meals?  No   Do you need help with housework?  No   Do you need help with laundry? No   Do you need help taking your medications? No   Do you need help managing money? No   Do you ever drive or ride in a car without wearing a seat belt? No   Have you felt unusual stress, anger or loneliness in the last month? No   Who do you live with? Spouse   If you need help, do you have trouble finding someone available to you? No   Have you been bothered in the last four weeks by sexual problems? No   Do you have difficulty concentrating, remembering or making decisions? No       Age-appropriate Screening Schedule:  Refer to the list below for future screening recommendations based on patient's age, sex and/or medical conditions. Orders for these recommended tests are listed in the plan section. The patient has been provided with a written plan.    Health Maintenance   Topic Date Due    DIABETIC EYE EXAM  07/14/2023    ANNUAL WELLNESS VISIT  09/20/2023    URINE MICROALBUMIN  09/20/2023    INFLUENZA VACCINE  10/01/2023    HEMOGLOBIN A1C  10/03/2023    LIPID PANEL  04/04/2024    COLORECTAL CANCER SCREENING  01/12/2028    TDAP/TD VACCINES (3 - Td or Tdap) 02/22/2029    HEPATITIS C SCREENING  Completed    Pneumococcal Vaccine 65+  Completed    COVID-19 Vaccine  Discontinued    ZOSTER VACCINE  Discontinued                  CMS Preventative Services Quick Reference  Risk Factors Identified During  Encounter:    Immunizations Discussed/Encouraged: Shingrix.  Patient declines Shingrix and COVID vaccines at this time.    Age-appropriate screenings were discussed with patient today. We will update metabolic screenings and microalbumin today.  Patient reports having a recent eye exam and we will request those records.    The above risks/problems have been discussed with the patient.  Pertinent information has been shared with the patient in the After Visit Summary.    Diagnoses and all orders for this visit:    1. Encounter for wellness examination (Primary)    2. Health care maintenance    3. Type 2 diabetes mellitus with hyperglycemia, without long-term current use of insulin  -     Comprehensive Metabolic Panel  -     Hemoglobin A1c  -     Lipid Panel  -     MicroAlbumin, Urine, Random - Urine, Clean Catch    4. Benign essential HTN  -     CBC (No Diff)  -     Comprehensive Metabolic Panel  -     Lipid Panel  -     TSH  -     T4, Free    5. Mixed hyperlipidemia  -     Comprehensive Metabolic Panel  -     Lipid Panel  -     CK        Follow Up:   Next Medicare Wellness visit to be scheduled in 1 year.  We will plan on a 6-month office follow-up, or certainly sooner if needed.    An After Visit Summary and PPPS were made available to the patient.      This document has been electronically signed by Fannie Haynes DO  September 21, 2023 13:23 EDT

## 2023-09-22 LAB
ALBUMIN SERPL-MCNC: 4.2 G/DL (ref 3.5–5.2)
ALBUMIN UR-MCNC: <1.2 MG/DL
ALBUMIN/GLOB SERPL: 2.1 G/DL
ALP SERPL-CCNC: 71 U/L (ref 39–117)
ALT SERPL W P-5'-P-CCNC: 29 U/L (ref 1–41)
ANION GAP SERPL CALCULATED.3IONS-SCNC: 10 MMOL/L (ref 5–15)
AST SERPL-CCNC: 31 U/L (ref 1–40)
BILIRUB SERPL-MCNC: 0.3 MG/DL (ref 0–1.2)
BUN SERPL-MCNC: 12 MG/DL (ref 8–23)
BUN/CREAT SERPL: 13.6 (ref 7–25)
CALCIUM SPEC-SCNC: 9 MG/DL (ref 8.6–10.5)
CHLORIDE SERPL-SCNC: 101 MMOL/L (ref 98–107)
CHOLEST SERPL-MCNC: 101 MG/DL (ref 0–200)
CK SERPL-CCNC: 156 U/L (ref 20–200)
CO2 SERPL-SCNC: 27 MMOL/L (ref 22–29)
CREAT SERPL-MCNC: 0.88 MG/DL (ref 0.76–1.27)
DEPRECATED RDW RBC AUTO: 48.4 FL (ref 37–54)
EGFRCR SERPLBLD CKD-EPI 2021: 89.1 ML/MIN/1.73
ERYTHROCYTE [DISTWIDTH] IN BLOOD BY AUTOMATED COUNT: 14.3 % (ref 12.3–15.4)
GLOBULIN UR ELPH-MCNC: 2 GM/DL
GLUCOSE SERPL-MCNC: 136 MG/DL (ref 65–99)
HBA1C MFR BLD: 8 % (ref 4.8–5.6)
HCT VFR BLD AUTO: 35.9 % (ref 37.5–51)
HDLC SERPL-MCNC: 39 MG/DL (ref 40–60)
HGB BLD-MCNC: 11.7 G/DL (ref 13–17.7)
LDLC SERPL CALC-MCNC: 33 MG/DL (ref 0–100)
LDLC/HDLC SERPL: 0.66 {RATIO}
MCH RBC QN AUTO: 30.2 PG (ref 26.6–33)
MCHC RBC AUTO-ENTMCNC: 32.6 G/DL (ref 31.5–35.7)
MCV RBC AUTO: 92.5 FL (ref 79–97)
PLATELET # BLD AUTO: 268 10*3/MM3 (ref 140–450)
PMV BLD AUTO: 10.8 FL (ref 6–12)
POTASSIUM SERPL-SCNC: 5.1 MMOL/L (ref 3.5–5.2)
PROT SERPL-MCNC: 6.2 G/DL (ref 6–8.5)
RBC # BLD AUTO: 3.88 10*6/MM3 (ref 4.14–5.8)
SODIUM SERPL-SCNC: 138 MMOL/L (ref 136–145)
T4 FREE SERPL-MCNC: 1.26 NG/DL (ref 0.93–1.7)
TRIGL SERPL-MCNC: 181 MG/DL (ref 0–150)
TSH SERPL DL<=0.05 MIU/L-ACNC: 1.24 UIU/ML (ref 0.27–4.2)
VLDLC SERPL-MCNC: 29 MG/DL (ref 5–40)
WBC NRBC COR # BLD: 5.66 10*3/MM3 (ref 3.4–10.8)

## 2023-09-25 DIAGNOSIS — D64.9 ANEMIA, UNSPECIFIED TYPE: Primary | ICD-10-CM

## 2023-09-27 ENCOUNTER — OFFICE VISIT (OUTPATIENT)
Dept: FAMILY MEDICINE CLINIC | Facility: CLINIC | Age: 78
End: 2023-09-27
Payer: MEDICARE

## 2023-09-27 VITALS
TEMPERATURE: 97.1 F | WEIGHT: 143.6 LBS | SYSTOLIC BLOOD PRESSURE: 102 MMHG | OXYGEN SATURATION: 99 % | HEIGHT: 69 IN | RESPIRATION RATE: 18 BRPM | BODY MASS INDEX: 21.27 KG/M2 | HEART RATE: 69 BPM | DIASTOLIC BLOOD PRESSURE: 60 MMHG

## 2023-09-27 DIAGNOSIS — R50.9 FEVER, UNSPECIFIED FEVER CAUSE: ICD-10-CM

## 2023-09-27 DIAGNOSIS — J06.9 ACUTE URI: Primary | ICD-10-CM

## 2023-09-27 PROCEDURE — 99213 OFFICE O/P EST LOW 20 MIN: CPT | Performed by: INTERNAL MEDICINE

## 2023-09-27 PROCEDURE — 1159F MED LIST DOCD IN RCRD: CPT | Performed by: INTERNAL MEDICINE

## 2023-09-27 PROCEDURE — 3078F DIAST BP <80 MM HG: CPT | Performed by: INTERNAL MEDICINE

## 2023-09-27 PROCEDURE — 1160F RVW MEDS BY RX/DR IN RCRD: CPT | Performed by: INTERNAL MEDICINE

## 2023-09-27 PROCEDURE — 3074F SYST BP LT 130 MM HG: CPT | Performed by: INTERNAL MEDICINE

## 2023-09-27 PROCEDURE — 87428 SARSCOV & INF VIR A&B AG IA: CPT | Performed by: INTERNAL MEDICINE

## 2023-09-27 RX ORDER — DOXYCYCLINE HYCLATE 100 MG/1
100 CAPSULE ORAL 2 TIMES DAILY
Qty: 20 CAPSULE | Refills: 0 | Status: SHIPPED | OUTPATIENT
Start: 2023-09-27

## 2023-09-27 NOTE — PROGRESS NOTES
Patient Name: Jean Noriega Today's Date: 2023   Patient MRN / CSN: [id number] / [id number] Date of Encounter: 2023   Patient Age / : 78 y.o. / 1945 Encounter Provider: Fannie Haynes DO   Referring Physician: No ref. provider found          Jean is a 78 y.o. male who is being seen today for Earache (Right), Fever, Chills, Diarrhea, Generalized Body Aches, and Headache      URI   This is a new problem. Episode onset: 2 days ago. The maximum temperature recorded prior to his arrival was 103 - 104 F. Associated symptoms include congestion, diarrhea, ear pain, headaches and nausea. Pertinent negatives include no abdominal pain, chest pain, coughing, rash, sore throat, vomiting or wheezing. He has tried acetaminophen for the symptoms. The treatment provided significant relief.     Allergies include:Percocet [oxycodone-acetaminophen]  Current Outpatient Medications   Medication Sig Dispense Refill    ASPIRIN 81 PO aspirin 81 mg tablet      atorvastatin (LIPITOR) 40 MG tablet Take 1 tablet by mouth Daily.      Blood Glucose Monitoring Suppl (Blood Glucose Monitor System) w/Device kit 1 Device Daily. 1 each 0    Creon 58347-541382 units capsule delayed-release particles capsule TAKE 3 CAPSULES BY MOUTH BEFORE MEAL(S) AND 1 CAPSULE BEFORE SNACKS      fluticasone (FLONASE) 50 MCG/ACT nasal spray 2 sprays into the nostril(s) as directed by provider Daily. 11.1 mL 1    glimepiride (AMARYL) 2 MG tablet TAKE 1 TABLET BY MOUTH ONCE DAILY IN THE MORNING BEFORE BREAKFAST 90 tablet 0    glucose blood test strip 1 each by Other route 2 (Two) Times a Day. Use as instructed 100 each 12    Lancets misc 1 Device 2 (Two) Times a Day. 100 each 12    levocetirizine (XYZAL) 5 MG tablet Take 1 tablet by mouth Every Evening. 90 tablet 3    losartan (COZAAR) 100 MG tablet       metFORMIN (GLUCOPHAGE) 1000 MG tablet Take 1 tablet by mouth 2 (Two) Times a Day With Meals. 180 tablet 1    MULTIPLE VITAMIN PO Take 1 tablet by  mouth Daily.      pantoprazole (PROTONIX) 40 MG EC tablet Take 1 tablet by mouth 2 (Two) Times a Day. 90 tablet 1    ursodiol (ACTIGALL) 500 MG tablet Take 1 tablet by mouth 3 (Three) Times a Day.      doxycycline (VIBRAMYCIN) 100 MG capsule Take 1 capsule by mouth 2 (Two) Times a Day. 20 capsule 0     Current Facility-Administered Medications   Medication Dose Route Frequency Provider Last Rate Last Admin    cyanocobalamin injection 1,000 mcg  1,000 mcg Intramuscular Weekly Aileen He APRN   1,000 mcg at 05/18/22 0939     Past Medical History:   Diagnosis Date    Anemia, recent acute blood loss 3/30/2017    ALB anemia in March after ureteral stent removed    Bladder cancer     Diabetes mellitus     Duodenal cancer     Dyspnea on exertion 2/1/2016    Foreign body (FB) in soft tissue 2/26/2019    Gallstones     History of recent left nephrolithiasis s/p ureteral stent placement  3/30/2017    2/17/17 - ureteral stent placed at [facility], s/p stent and stone removal 2/13/17    Hyperlipidemia     Kidney stone     Kidney stone on left side 02/2017    Nephrolithiasis     Pneumonia      Family History   Problem Relation Age of Onset    Heart disease Father     Arthritis Sister     Hypertension Brother     Diabetes Brother      Past Surgical History:   Procedure Laterality Date    CYSTOSCOPY W/ URETERAL STENT REMOVAL Left 03/13/2017    EYE MUSCLE SURGERY      HERNIA REPAIR      INTERVENTIONAL RADIOLOGY PROCEDURE Right 03/31/2017    Procedure: PTC & Drain placement;  Surgeon: Serjio Wall MD;  Location: [facility] INVASIVE LOCATION;  Service:     JOINT ASPIRATION AND/OR INJECTION Right     right hand    LIVER SURGERY  06/10/2019    URETERAL STENT INSERTION Left     WHIPPLE PROCEDURE  2004     Social History     Substance and Sexual Activity   Alcohol Use No     Social History     Tobacco Use   Smoking Status Former    Packs/day: 1.00    Years: 5.00    Pack years: 5.00    Types: Cigarettes, Cigars, Pipe     "Start date:     Quit date:     Years since quittin.7   Smokeless Tobacco Current    Types: Snuff     Social History     Substance and Sexual Activity   Drug Use No     Review of Systems   HENT:  Positive for congestion and ear pain. Negative for sore throat.    Respiratory:  Negative for cough and wheezing.    Cardiovascular:  Negative for chest pain.   Gastrointestinal:  Positive for diarrhea and nausea. Negative for abdominal pain, blood in stool and vomiting.   Genitourinary:  Negative for hematuria.   Skin:  Negative for rash.   Neurological:  Positive for headaches.      Depression Assessment Review:  PHQ-9 Total Score:    Vital Signs & Measurements Taken This Encounter  /60 (BP Location: Right arm, Patient Position: Sitting, Cuff Size: Adult)   Pulse 69   Temp 97.1 °F (36.2 °C) (Temporal)   Resp 18   Ht 175.3 cm (69.02\")   Wt 65.1 kg (143 lb 9.6 oz)   SpO2 99%   BMI 21.19 kg/m²    SpO2 Percentage    / 0917   SpO2: 99%        BMI is within normal parameters. No other follow-up for BMI required.      Physical Exam  Vitals reviewed.   Constitutional:       General: He is not in acute distress.  HENT:      Head: Normocephalic and atraumatic.      Ears:      Comments: Mucoid effusions bilaterally.  Right TM is bulging.  Maxillary sinus tenderness to palpation bilaterally.     Mouth/Throat:      Mouth: Mucous membranes are moist.      Pharynx: Posterior oropharyngeal erythema present. No oropharyngeal exudate.   Eyes:      General: No scleral icterus.     Extraocular Movements: Extraocular movements intact.      Conjunctiva/sclera: Conjunctivae normal.      Pupils: Pupils are equal, round, and reactive to light.   Cardiovascular:      Rate and Rhythm: Normal rate and regular rhythm.   Pulmonary:      Effort: Pulmonary effort is normal. No respiratory distress.      Breath sounds: Normal breath sounds. No wheezing or rhonchi.   Abdominal:      Palpations: Abdomen is soft.      " "Tenderness: There is no abdominal tenderness.   Musculoskeletal:         General: No swelling.      Cervical back: Neck supple.   Lymphadenopathy:      Cervical: Cervical adenopathy present.   Skin:     General: Skin is warm and dry.      Coloration: Skin is not jaundiced.   Neurological:      Mental Status: He is alert.   Psychiatric:         Mood and Affect: Mood normal.         Behavior: Behavior normal.         Thought Content: Thought content normal.         Judgment: Judgment normal.            Assessment & Plan  Patient Active Problem List   Diagnosis    Epigastric pain    Gastroesophageal reflux disease    Chronic rhinitis    Type 2 diabetes mellitus with hyperglycemia, without long-term current use of insulin    Low back pain    Mixed hyperlipidemia    Hx of duodenal cancer, s/p Whipple 2004    Hx of bladder cancer, s/p \"scraping\", 2004    History of recent left nephrolithiasis s/p ureteral stent placement     Benign essential HTN    Arthritis    History of duodenal cancer    Peptic ulcer    Taking multiple medications for chronic disease    Varicose veins of lower extremity with inflammation    Malignant neoplasm of urinary bladder    Degenerative joint disease (DJD) of lumbar spine    Spondylolisthesis of lumbar region    Spinal stenosis of lumbar region    Allergic rhinitis       ICD-10-CM ICD-9-CM   1. Acute URI  J06.9 465.9   2. Fever, unspecified fever cause  R50.9 780.60     Orders Placed This Encounter   Procedures    POCT SARS-CoV-2 Antigen JESSI + Flu     Order Specific Question:   Release to patient     Answer:   Routine Release [[id number]]       Meds Ordered During Visit:  New Medications Ordered This Visit   Medications    doxycycline (VIBRAMYCIN) 100 MG capsule     Sig: Take 1 capsule by mouth 2 (Two) Times a Day.     Dispense:  20 capsule     Refill:  0     COVID and flu test were negative today.  I discussed doxycycline to take twice daily.  I encourage patient to continue Xyzal and Flonase, " as he is doing.  He should also continue Tylenol as needed for fever control and body aches.    Return if symptoms worsen or fail to improve, for Next scheduled follow up.          Referring Provider (if known): No ref. provider found      This document has been electronically signed by Fannie Haynes DO  September 27, 2023 10:03 EDT    Fannie Haynes DO, FACOI  [address]  [place], [place] [address] [phone] (office)    Part of this note may be an electronic transcription/translation of spoken language to printed text using the Dragon Dictation System.

## 2023-09-28 ENCOUNTER — APPOINTMENT (OUTPATIENT)
Dept: CT IMAGING | Facility: HOSPITAL | Age: 78
End: 2023-09-28
Payer: MEDICARE

## 2023-09-28 ENCOUNTER — TELEPHONE (OUTPATIENT)
Dept: FAMILY MEDICINE CLINIC | Facility: CLINIC | Age: 78
End: 2023-09-28

## 2023-09-28 ENCOUNTER — HOSPITAL ENCOUNTER (EMERGENCY)
Facility: HOSPITAL | Age: 78
Discharge: HOME | End: 2023-09-28
Attending: STUDENT IN AN ORGANIZED HEALTH CARE EDUCATION/TRAINING PROGRAM
Payer: MEDICARE

## 2023-09-28 ENCOUNTER — TELEPHONE (OUTPATIENT)
Dept: FAMILY MEDICINE CLINIC | Facility: CLINIC | Age: 78
End: 2023-09-28
Payer: MEDICARE

## 2023-09-28 VITALS
RESPIRATION RATE: 16 BRPM | SYSTOLIC BLOOD PRESSURE: 145 MMHG | HEIGHT: 69 IN | WEIGHT: 145 LBS | HEART RATE: 51 BPM | DIASTOLIC BLOOD PRESSURE: 69 MMHG | BODY MASS INDEX: 21.48 KG/M2 | TEMPERATURE: 97.3 F | OXYGEN SATURATION: 99 %

## 2023-09-28 DIAGNOSIS — U07.1 COVID-19: Primary | ICD-10-CM

## 2023-09-28 LAB
ALBUMIN SERPL-MCNC: 3.3 G/DL (ref 3.5–5.2)
ALBUMIN/GLOB SERPL: 1.3 G/DL
ALP SERPL-CCNC: 66 U/L (ref 39–117)
ALT SERPL W P-5'-P-CCNC: 18 U/L (ref 1–41)
ANION GAP SERPL CALCULATED.3IONS-SCNC: 8.9 MMOL/L (ref 5–15)
AST SERPL-CCNC: 21 U/L (ref 1–40)
BASOPHILS # BLD AUTO: 0.03 10*3/MM3 (ref 0–0.2)
BASOPHILS NFR BLD AUTO: 0.5 % (ref 0–1.5)
BILIRUB SERPL-MCNC: 0.2 MG/DL (ref 0–1.2)
BILIRUB UR QL STRIP: NEGATIVE
BUN SERPL-MCNC: 17 MG/DL (ref 8–23)
BUN/CREAT SERPL: 15.9 (ref 7–25)
CALCIUM SPEC-SCNC: 8.7 MG/DL (ref 8.6–10.5)
CHLORIDE SERPL-SCNC: 103 MMOL/L (ref 98–107)
CLARITY UR: CLEAR
CO2 SERPL-SCNC: 24.1 MMOL/L (ref 22–29)
COLOR UR: YELLOW
CREAT SERPL-MCNC: 1.07 MG/DL (ref 0.76–1.27)
CRP SERPL-MCNC: 3.59 MG/DL (ref 0–0.5)
D-LACTATE SERPL-SCNC: 1.5 MMOL/L (ref 0.5–2)
DEPRECATED RDW RBC AUTO: 48.7 FL (ref 37–54)
EGFRCR SERPLBLD CKD-EPI 2021: 71.9 ML/MIN/1.73
EOSINOPHIL # BLD AUTO: 0.02 10*3/MM3 (ref 0–0.4)
EOSINOPHIL NFR BLD AUTO: 0.3 % (ref 0.3–6.2)
ERYTHROCYTE [DISTWIDTH] IN BLOOD BY AUTOMATED COUNT: 14.4 % (ref 12.3–15.4)
FLUAV RNA RESP QL NAA+PROBE: NOT DETECTED
FLUBV RNA RESP QL NAA+PROBE: NOT DETECTED
GLOBULIN UR ELPH-MCNC: 2.5 GM/DL
GLUCOSE SERPL-MCNC: 277 MG/DL (ref 65–99)
GLUCOSE UR STRIP-MCNC: ABNORMAL MG/DL
HCT VFR BLD AUTO: 34 % (ref 37.5–51)
HGB BLD-MCNC: 10.8 G/DL (ref 13–17.7)
HGB UR QL STRIP.AUTO: NEGATIVE
HOLD SPECIMEN: NORMAL
HOLD SPECIMEN: NORMAL
IMM GRANULOCYTES # BLD AUTO: 0.02 10*3/MM3 (ref 0–0.05)
IMM GRANULOCYTES NFR BLD AUTO: 0.3 % (ref 0–0.5)
KETONES UR QL STRIP: NEGATIVE
LEUKOCYTE ESTERASE UR QL STRIP.AUTO: NEGATIVE
LYMPHOCYTES # BLD AUTO: 0.67 10*3/MM3 (ref 0.7–3.1)
LYMPHOCYTES NFR BLD AUTO: 10.5 % (ref 19.6–45.3)
MCH RBC QN AUTO: 29.5 PG (ref 26.6–33)
MCHC RBC AUTO-ENTMCNC: 31.8 G/DL (ref 31.5–35.7)
MCV RBC AUTO: 92.9 FL (ref 79–97)
MONOCYTES # BLD AUTO: 1.21 10*3/MM3 (ref 0.1–0.9)
MONOCYTES NFR BLD AUTO: 18.9 % (ref 5–12)
NEUTROPHILS NFR BLD AUTO: 4.46 10*3/MM3 (ref 1.7–7)
NEUTROPHILS NFR BLD AUTO: 69.5 % (ref 42.7–76)
NITRITE UR QL STRIP: NEGATIVE
NRBC BLD AUTO-RTO: 0 /100 WBC (ref 0–0.2)
PH UR STRIP.AUTO: 6 [PH] (ref 5–8)
PLATELET # BLD AUTO: 238 10*3/MM3 (ref 140–450)
PMV BLD AUTO: 9.5 FL (ref 6–12)
POTASSIUM SERPL-SCNC: 4.6 MMOL/L (ref 3.5–5.2)
PROT SERPL-MCNC: 5.8 G/DL (ref 6–8.5)
PROT UR QL STRIP: NEGATIVE
RBC # BLD AUTO: 3.66 10*6/MM3 (ref 4.14–5.8)
SARS-COV-2 RNA RESP QL NAA+PROBE: DETECTED
SODIUM SERPL-SCNC: 136 MMOL/L (ref 136–145)
SP GR UR STRIP: 1.01 (ref 1–1.03)
UROBILINOGEN UR QL STRIP: ABNORMAL
WBC NRBC COR # BLD: 6.41 10*3/MM3 (ref 3.4–10.8)
WHOLE BLOOD HOLD COAG: NORMAL
WHOLE BLOOD HOLD SPECIMEN: NORMAL

## 2023-09-28 PROCEDURE — 87040 BLOOD CULTURE FOR BACTERIA: CPT | Performed by: PHYSICIAN ASSISTANT

## 2023-09-28 PROCEDURE — 81003 URINALYSIS AUTO W/O SCOPE: CPT | Performed by: PHYSICIAN ASSISTANT

## 2023-09-28 PROCEDURE — 83605 ASSAY OF LACTIC ACID: CPT | Performed by: PHYSICIAN ASSISTANT

## 2023-09-28 PROCEDURE — 74177 CT ABD & PELVIS W/CONTRAST: CPT | Performed by: RADIOLOGY

## 2023-09-28 PROCEDURE — 25510000001 IOPAMIDOL 61 % SOLUTION: Performed by: STUDENT IN AN ORGANIZED HEALTH CARE EDUCATION/TRAINING PROGRAM

## 2023-09-28 PROCEDURE — 36415 COLL VENOUS BLD VENIPUNCTURE: CPT

## 2023-09-28 PROCEDURE — 71260 CT THORAX DX C+: CPT | Performed by: RADIOLOGY

## 2023-09-28 PROCEDURE — 71260 CT THORAX DX C+: CPT

## 2023-09-28 PROCEDURE — 85025 COMPLETE CBC W/AUTO DIFF WBC: CPT | Performed by: PHYSICIAN ASSISTANT

## 2023-09-28 PROCEDURE — 99285 EMERGENCY DEPT VISIT HI MDM: CPT

## 2023-09-28 PROCEDURE — 86140 C-REACTIVE PROTEIN: CPT | Performed by: PHYSICIAN ASSISTANT

## 2023-09-28 PROCEDURE — 80053 COMPREHEN METABOLIC PANEL: CPT | Performed by: PHYSICIAN ASSISTANT

## 2023-09-28 PROCEDURE — 87636 SARSCOV2 & INF A&B AMP PRB: CPT | Performed by: STUDENT IN AN ORGANIZED HEALTH CARE EDUCATION/TRAINING PROGRAM

## 2023-09-28 PROCEDURE — 74177 CT ABD & PELVIS W/CONTRAST: CPT

## 2023-09-28 RX ADMIN — IOPAMIDOL 100 ML: 612 INJECTION, SOLUTION INTRAVENOUS at 15:22

## 2023-09-28 RX ADMIN — SODIUM CHLORIDE 1000 ML: 9 INJECTION, SOLUTION INTRAVENOUS at 12:58

## 2023-09-28 NOTE — TELEPHONE ENCOUNTER
PT HAD APPT SCHEDULED FOR TODAY AT 1:15. FEELING SEPTIC.  HAD WORSENING DIARRHEA THRU THE NIGHT.  ABDOMINAL PAIN AND FEVER NO BETTER THAN WHEN SEEN YESTERDAY.  SPOKE WITH TSEFANIE TANG WHO RECOMMENDED PT GO TO ED.  SPOKE WITH WIFE WHO AGREED TO TAKE HIM.

## 2023-09-28 NOTE — ED PROVIDER NOTES
Subjective   History of Present Illness  pt states he has had diarrhea with nausea and vomiting for about 3-4 days post Covid 2 weeks ago. Pt was checked at urgent care yesterday and states covid flu swab was negative    History provided by:  Patient   used: No    Vomiting  The primary symptoms include nausea and vomiting. The illness began 3 to 5 days ago. The onset was sudden.   The illness is also significant for chills.     Review of Systems   Constitutional:  Positive for chills.   Gastrointestinal:  Positive for nausea and vomiting.     Past Medical History:   Diagnosis Date    Anemia, recent acute blood loss 3/30/2017    ALB anemia in March after ureteral stent removed    Bladder cancer     Diabetes mellitus     Duodenal cancer     Dyspnea on exertion 2/1/2016    Foreign body (FB) in soft tissue 2/26/2019    Gallstones     History of recent left nephrolithiasis s/p ureteral stent placement  3/30/2017    2/17/17 - ureteral stent placed at [facility], s/p stent and stone removal 2/13/17    Hyperlipidemia     Kidney stone     Kidney stone on left side 02/2017    Nephrolithiasis     Pneumonia        Allergies   Allergen Reactions    Percocet [Oxycodone-Acetaminophen] Itching       Past Surgical History:   Procedure Laterality Date    CYSTOSCOPY W/ URETERAL STENT REMOVAL Left 03/13/2017    EYE MUSCLE SURGERY      HERNIA REPAIR      INTERVENTIONAL RADIOLOGY PROCEDURE Right 03/31/2017    Procedure: PTC & Drain placement;  Surgeon: Serjio Wall MD;  Location: [facility] INVASIVE LOCATION;  Service:     JOINT ASPIRATION AND/OR INJECTION Right     right hand    LIVER SURGERY  06/10/2019    URETERAL STENT INSERTION Left     WHIPPLE PROCEDURE  2004       Family History   Problem Relation Age of Onset    Heart disease Father     Arthritis Sister     Hypertension Brother     Diabetes Brother        Social History     Socioeconomic History    Marital status:    Tobacco Use    Smoking status:  Former     Packs/day: 1.00     Years: 5.00     Pack years: 5.00     Types: Cigarettes, Cigars, Pipe     Start date:      Quit date:      Years since quittin.7    Smokeless tobacco: Current     Types: Snuff   Vaping Use    Vaping Use: Never used   Substance and Sexual Activity    Alcohol use: No    Drug use: No    Sexual activity: Defer           Objective   Physical Exam  Vitals and nursing note reviewed.   Constitutional:       Appearance: He is well-developed.   HENT:      Head: Normocephalic.   Cardiovascular:      Rate and Rhythm: Normal rate and regular rhythm.   Pulmonary:      Effort: Pulmonary effort is normal.      Breath sounds: Normal breath sounds.   Abdominal:      General: Bowel sounds are normal.      Palpations: Abdomen is soft.      Tenderness: There is no abdominal tenderness.   Musculoskeletal:         General: Normal range of motion.      Cervical back: Neck supple.   Skin:     General: Skin is warm and dry.   Neurological:      Mental Status: He is alert and oriented to person, place, and time.   Psychiatric:         Behavior: Behavior normal.         Thought Content: Thought content normal.         Judgment: Judgment normal.       Procedures           ED Course      Results for orders placed or performed during the hospital encounter of 23   COVID-19 and FLU A/B PCR - Swab, Nasopharynx    Specimen: Nasopharynx; Swab   Result Value Ref Range    COVID19 Detected (C) Not Detected - Ref. Range    Influenza A PCR Not Detected Not Detected    Influenza B PCR Not Detected Not Detected   Comprehensive Metabolic Panel    Specimen: Blood   Result Value Ref Range    Glucose 277 (H) 65 - 99 mg/dL    BUN 17 8 - 23 mg/dL    Creatinine 1.07 0.76 - 1.27 mg/dL    Sodium 136 136 - 145 mmol/L    Potassium 4.6 3.5 - 5.2 mmol/L    Chloride 103 98 - 107 mmol/L    CO2 24.1 22.0 - 29.0 mmol/L    Calcium 8.7 8.6 - 10.5 mg/dL    Total Protein 5.8 (L) 6.0 - 8.5 g/dL    Albumin 3.3 (L) 3.5 - 5.2 g/dL     ALT (SGPT) 18 1 - 41 U/L    AST (SGOT) 21 1 - 40 U/L    Alkaline Phosphatase 66 39 - 117 U/L    Total Bilirubin 0.2 0.0 - 1.2 mg/dL    Globulin 2.5 gm/dL    A/G Ratio 1.3 g/dL    BUN/Creatinine Ratio 15.9 7.0 - 25.0    Anion Gap 8.9 5.0 - 15.0 mmol/L    eGFR 71.9 >60.0 mL/min/1.73   CBC Auto Differential    Specimen: Blood   Result Value Ref Range    WBC 6.41 3.40 - 10.80 10*3/mm3    RBC 3.66 (L) 4.14 - 5.80 10*6/mm3    Hemoglobin 10.8 (L) 13.0 - 17.7 g/dL    Hematocrit 34.0 (L) 37.5 - 51.0 %    MCV 92.9 79.0 - 97.0 fL    MCH 29.5 26.6 - 33.0 pg    MCHC 31.8 31.5 - 35.7 g/dL    RDW 14.4 12.3 - 15.4 %    RDW-SD 48.7 37.0 - 54.0 fl    MPV 9.5 6.0 - 12.0 fL    Platelets 238 140 - 450 10*3/mm3    Neutrophil % 69.5 42.7 - 76.0 %    Lymphocyte % 10.5 (L) 19.6 - 45.3 %    Monocyte % 18.9 (H) 5.0 - 12.0 %    Eosinophil % 0.3 0.3 - 6.2 %    Basophil % 0.5 0.0 - 1.5 %    Immature Grans % 0.3 0.0 - 0.5 %    Neutrophils, Absolute 4.46 1.70 - 7.00 10*3/mm3    Lymphocytes, Absolute 0.67 (L) 0.70 - 3.10 10*3/mm3    Monocytes, Absolute 1.21 (H) 0.10 - 0.90 10*3/mm3    Eosinophils, Absolute 0.02 0.00 - 0.40 10*3/mm3    Basophils, Absolute 0.03 0.00 - 0.20 10*3/mm3    Immature Grans, Absolute 0.02 0.00 - 0.05 10*3/mm3    nRBC 0.0 0.0 - 0.2 /100 WBC   Urinalysis With Microscopic If Indicated (No Culture) - Urine, Clean Catch    Specimen: Urine, Clean Catch   Result Value Ref Range    Color, UA Yellow Yellow, Straw    Appearance, UA Clear Clear    pH, UA 6.0 5.0 - 8.0    Specific Gravity, UA 1.008 1.005 - 1.030    Glucose,  mg/dL (1+) (A) Negative    Ketones, UA Negative Negative    Bilirubin, UA Negative Negative    Blood, UA Negative Negative    Protein, UA Negative Negative    Leuk Esterase, UA Negative Negative    Nitrite, UA Negative Negative    Urobilinogen, UA 0.2 E.U./dL 0.2 - 1.0 E.U./dL   Lactic Acid, Plasma    Specimen: Blood   Result Value Ref Range    Lactate 1.5 0.5 - 2.0 mmol/L   C-reactive Protein    Specimen:  Blood   Result Value Ref Range    C-Reactive Protein 3.59 (H) 0.00 - 0.50 mg/dL   Green Top (Gel)   Result Value Ref Range    Extra Tube Hold for add-ons.    Lavender Top   Result Value Ref Range    Extra Tube hold for add-on    Gold Top - SST   Result Value Ref Range    Extra Tube Hold for add-ons.    Light Blue Top   Result Value Ref Range    Extra Tube Hold for add-ons.                                            Medical Decision Making  pt states he has had diarrhea with nausea and vomiting for about 3-4 days post Covid 2 weeks ago. Pt was checked at urgent care yesterday and states covid flu swab was negative      Problems Addressed:  COVID-19: complicated acute illness or injury    Amount and/or Complexity of Data Reviewed  Labs: ordered.  Radiology: ordered.    Risk  Prescription drug management.  Risk Details: Kena Macias    Patient is stable.  Patient is medically cleared.  No EMC exist.  Patient is discharged home.  Patient's diagnostic results were discussed with him and they demonstrated understanding of diagnosis and treatment plan.  Patient was advised to return to the ER or follow-up with PCP if symptoms worsen or fail to improve as expected.         Final diagnoses:   COVID-19       ED Disposition  ED Disposition       ED Disposition   Discharge    Condition   Stable    Comment   --               Fannie Haynes DO  [address]  [facility] [address]  [phone]    In 2 days           Medication List      No changes were made to your prescriptions during this visit.            Kena Macias PA  09/28/23 4622

## 2023-09-28 NOTE — TELEPHONE ENCOUNTER
Caller: Drew Noriega    Relationship: Emergency Contact    Best call back number: [phone]    What orders are you requesting (i.e. lab or imaging): TESTING FOR SEPSIS WITH FULL PANEL BLOOD WORK ORDER    In what timeframe would the patient need to come in: N/A    Where will you receive your lab/imaging services: SEND TO [facility] AND ON CHART FOR PATIENT     Additional notes: DREW STATED THAT PATIENT CAME INTO OFFICE YESTERDAY; PATIENT HAS BEEN BITE BY TICK THAT WAS TINY SINCE COMING INTO OFFICE; FEVER; EXTREMELY THIN DIARRHEA AND STOMACH PAIN     PLEASE ADVISE DREW

## 2023-10-03 LAB
BACTERIA SPEC AEROBE CULT: NORMAL
BACTERIA SPEC AEROBE CULT: NORMAL

## 2023-10-05 ENCOUNTER — TELEPHONE (OUTPATIENT)
Dept: FAMILY MEDICINE CLINIC | Facility: CLINIC | Age: 78
End: 2023-10-05
Payer: MEDICARE

## 2023-10-05 NOTE — TELEPHONE ENCOUNTER
Please let patient know that I was aware he was seen in the emergency department and I have reviewed those records.  His hemoglobin went from 11.7 down to 10.8.  I recommended repeating labs around October 16.  Please reschedule his lab appointment till that date if possible.  If you were to develop any blood in stool or urine in the interim, he should contact us or go back to the emergency department.

## 2023-10-05 NOTE — TELEPHONE ENCOUNTER
Caller: Jean Noriega     Relationship: SELF    Best call back number: [phone]     What is your medical concern? HEMOGLOBIN LEVELS DROPPING BY ALMOST A POINT IN A WEEK    PATIENT WAS SEEN BY DR BUSH ON 9/21/23 AND HIS HEMOGLOBIN LEVEL WAS IN THE 11 RANGE.    HE STATED THAT HE WENT TO THE [facility] EMERGENCY ROOM IN [place] ON 9/28/23 AND HIS HEMOGLOBIN HAD DROPPED TO 10.8    PATIENT IS SCHEDULED FOR LAB WORK ON 10/27/23. SHOULD THESE BE MOVED UP SOONER OR IS IT OKAY FOR HIM TO WAIT UNTIL THE CURRENTLY SCHEDULED DATE?    PATIENT DOES NOT BELIEVE THAT DR BUSH IS AWARE THAT HE WAS SEEN AT THE EMERGENCY ROOM    PLEASE ADVISE

## 2023-10-05 NOTE — TELEPHONE ENCOUNTER
Called [phone] spoke with Aliya FELIX checked and she verbalized understanding of lab results as well and the new lab appt to recheck his labs

## 2023-10-17 ENCOUNTER — CLINICAL SUPPORT (OUTPATIENT)
Dept: FAMILY MEDICINE CLINIC | Facility: CLINIC | Age: 78
End: 2023-10-17
Payer: MEDICARE

## 2023-10-17 DIAGNOSIS — D64.9 ANEMIA, UNSPECIFIED TYPE: ICD-10-CM

## 2023-10-17 LAB
DEPRECATED RDW RBC AUTO: 46.1 FL (ref 37–54)
ERYTHROCYTE [DISTWIDTH] IN BLOOD BY AUTOMATED COUNT: 14.4 % (ref 12.3–15.4)
HCT VFR BLD AUTO: 37.6 % (ref 37.5–51)
HGB BLD-MCNC: 12.2 G/DL (ref 13–17.7)
MCH RBC QN AUTO: 29 PG (ref 26.6–33)
MCHC RBC AUTO-ENTMCNC: 32.4 G/DL (ref 31.5–35.7)
MCV RBC AUTO: 89.5 FL (ref 79–97)
PLATELET # BLD AUTO: 248 10*3/MM3 (ref 140–450)
PMV BLD AUTO: 11.1 FL (ref 6–12)
RBC # BLD AUTO: 4.2 10*6/MM3 (ref 4.14–5.8)
WBC NRBC COR # BLD: 7.64 10*3/MM3 (ref 3.4–10.8)

## 2023-10-17 PROCEDURE — 84466 ASSAY OF TRANSFERRIN: CPT | Performed by: INTERNAL MEDICINE

## 2023-10-17 PROCEDURE — 82607 VITAMIN B-12: CPT | Performed by: INTERNAL MEDICINE

## 2023-10-17 PROCEDURE — 85027 COMPLETE CBC AUTOMATED: CPT | Performed by: INTERNAL MEDICINE

## 2023-10-17 PROCEDURE — 82746 ASSAY OF FOLIC ACID SERUM: CPT | Performed by: INTERNAL MEDICINE

## 2023-10-17 PROCEDURE — 36415 COLL VENOUS BLD VENIPUNCTURE: CPT | Performed by: INTERNAL MEDICINE

## 2023-10-17 PROCEDURE — 83540 ASSAY OF IRON: CPT | Performed by: INTERNAL MEDICINE

## 2023-10-17 NOTE — PROGRESS NOTES
Venipuncture Blood Specimen Collection  Venipuncture performed in right arm by Evita Mendez MA with good hemostasis. Patient tolerated the procedure well without complications.   10/17/23   Evita Mendez MA

## 2023-10-18 ENCOUNTER — TELEPHONE (OUTPATIENT)
Dept: FAMILY MEDICINE CLINIC | Facility: CLINIC | Age: 78
End: 2023-10-18

## 2023-10-18 LAB
FOLATE SERPL-MCNC: >20 NG/ML (ref 4.78–24.2)
IRON 24H UR-MRATE: 66 MCG/DL (ref 59–158)
IRON SATN MFR SERPL: 14 % (ref 20–50)
TIBC SERPL-MCNC: 484 MCG/DL (ref 298–536)
TRANSFERRIN SERPL-MCNC: 325 MG/DL (ref 200–360)
VIT B12 BLD-MCNC: 478 PG/ML (ref 211–946)

## 2023-10-18 RX ORDER — FLUTICASONE PROPIONATE 50 MCG
2 SPRAY, SUSPENSION (ML) NASAL DAILY
Qty: 11.1 ML | Refills: 1 | Status: SHIPPED | OUTPATIENT
Start: 2023-10-18

## 2023-10-18 NOTE — TELEPHONE ENCOUNTER
THE PATIENT CALLED REGARDING HIS LAB WORK.  HE WOULD LIKE A CALL BACK ABOUT HIS LOW IRON RESULTS.  PLEASE CALL THE PATIENT  [phone]  A MESSAGE CAN BE LEFT

## 2023-10-18 NOTE — TELEPHONE ENCOUNTER
Caller: Jean Noriega    Relationship: Self    Best call back number:     Requested Prescriptions:   Requested Prescriptions     Pending Prescriptions Disp Refills    metFORMIN (GLUCOPHAGE) 1000 MG tablet 180 tablet 1     Sig: Take 1 tablet by mouth 2 (Two) Times a Day With Meals.    fluticasone (FLONASE) 50 MCG/ACT nasal spray 11.1 mL 1     Si sprays into the nostril(s) as directed by provider Daily.        Pharmacy where request should be sent: [facility] [phone] [place] [phone]      Last office visit with prescribing clinician: 2023   Last telemedicine visit with prescribing clinician: Visit date not found   Next office visit with prescribing clinician: 3/21/2024     Additional details provided by patient: 1 DAY LEFT    Does the patient have less than a 3 day supply:  [x] Yes  [] No    Would you like a call back once the refill request has been completed: [] Yes [x] No    If the office needs to give you a call back, can they leave a voicemail: [] Yes [x] No    Trish Pederson Rep   10/18/23 09:05 EDT         DELETE AFTER READING TO PATIENT: “Thank you for sharing this information with me. I will send a message to the clinical team. Please allow 48 hours for the clinical staff to follow up on this request.”

## 2023-12-17 ENCOUNTER — HOSPITAL ENCOUNTER (EMERGENCY)
Facility: HOSPITAL | Age: 78
Discharge: HOME | End: 2023-12-17
Attending: STUDENT IN AN ORGANIZED HEALTH CARE EDUCATION/TRAINING PROGRAM | Admitting: STUDENT IN AN ORGANIZED HEALTH CARE EDUCATION/TRAINING PROGRAM
Payer: MEDICARE

## 2023-12-17 ENCOUNTER — APPOINTMENT (OUTPATIENT)
Dept: GENERAL RADIOLOGY | Facility: HOSPITAL | Age: 78
End: 2023-12-17
Payer: MEDICARE

## 2023-12-17 ENCOUNTER — APPOINTMENT (OUTPATIENT)
Dept: CT IMAGING | Facility: HOSPITAL | Age: 78
End: 2023-12-17
Payer: MEDICARE

## 2023-12-17 VITALS
SYSTOLIC BLOOD PRESSURE: 129 MMHG | RESPIRATION RATE: 16 BRPM | DIASTOLIC BLOOD PRESSURE: 58 MMHG | TEMPERATURE: 97.6 F | HEART RATE: 85 BPM | WEIGHT: 150 LBS | HEIGHT: 69 IN | BODY MASS INDEX: 22.22 KG/M2 | OXYGEN SATURATION: 98 %

## 2023-12-17 DIAGNOSIS — B34.9 VIRAL ILLNESS: Primary | ICD-10-CM

## 2023-12-17 DIAGNOSIS — R55 NEAR SYNCOPE: ICD-10-CM

## 2023-12-17 LAB
ALBUMIN SERPL-MCNC: 3.2 G/DL (ref 3.5–5.2)
ALBUMIN/GLOB SERPL: 1.6 G/DL
ALP SERPL-CCNC: 74 U/L (ref 39–117)
ALT SERPL W P-5'-P-CCNC: 14 U/L (ref 1–41)
ANION GAP SERPL CALCULATED.3IONS-SCNC: 9.4 MMOL/L (ref 5–15)
AST SERPL-CCNC: 14 U/L (ref 1–40)
BASOPHILS # BLD AUTO: 0.03 10*3/MM3 (ref 0–0.2)
BASOPHILS NFR BLD AUTO: 0.3 % (ref 0–1.5)
BILIRUB SERPL-MCNC: 0.4 MG/DL (ref 0–1.2)
BILIRUB UR QL STRIP: NEGATIVE
BUN SERPL-MCNC: 22 MG/DL (ref 8–23)
BUN/CREAT SERPL: 18.5 (ref 7–25)
CALCIUM SPEC-SCNC: 8.5 MG/DL (ref 8.6–10.5)
CHLORIDE SERPL-SCNC: 104 MMOL/L (ref 98–107)
CLARITY UR: CLEAR
CO2 SERPL-SCNC: 23.6 MMOL/L (ref 22–29)
COLOR UR: YELLOW
CREAT SERPL-MCNC: 1.19 MG/DL (ref 0.76–1.27)
CRP SERPL-MCNC: <0.3 MG/DL (ref 0–0.5)
D-LACTATE SERPL-SCNC: 1.6 MMOL/L (ref 0.5–2)
D-LACTATE SERPL-SCNC: 2.1 MMOL/L (ref 0.5–2)
DEPRECATED RDW RBC AUTO: 48 FL (ref 37–54)
EGFRCR SERPLBLD CKD-EPI 2021: 63.3 ML/MIN/1.73
EOSINOPHIL # BLD AUTO: 0.1 10*3/MM3 (ref 0–0.4)
EOSINOPHIL NFR BLD AUTO: 1 % (ref 0.3–6.2)
ERYTHROCYTE [DISTWIDTH] IN BLOOD BY AUTOMATED COUNT: 14.2 % (ref 12.3–15.4)
FLUAV RNA RESP QL NAA+PROBE: NOT DETECTED
FLUBV RNA ISLT QL NAA+PROBE: NOT DETECTED
GEN 5 2HR TROPONIN T REFLEX: 14 NG/L
GLOBULIN UR ELPH-MCNC: 2 GM/DL
GLUCOSE SERPL-MCNC: 242 MG/DL (ref 65–99)
GLUCOSE UR STRIP-MCNC: ABNORMAL MG/DL
HCT VFR BLD AUTO: 37.8 % (ref 37.5–51)
HGB BLD-MCNC: 12 G/DL (ref 13–17.7)
HGB UR QL STRIP.AUTO: NEGATIVE
HOLD SPECIMEN: NORMAL
HOLD SPECIMEN: NORMAL
IMM GRANULOCYTES # BLD AUTO: 0.04 10*3/MM3 (ref 0–0.05)
IMM GRANULOCYTES NFR BLD AUTO: 0.4 % (ref 0–0.5)
KETONES UR QL STRIP: NEGATIVE
LEUKOCYTE ESTERASE UR QL STRIP.AUTO: NEGATIVE
LYMPHOCYTES # BLD AUTO: 1.14 10*3/MM3 (ref 0.7–3.1)
LYMPHOCYTES NFR BLD AUTO: 11.3 % (ref 19.6–45.3)
MAGNESIUM SERPL-MCNC: 1.7 MG/DL (ref 1.6–2.4)
MCH RBC QN AUTO: 29.3 PG (ref 26.6–33)
MCHC RBC AUTO-ENTMCNC: 31.7 G/DL (ref 31.5–35.7)
MCV RBC AUTO: 92.2 FL (ref 79–97)
MONOCYTES # BLD AUTO: 1.09 10*3/MM3 (ref 0.1–0.9)
MONOCYTES NFR BLD AUTO: 10.8 % (ref 5–12)
NEUTROPHILS NFR BLD AUTO: 7.73 10*3/MM3 (ref 1.7–7)
NEUTROPHILS NFR BLD AUTO: 76.2 % (ref 42.7–76)
NITRITE UR QL STRIP: NEGATIVE
NRBC BLD AUTO-RTO: 0 /100 WBC (ref 0–0.2)
PH UR STRIP.AUTO: 6 [PH] (ref 5–8)
PHOSPHATE SERPL-MCNC: 3.2 MG/DL (ref 2.5–4.5)
PLATELET # BLD AUTO: 232 10*3/MM3 (ref 140–450)
PMV BLD AUTO: 9.8 FL (ref 6–12)
POTASSIUM SERPL-SCNC: 4.8 MMOL/L (ref 3.5–5.2)
PROCALCITONIN SERPL-MCNC: 0.06 NG/ML (ref 0–0.25)
PROT SERPL-MCNC: 5.2 G/DL (ref 6–8.5)
PROT UR QL STRIP: NEGATIVE
RBC # BLD AUTO: 4.1 10*6/MM3 (ref 4.14–5.8)
SARS-COV-2 RNA RESP QL NAA+PROBE: NOT DETECTED
SODIUM SERPL-SCNC: 137 MMOL/L (ref 136–145)
SP GR UR STRIP: 1.02 (ref 1–1.03)
TROPONIN T DELTA: -2 NG/L
TROPONIN T SERPL HS-MCNC: 16 NG/L
UROBILINOGEN UR QL STRIP: ABNORMAL
WBC NRBC COR # BLD AUTO: 10.13 10*3/MM3 (ref 3.4–10.8)
WHOLE BLOOD HOLD COAG: NORMAL
WHOLE BLOOD HOLD SPECIMEN: NORMAL

## 2023-12-17 PROCEDURE — 96360 HYDRATION IV INFUSION INIT: CPT

## 2023-12-17 PROCEDURE — 80053 COMPREHEN METABOLIC PANEL: CPT | Performed by: PHYSICIAN ASSISTANT

## 2023-12-17 PROCEDURE — 84484 ASSAY OF TROPONIN QUANT: CPT | Performed by: PHYSICIAN ASSISTANT

## 2023-12-17 PROCEDURE — 93005 ELECTROCARDIOGRAM TRACING: CPT | Performed by: PHYSICIAN ASSISTANT

## 2023-12-17 PROCEDURE — 25810000003 SODIUM CHLORIDE 0.9 % SOLUTION: Performed by: PHYSICIAN ASSISTANT

## 2023-12-17 PROCEDURE — 81003 URINALYSIS AUTO W/O SCOPE: CPT | Performed by: PHYSICIAN ASSISTANT

## 2023-12-17 PROCEDURE — 83735 ASSAY OF MAGNESIUM: CPT | Performed by: PHYSICIAN ASSISTANT

## 2023-12-17 PROCEDURE — 71046 X-RAY EXAM CHEST 2 VIEWS: CPT

## 2023-12-17 PROCEDURE — 99284 EMERGENCY DEPT VISIT MOD MDM: CPT

## 2023-12-17 PROCEDURE — 36415 COLL VENOUS BLD VENIPUNCTURE: CPT

## 2023-12-17 PROCEDURE — 85025 COMPLETE CBC W/AUTO DIFF WBC: CPT | Performed by: PHYSICIAN ASSISTANT

## 2023-12-17 PROCEDURE — 86140 C-REACTIVE PROTEIN: CPT | Performed by: PHYSICIAN ASSISTANT

## 2023-12-17 PROCEDURE — 87636 SARSCOV2 & INF A&B AMP PRB: CPT | Performed by: PHYSICIAN ASSISTANT

## 2023-12-17 PROCEDURE — 84145 PROCALCITONIN (PCT): CPT | Performed by: PHYSICIAN ASSISTANT

## 2023-12-17 PROCEDURE — 83605 ASSAY OF LACTIC ACID: CPT | Performed by: PHYSICIAN ASSISTANT

## 2023-12-17 PROCEDURE — 70450 CT HEAD/BRAIN W/O DYE: CPT

## 2023-12-17 PROCEDURE — 84100 ASSAY OF PHOSPHORUS: CPT | Performed by: PHYSICIAN ASSISTANT

## 2023-12-17 RX ORDER — SODIUM CHLORIDE 0.9 % (FLUSH) 0.9 %
10 SYRINGE (ML) INJECTION AS NEEDED
Status: DISCONTINUED | OUTPATIENT
Start: 2023-12-17 | End: 2023-12-17 | Stop reason: HOSPADM

## 2023-12-17 RX ADMIN — SODIUM CHLORIDE 2040 ML: 9 INJECTION, SOLUTION INTRAVENOUS at 10:32

## 2023-12-17 NOTE — ED PROVIDER NOTES
"Subjective   History of Present Illness  This is a 79 y/o male that comes in with c/c \"Fever, LOW BP\" X one day. Patient states that he became light headed and nearly passed out at home. Checked his BP and was found to have BP 80/60. Patient states that fever was 101. Patient has history of diabetes, hyperlipidemia. Patient has had sinus congestion, cough for several days.     History provided by:  Patient   used: No    Fever  Max temp prior to arrival:  101  Temp source:  Subjective  Severity:  Moderate  Onset quality:  Gradual  Duration:  2 days  Timing:  Intermittent  Progression:  Worsening  Chronicity:  New  Relieved by:  Nothing  Worsened by:  Nothing  Associated symptoms: chills, cough, myalgias and rhinorrhea    Associated symptoms: no chest pain, no confusion, no diarrhea, no headaches, no sore throat and no vomiting    Risk factors: no contaminated food, no contaminated water, no hx of cancer, no immunosuppression and no occupational exposure    Hypotension  Location:  80/60  Severity:  Moderate  Onset quality:  Gradual  Duration:  2 days  Timing:  Intermittent  Progression:  Worsening  Chronicity:  New  Associated symptoms: cough, fever, myalgias and rhinorrhea    Associated symptoms: no abdominal pain, no chest pain, no diarrhea, no headaches, no shortness of breath, no sore throat and no vomiting        Review of Systems   Constitutional:  Positive for chills and fever.   HENT:  Positive for rhinorrhea. Negative for sore throat.    Eyes: Negative.  Negative for photophobia, pain, redness and itching.   Respiratory:  Positive for cough. Negative for shortness of breath and stridor.    Cardiovascular:  Negative for chest pain.   Gastrointestinal:  Negative for abdominal pain, diarrhea and vomiting.   Endocrine: Negative.  Negative for cold intolerance, heat intolerance, polydipsia and polyphagia.   Genitourinary: Negative.  Negative for enuresis, flank pain, frequency, genital sores " and hematuria.   Musculoskeletal:  Positive for myalgias. Negative for back pain and gait problem.   Neurological:  Negative for headaches.   Hematological: Negative.  Negative for adenopathy. Does not bruise/bleed easily.   Psychiatric/Behavioral: Negative.  Negative for confusion and decreased concentration. The patient is not hyperactive.    All other systems reviewed and are negative.      Past Medical History:   Diagnosis Date    Anemia, recent acute blood loss 3/30/2017    ALB anemia in March after ureteral stent removed    Bladder cancer     Diabetes mellitus     Duodenal cancer     Dyspnea on exertion 2/1/2016    Foreign body (FB) in soft tissue 2/26/2019    Gallstones     History of recent left nephrolithiasis s/p ureteral stent placement  3/30/2017    2/17/17 - ureteral stent placed at [facility], s/p stent and stone removal 2/13/17    Hyperlipidemia     Kidney stone     Kidney stone on left side 02/2017    Nephrolithiasis     Pneumonia        Allergies   Allergen Reactions    Percocet [Oxycodone-Acetaminophen] Itching       Past Surgical History:   Procedure Laterality Date    CYSTOSCOPY W/ URETERAL STENT REMOVAL Left 03/13/2017    EYE MUSCLE SURGERY      HERNIA REPAIR      INTERVENTIONAL RADIOLOGY PROCEDURE Right 03/31/2017    Procedure: PTC & Drain placement;  Surgeon: Serjio Wall MD;  Location: [facility] CATH INVASIVE LOCATION;  Service:     JOINT ASPIRATION AND/OR INJECTION Right     right hand    LIVER SURGERY  06/10/2019    URETERAL STENT INSERTION Left     WHIPPLE PROCEDURE  2004       Family History   Problem Relation Age of Onset    Heart disease Father     Arthritis Sister     Hypertension Brother     Diabetes Brother        Social History     Socioeconomic History    Marital status:    Tobacco Use    Smoking status: Former     Packs/day: 1.00     Years: 5.00     Additional pack years: 0.00     Total pack years: 5.00     Types: Cigarettes, Cigars, Pipe     Start date: 1970     Quit  date:      Years since quittin.9    Smokeless tobacco: Current     Types: Snuff   Vaping Use    Vaping Use: Never used   Substance and Sexual Activity    Alcohol use: No    Drug use: No    Sexual activity: Defer           Objective   Physical Exam  Vitals and nursing note reviewed.   Constitutional:       General: He is not in acute distress.     Appearance: Normal appearance. He is normal weight. He is not ill-appearing, toxic-appearing or diaphoretic.   HENT:      Head: Normocephalic and atraumatic.      Right Ear: Tympanic membrane, ear canal and external ear normal. There is no impacted cerumen.      Left Ear: Tympanic membrane, ear canal and external ear normal. There is no impacted cerumen.      Nose: Nose normal. No congestion or rhinorrhea.      Mouth/Throat:      Mouth: Mucous membranes are moist.      Pharynx: Oropharynx is clear. No oropharyngeal exudate or posterior oropharyngeal erythema.   Eyes:      General: No scleral icterus.        Right eye: No discharge.         Left eye: No discharge.      Extraocular Movements: Extraocular movements intact.      Conjunctiva/sclera: Conjunctivae normal.      Pupils: Pupils are equal, round, and reactive to light.   Neck:      Vascular: No carotid bruit.   Cardiovascular:      Rate and Rhythm: Normal rate and regular rhythm.      Pulses: Normal pulses.      Heart sounds: Normal heart sounds. No murmur heard.     No friction rub. No gallop.   Pulmonary:      Effort: Pulmonary effort is normal. No respiratory distress.      Breath sounds: Normal breath sounds. No stridor. No wheezing, rhonchi or rales.   Chest:      Chest wall: No tenderness.   Abdominal:      General: Abdomen is flat. Bowel sounds are normal. There is no distension.      Palpations: Abdomen is soft. There is no mass.      Tenderness: There is no abdominal tenderness. There is no right CVA tenderness, guarding or rebound.      Hernia: No hernia is present.   Musculoskeletal:          General: No swelling, tenderness, deformity or signs of injury. Normal range of motion.      Cervical back: Normal range of motion and neck supple. No rigidity or tenderness.      Right lower leg: No edema.   Lymphadenopathy:      Cervical: No cervical adenopathy.   Skin:     General: Skin is warm and dry.      Coloration: Skin is not jaundiced or pale.      Findings: No bruising, erythema, lesion or rash.   Neurological:      General: No focal deficit present.      Mental Status: He is alert and oriented to person, place, and time. Mental status is at baseline.      Cranial Nerves: No cranial nerve deficit.      Sensory: No sensory deficit.      Motor: No weakness.      Coordination: Coordination normal.      Gait: Gait normal.   Psychiatric:         Mood and Affect: Mood normal.         Behavior: Behavior normal.         Thought Content: Thought content normal.         Judgment: Judgment normal.         Procedures           ED Course  ED Course as of 12/17/23 1331   Sun Dec 17, 2023   1022 EKG obtained on arrival and independently interpreted as sinus rhythm with first-degree AV block and right bundle branch block without acute ischemic findings. [AS]   1321 IMPRESSION:     No evidence of acute disease in the chest.   []   1321 IMPRESSION:    No acute intracranial abnormality.       []      ED Course User Index  [AS] Garcia Leonard MD  [] Negrito Pinto, NAYA                                             Medical Decision Making  Problems Addressed:  Near syncope: complicated acute illness or injury  Viral illness: complicated acute illness or injury    Amount and/or Complexity of Data Reviewed  Labs: ordered.  Radiology: ordered.  ECG/medicine tests: ordered.    Risk  Prescription drug management.        Final diagnoses:   Viral illness   Near syncope       ED Disposition  ED Disposition       ED Disposition   Discharge    Condition   Stable    Comment   --               Fannie Haynes DO  [address] [facility] [address]  [phone]    Call in 1 day           Medication List      No changes were made to your prescriptions during this visit.            Negrito Pinto PA-C  12/17/23 [id number]

## 2023-12-19 ENCOUNTER — OFFICE VISIT (OUTPATIENT)
Dept: FAMILY MEDICINE CLINIC | Facility: CLINIC | Age: 78
End: 2023-12-19
Payer: MEDICARE

## 2023-12-19 VITALS
BODY MASS INDEX: 21.86 KG/M2 | OXYGEN SATURATION: 97 % | DIASTOLIC BLOOD PRESSURE: 64 MMHG | TEMPERATURE: 98.4 F | HEART RATE: 60 BPM | WEIGHT: 148 LBS | SYSTOLIC BLOOD PRESSURE: 142 MMHG

## 2023-12-19 DIAGNOSIS — J01.00 ACUTE MAXILLARY SINUSITIS, RECURRENCE NOT SPECIFIED: Primary | ICD-10-CM

## 2023-12-19 DIAGNOSIS — I10 BENIGN ESSENTIAL HTN: Chronic | ICD-10-CM

## 2023-12-19 LAB
QT INTERVAL: 382 MS
QTC INTERVAL: 429 MS

## 2023-12-19 RX ORDER — AMOXICILLIN AND CLAVULANATE POTASSIUM 875; 125 MG/1; MG/1
1 TABLET, FILM COATED ORAL 2 TIMES DAILY
Qty: 20 TABLET | Refills: 0 | Status: SHIPPED | OUTPATIENT
Start: 2023-12-19

## 2023-12-19 RX ORDER — LOSARTAN POTASSIUM 50 MG/1
50 TABLET ORAL DAILY
Qty: 90 TABLET | Refills: 3 | Status: SHIPPED | OUTPATIENT
Start: 2023-12-19

## 2023-12-19 RX ORDER — URSODIOL 300 MG/1
300 CAPSULE ORAL 2 TIMES DAILY
COMMUNITY
Start: 2023-11-24

## 2023-12-19 NOTE — PROGRESS NOTES
Patient Name: Jena Noriega Today's Date: 2023   Patient MRN / CSN: [id number] / [id number] Date of Encounter: 2023   Patient Age / : 78 y.o. / 1945 Encounter Provider: Fannie Haynes DO   Referring Physician: No ref. provider found          Jean is a 78 y.o. male who is being seen today for Hospital Follow Up Visit, URI (Tested negative for everything in ED), and Hypertension      URI   This is a new problem. The current episode started in the past 7 days. The maximum temperature recorded prior to his arrival was 101 - 101.9 F. Associated symptoms include congestion, coughing, ear pain, sinus pain and a sore throat. Pertinent negatives include no chest pain or wheezing. Associated symptoms comments: Patient went to the emergency department at [facility] [place] on 2023.  He was tested for COVID and flu and told they were all negative.  There were not any medication changes from that visit.  Patient reports his symptoms have worsened and he feels he needs an antibiotic.   Hypertension  This is a chronic problem. The current episode started more than 1 year ago. The problem has been waxing and waning since onset. Condition status: Patient reports having 3-4 episodes in the past few months where his blood pressure has bottomed out.  He reports systolic blood pressure as low as 65 mmHg. Pertinent negatives include no chest pain or shortness of breath. Current antihypertension treatment includes angiotensin blockers. The current treatment provides significant improvement.       Allergies include:Percocet [oxycodone-acetaminophen]  Current Outpatient Medications   Medication Sig Dispense Refill    ASPIRIN 81 PO aspirin 81 mg tablet      atorvastatin (LIPITOR) 40 MG tablet Take 1 tablet by mouth Daily.      Blood Glucose Monitoring Suppl (Blood Glucose Monitor System) w/Device kit 1 Device Daily. 1 each 0    Cholecalciferol 100 MCG (4000 UT) tablet Take 4,000 Units by mouth Daily.      Creon [id number]  units capsule delayed-release particles capsule TAKE 3 CAPSULES BY MOUTH BEFORE MEAL(S) AND 1 CAPSULE BEFORE SNACKS      fluticasone (FLONASE) 50 MCG/ACT nasal spray 2 sprays into the nostril(s) as directed by provider Daily. 11.1 mL 1    glimepiride (AMARYL) 2 MG tablet TAKE 1 TABLET BY MOUTH ONCE DAILY IN THE MORNING BEFORE BREAKFAST 90 tablet 0    glucose blood test strip 1 each by Other route 2 (Two) Times a Day. Use as instructed 100 each 12    Lancets misc 1 Device 2 (Two) Times a Day. 100 each 12    losartan (COZAAR) 50 MG tablet Take 1 tablet by mouth Daily. 90 tablet 3    metFORMIN (GLUCOPHAGE) 1000 MG tablet Take 1 tablet by mouth 2 (Two) Times a Day With Meals. 180 tablet 1    MULTIPLE VITAMIN PO Take 1 tablet by mouth Daily.      pantoprazole (PROTONIX) 40 MG EC tablet Take 1 tablet by mouth 2 (Two) Times a Day. 90 tablet 1    ursodiol (ACTIGALL) 300 MG capsule Take 1 capsule by mouth 2 (Two) Times a Day.      amoxicillin-clavulanate (AUGMENTIN) 875-125 MG per tablet Take 1 tablet by mouth 2 (Two) Times a Day. 20 tablet 0     Current Facility-Administered Medications   Medication Dose Route Frequency Provider Last Rate Last Admin    cyanocobalamin injection 1,000 mcg  1,000 mcg Intramuscular Weekly Aileen He APRN   1,000 mcg at 05/18/22 0939     Past Medical History:   Diagnosis Date    Anemia, recent acute blood loss 3/30/2017    ALB anemia in March after ureteral stent removed    Bladder cancer     Diabetes mellitus     Duodenal cancer     Dyspnea on exertion 2/1/2016    Foreign body (FB) in soft tissue 2/26/2019    Gallstones     History of recent left nephrolithiasis s/p ureteral stent placement  3/30/2017    2/17/17 - ureteral stent placed at [facility], s/p stent and stone removal 2/13/17    Hyperlipidemia     Kidney stone     Kidney stone on left side 02/2017    Nephrolithiasis     Pneumonia      Family History   Problem Relation Age of Onset    Heart disease Father     Arthritis  Sister     Hypertension Brother     Diabetes Brother      Past Surgical History:   Procedure Laterality Date    CYSTOSCOPY W/ URETERAL STENT REMOVAL Left 2017    EYE MUSCLE SURGERY      HERNIA REPAIR      INTERVENTIONAL RADIOLOGY PROCEDURE Right 2017    Procedure: PTC & Drain placement;  Surgeon: Serjio Wall MD;  Location: [facility] INVASIVE LOCATION;  Service:     JOINT ASPIRATION AND/OR INJECTION Right     right hand    LIVER SURGERY  06/10/2019    URETERAL STENT INSERTION Left     WHIPPLE PROCEDURE       Social History     Substance and Sexual Activity   Alcohol Use No     Social History     Tobacco Use   Smoking Status Former    Packs/day: 1.00    Years: 5.00    Additional pack years: 0.00    Total pack years: 5.00    Types: Cigarettes, Cigars, Pipe    Start date:     Quit date:     Years since quittin.9   Smokeless Tobacco Current    Types: Snuff     Social History     Substance and Sexual Activity   Drug Use No     Review of Systems   Constitutional:  Positive for fever.   HENT:  Positive for congestion, ear pain, sinus pain and sore throat.    Respiratory:  Positive for cough. Negative for shortness of breath and wheezing.    Cardiovascular:  Negative for chest pain.        Depression Assessment Review:  PHQ-9 Total Score:    Vital Signs & Measurements Taken This Encounter  /64 (BP Location: Left arm, Patient Position: Sitting, Cuff Size: Adult)   Pulse 60   Temp 98.4 °F (36.9 °C) (Temporal)   Wt 67.1 kg (148 lb)   SpO2 97%   BMI 21.86 kg/m²    SpO2 Percentage    23 1306   SpO2: 97%        BMI is within normal parameters. No other follow-up for BMI required.      Physical Exam  Vitals reviewed.   Constitutional:       General: He is not in acute distress.  HENT:      Head: Normocephalic and atraumatic.      Right Ear: Tympanic membrane normal.      Left Ear: Tympanic membrane normal.      Ears:      Comments: Maxillary sinus tenderness to palpation  "laterally     Mouth/Throat:      Mouth: Mucous membranes are moist.      Pharynx: Posterior oropharyngeal erythema present. No oropharyngeal exudate.   Eyes:      General: No scleral icterus.     Extraocular Movements: Extraocular movements intact.      Conjunctiva/sclera: Conjunctivae normal.      Pupils: Pupils are equal, round, and reactive to light.   Cardiovascular:      Rate and Rhythm: Normal rate and regular rhythm.   Pulmonary:      Effort: Pulmonary effort is normal. No respiratory distress.      Breath sounds: Normal breath sounds. No wheezing or rhonchi.   Musculoskeletal:         General: No swelling.      Cervical back: Neck supple. Tenderness present.   Lymphadenopathy:      Cervical: Cervical adenopathy present.   Skin:     General: Skin is warm and dry.      Coloration: Skin is not jaundiced.   Neurological:      Mental Status: He is alert.   Psychiatric:         Mood and Affect: Mood normal.         Behavior: Behavior normal.         Thought Content: Thought content normal.         Judgment: Judgment normal.              Assessment & Plan  Patient Active Problem List   Diagnosis    Epigastric pain    Gastroesophageal reflux disease    Chronic rhinitis    Type 2 diabetes mellitus with hyperglycemia, without long-term current use of insulin    Low back pain    Mixed hyperlipidemia    Hx of duodenal cancer, s/p Whipple 2004    Hx of bladder cancer, s/p \"scraping\", 2004    History of recent left nephrolithiasis s/p ureteral stent placement     Benign essential HTN    Arthritis    History of duodenal cancer    Peptic ulcer    Taking multiple medications for chronic disease    Varicose veins of lower extremity with inflammation    Malignant neoplasm of urinary bladder    Degenerative joint disease (DJD) of lumbar spine    Spondylolisthesis of lumbar region    Spinal stenosis of lumbar region    Allergic rhinitis       ICD-10-CM ICD-9-CM   1. Acute maxillary sinusitis, recurrence not specified  J01.00 " 461.0   2. Benign essential HTN  I10 401.1     No orders of the defined types were placed in this encounter.      Meds Ordered During Visit:  New Medications Ordered This Visit   Medications    losartan (COZAAR) 50 MG tablet     Sig: Take 1 tablet by mouth Daily.     Dispense:  90 tablet     Refill:  3    amoxicillin-clavulanate (AUGMENTIN) 875-125 MG per tablet     Sig: Take 1 tablet by mouth 2 (Two) Times a Day.     Dispense:  20 tablet     Refill:  0     I reviewed emergency department records today.  I recommended Augmentin twice daily with food.  Patient reports tolerating this well previously.  In regards to patient's hypertension, I recommended decreasing losartan to 50 mg daily.  I encouraged patient to monitor his blood pressure closely with a blood pressure log.  I encouraged him to reach out if hypotensive episodes continued despite the decrease in antihypertensive medications.  Otherwise, we will plan to meet back in March as previously arranged.    Return if symptoms worsen or fail to improve, for Recheck, Next scheduled follow up.          Referring Provider (if known): No ref. provider found      This document has been electronically signed by Fannie Haynes DO  December 19, 2023 14:52 EST    Fannie Haynes DO, FACOI  [address] [place] [address]  [phone] (office)    Part of this note may be an electronic transcription/translation of spoken language to printed text using the Dragon Dictation System.

## 2024-01-04 ENCOUNTER — OFFICE VISIT (OUTPATIENT)
Dept: FAMILY MEDICINE CLINIC | Facility: CLINIC | Age: 79
End: 2024-01-04
Payer: MEDICARE

## 2024-01-04 VITALS
HEART RATE: 70 BPM | TEMPERATURE: 97.7 F | DIASTOLIC BLOOD PRESSURE: 60 MMHG | SYSTOLIC BLOOD PRESSURE: 136 MMHG | BODY MASS INDEX: 21.92 KG/M2 | RESPIRATION RATE: 18 BRPM | WEIGHT: 148 LBS | HEIGHT: 69 IN | OXYGEN SATURATION: 98 %

## 2024-01-04 DIAGNOSIS — R21 RASH: Primary | ICD-10-CM

## 2024-01-04 PROCEDURE — 1160F RVW MEDS BY RX/DR IN RCRD: CPT | Performed by: NURSE PRACTITIONER

## 2024-01-04 PROCEDURE — 1159F MED LIST DOCD IN RCRD: CPT | Performed by: NURSE PRACTITIONER

## 2024-01-04 PROCEDURE — 99213 OFFICE O/P EST LOW 20 MIN: CPT | Performed by: NURSE PRACTITIONER

## 2024-01-04 PROCEDURE — 3078F DIAST BP <80 MM HG: CPT | Performed by: NURSE PRACTITIONER

## 2024-01-04 PROCEDURE — 3075F SYST BP GE 130 - 139MM HG: CPT | Performed by: NURSE PRACTITIONER

## 2024-01-04 RX ORDER — CLOTRIMAZOLE AND BETAMETHASONE DIPROPIONATE 10; .64 MG/G; MG/G
1 CREAM TOPICAL 2 TIMES DAILY
Qty: 45 G | Refills: 1 | Status: SHIPPED | OUTPATIENT
Start: 2024-01-04

## 2024-01-04 NOTE — PROGRESS NOTES
"Zander Noriega is a 78 y.o. male.     Chief Complaint   Patient presents with    Rash       History of Present Illness  He presents with c/o rash around his tailbone for 2-3 weeks. He states it itches and burns. He has tried cream for athlete's foot and benadryl. The benadryl helps with the itching but it comes right back. He also c/o a fungus on his right great toenail. He states he has an ingrown toenail on that toe as well. He is using otc treatment.        The following portions of the patient's history were reviewed and updated as appropriate: allergies, current medications, past family history, past medical history, past social history, past surgical history and problem list.    Review of Systems   Constitutional:  Negative for fever.   Respiratory:  Negative for shortness of breath and wheezing.    Cardiovascular:  Negative for chest pain and palpitations.   Gastrointestinal:  Negative for constipation and diarrhea.   Genitourinary:  Negative for dysuria.   Skin:  Positive for rash.       Objective     /60 (BP Location: Left arm, Patient Position: Sitting, Cuff Size: Adult)   Pulse 70   Temp 97.7 °F (36.5 °C) (Temporal)   Resp 18   Ht 175.3 cm (69.02\")   Wt 67.1 kg (148 lb)   SpO2 98%   BMI 21.85 kg/m²     Physical Exam  Vitals reviewed.   Constitutional:       General: He is not in acute distress.     Appearance: He is well-developed. He is not diaphoretic.   HENT:      Head: Normocephalic and atraumatic.   Cardiovascular:      Rate and Rhythm: Normal rate and regular rhythm.      Heart sounds: Normal heart sounds. No murmur heard.     No friction rub. No gallop.   Pulmonary:      Effort: Pulmonary effort is normal. No respiratory distress.      Breath sounds: Normal breath sounds.   Abdominal:      General: Bowel sounds are normal. There is no distension.      Palpations: Abdomen is soft.      Tenderness: There is no abdominal tenderness.   Feet:      Right foot:      Skin " integrity: Skin integrity normal. No erythema or warmth.      Toenail Condition: Right toenails are abnormally thick.      Comments: Thickening of right great toenail. No erythema, edema, or drainage of nailbed or surrounding area.   Skin:     General: Skin is warm and dry.             Comments: Erythematous rash bilateral buttocks and coccyx area.   Neurological:      Mental Status: He is alert and oriented to person, place, and time.   Psychiatric:         Behavior: Behavior normal.         Thought Content: Thought content normal.         Judgment: Judgment normal.         Current Outpatient Medications   Medication Sig Dispense Refill    amoxicillin-clavulanate (AUGMENTIN) 875-125 MG per tablet Take 1 tablet by mouth 2 (Two) Times a Day. 20 tablet 0    ASPIRIN 81 PO aspirin 81 mg tablet      atorvastatin (LIPITOR) 40 MG tablet Take 1 tablet by mouth Daily.      Blood Glucose Monitoring Suppl (Blood Glucose Monitor System) w/Device kit 1 Device Daily. 1 each 0    Cholecalciferol 100 MCG (4000 UT) tablet Take 4,000 Units by mouth Daily.      Creon [id number] units capsule delayed-release particles capsule TAKE 3 CAPSULES BY MOUTH BEFORE MEAL(S) AND 1 CAPSULE BEFORE SNACKS      fluticasone (FLONASE) 50 MCG/ACT nasal spray 2 sprays into the nostril(s) as directed by provider Daily. 11.1 mL 1    glimepiride (AMARYL) 2 MG tablet TAKE 1 TABLET BY MOUTH ONCE DAILY IN THE MORNING BEFORE BREAKFAST 90 tablet 0    glucose blood test strip 1 each by Other route 2 (Two) Times a Day. Use as instructed 100 each 12    Lancets misc 1 Device 2 (Two) Times a Day. 100 each 12    losartan (COZAAR) 50 MG tablet Take 1 tablet by mouth Daily. 90 tablet 3    metFORMIN (GLUCOPHAGE) 1000 MG tablet Take 1 tablet by mouth 2 (Two) Times a Day With Meals. 180 tablet 1    MULTIPLE VITAMIN PO Take 1 tablet by mouth Daily.      pantoprazole (PROTONIX) 40 MG EC tablet Take 1 tablet by mouth 2 (Two) Times a Day. 90 tablet 1    ursodiol (ACTIGALL)  300 MG capsule Take 1 capsule by mouth 2 (Two) Times a Day.      clotrimazole-betamethasone (Lotrisone) 1-0.05 % cream Apply 1 application  topically to the appropriate area as directed 2 (Two) Times a Day. 45 g 1     Current Facility-Administered Medications   Medication Dose Route Frequency Provider Last Rate Last Admin    cyanocobalamin injection 1,000 mcg  1,000 mcg Intramuscular Weekly Aileen He APRN   1,000 mcg at 05/18/22 0913            Assessment & Plan     Problem List Items Addressed This Visit    None  Visit Diagnoses       Rash    -  Primary    Relevant Medications    clotrimazole-betamethasone (Lotrisone) 1-0.05 % cream              ICD-10-CM ICD-9-CM   1. Rash  R21 782.1       Plan: Lotrisone ordered for rash. He declines podiatry referral for ingrown toenail. It does not appear to be infected. Try epsom salt soaks. May continue otc fungal nail treatment. Keep scheduled follow up with Dr. Haynes and follow up as needed.    @Body mass index is 21.85 kg/m².              Understands disease processes and need for medications.  Understands reasons for urgent and emergent care.  Patient (& family) verbalized agreement for treatment plan.   Emotional support and active listening provided.  Patient provided time to verbalize feelings.           BMI is within normal parameters. No other follow-up for BMI required.      This document has been electronically signed by JADE Murphy   January 4, 2024 13:14 EST

## 2024-01-23 ENCOUNTER — OFFICE VISIT (OUTPATIENT)
Dept: FAMILY MEDICINE CLINIC | Facility: CLINIC | Age: 79
End: 2024-01-23
Payer: MEDICARE

## 2024-01-23 ENCOUNTER — HOSPITAL ENCOUNTER (OUTPATIENT)
Dept: GENERAL RADIOLOGY | Facility: HOSPITAL | Age: 79
Discharge: HOME | End: 2024-01-23
Admitting: INTERNAL MEDICINE
Payer: MEDICARE

## 2024-01-23 VITALS
WEIGHT: 153 LBS | SYSTOLIC BLOOD PRESSURE: 112 MMHG | HEIGHT: 69 IN | OXYGEN SATURATION: 98 % | BODY MASS INDEX: 22.66 KG/M2 | TEMPERATURE: 97.3 F | DIASTOLIC BLOOD PRESSURE: 56 MMHG | HEART RATE: 65 BPM

## 2024-01-23 DIAGNOSIS — M53.3 SACRAL PAIN: ICD-10-CM

## 2024-01-23 DIAGNOSIS — M15.9 PRIMARY OSTEOARTHRITIS INVOLVING MULTIPLE JOINTS: ICD-10-CM

## 2024-01-23 DIAGNOSIS — M54.50 ACUTE MIDLINE LOW BACK PAIN WITHOUT SCIATICA: ICD-10-CM

## 2024-01-23 DIAGNOSIS — M53.3 SACRAL PAIN: Primary | ICD-10-CM

## 2024-01-23 DIAGNOSIS — F41.9 ANXIETY: ICD-10-CM

## 2024-01-23 PROBLEM — M15.0 PRIMARY OSTEOARTHRITIS INVOLVING MULTIPLE JOINTS: Status: ACTIVE | Noted: 2024-01-23

## 2024-01-23 PROCEDURE — 99214 OFFICE O/P EST MOD 30 MIN: CPT | Performed by: INTERNAL MEDICINE

## 2024-01-23 PROCEDURE — 72220 X-RAY EXAM SACRUM TAILBONE: CPT | Performed by: RADIOLOGY

## 2024-01-23 PROCEDURE — 72100 X-RAY EXAM L-S SPINE 2/3 VWS: CPT | Performed by: RADIOLOGY

## 2024-01-23 PROCEDURE — 72220 X-RAY EXAM SACRUM TAILBONE: CPT

## 2024-01-23 PROCEDURE — 3078F DIAST BP <80 MM HG: CPT | Performed by: INTERNAL MEDICINE

## 2024-01-23 PROCEDURE — 72100 X-RAY EXAM L-S SPINE 2/3 VWS: CPT

## 2024-01-23 PROCEDURE — 1159F MED LIST DOCD IN RCRD: CPT | Performed by: INTERNAL MEDICINE

## 2024-01-23 PROCEDURE — 3074F SYST BP LT 130 MM HG: CPT | Performed by: INTERNAL MEDICINE

## 2024-01-23 PROCEDURE — 1160F RVW MEDS BY RX/DR IN RCRD: CPT | Performed by: INTERNAL MEDICINE

## 2024-01-23 RX ORDER — HYDROXYZINE PAMOATE 25 MG/1
25 CAPSULE ORAL NIGHTLY PRN
Qty: 30 CAPSULE | Refills: 5 | Status: SHIPPED | OUTPATIENT
Start: 2024-01-23

## 2024-01-23 RX ORDER — CHOLECALCIFEROL (VITAMIN D3) 125 MCG
10 CAPSULE ORAL NIGHTLY
COMMUNITY
End: 2024-01-23 | Stop reason: ALTCHOICE

## 2024-01-23 RX ORDER — METHYLPREDNISOLONE 4 MG/1
TABLET ORAL
Qty: 21 EACH | Refills: 0 | Status: SHIPPED | OUTPATIENT
Start: 2024-01-23

## 2024-01-23 NOTE — PROGRESS NOTES
Patient Name: eJan Noriega Today's Date: 2024   Patient MRN / CSN: [id number] / [id number] Date of Encounter: 2024   Patient Age / : 78 y.o. / 1945 Encounter Provider: Fannie Haynes DO   Referring Physician: No ref. provider found          Jean is a 78 y.o. male who is being seen today for Hip Pain (Started a week ago after walking on treadmill, denies fall but states pain increases with sitting. Denies any surgery on hips.) and Insomnia      Hip Pain   Incident onset: Pain started about 1 month ago. There was no injury mechanism. Pain location: Sacral region bilaterally without radiation. The quality of the pain is described as aching. The pain is at a severity of 5/10. The pain is moderate. Exacerbated by: prolonged sitting. He has tried rest and acetaminophen for the symptoms. The treatment provided mild relief.   Insomnia  This is a chronic problem. The current episode started more than 1 year ago. The problem has been waxing and waning. Associated symptoms include arthralgias and fatigue. Pertinent negatives include no abdominal pain. Associated symptoms comments: Patient reports symptoms have been worse lately due to increased anxiety and racing thoughts at night. He has tried ambien in the past but did not tolerate it well. He reports melatonin doesn't seem to help much.       Allergies include:Percocet [oxycodone-acetaminophen]  Current Outpatient Medications   Medication Sig Dispense Refill    ASPIRIN 81 PO aspirin 81 mg tablet      atorvastatin (LIPITOR) 40 MG tablet Take 1 tablet by mouth Daily.      Blood Glucose Monitoring Suppl (Blood Glucose Monitor System) w/Device kit 1 Device Daily. 1 each 0    Cholecalciferol 100 MCG (4000 UT) tablet Take 4,000 Units by mouth Daily.      clotrimazole-betamethasone (Lotrisone) 1-0.05 % cream Apply 1 application  topically to the appropriate area as directed 2 (Two) Times a Day. 45 g 1    Creon 07021-656135 units capsule delayed-release  particles capsule TAKE 3 CAPSULES BY MOUTH BEFORE MEAL(S) AND 1 CAPSULE BEFORE SNACKS      fluticasone (FLONASE) 50 MCG/ACT nasal spray 2 sprays into the nostril(s) as directed by provider Daily. 11.1 mL 1    glimepiride (AMARYL) 2 MG tablet TAKE 1 TABLET BY MOUTH ONCE DAILY IN THE MORNING BEFORE BREAKFAST 90 tablet 0    glucose blood test strip 1 each by Other route 2 (Two) Times a Day. Use as instructed 100 each 12    Lancets misc 1 Device 2 (Two) Times a Day. 100 each 12    losartan (COZAAR) 50 MG tablet Take 1 tablet by mouth Daily. (Patient taking differently: Take 1 tablet by mouth Daily. Was instructed to monitor and take prn adjusting dosage) 90 tablet 3    metFORMIN (GLUCOPHAGE) 1000 MG tablet Take 1 tablet by mouth 2 (Two) Times a Day With Meals. (Patient taking differently: Take 1 tablet by mouth 2 (Two) Times a Day With Meals. Was instructed to monitor and take prn adjusting dosage) 180 tablet 1    MULTIPLE VITAMIN PO Take 1 tablet by mouth Daily.      pantoprazole (PROTONIX) 40 MG EC tablet Take 1 tablet by mouth 2 (Two) Times a Day. 90 tablet 1    ursodiol (ACTIGALL) 300 MG capsule Take 1 capsule by mouth 2 (Two) Times a Day.      hydrOXYzine pamoate (VISTARIL) 25 MG capsule Take 1 capsule by mouth At Night As Needed for Itching or Anxiety. 30 capsule 5    methylPREDNISolone (MEDROL) 4 MG dose pack Take as directed on package instructions. 21 each 0     Current Facility-Administered Medications   Medication Dose Route Frequency Provider Last Rate Last Admin    cyanocobalamin injection 1,000 mcg  1,000 mcg Intramuscular Weekly Aileen He APRN   1,000 mcg at 05/18/22 0939     Past Medical History:   Diagnosis Date    Anemia, recent acute blood loss 3/30/2017    ALB anemia in March after ureteral stent removed    Bladder cancer     Diabetes mellitus     Duodenal cancer     Dyspnea on exertion 2/1/2016    Foreign body (FB) in soft tissue 2/26/2019    Gallstones     History of recent left  nephrolithiasis s/p ureteral stent placement  3/30/2017    2/17/17 - ureteral stent placed at [facility], s/p stent and stone removal 17    Hyperlipidemia     Kidney stone     Kidney stone on left side 2017    Nephrolithiasis     Pneumonia      Family History   Problem Relation Age of Onset    Heart disease Father     Arthritis Sister     Hypertension Brother     Diabetes Brother      Past Surgical History:   Procedure Laterality Date    CYSTOSCOPY W/ URETERAL STENT REMOVAL Left 2017    EYE MUSCLE SURGERY      HERNIA REPAIR      INTERVENTIONAL RADIOLOGY PROCEDURE Right 2017    Procedure: PTC & Drain placement;  Surgeon: Serjio Wall MD;  Location: [facility] INVASIVE LOCATION;  Service:     JOINT ASPIRATION AND/OR INJECTION Right     right hand    LIVER SURGERY  06/10/2019    URETERAL STENT INSERTION Left     WHIPPLE PROCEDURE       Social History     Substance and Sexual Activity   Alcohol Use No     Social History     Tobacco Use   Smoking Status Former    Packs/day: 1.00    Years: 5.00    Additional pack years: 0.00    Total pack years: 5.00    Types: Cigarettes, Cigars, Pipe    Start date:     Quit date:     Years since quittin.0   Smokeless Tobacco Current    Types: Snuff     Social History     Substance and Sexual Activity   Drug Use No     Review of Systems   Constitutional:  Positive for fatigue.   Gastrointestinal:  Negative for abdominal pain and blood in stool.   Musculoskeletal:  Positive for arthralgias and back pain.        Arthritis pain in hands bilaterally as well as in lower back and sacral region.    Skin:         Rash on sacral region. Patient reports the rash has improved but still has some itching there.    Psychiatric/Behavioral:  Positive for sleep disturbance. The patient is nervous/anxious and has insomnia.         Depression Assessment Review:  PHQ-9 Total Score:    Vital Signs & Measurements Taken This Encounter  /56 (BP Location: Right  "arm, Patient Position: Sitting, Cuff Size: Adult)   Pulse 65   Temp 97.3 °F (36.3 °C) (Temporal)   Ht 175.3 cm (69.02\")   Wt 69.4 kg (153 lb)   SpO2 98%   BMI 22.58 kg/m²    SpO2 Percentage    01/23/24 0835   SpO2: 98%        BMI is within normal parameters. No other follow-up for BMI required.      Physical Exam  Vitals reviewed.   Constitutional:       General: He is not in acute distress.  HENT:      Head: Normocephalic and atraumatic.   Eyes:      General: No scleral icterus.     Extraocular Movements: Extraocular movements intact.      Conjunctiva/sclera: Conjunctivae normal.      Pupils: Pupils are equal, round, and reactive to light.   Cardiovascular:      Rate and Rhythm: Normal rate and regular rhythm.   Pulmonary:      Effort: Pulmonary effort is normal. No respiratory distress.      Breath sounds: Normal breath sounds. No wheezing or rhonchi.   Abdominal:      Palpations: Abdomen is soft.      Tenderness: There is no abdominal tenderness. There is no guarding or rebound.   Musculoskeletal:         General: No swelling.      Cervical back: Neck supple. No tenderness.      Comments: Tenderness to palpation in the sacral region bilaterally.  No lumbar spasm or paraspinal tenderness.  Heberden's and Cherelle's nodes of the hands noted bilaterally without erythema or synovitis.   Lymphadenopathy:      Cervical: No cervical adenopathy.   Skin:     General: Skin is warm and dry.      Coloration: Skin is not jaundiced.   Neurological:      Mental Status: He is alert.      Comments: + 2 patellar reflexes   Psychiatric:         Mood and Affect: Mood normal.         Behavior: Behavior normal.         Thought Content: Thought content normal.         Judgment: Judgment normal.              Assessment & Plan  Patient Active Problem List   Diagnosis    Epigastric pain    Gastroesophageal reflux disease    Chronic rhinitis    Type 2 diabetes mellitus with hyperglycemia, without long-term current use of insulin    " "Low back pain    Mixed hyperlipidemia    Hx of duodenal cancer, s/p Whipple 2004    Hx of bladder cancer, s/p \"scraping\", 2004    History of recent left nephrolithiasis s/p ureteral stent placement     Benign essential HTN    Arthritis    History of duodenal cancer    Peptic ulcer    Taking multiple medications for chronic disease    Varicose veins of lower extremity with inflammation    Malignant neoplasm of urinary bladder    Degenerative joint disease (DJD) of lumbar spine    Spondylolisthesis of lumbar region    Spinal stenosis of lumbar region    Allergic rhinitis    Primary osteoarthritis involving multiple joints    Anxiety    Sacral pain       ICD-10-CM ICD-9-CM   1. Sacral pain  M53.3 724.6   2. Acute midline low back pain without sciatica  M54.50 724.2   3. Anxiety  F41.9 300.00   4. Primary osteoarthritis involving multiple joints  M15.9 715.98     Orders Placed This Encounter   Procedures    XR sacrum and coccyx     Standing Status:   Future     Standing Expiration Date:   1/23/2025     Order Specific Question:   Reason for Exam:     Answer:   pain     Order Specific Question:   Release to patient     Answer:   Routine Release [[id number]]    XR Spine Lumbar 2 or 3 View     Standing Status:   Future     Standing Expiration Date:   1/23/2025     Order Specific Question:   Reason for Exam:     Answer:   pain     Order Specific Question:   Release to patient     Answer:   Routine Release [[id number]]       Meds Ordered During Visit:  New Medications Ordered This Visit   Medications    hydrOXYzine pamoate (VISTARIL) 25 MG capsule     Sig: Take 1 capsule by mouth At Night As Needed for Itching or Anxiety.     Dispense:  30 capsule     Refill:  5    methylPREDNISolone (MEDROL) 4 MG dose pack     Sig: Take as directed on package instructions.     Dispense:  21 each     Refill:  0     I reviewed Aileen's note.  I recommended Medrol Dosepak to take with food.  Encourage patient to monitor his glucose levels " closely while taking this.  I discussed stopping melatonin and replacing with Vistaril at night as needed for itching or anxiety.  I recommended Tylenol arthritis and/or Voltaren gel for joint pain.  We will update lumbar spine and sacral x-rays as above.    Return if symptoms worsen or fail to improve, for Next scheduled follow up.          Referring Provider (if known): No ref. provider found      This document has been electronically signed by Fannie Haynes DO  January 23, 2024 09:16 EST    Fannie Haynes DO, FACOI  [address] [place] [address]  [phone] (office)    Part of this note may be an electronic transcription/translation of spoken language to printed text using the Dragon Dictation System.

## 2024-02-01 RX ORDER — GLIMEPIRIDE 2 MG/1
2 TABLET ORAL
Qty: 90 TABLET | Refills: 3 | Status: SHIPPED | OUTPATIENT
Start: 2024-02-01

## 2024-02-01 NOTE — TELEPHONE ENCOUNTER
Caller: Aliya NoriegaANITA    Relationship: Emergency Contact    Best call back number: [phone]     Requested Prescriptions:   Requested Prescriptions     Pending Prescriptions Disp Refills    glimepiride (AMARYL) 2 MG tablet 90 tablet 0     Sig: Take 1 tablet by mouth.        Pharmacy where request should be sent: [facility] [address] [place] [address] [phone] PH - [phone] FX     Last office visit with prescribing clinician: 1/23/2024   Last telemedicine visit with prescribing clinician: Visit date not found   Next office visit with prescribing clinician: 3/21/2024     Additional details provided by patient: THE PATIENT HAS 1 PILL LEFT    Does the patient have less than a 3 day supply:  [x] Yes  [] No    Would you like a call back once the refill request has been completed: [] Yes [x] No    If the office needs to give you a call back, can they leave a voicemail: [] Yes [x] No    Trish Pederson Rep   02/01/24 13:53 EST

## 2024-02-27 DIAGNOSIS — E11.65 TYPE 2 DIABETES MELLITUS WITH HYPERGLYCEMIA, WITHOUT LONG-TERM CURRENT USE OF INSULIN: ICD-10-CM

## 2024-02-27 NOTE — TELEPHONE ENCOUNTER
Caller: NoriegaJean S    Relationship: Self    Best call back number: [phone]     Requested Prescriptions:   Requested Prescriptions     Pending Prescriptions Disp Refills    glucose blood test strip 100 each 12     Si each by Other route 2 (Two) Times a Day. Use as instructed        Pharmacy where request should be sent: [facility] [address] [place] [address] [phone] [facility] [phone]      Last office visit with prescribing clinician: 2024   Last telemedicine visit with prescribing clinician: Visit date not found   Next office visit with prescribing clinician: 3/21/2024     Additional details provided by patient: PATIENT HAS 2 DAYS LEFT OF THIS PRESCRIPTION LEFT.    Does the patient have less than a 3 day supply:  [x] Yes  [] No    Would you like a call back once the refill request has been completed: [] Yes [x] No    If the office needs to give you a call back, can they leave a voicemail: [] Yes [x] No    Trish Christine Rep   24 12:26 EST

## 2024-03-18 DIAGNOSIS — E11.65 TYPE 2 DIABETES MELLITUS WITH HYPERGLYCEMIA, WITHOUT LONG-TERM CURRENT USE OF INSULIN: ICD-10-CM

## 2024-03-18 DIAGNOSIS — J30.9 ALLERGIC RHINITIS, UNSPECIFIED SEASONALITY, UNSPECIFIED TRIGGER: Primary | ICD-10-CM

## 2024-03-18 RX ORDER — LANCETS 30 GAUGE
1 EACH MISCELLANEOUS DAILY
Qty: 100 EACH | Refills: 3 | Status: SHIPPED | OUTPATIENT
Start: 2024-03-18

## 2024-03-18 RX ORDER — FLUTICASONE PROPIONATE 50 MCG
2 SPRAY, SUSPENSION (ML) NASAL DAILY
Qty: 18.2 ML | Refills: 1 | Status: SHIPPED | OUTPATIENT
Start: 2024-03-18

## 2024-03-18 NOTE — TELEPHONE ENCOUNTER
Caller: Jean Noriega    Relationship: Self    Best call back number: [phone]     Requested Prescriptions:   Requested Prescriptions     Pending Prescriptions Disp Refills    fluticasone (FLONASE) 50 MCG/ACT nasal spray 11.1 mL 1     Si sprays into the nostril(s) as directed by provider Daily.    Lancets misc 100 each 12     Sig: Use 1 Device 2 (Two) Times a Day.   ( ACCUCHECK LANCETS)    Pharmacy where request should be sent: [facility] [phone] [place], [place] [phone] FX     Last office visit with prescribing clinician: 2024   Last telemedicine visit with prescribing clinician: Visit date not found   Next office visit with prescribing clinician: 3/21/2024     Additional details provided by patient: PLEASE SEND 90 DAY SUPPLY WITH REFILLS.    Does the patient have less than a 3 day supply:  [] Yes  [x] No    Would you like a call back once the refill request has been completed: [] Yes [x] No    If the office needs to give you a call back, can they leave a voicemail: [x] Yes [] No    Trish Silva Rep   24 11:10 EDT

## 2024-03-21 ENCOUNTER — OFFICE VISIT (OUTPATIENT)
Dept: FAMILY MEDICINE CLINIC | Facility: CLINIC | Age: 79
End: 2024-03-21
Payer: MEDICARE

## 2024-03-21 VITALS
HEIGHT: 69 IN | DIASTOLIC BLOOD PRESSURE: 70 MMHG | BODY MASS INDEX: 23.31 KG/M2 | SYSTOLIC BLOOD PRESSURE: 130 MMHG | OXYGEN SATURATION: 98 % | HEART RATE: 55 BPM | WEIGHT: 157.4 LBS | TEMPERATURE: 97.3 F

## 2024-03-21 DIAGNOSIS — E78.2 MIXED HYPERLIPIDEMIA: Chronic | ICD-10-CM

## 2024-03-21 DIAGNOSIS — I10 BENIGN ESSENTIAL HTN: Chronic | ICD-10-CM

## 2024-03-21 DIAGNOSIS — C17.0 DUODENAL CANCER: Chronic | ICD-10-CM

## 2024-03-21 DIAGNOSIS — E11.65 TYPE 2 DIABETES MELLITUS WITH HYPERGLYCEMIA, WITHOUT LONG-TERM CURRENT USE OF INSULIN: Primary | Chronic | ICD-10-CM

## 2024-03-21 LAB
ALBUMIN SERPL-MCNC: 3.9 G/DL (ref 3.5–5.2)
ALBUMIN/GLOB SERPL: 2.2 G/DL
ALP SERPL-CCNC: 85 U/L (ref 39–117)
ALT SERPL W P-5'-P-CCNC: 24 U/L (ref 1–41)
ANION GAP SERPL CALCULATED.3IONS-SCNC: 8.4 MMOL/L (ref 5–15)
AST SERPL-CCNC: 24 U/L (ref 1–40)
BILIRUB SERPL-MCNC: 0.4 MG/DL (ref 0–1.2)
BUN SERPL-MCNC: 15 MG/DL (ref 8–23)
BUN/CREAT SERPL: 13.6 (ref 7–25)
CALCIUM SPEC-SCNC: 8.7 MG/DL (ref 8.6–10.5)
CHLORIDE SERPL-SCNC: 103 MMOL/L (ref 98–107)
CHOLEST SERPL-MCNC: 115 MG/DL (ref 0–200)
CK SERPL-CCNC: 115 U/L (ref 20–200)
CO2 SERPL-SCNC: 24.6 MMOL/L (ref 22–29)
CREAT SERPL-MCNC: 1.1 MG/DL (ref 0.76–1.27)
EGFRCR SERPLBLD CKD-EPI 2021: 69.1 ML/MIN/1.73
GLOBULIN UR ELPH-MCNC: 1.8 GM/DL
GLUCOSE SERPL-MCNC: 248 MG/DL (ref 65–99)
HBA1C MFR BLD: 9.8 % (ref 4.8–5.6)
HDLC SERPL-MCNC: 42 MG/DL (ref 40–60)
LDLC SERPL CALC-MCNC: 44 MG/DL (ref 0–100)
LDLC/HDLC SERPL: 0.9 {RATIO}
POTASSIUM SERPL-SCNC: 4.7 MMOL/L (ref 3.5–5.2)
PROT SERPL-MCNC: 5.7 G/DL (ref 6–8.5)
SODIUM SERPL-SCNC: 136 MMOL/L (ref 136–145)
T4 FREE SERPL-MCNC: 1.18 NG/DL (ref 0.93–1.7)
TRIGL SERPL-MCNC: 176 MG/DL (ref 0–150)
TSH SERPL DL<=0.05 MIU/L-ACNC: 1.6 UIU/ML (ref 0.27–4.2)
VIT B12 BLD-MCNC: 856 PG/ML (ref 211–946)
VLDLC SERPL-MCNC: 29 MG/DL (ref 5–40)

## 2024-03-21 PROCEDURE — 1159F MED LIST DOCD IN RCRD: CPT | Performed by: INTERNAL MEDICINE

## 2024-03-21 PROCEDURE — 80053 COMPREHEN METABOLIC PANEL: CPT | Performed by: INTERNAL MEDICINE

## 2024-03-21 PROCEDURE — 80061 LIPID PANEL: CPT | Performed by: INTERNAL MEDICINE

## 2024-03-21 PROCEDURE — 82607 VITAMIN B-12: CPT | Performed by: INTERNAL MEDICINE

## 2024-03-21 PROCEDURE — 85027 COMPLETE CBC AUTOMATED: CPT | Performed by: INTERNAL MEDICINE

## 2024-03-21 PROCEDURE — 3078F DIAST BP <80 MM HG: CPT | Performed by: INTERNAL MEDICINE

## 2024-03-21 PROCEDURE — 84439 ASSAY OF FREE THYROXINE: CPT | Performed by: INTERNAL MEDICINE

## 2024-03-21 PROCEDURE — 83036 HEMOGLOBIN GLYCOSYLATED A1C: CPT | Performed by: INTERNAL MEDICINE

## 2024-03-21 PROCEDURE — 84443 ASSAY THYROID STIM HORMONE: CPT | Performed by: INTERNAL MEDICINE

## 2024-03-21 PROCEDURE — 99214 OFFICE O/P EST MOD 30 MIN: CPT | Performed by: INTERNAL MEDICINE

## 2024-03-21 PROCEDURE — 3075F SYST BP GE 130 - 139MM HG: CPT | Performed by: INTERNAL MEDICINE

## 2024-03-21 PROCEDURE — 1160F RVW MEDS BY RX/DR IN RCRD: CPT | Performed by: INTERNAL MEDICINE

## 2024-03-21 PROCEDURE — 82550 ASSAY OF CK (CPK): CPT | Performed by: INTERNAL MEDICINE

## 2024-03-21 NOTE — PROGRESS NOTES
Patient Name: Jean Noriega Today's Date: 3/21/2024   Patient MRN / CSN: [id number] / [id number] Date of Encounter: 3/21/2024   Patient Age / : 79 y.o. / 1945 Encounter Provider: Fannie Haynes DO   Referring Physician: No ref. provider found          Jean is a 79 y.o. male who is being seen today for Follow-up (States he is doing good, but does have a ingrown toe nail on the right great toe. ) and Hypertension      History of Present Illness    Jean presents today for follow-up on diabetes mellitus type 2, complicated with hyperglycemia, without insulin use, hypertension, hyperlipidemia, with remote history of duodenal cancer status post Whipple in .  He reports feeling very well lately.  He is monitoring his blood pressure and glucose well at home.  He had noted that at times his glucose will drop if he took all of his medicine at the same time.  He has been spacing metformin and Amaryl throughout the day instead of taking it all in the morning.  He is also been monitoring his blood pressure closely.  He holds losartan if his blood pressure is low that day.  He reports doing well with the current regimen.  He is also [place] cycling around [place] and feels well with this.  He has noted that this is helped with his knee pain.  He denies any chest pain or shortness of air.  He has had an ingrown toenail on the right, which is improved at this point with Epsom salt soaks.      Allergies include:Percocet [oxycodone-acetaminophen]  Current Outpatient Medications   Medication Sig Dispense Refill    ASPIRIN 81 PO aspirin 81 mg tablet      atorvastatin (LIPITOR) 40 MG tablet Take 1 tablet by mouth Daily.      Blood Glucose Monitoring Suppl (Blood Glucose Monitor System) w/Device kit 1 Device Daily. 1 each 0    Creon 17770-714520 units capsule delayed-release particles capsule TAKE 3 CAPSULES BY MOUTH BEFORE MEAL(S) AND 1 CAPSULE BEFORE SNACKS      fluticasone (FLONASE) 50 MCG/ACT nasal spray 2 sprays  into the nostril(s) as directed by provider Daily. 18.2 mL 1    glimepiride (AMARYL) 2 MG tablet Take 1 tablet by mouth Every Morning Before Breakfast. 90 tablet 3    glucose blood test strip 1 each by Other route Daily. Use as instructed 100 each 12    Lancets misc Use 1 Device Daily. 100 each 3    losartan (COZAAR) 50 MG tablet Take 1 tablet by mouth Daily. (Patient taking differently: Take 1 tablet by mouth Daily. Was instructed to monitor and take prn adjusting dosage) 90 tablet 3    metFORMIN (GLUCOPHAGE) 1000 MG tablet Take 1 tablet by mouth 2 (Two) Times a Day With Meals. (Patient taking differently: Take 1 tablet by mouth 2 (Two) Times a Day With Meals. Was instructed to monitor and take prn adjusting dosage) 180 tablet 1    MULTIPLE VITAMIN PO Take 1 tablet by mouth Daily.      pantoprazole (PROTONIX) 40 MG EC tablet Take 1 tablet by mouth 2 (Two) Times a Day. 90 tablet 1    ursodiol (ACTIGALL) 300 MG capsule Take 1 capsule by mouth 2 (Two) Times a Day.       No current facility-administered medications for this visit.     Past Medical History:   Diagnosis Date    Anemia, recent acute blood loss 3/30/2017    ALB anemia in March after ureteral stent removed    Bladder cancer     Diabetes mellitus     Duodenal cancer     Dyspnea on exertion 2/1/2016    Foreign body (FB) in soft tissue 2/26/2019    Gallstones     History of recent left nephrolithiasis s/p ureteral stent placement  3/30/2017    2/17/17 - ureteral stent placed [facility], s/p stent and stone removal 2/13/17    Hyperlipidemia     Kidney stone     Kidney stone on left side 02/2017    Nephrolithiasis     Pneumonia      Family History   Problem Relation Age of Onset    Heart disease Father     Arthritis Sister     Hypertension Brother     Diabetes Brother      Past Surgical History:   Procedure Laterality Date    CYSTOSCOPY W/ URETERAL STENT REMOVAL Left 03/13/2017    EYE MUSCLE SURGERY      HERNIA REPAIR      INTERVENTIONAL RADIOLOGY PROCEDURE  "Right 2017    Procedure: PTC & Drain placement;  Surgeon: Serjio Wall MD;  Location: [facility] [facility] CATH INVASIVE LOCATION;  Service:     JOINT ASPIRATION AND/OR INJECTION Right     right hand    LIVER SURGERY  06/10/2019    URETERAL STENT INSERTION Left     WHIPPLE PROCEDURE       Social History     Substance and Sexual Activity   Alcohol Use No     Social History     Tobacco Use   Smoking Status Former    Current packs/day: 0.00    Average packs/day: 1 pack/day for 5.0 years (5.0 ttl pk-yrs)    Types: Cigarettes, Cigars, Pipe    Start date:     Quit date: 1975    Years since quittin.2   Smokeless Tobacco Current    Types: Snuff     Social History     Substance and Sexual Activity   Drug Use No     Review of Systems   Constitutional:  Negative for fever.   Respiratory:  Negative for shortness of breath.    Cardiovascular:  Negative for chest pain.   Gastrointestinal:  Negative for blood in stool.   Genitourinary:  Negative for hematuria.        Depression Assessment Review:  PHQ-9 Total Score: 0  Vital Signs & Measurements Taken This Encounter  /70 (BP Location: Right arm, Patient Position: Sitting, Cuff Size: Adult)   Pulse 55   Temp 97.3 °F (36.3 °C) (Temporal)   Ht 175.3 cm (69\")   Wt 71.4 kg (157 lb 6.4 oz)   SpO2 98%   BMI 23.24 kg/m²    SpO2 Percentage    24 0855   SpO2: 98%        BMI is within normal parameters. No other follow-up for BMI required.      Physical Exam  Vitals reviewed.   Constitutional:       General: He is not in acute distress.  HENT:      Head: Normocephalic and atraumatic.   Eyes:      General: No scleral icterus.     Extraocular Movements: Extraocular movements intact.      Conjunctiva/sclera: Conjunctivae normal.      Pupils: Pupils are equal, round, and reactive to light.   Cardiovascular:      Rate and Rhythm: Regular rhythm. Bradycardia present.   Pulmonary:      Effort: Pulmonary effort is normal. No respiratory distress.      Breath sounds: Normal " "breath sounds. No wheezing or rhonchi.   Musculoskeletal:         General: No swelling.      Cervical back: Neck supple. No tenderness.   Lymphadenopathy:      Cervical: No cervical adenopathy.   Skin:     General: Skin is warm and dry.      Coloration: Skin is not jaundiced.      Comments: Right great toenail has been clipped on the lateral side and is without drainage or odor.  There is no warmth or surrounding erythema.   Neurological:      Mental Status: He is alert.   Psychiatric:         Mood and Affect: Mood normal.         Behavior: Behavior normal.         Thought Content: Thought content normal.         Judgment: Judgment normal.            Assessment & Plan  Patient Active Problem List   Diagnosis    Epigastric pain    Gastroesophageal reflux disease    Chronic rhinitis    Type 2 diabetes mellitus with hyperglycemia, without long-term current use of insulin    Low back pain    Mixed hyperlipidemia    Hx of duodenal cancer, s/p Whipple 2004    Hx of bladder cancer, s/p \"scraping\", 2004    History of recent left nephrolithiasis s/p ureteral stent placement     Benign essential HTN    Arthritis    History of duodenal cancer    Peptic ulcer    Taking multiple medications for chronic disease    Varicose veins of lower extremity with inflammation    Malignant neoplasm of urinary bladder    Degenerative joint disease (DJD) of lumbar spine    Spondylolisthesis of lumbar region    Spinal stenosis of lumbar region    Allergic rhinitis    Primary osteoarthritis involving multiple joints    Anxiety    Sacral pain       ICD-10-CM ICD-9-CM   1. Type 2 diabetes mellitus with hyperglycemia, without long-term current use of insulin  E11.65 250.00     790.29   2. Mixed hyperlipidemia  E78.2 272.2   3. Benign essential HTN  I10 401.1   4. Hx of duodenal cancer, s/p Whipple 2004  C17.0 152.0     Orders Placed This Encounter   Procedures    CBC (No Diff)     Order Specific Question:   Release to patient     Answer:   Routine " Release [[id number]]    Comprehensive Metabolic Panel     Order Specific Question:   Release to patient     Answer:   Routine Release [[id number]]    Hemoglobin A1c     Order Specific Question:   Release to patient     Answer:   Routine Release [[id number]]    Lipid Panel     Order Specific Question:   Release to patient     Answer:   Routine Release [[id number]]    TSH     Order Specific Question:   Release to patient     Answer:   Routine Release [[id number]]    CK     Order Specific Question:   Release to patient     Answer:   Routine Release [[id number]]    T4, Free     Order Specific Question:   Release to patient     Answer:   Routine Release [[id number]]    Vitamin B12     Order Specific Question:   Release to patient     Answer:   Routine Release [[id number]]       Meds Ordered During Visit:  No orders of the defined types were placed in this encounter.      Clinically, Jean appears to be doing very well.  We will continue current medicine regimen at this time.  We will update labs today as above.  I encourage patient to continue his healthy habits.    Return in about 6 months (around 9/21/2024), or if symptoms worsen or fail to improve, for Recheck, Medicare Wellness.          Referring Provider (if known): No ref. provider found      This document has been electronically signed by Fannie Haynes DO  March 21, 2024 09:36 EDT    Fannie Haynes DO, FACOI  [address] [place] [address] [phone] (office)    Part of this note may be an electronic transcription/translation of spoken language to printed text using the Dragon Dictation System.

## 2024-03-21 NOTE — PROGRESS NOTES
Venipuncture Blood Specimen Collection  Venipuncture performed in Left arm by Allison Larkin MA with good hemostasis. Patient tolerated the procedure well without complications.   03/21/24   Allison Larkin MA

## 2024-03-22 LAB
DEPRECATED RDW RBC AUTO: 45.8 FL (ref 37–54)
ERYTHROCYTE [DISTWIDTH] IN BLOOD BY AUTOMATED COUNT: 14.4 % (ref 12.3–15.4)
HCT VFR BLD AUTO: 36.7 % (ref 37.5–51)
HGB BLD-MCNC: 11.6 G/DL (ref 13–17.7)
MCH RBC QN AUTO: 27.8 PG (ref 26.6–33)
MCHC RBC AUTO-ENTMCNC: 31.6 G/DL (ref 31.5–35.7)
MCV RBC AUTO: 87.8 FL (ref 79–97)
PLATELET # BLD AUTO: 263 10*3/MM3 (ref 140–450)
PMV BLD AUTO: 11 FL (ref 6–12)
RBC # BLD AUTO: 4.18 10*6/MM3 (ref 4.14–5.8)
WBC NRBC COR # BLD AUTO: 7.26 10*3/MM3 (ref 3.4–10.8)

## 2024-03-28 DIAGNOSIS — E11.65 TYPE 2 DIABETES MELLITUS WITH HYPERGLYCEMIA, WITHOUT LONG-TERM CURRENT USE OF INSULIN: Primary | Chronic | ICD-10-CM

## 2024-04-01 ENCOUNTER — TELEPHONE (OUTPATIENT)
Dept: FAMILY MEDICINE CLINIC | Facility: CLINIC | Age: 79
End: 2024-04-01
Payer: MEDICARE

## 2024-04-01 DIAGNOSIS — E11.65 TYPE 2 DIABETES MELLITUS WITH HYPERGLYCEMIA, WITHOUT LONG-TERM CURRENT USE OF INSULIN: Primary | Chronic | ICD-10-CM

## 2024-04-01 NOTE — TELEPHONE ENCOUNTER
Caller: Jean Noriega    Relationship: Self    Best call back number: [phone]     Which medication are you concerned about: ALINA    What are your concerns: COPAY IS $500  PATIENT STATES HE WOULD LIKE A CALL TO FURTHER DISCUSS ALTERNATIVE MEDICATIONS OR PROGRAMS AND SUCH TO HELP WITH COST.

## 2024-04-02 NOTE — TELEPHONE ENCOUNTER
I believe there is a program through the hospital to help with this cost. Also, do we have januvia samples?

## 2024-04-03 ENCOUNTER — TELEPHONE (OUTPATIENT)
Dept: PHARMACY | Facility: HOSPITAL | Age: 79
End: 2024-04-03
Payer: MEDICARE

## 2024-04-03 DIAGNOSIS — E11.65 TYPE 2 DIABETES MELLITUS WITH HYPERGLYCEMIA, WITHOUT LONG-TERM CURRENT USE OF INSULIN: Primary | Chronic | ICD-10-CM

## 2024-04-03 NOTE — TELEPHONE ENCOUNTER
We received referral for patients Jardiance. Copay is due to patient deductible. We are unable to help with cost here.

## 2024-04-03 NOTE — TELEPHONE ENCOUNTER
I put in a referral for disease state management yesterday. They may be able to get it for patient. Thanks

## 2024-04-03 NOTE — TELEPHONE ENCOUNTER
From what I can tell, insurance prefers Tradjenta.  I will send this sent in place of Januvia.  He should take Tradjenta 5 mg daily in addition to metformin.  I would like for him to monitor his glucose levels and send in a glucose log within a month after starting Tradjenta.  Please let me know if there is a cost issue with this medicine.

## 2024-04-10 DIAGNOSIS — E11.65 TYPE 2 DIABETES MELLITUS WITH HYPERGLYCEMIA, WITHOUT LONG-TERM CURRENT USE OF INSULIN: Primary | Chronic | ICD-10-CM

## 2024-05-02 ENCOUNTER — OFFICE VISIT (OUTPATIENT)
Dept: FAMILY MEDICINE CLINIC | Facility: CLINIC | Age: 79
End: 2024-05-02
Payer: MEDICARE

## 2024-05-02 VITALS
OXYGEN SATURATION: 97 % | HEIGHT: 69 IN | WEIGHT: 147 LBS | BODY MASS INDEX: 21.77 KG/M2 | RESPIRATION RATE: 18 BRPM | SYSTOLIC BLOOD PRESSURE: 108 MMHG | DIASTOLIC BLOOD PRESSURE: 48 MMHG | TEMPERATURE: 98.6 F | HEART RATE: 69 BPM

## 2024-05-02 DIAGNOSIS — R53.1 WEAKNESS: ICD-10-CM

## 2024-05-02 DIAGNOSIS — R19.7 DIARRHEA, UNSPECIFIED TYPE: Primary | ICD-10-CM

## 2024-05-02 PROCEDURE — 3074F SYST BP LT 130 MM HG: CPT | Performed by: NURSE PRACTITIONER

## 2024-05-02 PROCEDURE — 1159F MED LIST DOCD IN RCRD: CPT | Performed by: NURSE PRACTITIONER

## 2024-05-02 PROCEDURE — 3078F DIAST BP <80 MM HG: CPT | Performed by: NURSE PRACTITIONER

## 2024-05-02 PROCEDURE — 80053 COMPREHEN METABOLIC PANEL: CPT | Performed by: NURSE PRACTITIONER

## 2024-05-02 PROCEDURE — 85025 COMPLETE CBC W/AUTO DIFF WBC: CPT | Performed by: NURSE PRACTITIONER

## 2024-05-02 PROCEDURE — 36415 COLL VENOUS BLD VENIPUNCTURE: CPT | Performed by: NURSE PRACTITIONER

## 2024-05-02 PROCEDURE — 99213 OFFICE O/P EST LOW 20 MIN: CPT | Performed by: NURSE PRACTITIONER

## 2024-05-02 PROCEDURE — 1160F RVW MEDS BY RX/DR IN RCRD: CPT | Performed by: NURSE PRACTITIONER

## 2024-05-02 RX ORDER — URSODIOL 300 MG/1
600 CAPSULE ORAL 2 TIMES DAILY
COMMUNITY
Start: 2024-04-03

## 2024-05-02 RX ORDER — DIPHENOXYLATE HYDROCHLORIDE AND ATROPINE SULFATE 2.5; .025 MG/1; MG/1
1 TABLET ORAL DAILY
COMMUNITY

## 2024-05-02 NOTE — PROGRESS NOTES
"Zander Noriega is a 79 y.o. male.     Chief Complaint   Patient presents with    Abdominal Pain       History of Present Illness  He presents with c/o weakness for the past few days. He states he has been working outside and had diarrhea. He states his blood sugar was over 200 and his blood pressure was 127 systolic. He monitors his blood sugar and blood pressure before he takes his medicine. He did not take medicine for his blood sugar or blood pressure today or yesterday. He c/o diarrhea that started yesterday. It was every 2-3 hours yesterday. He had diarrhea twice today. He denies blood in the stool. He denies nausea and vomiting. He c/o not having any appetite. He c/o pain in the center of his abdomen that is described as dull. Mylanta seems to have helped the pain. He did have some chicken enchiladas before it started. He has a stent in his liver. He denies recent antibiotic use.        The following portions of the patient's history were reviewed and updated as appropriate: allergies, current medications, past family history, past medical history, past social history, past surgical history and problem list.    Review of Systems   Constitutional:  Positive for appetite change and fatigue. Negative for fever.   Respiratory:  Negative for cough, shortness of breath and wheezing.    Cardiovascular:  Negative for chest pain and palpitations.   Gastrointestinal:  Positive for abdominal pain and diarrhea. Negative for blood in stool, constipation, nausea and vomiting.   Genitourinary:  Negative for dysuria and hematuria.   Neurological:  Positive for weakness and headaches. Negative for dizziness.       Objective     /48 (BP Location: Right arm, Patient Position: Sitting, Cuff Size: Adult)   Pulse 69   Temp 98.6 °F (37 °C) (Temporal)   Resp 18   Ht 175.3 cm (69.02\")   Wt 66.7 kg (147 lb)   SpO2 97%   BMI 21.70 kg/m²     Physical Exam  Vitals reviewed.   Constitutional:       General: He is " not in acute distress.     Appearance: He is well-developed. He is not diaphoretic.   HENT:      Head: Normocephalic and atraumatic.   Cardiovascular:      Rate and Rhythm: Normal rate and regular rhythm.      Heart sounds: Normal heart sounds. No murmur heard.     No friction rub. No gallop.   Pulmonary:      Effort: Pulmonary effort is normal. No respiratory distress.      Breath sounds: Normal breath sounds.   Abdominal:      General: Bowel sounds are normal. There is no distension.      Palpations: Abdomen is soft.      Tenderness: There is abdominal tenderness in the epigastric area and left upper quadrant. There is no guarding or rebound.   Skin:     General: Skin is warm and dry.   Neurological:      Mental Status: He is alert and oriented to person, place, and time.   Psychiatric:         Behavior: Behavior normal.         Thought Content: Thought content normal.         Judgment: Judgment normal.         Current Outpatient Medications   Medication Sig Dispense Refill    ASPIRIN 81 PO aspirin 81 mg tablet      atorvastatin (LIPITOR) 40 MG tablet Take 1 tablet by mouth Daily.      Blood Glucose Monitoring Suppl (Blood Glucose Monitor System) w/Device kit 1 Device Daily. 1 each 0    Creon 32096-894716 units capsule delayed-release particles capsule TAKE 3 CAPSULES BY MOUTH BEFORE MEAL(S) AND 1 CAPSULE BEFORE SNACKS      fluticasone (FLONASE) 50 MCG/ACT nasal spray 2 sprays into the nostril(s) as directed by provider Daily. 18.2 mL 1    glucose blood test strip 1 each by Other route Daily. Use as instructed 100 each 12    Lancets misc Use 1 Device Daily. 100 each 3    linagliptin (Tradjenta) 5 MG tablet tablet Take 1 tablet by mouth Daily. 30 tablet 5    losartan (COZAAR) 50 MG tablet Take 1 tablet by mouth Daily. (Patient taking differently: Take 1 tablet by mouth Daily. Was instructed to monitor and take prn adjusting dosage) 90 tablet 3    metFORMIN (GLUCOPHAGE) 1000 MG tablet TAKE 1 TABLET BY MOUTH TWICE  DAILY WITH MEALS 180 tablet 1    MULTIPLE VITAMIN PO Take 1 tablet by mouth Daily.      pantoprazole (PROTONIX) 40 MG EC tablet Take 1 tablet by mouth 2 (Two) Times a Day. 90 tablet 1    ursodiol (ACTIGALL) 300 MG capsule Take 2 capsules by mouth 2 (Two) Times a Day.      multivitamin (ONE DAILY MULTIPLE VITAMIN PO) Take 1 tablet by mouth Daily.       No current facility-administered medications for this visit.            Assessment & Plan     Problem List Items Addressed This Visit    None  Visit Diagnoses       Diarrhea, unspecified type    -  Primary    Relevant Orders    CBC & Differential    Comprehensive Metabolic Panel    Weakness                  ICD-10-CM ICD-9-CM   1. Diarrhea, unspecified type  R19.7 787.91   2. Weakness  R53.1 780.79       Plan: Get CBC and CMP.  Try Pepto-Bismol for the diarrhea.  Drink lots of fluids.  Try Pedialyte, liquid IV, or sugar-free Gatorade.  [place]on diet.  Follow-up with Dr. Haynes pending results.  We discussed parameters for sepsis.  Go to ER if you develop a fever or fast heartbeat.    @Body mass index is 21.7 kg/m².           Understands disease processes and need for medications.  Understands reasons for urgent and emergent care.  Patient (& family) verbalized agreement for treatment plan.   Emotional support and active listening provided.  Patient provided time to verbalize feelings.           BMI is within normal parameters. No other follow-up for BMI required.      This document has been electronically signed by JADE Murphy   May 2, 2024 13:34 EDT

## 2024-05-02 NOTE — PROGRESS NOTES
Venipuncture Blood Specimen Collection  Venipuncture performed in right arm by Evita Mendez MA with good hemostasis. Patient tolerated the procedure well without complications.   05/02/24   Evita Mendez MA

## 2024-05-03 ENCOUNTER — CLINICAL SUPPORT (OUTPATIENT)
Dept: FAMILY MEDICINE CLINIC | Facility: CLINIC | Age: 79
End: 2024-05-03
Payer: MEDICARE

## 2024-05-03 DIAGNOSIS — D64.9 ANEMIA, UNSPECIFIED TYPE: Primary | ICD-10-CM

## 2024-05-03 LAB
ALBUMIN SERPL-MCNC: 3.8 G/DL (ref 3.5–5.2)
ALBUMIN/GLOB SERPL: 1.7 G/DL
ALP SERPL-CCNC: 66 U/L (ref 39–117)
ALT SERPL W P-5'-P-CCNC: 27 U/L (ref 1–41)
ANION GAP SERPL CALCULATED.3IONS-SCNC: 10.5 MMOL/L (ref 5–15)
AST SERPL-CCNC: 27 U/L (ref 1–40)
BASOPHILS # BLD AUTO: 0.01 10*3/MM3 (ref 0–0.2)
BASOPHILS NFR BLD AUTO: 0.1 % (ref 0–1.5)
BILIRUB SERPL-MCNC: 0.3 MG/DL (ref 0–1.2)
BUN SERPL-MCNC: 20 MG/DL (ref 8–23)
BUN/CREAT SERPL: 18.9 (ref 7–25)
CALCIUM SPEC-SCNC: 8.8 MG/DL (ref 8.6–10.5)
CHLORIDE SERPL-SCNC: 100 MMOL/L (ref 98–107)
CO2 SERPL-SCNC: 25.5 MMOL/L (ref 22–29)
CREAT SERPL-MCNC: 1.06 MG/DL (ref 0.76–1.27)
DEPRECATED RDW RBC AUTO: 46.4 FL (ref 37–54)
EGFRCR SERPLBLD CKD-EPI 2021: 72.3 ML/MIN/1.73
EOSINOPHIL # BLD AUTO: 0.05 10*3/MM3 (ref 0–0.4)
EOSINOPHIL NFR BLD AUTO: 0.7 % (ref 0.3–6.2)
ERYTHROCYTE [DISTWIDTH] IN BLOOD BY AUTOMATED COUNT: 14.5 % (ref 12.3–15.4)
FERRITIN SERPL-MCNC: 10.6 NG/ML (ref 30–400)
FOLATE SERPL-MCNC: >20 NG/ML (ref 4.78–24.2)
GLOBULIN UR ELPH-MCNC: 2.2 GM/DL
GLUCOSE SERPL-MCNC: 220 MG/DL (ref 65–99)
HCT VFR BLD AUTO: 34.1 % (ref 37.5–51)
HGB BLD-MCNC: 10.8 G/DL (ref 13–17.7)
IMM GRANULOCYTES # BLD AUTO: 0.03 10*3/MM3 (ref 0–0.05)
IMM GRANULOCYTES NFR BLD AUTO: 0.4 % (ref 0–0.5)
IRON 24H UR-MRATE: 30 MCG/DL (ref 59–158)
IRON SATN MFR SERPL: 6 % (ref 20–50)
LYMPHOCYTES # BLD AUTO: 1.02 10*3/MM3 (ref 0.7–3.1)
LYMPHOCYTES NFR BLD AUTO: 13.6 % (ref 19.6–45.3)
MCH RBC QN AUTO: 27.5 PG (ref 26.6–33)
MCHC RBC AUTO-ENTMCNC: 31.7 G/DL (ref 31.5–35.7)
MCV RBC AUTO: 86.8 FL (ref 79–97)
MONOCYTES # BLD AUTO: 0.64 10*3/MM3 (ref 0.1–0.9)
MONOCYTES NFR BLD AUTO: 8.5 % (ref 5–12)
NEUTROPHILS NFR BLD AUTO: 5.77 10*3/MM3 (ref 1.7–7)
NEUTROPHILS NFR BLD AUTO: 76.7 % (ref 42.7–76)
NRBC BLD AUTO-RTO: 0 /100 WBC (ref 0–0.2)
PLATELET # BLD AUTO: 236 10*3/MM3 (ref 140–450)
PMV BLD AUTO: 11.1 FL (ref 6–12)
POTASSIUM SERPL-SCNC: 4.9 MMOL/L (ref 3.5–5.2)
PROT SERPL-MCNC: 6 G/DL (ref 6–8.5)
RBC # BLD AUTO: 3.93 10*6/MM3 (ref 4.14–5.8)
SODIUM SERPL-SCNC: 136 MMOL/L (ref 136–145)
TIBC SERPL-MCNC: 501 MCG/DL (ref 298–536)
TRANSFERRIN SERPL-MCNC: 336 MG/DL (ref 200–360)
VIT B12 BLD-MCNC: 631 PG/ML (ref 211–946)
WBC NRBC COR # BLD AUTO: 7.52 10*3/MM3 (ref 3.4–10.8)

## 2024-05-03 PROCEDURE — 82728 ASSAY OF FERRITIN: CPT | Performed by: NURSE PRACTITIONER

## 2024-05-03 PROCEDURE — 83540 ASSAY OF IRON: CPT | Performed by: NURSE PRACTITIONER

## 2024-05-03 PROCEDURE — 83921 ORGANIC ACID SINGLE QUANT: CPT | Performed by: NURSE PRACTITIONER

## 2024-05-03 PROCEDURE — 84466 ASSAY OF TRANSFERRIN: CPT | Performed by: NURSE PRACTITIONER

## 2024-05-03 PROCEDURE — 82607 VITAMIN B-12: CPT | Performed by: NURSE PRACTITIONER

## 2024-05-03 PROCEDURE — 82746 ASSAY OF FOLIC ACID SERUM: CPT | Performed by: NURSE PRACTITIONER

## 2024-05-03 PROCEDURE — 36415 COLL VENOUS BLD VENIPUNCTURE: CPT | Performed by: NURSE PRACTITIONER

## 2024-05-03 NOTE — PROGRESS NOTES
White blood cell count is normal.  This would normally be elevated in infection.  He is anemic.  His blood counts have dropped a little in the past month.  Is he losing blood anywhere?  Is he willing to have iron studies and stool for occult blood?

## 2024-05-03 NOTE — PROGRESS NOTES
Venipuncture Blood Specimen Collection  Venipuncture performed in right arm by Evita Mendez MA with good hemostasis. Patient tolerated the procedure well without complications.   05/03/24   Evita Mendez MA

## 2024-05-06 ENCOUNTER — TELEPHONE (OUTPATIENT)
Dept: FAMILY MEDICINE CLINIC | Facility: CLINIC | Age: 79
End: 2024-05-06

## 2024-05-06 DIAGNOSIS — D50.9 IRON DEFICIENCY ANEMIA, UNSPECIFIED IRON DEFICIENCY ANEMIA TYPE: Primary | ICD-10-CM

## 2024-05-06 RX ORDER — FERROUS SULFATE 325(65) MG
325 TABLET ORAL
Qty: 30 TABLET | Refills: 5 | Status: SHIPPED | OUTPATIENT
Start: 2024-05-06

## 2024-05-06 NOTE — TELEPHONE ENCOUNTER
Caller: Drew Noriega    Relationship: Emergency Contact    Best call back number: [phone]     Caller requesting test results: PATIENT'S WIFE DREW    What test was performed: BLOOD WORK    When was the test performed: 05.03.24    Where was the test performed: IN OFFICE    Additional notes: PLEASE CALL PATIENT'S WIFE BACK WITH RESULTS. THANK YOU.

## 2024-05-07 ENCOUNTER — CLINICAL SUPPORT (OUTPATIENT)
Dept: FAMILY MEDICINE CLINIC | Facility: CLINIC | Age: 79
End: 2024-05-07
Payer: MEDICARE

## 2024-05-07 ENCOUNTER — TELEPHONE (OUTPATIENT)
Dept: FAMILY MEDICINE CLINIC | Facility: CLINIC | Age: 79
End: 2024-05-07

## 2024-05-07 DIAGNOSIS — D50.9 IRON DEFICIENCY ANEMIA, UNSPECIFIED IRON DEFICIENCY ANEMIA TYPE: ICD-10-CM

## 2024-05-07 DIAGNOSIS — D64.9 ANEMIA, UNSPECIFIED TYPE: Primary | ICD-10-CM

## 2024-05-07 DIAGNOSIS — Z85.068 HISTORY OF DUODENAL CANCER: Primary | Chronic | ICD-10-CM

## 2024-05-07 DIAGNOSIS — R19.7 DIARRHEA, UNSPECIFIED TYPE: ICD-10-CM

## 2024-05-07 LAB
EXPIRATION DATE 2: NORMAL
EXPIRATION DATE 3: NORMAL
EXPIRATION DATE: NORMAL
GASTROCULT GAST QL: NEGATIVE
HEMOCCULT SP2 STL QL: NEGATIVE
HEMOCCULT SP3 STL QL: NEGATIVE
Lab: NORMAL
METHYLMALONATE SERPL-SCNC: 130 NMOL/L (ref 0–378)

## 2024-05-07 PROCEDURE — 82274 ASSAY TEST FOR BLOOD FECAL: CPT | Performed by: INTERNAL MEDICINE

## 2024-05-09 ENCOUNTER — TELEPHONE (OUTPATIENT)
Dept: FAMILY MEDICINE CLINIC | Facility: CLINIC | Age: 79
End: 2024-05-09

## 2024-05-09 NOTE — TELEPHONE ENCOUNTER
Caller: Aliya Noriega    Relationship: Emergency Contact    Best call back number: [phone]     What is the best time to reach you: ANY    Who are you requesting to speak with (clinical staff, provider,  specific staff member): CLINICAL     What was the call regarding: PATIENT IS REQUESTING THAT THE REFERRAL FOR THE HEMATOLOGIST BE CANCELED. PATIENT IS JUST WANTING TO HAVE THE IRON INFUSIONS. PATIENT STATED THAT THIS SHOULD BE ABLE TO BE ORDERED AND COMPLETED AT THE HOSPITAL.   PATIENT ALREADY SEES A HEMATOLOGIST AND DOES NOT NEED A REFERRAL.     PLEASE CALL PATIENT IF ANY FURTHER QUESTIONS OR IF THE ORDERS CAN BE PLACED FOR THE IRON INFUSIONS.     ADVISE OF NEXT STEPS.

## 2024-05-13 DIAGNOSIS — E11.65 TYPE 2 DIABETES MELLITUS WITH HYPERGLYCEMIA, WITHOUT LONG-TERM CURRENT USE OF INSULIN: ICD-10-CM

## 2024-05-13 RX ORDER — LANCETS 30 GAUGE
1 EACH MISCELLANEOUS DAILY
Qty: 100 EACH | Refills: 3 | Status: SHIPPED | OUTPATIENT
Start: 2024-05-13

## 2024-05-13 NOTE — TELEPHONE ENCOUNTER
Caller: Aliya Noriega EVI    Relationship: Emergency ContactWIFE    Best call back number: [phone]     Requested Prescriptions:   Requested Prescriptions     Pending Prescriptions Disp Refills    glucose blood test strip 100 each 12     Si each by Other route Daily. Use as instructed    Lancets misc 100 each 3     Sig: Use 1 Device Daily.        Pharmacy where request should be sent: [facility] [address] [place] [address] [phone] [facility] [phone] FX     Last office visit with prescribing clinician: 3/21/2024   Last telemedicine visit with prescribing clinician: Visit date not found   Next office visit with prescribing clinician: 2024     Additional details provided by patient: PATIENT IS OUT OF THE TEST STRIPS AND ALMOST OUT OF THE LANCETS. PATIENT TEST 2 TIMES PER DAY. ASKING THE PRESCRIPTION TO BE UPDATED WITH THE CORRECT DOSE OF TEST 2 TIMES PER DAY. THIS WAS INCREASED AT LAST VISIT AS DR BUSH IS WANTING RESULTS FOR MEDICATION CHANGE.  GLUCOSE MONITOR USED IS: ACCU-CHECK AND THE LANCETS IS ACCU-CHECK SOFT CLICK    PATIENT STOPPED TAKING THE TRADJENTA DUE TO THE COST (CO-PAY $394). COULD NOT  THE PHARMACY. PATIENT IS GOING BACK ON TH GLIMEPIRIDE.     WIFE HAS TAKEN CARE OF THE IRON INFUSIONS. SHE HAS NOT HEARD BACK FROM OUR OFFICE. THE IRON INFUSIONS WILL BE DONE AT [facility] IN [place]. THE ONCOLOGY/HEMATOLOGIST FROM [place] TOOK CARE OF THIS .    Does the patient have less than a 3 day supply:  [x] Yes  [] No    Would you like a call back once the refill request has been completed: [] Yes [x] No    If the office needs to give you a call back, can they leave a voicemail: [x] Yes [] No    Trish Silva Rep   24 14:40 EDT

## 2024-05-15 ENCOUNTER — TELEPHONE (OUTPATIENT)
Dept: FAMILY MEDICINE CLINIC | Facility: CLINIC | Age: 79
End: 2024-05-15
Payer: MEDICARE

## 2024-05-15 NOTE — TELEPHONE ENCOUNTER
PATIENT CALLED OFFICE AND STATES TEST STRIPS WERE SUPPOSED TO BE ORDERED FOR TWICE DAILY AND WERE CALLED IN FOR ONCE DAILY.

## 2024-05-15 NOTE — TELEPHONE ENCOUNTER
Spoke with Aliya, she states they have already set it up with Hematology. He goes for his first infusion on Friday. Please cancel referral.

## 2024-06-04 DIAGNOSIS — J30.9 ALLERGIC RHINITIS, UNSPECIFIED SEASONALITY, UNSPECIFIED TRIGGER: ICD-10-CM

## 2024-06-04 RX ORDER — FLUTICASONE PROPIONATE 50 MCG
2 SPRAY, SUSPENSION (ML) NASAL DAILY
Qty: 18.2 ML | Refills: 1 | Status: SHIPPED | OUTPATIENT
Start: 2024-06-04

## 2024-06-04 NOTE — TELEPHONE ENCOUNTER
Caller: Aliya NoriegaANITA    Relationship: Emergency Contact    Best call back number: [phone]    Requested Prescriptions:   Requested Prescriptions     Pending Prescriptions Disp Refills    fluticasone (FLONASE) 50 MCG/ACT nasal spray 18.2 mL 1     Si sprays into the nostril(s) as directed by provider Daily.        Pharmacy where request should be sent: [facility] [address] [place] [address] [phone] [place] [phone] FX     Last office visit with prescribing clinician: 3/21/2024   Last telemedicine visit with prescribing clinician: Visit date not found   Next office visit with prescribing clinician: 2024     Additional details provided by patient: PATIENT IS OUT OF MEDICATION.  SHE REQUESTED ADDITIONAL REFILLS.    Does the patient have less than a 3 day supply:  [x] Yes  [] No    Would you like a call back once the refill request has been completed: [x] Yes [] No    If the office needs to give you a call back, can they leave a voicemail: [x] Yes [] No    Trish Mccain   24 11:20 EDT

## 2024-06-06 ENCOUNTER — CLINICAL SUPPORT (OUTPATIENT)
Dept: FAMILY MEDICINE CLINIC | Facility: CLINIC | Age: 79
End: 2024-06-06
Payer: MEDICARE

## 2024-06-06 DIAGNOSIS — D50.9 IRON DEFICIENCY ANEMIA, UNSPECIFIED IRON DEFICIENCY ANEMIA TYPE: ICD-10-CM

## 2024-06-06 LAB
DEPRECATED RDW RBC AUTO: 58.6 FL (ref 37–54)
ERYTHROCYTE [DISTWIDTH] IN BLOOD BY AUTOMATED COUNT: 18 % (ref 12.3–15.4)
FERRITIN SERPL-MCNC: 221 NG/ML (ref 30–400)
HCT VFR BLD AUTO: 38.6 % (ref 37.5–51)
HGB BLD-MCNC: 11.8 G/DL (ref 13–17.7)
IRON 24H UR-MRATE: 79 MCG/DL (ref 59–158)
IRON SATN MFR SERPL: 20 % (ref 20–50)
MCH RBC QN AUTO: 28 PG (ref 26.6–33)
MCHC RBC AUTO-ENTMCNC: 30.6 G/DL (ref 31.5–35.7)
MCV RBC AUTO: 91.7 FL (ref 79–97)
PLATELET # BLD AUTO: 254 10*3/MM3 (ref 140–450)
PMV BLD AUTO: 10.1 FL (ref 6–12)
RBC # BLD AUTO: 4.21 10*6/MM3 (ref 4.14–5.8)
TIBC SERPL-MCNC: 387 MCG/DL (ref 298–536)
TRANSFERRIN SERPL-MCNC: 260 MG/DL (ref 200–360)
WBC NRBC COR # BLD AUTO: 5.47 10*3/MM3 (ref 3.4–10.8)

## 2024-06-06 PROCEDURE — 84466 ASSAY OF TRANSFERRIN: CPT | Performed by: INTERNAL MEDICINE

## 2024-06-06 PROCEDURE — 85027 COMPLETE CBC AUTOMATED: CPT | Performed by: INTERNAL MEDICINE

## 2024-06-06 PROCEDURE — 82728 ASSAY OF FERRITIN: CPT | Performed by: INTERNAL MEDICINE

## 2024-06-06 PROCEDURE — 36415 COLL VENOUS BLD VENIPUNCTURE: CPT | Performed by: NURSE PRACTITIONER

## 2024-06-06 PROCEDURE — 83540 ASSAY OF IRON: CPT | Performed by: INTERNAL MEDICINE

## 2024-06-06 NOTE — PROGRESS NOTES
Venipuncture Blood Specimen Collection  Venipuncture performed in right arm by Evita Mendez MA with good hemostasis. Patient tolerated the procedure well without complications.   06/06/24   Evita Mendez MA

## 2024-08-06 ENCOUNTER — TELEPHONE (OUTPATIENT)
Dept: FAMILY MEDICINE CLINIC | Facility: CLINIC | Age: 79
End: 2024-08-06

## 2024-08-06 ENCOUNTER — OFFICE VISIT (OUTPATIENT)
Dept: FAMILY MEDICINE CLINIC | Facility: CLINIC | Age: 79
End: 2024-08-06
Payer: MEDICARE

## 2024-08-06 VITALS
TEMPERATURE: 97.8 F | WEIGHT: 148.2 LBS | HEIGHT: 69 IN | OXYGEN SATURATION: 98 % | DIASTOLIC BLOOD PRESSURE: 82 MMHG | BODY MASS INDEX: 21.95 KG/M2 | SYSTOLIC BLOOD PRESSURE: 142 MMHG | HEART RATE: 54 BPM

## 2024-08-06 DIAGNOSIS — E11.65 TYPE 2 DIABETES MELLITUS WITH HYPERGLYCEMIA, WITHOUT LONG-TERM CURRENT USE OF INSULIN: Primary | ICD-10-CM

## 2024-08-06 PROCEDURE — 3077F SYST BP >= 140 MM HG: CPT | Performed by: NURSE PRACTITIONER

## 2024-08-06 PROCEDURE — 1125F AMNT PAIN NOTED PAIN PRSNT: CPT | Performed by: NURSE PRACTITIONER

## 2024-08-06 PROCEDURE — 3078F DIAST BP <80 MM HG: CPT | Performed by: NURSE PRACTITIONER

## 2024-08-06 PROCEDURE — 99213 OFFICE O/P EST LOW 20 MIN: CPT | Performed by: NURSE PRACTITIONER

## 2024-08-06 RX ORDER — LANCETS 30 GAUGE
1 EACH MISCELLANEOUS 2 TIMES DAILY
Qty: 60 EACH | Refills: 2 | Status: SHIPPED | OUTPATIENT
Start: 2024-08-06 | End: 2024-09-05

## 2024-08-06 RX ORDER — GLIMEPIRIDE 2 MG/1
2 TABLET ORAL
COMMUNITY
Start: 2024-05-17

## 2024-08-06 RX ORDER — ACETAMINOPHEN 500 MG
500 TABLET ORAL
COMMUNITY

## 2024-08-06 NOTE — PROGRESS NOTES
"  Patient Name: Jean Noriega Today's Date: 2024   Patient MRN / CSN: [id number] / [id number] Date of Encounter: 2024   Patient Age / : 79 y.o. / 1945 Encounter Provider: JADE Pedraza   Referring Physician: No ref. provider found          Jean is a 79 y.o. male who is being seen today for Med Refill (Needs more test strips having issues with insurance. Running out of supplies because he is testing 2 times a day and insurance is not wanting to pay for 2 times a day. )      History of Present Illness  Patient presents today to discuss type 2 diabetes. He is having hypoglycemic episodes where he has \"passed out\".  He has also been seen in the ER for these issues as well.      Allergies include:Percocet [oxycodone-acetaminophen]  Current Outpatient Medications   Medication Sig Dispense Refill    acetaminophen (TYLENOL) 500 MG tablet Take 1 tablet by mouth.      ASPIRIN 81 PO aspirin 81 mg tablet      atorvastatin (LIPITOR) 40 MG tablet Take 1 tablet by mouth Daily.      Blood Glucose Monitoring Suppl (Blood Glucose Monitor System) w/Device kit 1 Device Daily. 1 each 0    Cholecalciferol 100 MCG (4000 UT) tablet Take 4,000 Units by mouth Daily.      Creon 43152-525469 units capsule delayed-release particles capsule TAKE 3 CAPSULES BY MOUTH BEFORE MEAL(S) AND 1 CAPSULE BEFORE SNACKS      fluticasone (FLONASE) 50 MCG/ACT nasal spray 2 sprays into the nostril(s) as directed by provider Daily. 18.2 mL 1    glimepiride (AMARYL) 2 MG tablet Take 1 tablet by mouth Before Breakfast.      glucose blood test strip 1 each by Other route 2 (Two) Times a Day. Use as instructed 60 each 2    Lancets misc Use 1 Device 2 (Two) Times a Day for 30 days. 60 each 2    losartan (COZAAR) 50 MG tablet Take 1 tablet by mouth Daily. (Patient taking differently: Take 1 tablet by mouth Daily. Was instructed to monitor and take prn adjusting dosage) 90 tablet 3    metFORMIN (GLUCOPHAGE) 1000 MG tablet TAKE 1 TABLET BY MOUTH " TWICE DAILY WITH MEALS 180 tablet 1    MULTIPLE VITAMIN PO Take 1 tablet by mouth Daily.      multivitamin (ONE DAILY MULTIPLE VITAMIN PO) Take 1 tablet by mouth Daily.      multivitamin with minerals (MULTIVITAMIN ADULT PO) Take 1 tablet by mouth Daily.      pantoprazole (PROTONIX) 40 MG EC tablet Take 1 tablet by mouth 2 (Two) Times a Day. 90 tablet 1    ursodiol (ACTIGALL) 300 MG capsule Take 2 capsules by mouth 2 (Two) Times a Day.      empagliflozin (Jardiance) 10 MG tablet tablet Take 1 tablet by mouth Daily. 30 tablet 0    ferrous sulfate 325 (65 FE) MG tablet Take 1 tablet by mouth Daily With Breakfast. (Patient not taking: Reported on 8/6/2024) 30 tablet 5    linagliptin (Tradjenta) 5 MG tablet tablet Take 1 tablet by mouth Daily. 30 tablet 0     No current facility-administered medications for this visit.     Past Medical History:   Diagnosis Date    Anemia, recent acute blood loss 3/30/2017    ALB anemia in March after ureteral stent removed    Bladder cancer     Diabetes mellitus     Duodenal cancer     Dyspnea on exertion 2/1/2016    Foreign body (FB) in soft tissue 2/26/2019    Gallstones     History of recent left nephrolithiasis s/p ureteral stent placement  3/30/2017    2/17/17 - ureteral stent placed at [facility], s/p stent and stone removal 2/13/17    Hyperlipidemia     Kidney stone     Kidney stone on left side 02/2017    Nephrolithiasis     Pneumonia      Family History   Problem Relation Age of Onset    Heart disease Father     Arthritis Sister     Hypertension Brother     Diabetes Brother      Past Surgical History:   Procedure Laterality Date    CYSTOSCOPY W/ URETERAL STENT REMOVAL Left 03/13/2017    EYE MUSCLE SURGERY      HERNIA REPAIR      INTERVENTIONAL RADIOLOGY PROCEDURE Right 03/31/2017    Procedure: PTC & Drain placement;  Surgeon: Serjio Wall MD;  Location: [facility] INVASIVE LOCATION;  Service:     JOINT ASPIRATION AND/OR INJECTION Right     right hand    LIVER SURGERY   "06/10/2019    URETERAL STENT INSERTION Left     WHIPPLE PROCEDURE  2004     Social History     Substance and Sexual Activity   Alcohol Use No     Social History     Tobacco Use   Smoking Status Former    Current packs/day: 0.00    Average packs/day: 1 pack/day for 5.0 years (5.0 ttl pk-yrs)    Types: Cigarettes, Cigars, Pipe    Start date:     Quit date: 1975    Years since quittin.6   Smokeless Tobacco Current    Types: Snuff     Social History     Substance and Sexual Activity   Drug Use No     Review of Systems   Constitutional:  Positive for fatigue.   Respiratory:  Negative for cough, chest tightness, shortness of breath and wheezing.    Cardiovascular:  Negative for chest pain, palpitations and leg swelling.   Gastrointestinal:  Negative for abdominal pain.        Depression Assessment Review:  PHQ-9 Total Score:    Vital Signs & Measurements Taken This Encounter  /82 (BP Location: Right arm, Patient Position: Sitting, Cuff Size: Adult)   Pulse 54   Temp 97.8 °F (36.6 °C) (Oral)   Ht 175.3 cm (69.02\")   Wt 67.2 kg (148 lb 3.2 oz)   SpO2 98%   BMI 21.88 kg/m²    SpO2 Percentage    24 1002   SpO2: 98%        BMI is within normal parameters. No other follow-up for BMI required.      Physical Exam  Constitutional:       Appearance: Normal appearance.   HENT:      Right Ear: External ear normal.      Left Ear: External ear normal.      Nose: Nose normal.      Mouth/Throat:      Mouth: Mucous membranes are moist.   Eyes:      Pupils: Pupils are equal, round, and reactive to light.   Cardiovascular:      Rate and Rhythm: Normal rate and regular rhythm.      Pulses: Normal pulses.   Pulmonary:      Effort: Pulmonary effort is normal.   Abdominal:      Palpations: Abdomen is soft.   Musculoskeletal:         General: Normal range of motion.   Skin:     General: Skin is warm.   Neurological:      General: No focal deficit present.      Mental Status: He is alert.   Psychiatric:         Mood and " "Affect: Mood normal.          Fall Risk Assessment:  Fall Risk Assessment was completed, and patient is at low risk for falls.    Assessment & Plan  Patient Active Problem List   Diagnosis    Epigastric pain    Gastroesophageal reflux disease    Chronic rhinitis    Type 2 diabetes mellitus with hyperglycemia, without long-term current use of insulin    Low back pain    Mixed hyperlipidemia    Hx of duodenal cancer, s/p Whipple 2004    Hx of bladder cancer, s/p \"scraping\",     History of recent left nephrolithiasis s/p ureteral stent placement     Benign essential HTN    Arthritis    History of duodenal cancer    Peptic ulcer    Taking multiple medications for chronic disease    Varicose veins of lower extremity with inflammation    Malignant neoplasm of urinary bladder    Degenerative joint disease (DJD) of lumbar spine    Spondylolisthesis of lumbar region    Spinal stenosis of lumbar region    Allergic rhinitis    Primary osteoarthritis involving multiple joints    Anxiety    Sacral pain       ICD-10-CM ICD-9-CM   1. Type 2 diabetes mellitus with hyperglycemia, without long-term current use of insulin  E11.65 250.00     790.29     No orders of the defined types were placed in this encounter.      Meds Ordered During Visit:  New Medications Ordered This Visit   Medications    Lancets misc     Sig: Use 1 Device 2 (Two) Times a Day for 30 days.     Dispense:  60 each     Refill:  2     ICD10: E11.9    glucose blood test strip     Si each by Other route 2 (Two) Times a Day. Use as instructed     Dispense:  60 each     Refill:  2     E11.9    linagliptin (Tradjenta) 5 MG tablet tablet     Sig: Take 1 tablet by mouth Daily.     Dispense:  30 tablet     Refill:  0     -- He presents today for follow-up.  His most recent A1c was 9.8.  I will try him on Tradjenta to see if it will help better control his type 2 diabetes.  He is also having hypoglycemic episodes and is having to check his sugar multiple times a day " days sometimes.  He states he has had hypoglycemic episodes in got fatigued, lightheaded and passed out.  He has been seen in the ER multiple times for this issue as well.  He does have a significant past medical history that does not allow him to absorb nutrients.  He had a Whipple procedure performed in 2004.  He requires the use of pancreatic enzymes for digestion.  He has also had stents placed in his liver.  He has a significant history of anemia and iron deficiency.  He requires iron infusions periodically.  I will also trial Tradjenta on him since he has not started this medication yet.    Follow-up eval next month with Dr. Haynes.           This document has been electronically signed by JADE Pedraza  August 9, 2024 17:05 EDT    JADE Pedraza  [address] [place] [address] [phone] (office)    Part of this note may be an electronic transcription/translation of spoken language to printed text using the Dragon Dictation System.

## 2024-08-06 NOTE — TELEPHONE ENCOUNTER
Caller: Drew Noriega    Relationship: Emergency Contact    Best call back number: [phone]    Which medication are you concerned about: HIEN      Who prescribed you this medication: LOCO     What are your concerns: THIS IS TOO EXPENSIVE PER DREW.  IT COSTS $258.

## 2024-08-07 DIAGNOSIS — E11.65 TYPE 2 DIABETES MELLITUS WITH HYPERGLYCEMIA, WITHOUT LONG-TERM CURRENT USE OF INSULIN: Primary | ICD-10-CM

## 2024-08-09 ENCOUNTER — TELEPHONE (OUTPATIENT)
Dept: FAMILY MEDICINE CLINIC | Facility: CLINIC | Age: 79
End: 2024-08-09
Payer: MEDICARE

## 2024-08-09 NOTE — TELEPHONE ENCOUNTER
Spoke with pt wife (HIPAA VERIFIED) and let them know about the prescription being sent to the pharmacy and she said they are going to just stick with the glimepiride because the jardiance is 106 dollars a month.

## 2024-08-12 ENCOUNTER — APPOINTMENT (OUTPATIENT)
Dept: GENERAL RADIOLOGY | Facility: HOSPITAL | Age: 79
End: 2024-08-12
Payer: MEDICARE

## 2024-08-12 ENCOUNTER — HOSPITAL ENCOUNTER (EMERGENCY)
Facility: HOSPITAL | Age: 79
Discharge: HOME | End: 2024-08-12
Attending: EMERGENCY MEDICINE | Admitting: EMERGENCY MEDICINE
Payer: MEDICARE

## 2024-08-12 VITALS
HEIGHT: 69 IN | DIASTOLIC BLOOD PRESSURE: 78 MMHG | TEMPERATURE: 98.6 F | SYSTOLIC BLOOD PRESSURE: 134 MMHG | RESPIRATION RATE: 17 BRPM | WEIGHT: 152 LBS | BODY MASS INDEX: 22.51 KG/M2 | HEART RATE: 87 BPM | OXYGEN SATURATION: 97 %

## 2024-08-12 DIAGNOSIS — S46.212A BICEPS TENDON RUPTURE, LEFT, INITIAL ENCOUNTER: Primary | ICD-10-CM

## 2024-08-12 PROCEDURE — 99283 EMERGENCY DEPT VISIT LOW MDM: CPT

## 2024-08-12 PROCEDURE — 73060 X-RAY EXAM OF HUMERUS: CPT

## 2024-08-12 PROCEDURE — 73030 X-RAY EXAM OF SHOULDER: CPT

## 2024-08-12 RX ORDER — TRAMADOL HYDROCHLORIDE 50 MG/1
50 TABLET ORAL ONCE
Status: COMPLETED | OUTPATIENT
Start: 2024-08-12 | End: 2024-08-12

## 2024-08-12 RX ORDER — TRAMADOL HYDROCHLORIDE 50 MG/1
50 TABLET ORAL EVERY 6 HOURS PRN
Qty: 15 TABLET | Refills: 0 | Status: SHIPPED | OUTPATIENT
Start: 2024-08-12

## 2024-08-12 RX ADMIN — TRAMADOL HYDROCHLORIDE 50 MG: 50 TABLET ORAL at 23:04

## 2024-08-13 PROCEDURE — 73030 X-RAY EXAM OF SHOULDER: CPT | Performed by: RADIOLOGY

## 2024-08-13 PROCEDURE — 73060 X-RAY EXAM OF HUMERUS: CPT | Performed by: RADIOLOGY

## 2024-08-13 NOTE — ED PROVIDER NOTES
Subjective   History of Present Illness  Patient is a 79-year-old male with past medical history of diabetes, kidney disease, liver disease with stent, and bladder cancer.  Patient presents with complaints of left upper extremity pain.  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  Patient denies any known injury.  Patient does note to have 2 large swollen areas to the anterior surface of the upper arm.  Pulses are equal and noted throughout bilateral arms.  Patient has full range of motion in bilateral arms and is able to move left arm and all range of motion's without difficulty.  Patient denies any other complaints this date.  Patient is alert and oriented able to answer questions appropriately.  Patient presents private vehicle with his wife.        Review of Systems   Constitutional: Negative.  Negative for fever.   HENT: Negative.     Respiratory: Negative.     Cardiovascular: Negative.  Negative for chest pain.   Gastrointestinal: Negative.  Negative for abdominal pain.   Endocrine: Negative.    Genitourinary: Negative.  Negative for dysuria.   Musculoskeletal:         Left arm pain   Skin: Negative.    Neurological: Negative.    Psychiatric/Behavioral: Negative.     All other systems reviewed and are negative.      Past Medical History:   Diagnosis Date    Anemia, recent acute blood loss 3/30/2017    ALB anemia in March after ureteral stent removed    Bladder cancer     Diabetes mellitus     Duodenal cancer     Dyspnea on exertion 2/1/2016    Foreign body (FB) in soft tissue 2/26/2019    Gallstones     History of recent left nephrolithiasis s/p ureteral stent placement  3/30/2017    2/17/17 - ureteral stent placed at [facility], s/p stent and stone removal 2/13/17    Hyperlipidemia     Kidney stone     Kidney stone on left side 02/2017    Nephrolithiasis     Pneumonia        Allergies   Allergen Reactions    Percocet [Oxycodone-Acetaminophen] Itching       Past  Surgical History:   Procedure Laterality Date    CYSTOSCOPY W/ URETERAL STENT REMOVAL Left 2017    EYE MUSCLE SURGERY      HERNIA REPAIR      INTERVENTIONAL RADIOLOGY PROCEDURE Right 2017    Procedure: PTC & Drain placement;  Surgeon: Serjio Wall MD;  Location: [facility] INVASIVE LOCATION;  Service:     JOINT ASPIRATION AND/OR INJECTION Right     right hand    LIVER SURGERY  06/10/2019    URETERAL STENT INSERTION Left     WHIPPLE PROCEDURE  2004       Family History   Problem Relation Age of Onset    Heart disease Father     Arthritis Sister     Hypertension Brother     Diabetes Brother        Social History     Socioeconomic History    Marital status:    Tobacco Use    Smoking status: Former     Current packs/day: 0.00     Average packs/day: 1 pack/day for 5.0 years (5.0 ttl pk-yrs)     Types: Cigarettes, Cigars, Pipe     Start date:      Quit date:      Years since quittin.6    Smokeless tobacco: Current     Types: Snuff   Vaping Use    Vaping status: Never Used   Substance and Sexual Activity    Alcohol use: No    Drug use: No    Sexual activity: Defer           Objective   Physical Exam  Vitals and nursing note reviewed.   Constitutional:       General: He is not in acute distress.     Appearance: He is well-developed. He is not diaphoretic.   HENT:      Head: Normocephalic and atraumatic.      Right Ear: External ear normal.      Left Ear: External ear normal.      Nose: Nose normal.   Eyes:      Conjunctiva/sclera: Conjunctivae normal.      Pupils: Pupils are equal, round, and reactive to light.   Neck:      Vascular: No JVD.      Trachea: No tracheal deviation.   Cardiovascular:      Rate and Rhythm: Normal rate and regular rhythm.      Heart sounds: Normal heart sounds. No murmur heard.  Pulmonary:      Effort: Pulmonary effort is normal. No respiratory distress.      Breath sounds: Normal breath sounds. No wheezing.   Abdominal:      General: Bowel sounds are normal.       Palpations: Abdomen is soft.      Tenderness: There is no abdominal tenderness.   Musculoskeletal:         General: Swelling and tenderness present. No deformity. Normal range of motion.      Cervical back: Normal range of motion and neck supple.      Comments: 2 large areas of swelling to left anterior upper arm.  Pulses are equal and noted bilaterally.  Patient has full range of motion in bilateral arms.   Skin:     General: Skin is warm and dry.      Coloration: Skin is not pale.      Findings: No erythema or rash.   Neurological:      Mental Status: He is alert and oriented to person, place, and time.      Cranial Nerves: No cranial nerve deficit.   Psychiatric:         Behavior: Behavior normal.         Thought Content: Thought content normal.         Procedures           ED Course  ED Course as of 08/12/24 2304   Mon Aug 12, 2024   2250 X-rays read per Dr. Rivera, no acute abnormalities noted to either x-ray.  Patient agreeable to following up with Ortho this week. [KH]      ED Course User Index  [KH] Kassi Abraham, APRN                                             Medical Decision Making  Patient is a 79-year-old male with past medical history of diabetes, kidney disease, liver disease with stent, and bladder cancer.  Patient presents with complaints of left upper extremity pain.  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  Patient denies any known injury.  Patient does note to have 2 large swollen areas to the anterior surface of the upper arm.  Pulses are equal and noted throughout bilateral arms.  Patient has full range of motion in bilateral arms and is able to move left arm and all range of motion's without difficulty.  Patient denies any other complaints this date.  Patient is alert and oriented able to answer questions appropriately.  Patient presents private vehicle with his wife.    Amount and/or Complexity of Data Reviewed  Radiology:  ordered.        Final diagnoses:   Biceps tendon rupture, left, initial encounter       ED Disposition  ED Disposition       ED Disposition   Discharge    Condition   Stable    Comment   --               Fannie Haynes,   [address]  [facility] [address]  [phone]    Schedule an appointment as soon as possible for a visit   As needed    [facility] EMERGENCY DEPARTMENT  [address] [facility] [id number] [phone]  Go to   If symptoms worsen    Sajan Estrada MD  [address] [place] [address] [address] [place] [id number] [phone]    Schedule an appointment as soon as possible for a visit in 1 day           Medication List        New Prescriptions      traMADol 50 MG tablet  Commonly known as: ULTRAM  Take 1 tablet by mouth Every 6 (Six) Hours As Needed for Moderate Pain.            Changed      losartan 50 MG tablet  Commonly known as: COZAAR  Take 1 tablet by mouth Daily.  What changed: additional instructions               Where to Get Your Medications        These medications were sent to [facility] [address] [place] [address] [phone] [place] [phone] [address] [address]      Phone: [phone]   traMADol 50 MG tablet            Kassi Abraham, APRN  08/12/24 2311

## 2024-08-14 ENCOUNTER — TELEPHONE (OUTPATIENT)
Dept: FAMILY MEDICINE CLINIC | Facility: CLINIC | Age: 79
End: 2024-08-14
Payer: MEDICARE

## 2024-08-14 DIAGNOSIS — E11.65 TYPE 2 DIABETES MELLITUS WITH HYPERGLYCEMIA, WITHOUT LONG-TERM CURRENT USE OF INSULIN: ICD-10-CM

## 2024-08-14 NOTE — TELEPHONE ENCOUNTER
Caller: Aliya Noriega    Relationship: Emergency Contact    Best call back number: [phone]    What test/procedure requested: DIABETIC TESTING SUPPLIES  ( FOR TWICE A DAY )  TEST STRIPS & LANCETS     When is it needed: ASAP - PLEASE CHECK TO SEE IF YOU HAVE REC'D THE PRIOR AUTH FROM MEDICARE.     PHARMACY HAS PRESCRIPTION JUST NOT APPROVAL YET FROM MEDICARE.     THEY HAVE BEEN TRYING TO GET THESE SINCE 08/06/24 AND HE IS OUT OF SUPPLIES.       [facility] [address] [place] [address] [phone] [facility] [phone] FX

## 2024-08-30 DIAGNOSIS — J30.9 ALLERGIC RHINITIS, UNSPECIFIED SEASONALITY, UNSPECIFIED TRIGGER: ICD-10-CM

## 2024-08-30 NOTE — TELEPHONE ENCOUNTER
Caller: Jean Noriega    Relationship: Self    Best call back number:      Requested Prescriptions:   Requested Prescriptions     Pending Prescriptions Disp Refills    fluticasone (FLONASE) 50 MCG/ACT nasal spray 18.2 mL 1     Si sprays into the nostril(s) as directed by provider Daily.        Pharmacy where request should be sent: [facility] [address] [place] [address] [phone] [place] [phone]      Last office visit with prescribing clinician: 3/21/2024   Last telemedicine visit with prescribing clinician: Visit date not found   Next office visit with prescribing clinician: 2024     Additional details provided by patient: PATIENT IS OUT OUT OF MEDICATION AND THIS WAS LAST FILLED BY [facility]    Does the patient have less than a 3 day supply:  [x] Yes  [] No      Trish Duval Rep   24 10:01 EDT

## 2024-09-03 RX ORDER — FLUTICASONE PROPIONATE 50 MCG
2 SPRAY, SUSPENSION (ML) NASAL DAILY
Qty: 18.2 ML | Refills: 1 | Status: SHIPPED | OUTPATIENT
Start: 2024-09-03

## 2024-09-26 ENCOUNTER — OFFICE VISIT (OUTPATIENT)
Dept: FAMILY MEDICINE CLINIC | Facility: CLINIC | Age: 79
End: 2024-09-26
Payer: MEDICARE

## 2024-09-26 VITALS
BODY MASS INDEX: 22.16 KG/M2 | TEMPERATURE: 98 F | HEART RATE: 59 BPM | HEIGHT: 69 IN | DIASTOLIC BLOOD PRESSURE: 80 MMHG | WEIGHT: 149.6 LBS | SYSTOLIC BLOOD PRESSURE: 148 MMHG | OXYGEN SATURATION: 99 %

## 2024-09-26 DIAGNOSIS — Z12.5 PROSTATE CANCER SCREENING: ICD-10-CM

## 2024-09-26 DIAGNOSIS — Z00.00 ENCOUNTER FOR WELLNESS EXAMINATION: Primary | ICD-10-CM

## 2024-09-26 DIAGNOSIS — Z00.00 HEALTH CARE MAINTENANCE: ICD-10-CM

## 2024-09-26 DIAGNOSIS — E78.2 MIXED HYPERLIPIDEMIA: Chronic | ICD-10-CM

## 2024-09-26 DIAGNOSIS — E11.65 TYPE 2 DIABETES MELLITUS WITH HYPERGLYCEMIA, WITHOUT LONG-TERM CURRENT USE OF INSULIN: Chronic | ICD-10-CM

## 2024-09-26 DIAGNOSIS — K21.9 GASTROESOPHAGEAL REFLUX DISEASE WITHOUT ESOPHAGITIS: Chronic | ICD-10-CM

## 2024-09-26 DIAGNOSIS — I10 BENIGN ESSENTIAL HTN: Chronic | ICD-10-CM

## 2024-09-26 LAB
ALBUMIN SERPL-MCNC: 4 G/DL (ref 3.5–5.2)
ALBUMIN/GLOB SERPL: 2.2 G/DL
ALP SERPL-CCNC: 65 U/L (ref 39–117)
ALT SERPL W P-5'-P-CCNC: 24 U/L (ref 1–41)
ANION GAP SERPL CALCULATED.3IONS-SCNC: 8 MMOL/L (ref 5–15)
AST SERPL-CCNC: 25 U/L (ref 1–40)
BILIRUB SERPL-MCNC: 0.3 MG/DL (ref 0–1.2)
BUN SERPL-MCNC: 18 MG/DL (ref 8–23)
BUN/CREAT SERPL: 17 (ref 7–25)
CALCIUM SPEC-SCNC: 9.4 MG/DL (ref 8.6–10.5)
CHLORIDE SERPL-SCNC: 100 MMOL/L (ref 98–107)
CHOLEST SERPL-MCNC: 114 MG/DL (ref 0–200)
CK SERPL-CCNC: 139 U/L (ref 20–200)
CO2 SERPL-SCNC: 28 MMOL/L (ref 22–29)
CREAT SERPL-MCNC: 1.06 MG/DL (ref 0.76–1.27)
DEPRECATED RDW RBC AUTO: 45.5 FL (ref 37–54)
EGFRCR SERPLBLD CKD-EPI 2021: 72.3 ML/MIN/1.73
ERYTHROCYTE [DISTWIDTH] IN BLOOD BY AUTOMATED COUNT: 12.9 % (ref 12.3–15.4)
GLOBULIN UR ELPH-MCNC: 1.8 GM/DL
GLUCOSE SERPL-MCNC: 155 MG/DL (ref 65–99)
HBA1C MFR BLD: 7.6 % (ref 4.8–5.6)
HCT VFR BLD AUTO: 40.9 % (ref 37.5–51)
HDLC SERPL-MCNC: 46 MG/DL (ref 40–60)
HGB BLD-MCNC: 13.6 G/DL (ref 13–17.7)
LDLC SERPL CALC-MCNC: 45 MG/DL (ref 0–100)
LDLC/HDLC SERPL: 0.9 {RATIO}
MCH RBC QN AUTO: 32.2 PG (ref 26.6–33)
MCHC RBC AUTO-ENTMCNC: 33.3 G/DL (ref 31.5–35.7)
MCV RBC AUTO: 96.9 FL (ref 79–97)
PLATELET # BLD AUTO: 202 10*3/MM3 (ref 140–450)
PMV BLD AUTO: 10.7 FL (ref 6–12)
POTASSIUM SERPL-SCNC: 4.6 MMOL/L (ref 3.5–5.2)
PROT SERPL-MCNC: 5.8 G/DL (ref 6–8.5)
PSA SERPL-MCNC: 0.35 NG/ML (ref 0–4)
RBC # BLD AUTO: 4.22 10*6/MM3 (ref 4.14–5.8)
SODIUM SERPL-SCNC: 136 MMOL/L (ref 136–145)
TRIGL SERPL-MCNC: 134 MG/DL (ref 0–150)
TSH SERPL DL<=0.05 MIU/L-ACNC: 2.19 UIU/ML (ref 0.27–4.2)
VLDLC SERPL-MCNC: 23 MG/DL (ref 5–40)
WBC NRBC COR # BLD AUTO: 6.94 10*3/MM3 (ref 3.4–10.8)

## 2024-09-26 PROCEDURE — 82043 UR ALBUMIN QUANTITATIVE: CPT | Performed by: INTERNAL MEDICINE

## 2024-09-26 PROCEDURE — 80061 LIPID PANEL: CPT | Performed by: INTERNAL MEDICINE

## 2024-09-26 PROCEDURE — 84443 ASSAY THYROID STIM HORMONE: CPT | Performed by: INTERNAL MEDICINE

## 2024-09-26 PROCEDURE — 3079F DIAST BP 80-89 MM HG: CPT | Performed by: INTERNAL MEDICINE

## 2024-09-26 PROCEDURE — 82550 ASSAY OF CK (CPK): CPT | Performed by: INTERNAL MEDICINE

## 2024-09-26 PROCEDURE — 99214 OFFICE O/P EST MOD 30 MIN: CPT | Performed by: INTERNAL MEDICINE

## 2024-09-26 PROCEDURE — 85027 COMPLETE CBC AUTOMATED: CPT | Performed by: INTERNAL MEDICINE

## 2024-09-26 PROCEDURE — 80053 COMPREHEN METABOLIC PANEL: CPT | Performed by: INTERNAL MEDICINE

## 2024-09-26 PROCEDURE — 1170F FXNL STATUS ASSESSED: CPT | Performed by: INTERNAL MEDICINE

## 2024-09-26 PROCEDURE — 3077F SYST BP >= 140 MM HG: CPT | Performed by: INTERNAL MEDICINE

## 2024-09-26 PROCEDURE — 1160F RVW MEDS BY RX/DR IN RCRD: CPT | Performed by: INTERNAL MEDICINE

## 2024-09-26 PROCEDURE — 36415 COLL VENOUS BLD VENIPUNCTURE: CPT | Performed by: INTERNAL MEDICINE

## 2024-09-26 PROCEDURE — G0103 PSA SCREENING: HCPCS | Performed by: INTERNAL MEDICINE

## 2024-09-26 PROCEDURE — 1126F AMNT PAIN NOTED NONE PRSNT: CPT | Performed by: INTERNAL MEDICINE

## 2024-09-26 PROCEDURE — 1159F MED LIST DOCD IN RCRD: CPT | Performed by: INTERNAL MEDICINE

## 2024-09-26 PROCEDURE — G0439 PPPS, SUBSEQ VISIT: HCPCS | Performed by: INTERNAL MEDICINE

## 2024-09-26 PROCEDURE — 83036 HEMOGLOBIN GLYCOSYLATED A1C: CPT | Performed by: INTERNAL MEDICINE

## 2024-09-26 RX ORDER — PANTOPRAZOLE SODIUM 40 MG/1
40 TABLET, DELAYED RELEASE ORAL 2 TIMES DAILY
Qty: 180 TABLET | Refills: 1 | Status: SHIPPED | OUTPATIENT
Start: 2024-09-26

## 2024-09-26 RX ORDER — LOSARTAN POTASSIUM 50 MG/1
50 TABLET ORAL DAILY
Qty: 90 TABLET | Refills: 1 | Status: SHIPPED | OUTPATIENT
Start: 2024-09-26

## 2024-09-26 RX ORDER — GLIMEPIRIDE 2 MG/1
2 TABLET ORAL
Qty: 90 TABLET | Refills: 1 | Status: SHIPPED | OUTPATIENT
Start: 2024-09-26

## 2024-09-27 LAB — ALBUMIN UR-MCNC: <1.2 MG/DL

## 2024-11-11 ENCOUNTER — HOSPITAL ENCOUNTER (EMERGENCY)
Facility: HOSPITAL | Age: 79
Discharge: LEFT BEFORE DISCHARGE | End: 2024-11-11
Attending: STUDENT IN AN ORGANIZED HEALTH CARE EDUCATION/TRAINING PROGRAM | Admitting: STUDENT IN AN ORGANIZED HEALTH CARE EDUCATION/TRAINING PROGRAM
Payer: MEDICARE

## 2024-11-11 ENCOUNTER — APPOINTMENT (OUTPATIENT)
Dept: GENERAL RADIOLOGY | Facility: HOSPITAL | Age: 79
End: 2024-11-11
Payer: MEDICARE

## 2024-11-11 VITALS
WEIGHT: 150 LBS | BODY MASS INDEX: 22.22 KG/M2 | HEART RATE: 67 BPM | DIASTOLIC BLOOD PRESSURE: 70 MMHG | HEIGHT: 69 IN | SYSTOLIC BLOOD PRESSURE: 120 MMHG | RESPIRATION RATE: 16 BRPM | TEMPERATURE: 97.5 F | OXYGEN SATURATION: 97 %

## 2024-11-11 DIAGNOSIS — R55 SYNCOPE, UNSPECIFIED SYNCOPE TYPE: Primary | ICD-10-CM

## 2024-11-11 LAB
ALBUMIN SERPL-MCNC: 3.8 G/DL (ref 3.5–5.2)
ALBUMIN/GLOB SERPL: 1.8 G/DL
ALP SERPL-CCNC: 63 U/L (ref 39–117)
ALT SERPL W P-5'-P-CCNC: 26 U/L (ref 1–41)
ANION GAP SERPL CALCULATED.3IONS-SCNC: 11.8 MMOL/L (ref 5–15)
AST SERPL-CCNC: 31 U/L (ref 1–40)
BASOPHILS # BLD AUTO: 0.04 10*3/MM3 (ref 0–0.2)
BASOPHILS NFR BLD AUTO: 0.3 % (ref 0–1.5)
BILIRUB SERPL-MCNC: 0.7 MG/DL (ref 0–1.2)
BUN SERPL-MCNC: 19 MG/DL (ref 8–23)
BUN/CREAT SERPL: 15.7 (ref 7–25)
CALCIUM SPEC-SCNC: 8.7 MG/DL (ref 8.6–10.5)
CHLORIDE SERPL-SCNC: 102 MMOL/L (ref 98–107)
CO2 SERPL-SCNC: 26.2 MMOL/L (ref 22–29)
CREAT SERPL-MCNC: 1.21 MG/DL (ref 0.76–1.27)
DEPRECATED RDW RBC AUTO: 45.4 FL (ref 37–54)
EGFRCR SERPLBLD CKD-EPI 2021: 61.7 ML/MIN/1.73
EOSINOPHIL # BLD AUTO: 0.02 10*3/MM3 (ref 0–0.4)
EOSINOPHIL NFR BLD AUTO: 0.1 % (ref 0.3–6.2)
ERYTHROCYTE [DISTWIDTH] IN BLOOD BY AUTOMATED COUNT: 12.7 % (ref 12.3–15.4)
GEN 5 2HR TROPONIN T REFLEX: 28 NG/L
GLOBULIN UR ELPH-MCNC: 2.1 GM/DL
GLUCOSE SERPL-MCNC: 191 MG/DL (ref 65–99)
HCT VFR BLD AUTO: 42 % (ref 37.5–51)
HGB BLD-MCNC: 13.9 G/DL (ref 13–17.7)
HOLD SPECIMEN: NORMAL
HOLD SPECIMEN: NORMAL
IMM GRANULOCYTES # BLD AUTO: 0.08 10*3/MM3 (ref 0–0.05)
IMM GRANULOCYTES NFR BLD AUTO: 0.5 % (ref 0–0.5)
LYMPHOCYTES # BLD AUTO: 0.42 10*3/MM3 (ref 0.7–3.1)
LYMPHOCYTES NFR BLD AUTO: 2.8 % (ref 19.6–45.3)
MCH RBC QN AUTO: 32.3 PG (ref 26.6–33)
MCHC RBC AUTO-ENTMCNC: 33.1 G/DL (ref 31.5–35.7)
MCV RBC AUTO: 97.7 FL (ref 79–97)
MONOCYTES # BLD AUTO: 1.16 10*3/MM3 (ref 0.1–0.9)
MONOCYTES NFR BLD AUTO: 7.6 % (ref 5–12)
NEUTROPHILS NFR BLD AUTO: 13.53 10*3/MM3 (ref 1.7–7)
NEUTROPHILS NFR BLD AUTO: 88.7 % (ref 42.7–76)
NRBC BLD AUTO-RTO: 0 /100 WBC (ref 0–0.2)
PLATELET # BLD AUTO: 208 10*3/MM3 (ref 140–450)
PMV BLD AUTO: 9.4 FL (ref 6–12)
POTASSIUM SERPL-SCNC: 4.4 MMOL/L (ref 3.5–5.2)
PROT SERPL-MCNC: 5.9 G/DL (ref 6–8.5)
RBC # BLD AUTO: 4.3 10*6/MM3 (ref 4.14–5.8)
SODIUM SERPL-SCNC: 140 MMOL/L (ref 136–145)
TROPONIN T DELTA: -4 NG/L
TROPONIN T SERPL HS-MCNC: 32 NG/L
WBC NRBC COR # BLD AUTO: 15.25 10*3/MM3 (ref 3.4–10.8)
WHOLE BLOOD HOLD COAG: NORMAL
WHOLE BLOOD HOLD SPECIMEN: NORMAL

## 2024-11-11 PROCEDURE — 99284 EMERGENCY DEPT VISIT MOD MDM: CPT

## 2024-11-11 PROCEDURE — 93005 ELECTROCARDIOGRAM TRACING: CPT | Performed by: STUDENT IN AN ORGANIZED HEALTH CARE EDUCATION/TRAINING PROGRAM

## 2024-11-11 PROCEDURE — 71045 X-RAY EXAM CHEST 1 VIEW: CPT

## 2024-11-11 PROCEDURE — 36415 COLL VENOUS BLD VENIPUNCTURE: CPT | Performed by: STUDENT IN AN ORGANIZED HEALTH CARE EDUCATION/TRAINING PROGRAM

## 2024-11-11 PROCEDURE — 80053 COMPREHEN METABOLIC PANEL: CPT | Performed by: STUDENT IN AN ORGANIZED HEALTH CARE EDUCATION/TRAINING PROGRAM

## 2024-11-11 PROCEDURE — 85025 COMPLETE CBC W/AUTO DIFF WBC: CPT | Performed by: STUDENT IN AN ORGANIZED HEALTH CARE EDUCATION/TRAINING PROGRAM

## 2024-11-11 PROCEDURE — 71045 X-RAY EXAM CHEST 1 VIEW: CPT | Performed by: RADIOLOGY

## 2024-11-11 PROCEDURE — 84484 ASSAY OF TROPONIN QUANT: CPT | Performed by: STUDENT IN AN ORGANIZED HEALTH CARE EDUCATION/TRAINING PROGRAM

## 2024-11-12 LAB — QT INTERVAL: 382 MS

## 2024-11-12 NOTE — ED PROVIDER NOTES
"Subjective   History of Present Illness  79-year-old male with past medical history of anemia, diabetes, Emergency Department for multiple episodes at home.  Patient states that he was physically exerting himself today which was extensive in nature.  After working outside, he came into the house and stated he felt under the weather.  As he was standing checking his blood pressure, patient had reportedly 3 syncopal episodes according to his wife.  Patient states that he was also nauseous diaphoretic, and diarrhea at that time.  He was concerned \"department by his wife.  He currently denies any fevers, chills, nausea, vomiting, shortness of breath or chest pain.  He states that he otherwise feels well.        Review of Systems   All other systems reviewed and are negative.      Past Medical History:   Diagnosis Date    Anemia, recent acute blood loss 3/30/2017    ALB anemia in March after ureteral stent removed    Bladder cancer     Diabetes mellitus     Duodenal cancer     Dyspnea on exertion 2/1/2016    Foreign body (FB) in soft tissue 2/26/2019    Gallstones     History of recent left nephrolithiasis s/p ureteral stent placement  3/30/2017    2/17/17 - ureteral stent placed at [facility], s/p stent and stone removal 2/13/17    Hyperlipidemia     Kidney stone     Kidney stone on left side 02/2017    Nephrolithiasis     Pneumonia        Allergies   Allergen Reactions    Percocet [Oxycodone-Acetaminophen] Itching       Past Surgical History:   Procedure Laterality Date    CYSTOSCOPY W/ URETERAL STENT REMOVAL Left 03/13/2017    EYE MUSCLE SURGERY      HERNIA REPAIR      INTERVENTIONAL RADIOLOGY PROCEDURE Right 03/31/2017    Procedure: PTC & Drain placement;  Surgeon: Serjio Wall MD;  Location: [facility] CATH INVASIVE LOCATION;  Service:     JOINT ASPIRATION AND/OR INJECTION Right     right hand    LIVER SURGERY  06/10/2019    URETERAL STENT INSERTION Left     WHIPPLE PROCEDURE  2004       Family History   Problem " Relation Age of Onset    Heart disease Father     Arthritis Sister     Hypertension Brother     Diabetes Brother        Social History     Socioeconomic History    Marital status:    Tobacco Use    Smoking status: Former     Current packs/day: 0.00     Average packs/day: 1 pack/day for 5.0 years (5.0 ttl pk-yrs)     Types: Cigarettes, Cigars, Pipe     Start date:      Quit date:      Years since quittin.8    Smokeless tobacco: Current     Types: Snuff   Vaping Use    Vaping status: Never Used   Substance and Sexual Activity    Alcohol use: No    Drug use: No    Sexual activity: Defer           Objective   Physical Exam  Constitutional:       Appearance: Normal appearance.   HENT:      Head: Normocephalic.      Mouth/Throat:      Mouth: Mucous membranes are moist.   Eyes:      Pupils: Pupils are equal, round, and reactive to light.   Cardiovascular:      Rate and Rhythm: Normal rate and regular rhythm.   Pulmonary:      Effort: Pulmonary effort is normal.   Abdominal:      Palpations: Abdomen is soft.   Musculoskeletal:         General: Normal range of motion.      Cervical back: Normal range of motion.   Neurological:      General: No focal deficit present.      Mental Status: He is alert.   Psychiatric:         Mood and Affect: Mood normal.         Procedures           ED Course  ED Course as of 24 2310    EKG:  Rate 73  WV interval 196  QTc 420  Normal sinus rhythm  RBBB Q waves in the anterior leads  No STEMI  Electronically signed by Phil Molina DO, 24, 8:20 PM EST.   [OI]      ED Course User Index  [OI] Phil Molina DO                    No data recorded                             Medical Decision Making  79-year-old male with past medical history of anemia, diabetes, Emergency Department for multiple episodes at home.  Patient states that he was physically exerting himself today which was extensive in nature.  After working outside, he came into the  "[place] and stated he felt under the weather.  As he was standing checking his blood pressure, patient had reportedly 3 syncopal episodes according to his wife.  Patient states that he was also nauseous diaphoretic, and diarrhea at that time.  He was concerned \"department by his wife.  He currently denies any fevers, chills, nausea, vomiting, shortness of breath or chest pain.  He states that he otherwise feels well.  Differential diagnosis includes anemia, electrolyte abnormality, dysrhythmia, orthostatic hypotension, dehydration, among others.  Labs are significant for mildly elevated troponin, but patient denies any chest pain.  Due to patient's age, medical history, decision made to admit the patient to the hospital for a syncopal workup.  I discussed my findings with the patient.  He would rather leave AGAINST MEDICAL ADVICE and follow-up with his cardiologist.  I once again advised the patient to the main emergency department to have evaluation by her inpatient team, but he stated that he understood the risks of leaving and ultimately chose to leave AGAINST MEDICAL ADVICE.  He is advised to return for any worsening symptoms.  Electronically signed by Phil Molina DO, 11/11/24, 11:12 PM EST.      Problems Addressed:  Syncope, unspecified syncope type: complicated acute illness or injury    Amount and/or Complexity of Data Reviewed  Labs: ordered.  Radiology: ordered.  ECG/medicine tests: ordered.        Final diagnoses:   Syncope, unspecified syncope type       ED Disposition  ED Disposition       ED Disposition   AMA    Condition   --    Comment   --               Fannie Haynes DO  [address]  [facility] [address]  [phone]    In 1 day           Medication List      No changes were made to your prescriptions during this visit.            Phil Molina DO  11/11/24 2312    "

## 2024-11-12 NOTE — ED NOTES
Pt verbalized that he wishes to go home at this time. Dr. Molina at bedside and speaking with pt about risk of leaving AMA. Pt verbalized understanding at this time.

## 2024-11-13 ENCOUNTER — PATIENT OUTREACH (OUTPATIENT)
Dept: CASE MANAGEMENT | Facility: OTHER | Age: 79
End: 2024-11-13
Payer: MEDICARE

## 2024-11-13 NOTE — OUTREACH NOTE
AMBULATORY CASE MANAGEMENT NOTE    Names and Relationships of Patient/Support Persons: Contact: Aliya Noriega; Relationship: Emergency Contact -     Patient Outreach    RN-ACM outreach to patient.  Conversation this date with spouse, Aliya.  Patient had an ED visit at [facility] 11/11/24.  Patient declined admission and discharged to home AMA.  Clinical impression is noted as syncope.  No medication changes are noted.      Per spouse, patient has cardiology follow up scheduled for tomorrow 11/14/24.  She reports that patient is seen by Dr. Lua at [facility] in [place].  Other needs, questions, or concerns for RN-ACM to address were denied.          Emily TRIVEDI  Ambulatory Case Management    11/13/2024, 15:52 EST

## 2024-11-19 DIAGNOSIS — J30.9 ALLERGIC RHINITIS, UNSPECIFIED SEASONALITY, UNSPECIFIED TRIGGER: ICD-10-CM

## 2024-11-19 RX ORDER — FLUTICASONE PROPIONATE 50 MCG
SPRAY, SUSPENSION (ML) NASAL
Qty: 16 G | Refills: 3 | Status: SHIPPED | OUTPATIENT
Start: 2024-11-19

## 2024-12-06 ENCOUNTER — OFFICE VISIT (OUTPATIENT)
Dept: FAMILY MEDICINE CLINIC | Facility: CLINIC | Age: 79
End: 2024-12-06
Payer: MEDICARE

## 2024-12-06 VITALS
WEIGHT: 148.6 LBS | TEMPERATURE: 98 F | SYSTOLIC BLOOD PRESSURE: 138 MMHG | OXYGEN SATURATION: 98 % | BODY MASS INDEX: 22.01 KG/M2 | HEART RATE: 66 BPM | HEIGHT: 69 IN | DIASTOLIC BLOOD PRESSURE: 60 MMHG

## 2024-12-06 DIAGNOSIS — J06.9 ACUTE URI: Primary | ICD-10-CM

## 2024-12-06 RX ORDER — AZITHROMYCIN 250 MG/1
TABLET, FILM COATED ORAL
Qty: 6 TABLET | Refills: 0 | Status: SHIPPED | OUTPATIENT
Start: 2024-12-06

## 2024-12-06 NOTE — PROGRESS NOTES
Patient Name: Jean Noriega Today's Date: 2024   Patient MRN / CSN: [id number] / [id number] Date of Encounter: 2024   Patient Age / : 79 y.o. / 1945 Encounter Provider: JADE Pedraza   Referring Physician: No ref. provider found          Jean is a 79 y.o. male who is being seen today for Headache and URI (When pt breathes it hurts his lungs. Pt is wearing a heart monitor as well because he blacked out a few times a few weeks ago. )      URI   This is a new problem. The current episode started today. The problem has been gradually worsening. There has been no fever. Associated symptoms include congestion and coughing. He has tried nothing for the symptoms. The treatment provided no relief.       Allergies include:Percocet [oxycodone-acetaminophen]  Current Outpatient Medications   Medication Sig Dispense Refill    acetaminophen (TYLENOL) 500 MG tablet Take 1 tablet by mouth.      ASPIRIN 81 PO aspirin 81 mg tablet      atorvastatin (LIPITOR) 40 MG tablet Take 1 tablet by mouth Daily. QOD      Blood Glucose Monitoring Suppl (Blood Glucose Monitor System) w/Device kit 1 Device Daily. 1 each 0    Creon 77271-564374 units capsule delayed-release particles capsule TAKE 3 CAPSULES BY MOUTH BEFORE MEAL(S) AND 1 CAPSULE BEFORE SNACKS      empagliflozin (Jardiance) 10 MG tablet tablet Take 1 tablet by mouth Daily. 30 tablet 0    fluticasone (FLONASE) 50 MCG/ACT nasal spray USE 2 SPRAY(S) IN EACH NOSTRIL DAILY AS DIRECTED 16 g 3    glimepiride (AMARYL) 2 MG tablet Take 1 tablet by mouth Every Morning Before Breakfast. 90 tablet 1    glucose blood test strip 1 each by Other route Daily. Use as instructed 100 each 11    glucose blood test strip Use as instructed 100 each 12    losartan (COZAAR) 50 MG tablet Take 1 tablet by mouth Daily. 90 tablet 1    metFORMIN (GLUCOPHAGE) 1000 MG tablet Take 1 tablet by mouth 2 (Two) Times a Day With Meals. 180 tablet 1    multivitamin (ONE DAILY MULTIPLE VITAMIN PO)  Take 1 tablet by mouth Daily.      pantoprazole (PROTONIX) 40 MG EC tablet Take 1 tablet by mouth 2 (Two) Times a Day. 180 tablet 1    ursodiol (ACTIGALL) 300 MG capsule Take 2 capsules by mouth 2 (Two) Times a Day.      azithromycin (Zithromax Z-Javed) 250 MG tablet Take 2 tablets by mouth on day 1, then 1 tablet daily on days 2-5 6 tablet 0     No current facility-administered medications for this visit.     Past Medical History:   Diagnosis Date    Anemia, recent acute blood loss 3/30/2017    ALB anemia in March after ureteral stent removed    Bladder cancer     Diabetes mellitus     Duodenal cancer     Dyspnea on exertion 2016    Foreign body (FB) in soft tissue 2019    Gallstones     History of recent left nephrolithiasis s/p ureteral stent placement  3/30/2017    2/17/17 - ureteral stent placed at [facility], s/p stent and stone removal 17    Hyperlipidemia     Kidney stone     Kidney stone on left side 2017    Nephrolithiasis     Pneumonia      Family History   Problem Relation Age of Onset    Heart disease Father     Arthritis Sister     Hypertension Brother     Diabetes Brother      Past Surgical History:   Procedure Laterality Date    CYSTOSCOPY W/ URETERAL STENT REMOVAL Left 2017    EYE MUSCLE SURGERY      HERNIA REPAIR      INTERVENTIONAL RADIOLOGY PROCEDURE Right 2017    Procedure: PTC & Drain placement;  Surgeon: Serjio Wall MD;  Location: [facility] INVASIVE LOCATION;  Service:     JOINT ASPIRATION AND/OR INJECTION Right     right hand    LIVER SURGERY  06/10/2019    URETERAL STENT INSERTION Left     WHIPPLE PROCEDURE       Social History     Substance and Sexual Activity   Alcohol Use No     Social History     Tobacco Use   Smoking Status Former    Current packs/day: 0.00    Average packs/day: 1 pack/day for 5.0 years (5.0 ttl pk-yrs)    Types: Cigarettes, Cigars, Pipe    Start date:     Quit date:     Years since quittin.9   Smokeless Tobacco  "Current    Types: Snuff     Social History     Substance and Sexual Activity   Drug Use No     Review of Systems   HENT:  Positive for congestion.    Respiratory:  Positive for cough.         Depression Assessment Review:  PHQ-9 Total Score:    Vital Signs & Measurements Taken This Encounter  /60 (BP Location: Left arm, Patient Position: Sitting, Cuff Size: Adult)   Pulse 66   Temp 98 °F (36.7 °C) (Oral)   Ht 175.3 cm (69.02\")   Wt 67.4 kg (148 lb 9.6 oz)   SpO2 98%   BMI 21.93 kg/m²    SpO2 Percentage    12/06/24 1030   SpO2: 98%        BMI is within normal parameters. No other follow-up for BMI required.      Physical Exam  HENT:      Right Ear: External ear normal.      Left Ear: External ear normal.      Nose: Congestion present.      Mouth/Throat:      Mouth: Mucous membranes are moist.   Eyes:      Pupils: Pupils are equal, round, and reactive to light.   Cardiovascular:      Rate and Rhythm: Normal rate and regular rhythm.      Pulses: Normal pulses.      Heart sounds: Normal heart sounds.   Pulmonary:      Effort: Pulmonary effort is normal.   Abdominal:      Palpations: Abdomen is soft.   Musculoskeletal:         General: Normal range of motion.   Skin:     General: Skin is warm.   Neurological:      General: No focal deficit present.      Mental Status: He is alert and oriented to person, place, and time.   Psychiatric:         Mood and Affect: Mood normal.         Behavior: Behavior normal.          Fall Risk Assessment:  Fall Risk Assessment was completed, and patient is at low risk for falls.    Assessment & Plan  Patient Active Problem List   Diagnosis    Epigastric pain    Gastroesophageal reflux disease    Chronic rhinitis    Type 2 diabetes mellitus with hyperglycemia, without long-term current use of insulin    Low back pain    Mixed hyperlipidemia    Hx of duodenal cancer, s/p Whipple 2004    Hx of bladder cancer, s/p \"scraping\", 2004    History of recent left nephrolithiasis s/p " ureteral stent placement     Benign essential HTN    Arthritis    History of duodenal cancer    Peptic ulcer    Taking multiple medications for chronic disease    Varicose veins of lower extremity with inflammation    Malignant neoplasm of urinary bladder    Degenerative joint disease (DJD) of lumbar spine    Spondylolisthesis of lumbar region    Spinal stenosis of lumbar region    Allergic rhinitis    Primary osteoarthritis involving multiple joints    Anxiety    Sacral pain       ICD-10-CM ICD-9-CM   1. Acute URI  J06.9 465.9     Diagnoses and all orders for this visit:    1. Acute URI (Primary)  -     azithromycin (Zithromax Z-Javed) 250 MG tablet; Take 2 tablets by mouth on day 1, then 1 tablet daily on days 2-5  Dispense: 6 tablet; Refill: 0       -- He presents today with cough and congestion.  COVID/flu testing declined in office.  I will start him on azithromycin.      Follow-up on file with Dr. Haynes.           This document has been electronically signed by JADE Pedraza  December 6, 2024 15:05 JADE Reynoso  [address] [place] [address]  [phone] (office)    Part of this note may be an electronic transcription/translation of spoken language to printed text using the Dragon Dictation System.

## 2025-02-12 ENCOUNTER — TELEPHONE (OUTPATIENT)
Age: 80
End: 2025-02-12

## 2025-02-12 DIAGNOSIS — I49.5 SSS (SICK SINUS SYNDROME): Primary | ICD-10-CM

## 2025-02-12 NOTE — TELEPHONE ENCOUNTER
YOU HAD PREVIOUSLY SEEN PATIENT AT [facility] AND REFERRED HIM TO DR LEA FOR SICK SINUS SYNDROME. CAN YOU PUT AN ORDER INTO [facility] SO WE CAN MAKE SURE IT GETS SENT OVER AND SCHEDULED?

## 2025-02-27 ENCOUNTER — OFFICE VISIT (OUTPATIENT)
Dept: FAMILY MEDICINE CLINIC | Facility: CLINIC | Age: 80
End: 2025-02-27
Payer: MEDICARE

## 2025-02-27 VITALS
SYSTOLIC BLOOD PRESSURE: 132 MMHG | HEART RATE: 72 BPM | TEMPERATURE: 98.5 F | DIASTOLIC BLOOD PRESSURE: 65 MMHG | BODY MASS INDEX: 21.8 KG/M2 | WEIGHT: 147.2 LBS | HEIGHT: 69 IN | OXYGEN SATURATION: 95 %

## 2025-02-27 DIAGNOSIS — R30.9 PAINFUL URINATION: ICD-10-CM

## 2025-02-27 DIAGNOSIS — U07.1 COVID-19 VIRUS INFECTION: Primary | ICD-10-CM

## 2025-02-27 DIAGNOSIS — R05.9 COUGH, UNSPECIFIED TYPE: ICD-10-CM

## 2025-02-27 LAB
BILIRUB BLD-MCNC: NEGATIVE MG/DL
CLARITY, POC: CLEAR
COLOR UR: YELLOW
EXPIRATION DATE: ABNORMAL
FLUAV AG UPPER RESP QL IA.RAPID: NOT DETECTED
FLUBV AG UPPER RESP QL IA.RAPID: NOT DETECTED
GLUCOSE UR STRIP-MCNC: NEGATIVE MG/DL
INTERNAL CONTROL: ABNORMAL
KETONES UR QL: NEGATIVE
LEUKOCYTE EST, POC: NEGATIVE
Lab: ABNORMAL
NITRITE UR-MCNC: NEGATIVE MG/ML
PH UR: 6 [PH] (ref 5–8)
PROT UR STRIP-MCNC: NEGATIVE MG/DL
RBC # UR STRIP: NEGATIVE /UL
SARS-COV-2 AG UPPER RESP QL IA.RAPID: DETECTED
SP GR UR: 1.01 (ref 1–1.03)
UROBILINOGEN UR QL: NORMAL

## 2025-02-27 PROCEDURE — 81002 URINALYSIS NONAUTO W/O SCOPE: CPT | Performed by: INTERNAL MEDICINE

## 2025-02-27 PROCEDURE — 1160F RVW MEDS BY RX/DR IN RCRD: CPT | Performed by: INTERNAL MEDICINE

## 2025-02-27 PROCEDURE — 99213 OFFICE O/P EST LOW 20 MIN: CPT | Performed by: INTERNAL MEDICINE

## 2025-02-27 PROCEDURE — 87428 SARSCOV & INF VIR A&B AG IA: CPT | Performed by: INTERNAL MEDICINE

## 2025-02-27 PROCEDURE — 1125F AMNT PAIN NOTED PAIN PRSNT: CPT | Performed by: INTERNAL MEDICINE

## 2025-02-27 PROCEDURE — 3075F SYST BP GE 130 - 139MM HG: CPT | Performed by: INTERNAL MEDICINE

## 2025-02-27 PROCEDURE — 1159F MED LIST DOCD IN RCRD: CPT | Performed by: INTERNAL MEDICINE

## 2025-02-27 PROCEDURE — 3078F DIAST BP <80 MM HG: CPT | Performed by: INTERNAL MEDICINE

## 2025-02-27 RX ORDER — BENZONATATE 200 MG/1
200 CAPSULE ORAL 3 TIMES DAILY PRN
Qty: 30 CAPSULE | Refills: 0 | Status: SHIPPED | OUTPATIENT
Start: 2025-02-27

## 2025-02-27 NOTE — PROGRESS NOTES
Patient Name: Jean Noriega Today's Date: 2025   Patient MRN / CSN: [id number] / [id number] Date of Encounter: 2025   Patient Age / : 80 y.o. / 1945 Encounter Provider: Fannie Haynes DO   Referring Physician: No ref. provider found          Jean is a 80 y.o. male who is being seen today for Cough (Started coughing last night. Very little production, unknown color of sputum. ), Headache (Headache started last night. ), and Difficulty Urinating (Painful urination since this morning. )      Cough  This is a new problem. The current episode started yesterday. The cough is Productive of sputum. Associated symptoms include headaches. Pertinent negatives include no chest pain, fever, shortness of breath or wheezing.       Allergies include:Percocet [oxycodone-acetaminophen]  Current Outpatient Medications   Medication Sig Dispense Refill    acetaminophen (TYLENOL) 500 MG tablet Take 1 tablet by mouth.      ASPIRIN 81 PO aspirin 81 mg tablet      atorvastatin (LIPITOR) 40 MG tablet Take 1 tablet by mouth Daily. QOD      Blood Glucose Monitoring Suppl (Blood Glucose Monitor System) w/Device kit 1 Device Daily. 1 each 0    Creon 41391-520607 units capsule delayed-release particles capsule TAKE 3 CAPSULES BY MOUTH BEFORE MEAL(S) AND 1 CAPSULE BEFORE SNACKS      fluticasone (FLONASE) 50 MCG/ACT nasal spray USE 2 SPRAY(S) IN EACH NOSTRIL DAILY AS DIRECTED 16 g 3    glimepiride (AMARYL) 2 MG tablet Take 1 tablet by mouth Every Morning Before Breakfast. 90 tablet 1    glucose blood test strip 1 each by Other route Daily. Use as instructed 100 each 11    glucose blood test strip Use as instructed 100 each 12    losartan (COZAAR) 50 MG tablet Take 1 tablet by mouth Daily. 90 tablet 1    metFORMIN (GLUCOPHAGE) 1000 MG tablet Take 1 tablet by mouth 2 (Two) Times a Day With Meals. 180 tablet 1    multivitamin (ONE DAILY MULTIPLE VITAMIN PO) Take 1 tablet by mouth Daily.      pantoprazole (PROTONIX) 40 MG EC  tablet Take 1 tablet by mouth 2 (Two) Times a Day. 180 tablet 1    ursodiol (ACTIGALL) 300 MG capsule Take 2 capsules by mouth 2 (Two) Times a Day.      benzonatate (TESSALON) 200 MG capsule Take 1 capsule by mouth 3 (Three) Times a Day As Needed for Cough. 30 capsule 0    Nirmatrelvir & Ritonavir, 300mg/100mg, (PAXLOVID) Take 3 tablets by mouth 2 (Two) Times a Day. 30 tablet 0     No current facility-administered medications for this visit.     Past Medical History:   Diagnosis Date    Anemia, recent acute blood loss 3/30/2017    ALB anemia in March after ureteral stent removed    Bladder cancer     Diabetes mellitus     Duodenal cancer     Dyspnea on exertion 2/1/2016    Foreign body (FB) in soft tissue 2/26/2019    Gallstones     History of recent left nephrolithiasis s/p ureteral stent placement  3/30/2017    2/17/17 - ureteral stent placed at [facility], s/p stent and stone removal 2/13/17    Hyperlipidemia     Kidney stone     Kidney stone on left side 02/2017    Nephrolithiasis     Pneumonia      Family History   Problem Relation Age of Onset    Heart disease Father     Arthritis Sister     Hypertension Brother     Diabetes Brother      Past Surgical History:   Procedure Laterality Date    CYSTOSCOPY W/ URETERAL STENT REMOVAL Left 03/13/2017    EYE MUSCLE SURGERY      HERNIA REPAIR      INTERVENTIONAL RADIOLOGY PROCEDURE Right 03/31/2017    Procedure: PTC & Drain placement;  Surgeon: Serjio Wall MD;  Location: [facility] INVASIVE LOCATION;  Service:     JOINT ASPIRATION AND/OR INJECTION Right     right hand    LIVER SURGERY  06/10/2019    URETERAL STENT INSERTION Left     WHIPPLE PROCEDURE  2004     Social History     Substance and Sexual Activity   Alcohol Use No     Social History     Tobacco Use   Smoking Status Former    Current packs/day: 0.00    Average packs/day: 1 pack/day for 5.0 years (5.0 ttl pk-yrs)    Types: Cigarettes, Cigars, Pipe    Start date: 1970    Quit date: 1975    Years since  "quittin.1   Smokeless Tobacco Current    Types: Snuff     Social History     Substance and Sexual Activity   Drug Use No     Review of Systems   Constitutional:  Negative for fever.   HENT:  Positive for congestion.    Respiratory:  Negative for shortness of breath and wheezing.    Cardiovascular:  Negative for chest pain.   Gastrointestinal:  Negative for diarrhea, nausea and vomiting.   Neurological:  Positive for headaches.        Depression Assessment Review:  PHQ-9 Total Score:    Vital Signs & Measurements Taken This Encounter  /65 (BP Location: Left arm, Patient Position: Sitting, Cuff Size: Adult)   Pulse 72   Temp 98.5 °F (36.9 °C) (Oral)   Ht 175.3 cm (69\")   Wt 66.8 kg (147 lb 3.2 oz)   SpO2 95%   BMI 21.74 kg/m²    SpO2 Percentage    25 1653   SpO2: 95%        BMI is within normal parameters. No other follow-up for BMI required.      Physical Exam  Vitals reviewed.   Constitutional:       General: He is not in acute distress.     Appearance: He is ill-appearing. He is not diaphoretic.   HENT:      Head: Normocephalic and atraumatic.      Mouth/Throat:      Comments: Patient is hoarse  Eyes:      General: No scleral icterus.     Extraocular Movements: Extraocular movements intact.      Conjunctiva/sclera: Conjunctivae normal.      Pupils: Pupils are equal, round, and reactive to light.   Cardiovascular:      Rate and Rhythm: Normal rate and regular rhythm.   Pulmonary:      Effort: Pulmonary effort is normal. No respiratory distress.      Breath sounds: Normal breath sounds.      Comments: Congested cough without wheezing or rhonchi  Musculoskeletal:         General: No swelling.      Cervical back: Neck supple. No tenderness.   Skin:     General: Skin is warm and dry.      Coloration: Skin is not jaundiced.   Neurological:      Mental Status: He is alert.   Psychiatric:         Mood and Affect: Mood normal.         Behavior: Behavior normal.              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    Hx of bladder cancer, s/p \"scraping\", 2004    History of recent left nephrolithiasis s/p ureteral stent placement     Benign essential HTN    Arthritis    History of duodenal cancer    Peptic ulcer    Taking multiple medications for chronic disease    Varicose veins of lower extremity with inflammation    Malignant neoplasm of urinary bladder    Degenerative joint disease (DJD) of lumbar spine    Spondylolisthesis of lumbar region    Spinal stenosis of lumbar region    Allergic rhinitis    Primary osteoarthritis involving multiple joints    Anxiety    Sacral pain       ICD-10-CM ICD-9-CM   1. COVID-19 virus infection  U07.1 079.89   2. Painful urination  R30.9 788.1   3. Cough, unspecified type  R05.9 786.2     Diagnoses and all orders for this visit:    1. COVID-19 virus infection (Primary)  -     POCT SARS-CoV-2 Antigen JESSI + Flu  -     Nirmatrelvir & Ritonavir, 300mg/100mg, (PAXLOVID); Take 3 tablets by mouth 2 (Two) Times a Day.  Dispense: 30 tablet; Refill: 0    2. Painful urination  -     POC Urinalysis Dipstick    3. Cough, unspecified type  -     POCT SARS-CoV-2 Antigen JESSI + Flu  -     benzonatate (TESSALON) 200 MG capsule; Take 1 capsule by mouth 3 (Three) Times a Day As Needed for Cough.  Dispense: 30 capsule; Refill: 0         Meds Ordered During Visit:  New Medications Ordered This Visit   Medications    Nirmatrelvir & Ritonavir, 300mg/100mg, (PAXLOVID)     Sig: Take 3 tablets by mouth 2 (Two) Times a Day.     Dispense:  30 tablet     Refill:  0     If unable to break a box, update sig with remaining and discarded doses and dispense full box.     Order Specific Question:   I have reviewed the patient's medication list prior to " prescribing Paxlovid due to the risk of serious adverse reactions secondary to drug interactions. I will consult with pharmacy, if needed     Answer:   Patient's medication list reviewed    benzonatate (TESSALON) 200 MG capsule     Sig: Take 1 capsule by mouth 3 (Three) Times a Day As Needed for Cough.     Dispense:  30 capsule     Refill:  0     Jean was positive for COVID and negative for flu on today's evaluation.  I discussed this with him in detail today.  His urine analysis was normal.  I explained that the dysuria he was having may be due to dehydration and encouraged him to hydrate well.  He is agreeable with this.  I recommended quarantining for 5 days per CDC guidelines.  I also discussed practicing deep breathing exercises, ambulating every hour while awake, and hydrating well.  I discussed Paxlovid and Tessalon Perles with patient also.  I explained that Jean should hold atorvastatin while taking Paxlovid.  He is agreeable with plan.  We will plan to follow-up for routine appointment next month as previously arranged, or certainly sooner if needed.    Return if symptoms worsen or fail to improve, for Next scheduled follow up.          Referring Provider (if known): No ref. provider found      This document has been electronically signed by Fannie Haynes DO  February 28, 2025 09:50 EST    Fannie Haynes DO, [facility]  [address] [place] [address] [phone] (office)    Part of this note may be an electronic transcription/translation of spoken language to printed text using the Dragon Dictation System.

## 2025-03-05 ENCOUNTER — TELEPHONE (OUTPATIENT)
Dept: FAMILY MEDICINE CLINIC | Facility: CLINIC | Age: 80
End: 2025-03-05
Payer: MEDICARE

## 2025-03-05 DIAGNOSIS — J01.90 ACUTE SINUSITIS, RECURRENCE NOT SPECIFIED, UNSPECIFIED LOCATION: Primary | ICD-10-CM

## 2025-03-05 RX ORDER — DOXYCYCLINE 100 MG/1
100 CAPSULE ORAL 2 TIMES DAILY
Qty: 14 CAPSULE | Refills: 0 | Status: SHIPPED | OUTPATIENT
Start: 2025-03-05

## 2025-03-05 NOTE — TELEPHONE ENCOUNTER
Jean had COVID last week. The acute viral infection should have passed but likely developed secondary sinus infection. I will send in doxycycline for sinusitis.  Encourage him to reach out if symptoms are persistent.

## 2025-03-05 NOTE — TELEPHONE ENCOUNTER
Caller: Jean Noriega    Relationship: Self    Best call back number: [phone]     What medication are you requesting: MEDICATION FOR SYMPTOMS     What are your current symptoms: HEADACHE, SINUS BURNING    How long have you been experiencing symptoms: SINCE 2/27    Have you had these symptoms before:    [x] Yes  [] No    Have you been treated for these symptoms before:   [x] Yes  [] No    If a prescription is needed, what is your preferred pharmacy and phone number: [address] [phone] [facility] [phone]      Additional notes: PLEASE CALL PATIENT TO FURTHER DISCUSS POTENTIAL TREATMENT.

## 2025-03-12 DIAGNOSIS — E11.65 TYPE 2 DIABETES MELLITUS WITH HYPERGLYCEMIA, WITHOUT LONG-TERM CURRENT USE OF INSULIN: ICD-10-CM

## 2025-03-12 RX ORDER — BLOOD SUGAR DIAGNOSTIC
1 STRIP MISCELLANEOUS DAILY
Qty: 100 EACH | Refills: 5 | Status: SHIPPED | OUTPATIENT
Start: 2025-03-12

## 2025-03-26 ENCOUNTER — OFFICE VISIT (OUTPATIENT)
Dept: FAMILY MEDICINE CLINIC | Facility: CLINIC | Age: 80
End: 2025-03-26
Payer: MEDICARE

## 2025-03-26 VITALS
DIASTOLIC BLOOD PRESSURE: 60 MMHG | BODY MASS INDEX: 21.56 KG/M2 | HEIGHT: 69 IN | OXYGEN SATURATION: 98 % | TEMPERATURE: 98.1 F | HEART RATE: 64 BPM | SYSTOLIC BLOOD PRESSURE: 124 MMHG | WEIGHT: 145.6 LBS

## 2025-03-26 DIAGNOSIS — M15.0 PRIMARY OSTEOARTHRITIS INVOLVING MULTIPLE JOINTS: ICD-10-CM

## 2025-03-26 DIAGNOSIS — E11.65 TYPE 2 DIABETES MELLITUS WITH HYPERGLYCEMIA, WITHOUT LONG-TERM CURRENT USE OF INSULIN: Primary | Chronic | ICD-10-CM

## 2025-03-26 DIAGNOSIS — E78.2 MIXED HYPERLIPIDEMIA: Chronic | ICD-10-CM

## 2025-03-26 DIAGNOSIS — I10 BENIGN ESSENTIAL HTN: Chronic | ICD-10-CM

## 2025-03-26 LAB
ALBUMIN SERPL-MCNC: 3.7 G/DL (ref 3.5–5.2)
ALBUMIN UR-MCNC: <1.2 MG/DL
ALBUMIN/GLOB SERPL: 1.7 G/DL
ALP SERPL-CCNC: 101 U/L (ref 39–117)
ALT SERPL W P-5'-P-CCNC: 26 U/L (ref 1–41)
ANION GAP SERPL CALCULATED.3IONS-SCNC: 9.2 MMOL/L (ref 5–15)
AST SERPL-CCNC: 34 U/L (ref 1–40)
BILIRUB SERPL-MCNC: 0.5 MG/DL (ref 0–1.2)
BUN SERPL-MCNC: 15 MG/DL (ref 8–23)
BUN/CREAT SERPL: 13.3 (ref 7–25)
CALCIUM SPEC-SCNC: 8.9 MG/DL (ref 8.6–10.5)
CHLORIDE SERPL-SCNC: 102 MMOL/L (ref 98–107)
CHOLEST SERPL-MCNC: 114 MG/DL (ref 0–200)
CK SERPL-CCNC: 177 U/L (ref 20–200)
CO2 SERPL-SCNC: 24.8 MMOL/L (ref 22–29)
CREAT SERPL-MCNC: 1.13 MG/DL (ref 0.76–1.27)
CREAT UR-MCNC: 115.8 MG/DL
DEPRECATED RDW RBC AUTO: 43.9 FL (ref 37–54)
EGFRCR SERPLBLD CKD-EPI 2021: 66.5 ML/MIN/1.73
ERYTHROCYTE [DISTWIDTH] IN BLOOD BY AUTOMATED COUNT: 12.3 % (ref 12.3–15.4)
GLOBULIN UR ELPH-MCNC: 2.2 GM/DL
GLUCOSE SERPL-MCNC: 238 MG/DL (ref 65–99)
HBA1C MFR BLD: 8.1 % (ref 4.8–5.6)
HCT VFR BLD AUTO: 41.2 % (ref 37.5–51)
HDLC SERPL-MCNC: 58 MG/DL (ref 40–60)
HGB BLD-MCNC: 13.4 G/DL (ref 13–17.7)
LDLC SERPL CALC-MCNC: 40 MG/DL (ref 0–100)
LDLC/HDLC SERPL: 0.7 {RATIO}
MCH RBC QN AUTO: 31.8 PG (ref 26.6–33)
MCHC RBC AUTO-ENTMCNC: 32.5 G/DL (ref 31.5–35.7)
MCV RBC AUTO: 97.6 FL (ref 79–97)
MICROALBUMIN/CREAT UR: NORMAL MG/G{CREAT}
PLATELET # BLD AUTO: 225 10*3/MM3 (ref 140–450)
PMV BLD AUTO: 10.3 FL (ref 6–12)
POTASSIUM SERPL-SCNC: 4.6 MMOL/L (ref 3.5–5.2)
PROT SERPL-MCNC: 5.9 G/DL (ref 6–8.5)
RBC # BLD AUTO: 4.22 10*6/MM3 (ref 4.14–5.8)
SODIUM SERPL-SCNC: 136 MMOL/L (ref 136–145)
TRIGL SERPL-MCNC: 78 MG/DL (ref 0–150)
TSH SERPL DL<=0.05 MIU/L-ACNC: 1.51 UIU/ML (ref 0.27–4.2)
VLDLC SERPL-MCNC: 16 MG/DL (ref 5–40)
WBC NRBC COR # BLD AUTO: 9.55 10*3/MM3 (ref 3.4–10.8)

## 2025-03-26 PROCEDURE — 83036 HEMOGLOBIN GLYCOSYLATED A1C: CPT | Performed by: INTERNAL MEDICINE

## 2025-03-26 PROCEDURE — 82550 ASSAY OF CK (CPK): CPT | Performed by: INTERNAL MEDICINE

## 2025-03-26 PROCEDURE — 80053 COMPREHEN METABOLIC PANEL: CPT | Performed by: INTERNAL MEDICINE

## 2025-03-26 PROCEDURE — 82570 ASSAY OF URINE CREATININE: CPT | Performed by: INTERNAL MEDICINE

## 2025-03-26 PROCEDURE — 85027 COMPLETE CBC AUTOMATED: CPT | Performed by: INTERNAL MEDICINE

## 2025-03-26 PROCEDURE — 80061 LIPID PANEL: CPT | Performed by: INTERNAL MEDICINE

## 2025-03-26 PROCEDURE — 82043 UR ALBUMIN QUANTITATIVE: CPT | Performed by: INTERNAL MEDICINE

## 2025-03-26 PROCEDURE — 84443 ASSAY THYROID STIM HORMONE: CPT | Performed by: INTERNAL MEDICINE

## 2025-03-26 RX ORDER — LOSARTAN POTASSIUM 25 MG/1
25 TABLET ORAL DAILY
Qty: 90 TABLET | Refills: 1 | Status: SHIPPED | OUTPATIENT
Start: 2025-03-26

## 2025-03-26 NOTE — PROGRESS NOTES
Patient Name: Jean Noriega Today's Date: 3/26/2025   Patient MRN / CSN: [id number] / [id number] Date of Encounter: 3/26/2025   Patient Age / : 80 y.o. / 1945 Encounter Provider: Fannie Haynes DO   Referring Physician: No ref. provider found          Jean is a 80 y.o. male who is being seen today for Follow-up (He is doing good today.  No issues or concerns.), Hyperlipidemia, Hypertension (He has been checking at home and states his blood pressure has been running around 130/66/), Diabetes (He is checking at home.  He states his blood sugar has been running around 130 mg/dL.), and Hand Pain (He states he installed a fence for his dog and has had a bi-lateral hand arthritis flare up.)      Hyperlipidemia  This is a chronic problem. The current episode started more than 1 year ago. The problem is controlled. Exacerbating diseases include diabetes. Pertinent negatives include no chest pain or shortness of breath. Current antihyperlipidemic treatment includes statins. The current treatment provides significant improvement of lipids. There are no compliance problems.    Hypertension  This is a chronic problem. The current episode started more than 1 year ago. The problem has been stable since onset. The problem is controlled. Pertinent negatives include no chest pain or shortness of breath. Current antihypertension treatment includes angiotensin blockers (Blood pressure has been dropping too low at 50 mg.  Often times he takes 25 mg daily instead, and sometimes holds it altogether). The current treatment provides significant improvement. There are no compliance problems.    Diabetes  He presents for his follow-up diabetic visit. He has type 2 diabetes mellitus. His disease course has been stable. (No hypoglycemia recently) Pertinent negatives for diabetes include no chest pain. Current diabetic treatment includes oral agent (monotherapy). He is compliant with treatment most of the time. An ACE  inhibitor/angiotensin II receptor blocker is being taken.       Allergies include:Percocet [oxycodone-acetaminophen]  Current Outpatient Medications   Medication Sig Dispense Refill    acetaminophen (TYLENOL) 500 MG tablet Take 1 tablet by mouth.      ASPIRIN 81 PO aspirin 81 mg tablet      atorvastatin (LIPITOR) 40 MG tablet Take 1 tablet by mouth Daily. QOD      Blood Glucose Monitoring Suppl (Blood Glucose Monitor System) w/Device kit 1 Device Daily. 1 each 0    Creon [id number] units capsule delayed-release particles capsule TAKE 3 CAPSULES BY MOUTH BEFORE MEAL(S) AND 1 CAPSULE BEFORE SNACKS      fluticasone (FLONASE) 50 MCG/ACT nasal spray USE 2 SPRAY(S) IN EACH NOSTRIL DAILY AS DIRECTED 16 g 3    glimepiride (AMARYL) 2 MG tablet Take 1 tablet by mouth Every Morning Before Breakfast. 90 tablet 1    glucose blood (Accu-Chek Guide Test) test strip USE 1 STRIP TO CHECK GLUCOSE ONCE DAILY 100 each 5    glucose blood test strip 1 each by Other route Daily. Use as instructed 100 each 11    glucose blood test strip Use as instructed 100 each 12    losartan (COZAAR) 25 MG tablet Take 1 tablet by mouth Daily. 90 tablet 1    metFORMIN (GLUCOPHAGE) 1000 MG tablet Take 1 tablet by mouth 2 (Two) Times a Day With Meals. 180 tablet 1    multivitamin (ONE DAILY MULTIPLE VITAMIN PO) Take 1 tablet by mouth Daily.      pantoprazole (PROTONIX) 40 MG EC tablet Take 1 tablet by mouth 2 (Two) Times a Day. 180 tablet 1    ursodiol (ACTIGALL) 300 MG capsule Take 2 capsules by mouth 2 (Two) Times a Day.       No current facility-administered medications for this visit.     Past Medical History:   Diagnosis Date    Anemia, recent acute blood loss 3/30/2017    ALB anemia in March after ureteral stent removed    Bladder cancer     Diabetes mellitus     Duodenal cancer     Dyspnea on exertion 2/1/2016    Foreign body (FB) in soft tissue 2/26/2019    Gallstones     History of recent left nephrolithiasis s/p ureteral stent placement   "3/30/2017    2/17/17 - ureteral stent placed at [facility], s/p stent and stone removal 17    Hyperlipidemia     Kidney stone     Kidney stone on left side 2017    Nephrolithiasis     Pneumonia      Family History   Problem Relation Age of Onset    Heart disease Father     Arthritis Sister     Hypertension Brother     Diabetes Brother      Past Surgical History:   Procedure Laterality Date    CYSTOSCOPY W/ URETERAL STENT REMOVAL Left 2017    EYE MUSCLE SURGERY      HERNIA REPAIR      INTERVENTIONAL RADIOLOGY PROCEDURE Right 2017    Procedure: PTC & Drain placement;  Surgeon: Serjio Wall MD;  Location: [facility] INVASIVE LOCATION;  Service:     JOINT ASPIRATION AND/OR INJECTION Right     right hand    LIVER SURGERY  06/10/2019    URETERAL STENT INSERTION Left     WHIPPLE PROCEDURE       Social History     Substance and Sexual Activity   Alcohol Use No     Social History     Tobacco Use   Smoking Status Former    Current packs/day: 0.00    Average packs/day: 1 pack/day for 5.0 years (5.0 ttl pk-yrs)    Types: Cigarettes, Cigars, Pipe    Start date:     Quit date:     Years since quittin.2   Smokeless Tobacco Current    Types: Snuff     Social History     Substance and Sexual Activity   Drug Use No     Review of Systems   Respiratory:  Negative for shortness of breath.    Cardiovascular:  Negative for chest pain.   Gastrointestinal:  Negative for blood in stool.   Genitourinary:  Positive for decreased urine volume. Negative for hematuria and urgency.   Musculoskeletal:  Positive for arthralgias.        Hand pain bilaterally from arthritis since working out in the yard        Depression Assessment Review:  PHQ-9 Total Score:    Vital Signs & Measurements Taken This Encounter  /60 (BP Location: Left arm, Patient Position: Sitting, Cuff Size: Adult)   Pulse 64   Temp 98.1 °F (36.7 °C) (Oral)   Ht 175.3 cm (69\")   Wt 66 kg (145 lb 9.6 oz)   SpO2 98%   BMI 21.50 " "kg/m²    SpO2 Percentage    03/26/25 0847   SpO2: 98%        BMI is within normal parameters. No other follow-up for BMI required.      Physical Exam  Vitals reviewed.   Constitutional:       General: He is not in acute distress.  HENT:      Head: Normocephalic and atraumatic.      Right Ear: Tympanic membrane normal.      Left Ear: Tympanic membrane normal.   Eyes:      General: No scleral icterus.     Extraocular Movements: Extraocular movements intact.      Conjunctiva/sclera: Conjunctivae normal.      Pupils: Pupils are equal, round, and reactive to light.   Cardiovascular:      Rate and Rhythm: Normal rate and regular rhythm.   Pulmonary:      Effort: Pulmonary effort is normal. No respiratory distress.      Breath sounds: Normal breath sounds. No wheezing or rhonchi.   Musculoskeletal:         General: No swelling.      Cervical back: Neck supple. No tenderness.      Comments: Heberden's and Cherelle's nodes of the hands bilaterally.  No synovitis of the hands noted on today's exam.   Lymphadenopathy:      Cervical: No cervical adenopathy.   Skin:     General: Skin is warm and dry.      Coloration: Skin is not jaundiced.   Neurological:      Mental Status: He is alert.   Psychiatric:         Mood and Affect: Mood normal.         Behavior: Behavior normal.         Thought Content: Thought content normal.         Judgment: Judgment normal.              Assessment & Plan  Patient Active Problem List   Diagnosis    Epigastric pain    Gastroesophageal reflux disease    Chronic rhinitis    Type 2 diabetes mellitus with hyperglycemia, without long-term current use of insulin    Low back pain    Mixed hyperlipidemia    Hx of bladder cancer, s/p \"scraping\", 2004    History of recent left nephrolithiasis s/p ureteral stent placement     Benign essential HTN    Arthritis    History of duodenal cancer    Peptic ulcer    Taking multiple medications for chronic disease    Varicose veins of lower extremity with inflammation "    Malignant neoplasm of urinary bladder    Degenerative joint disease (DJD) of lumbar spine    Spondylolisthesis of lumbar region    Spinal stenosis of lumbar region    Allergic rhinitis    Primary osteoarthritis involving multiple joints    Anxiety    Sacral pain       ICD-10-CM ICD-9-CM   1. Type 2 diabetes mellitus with hyperglycemia, without long-term current use of insulin  E11.65 250.00     790.29   2. Benign essential HTN  I10 401.1   3. Mixed hyperlipidemia  E78.2 272.2   4. Primary osteoarthritis involving multiple joints  M15.0 715.98     Diagnoses and all orders for this visit:    1. Type 2 diabetes mellitus with hyperglycemia, without long-term current use of insulin (Primary)  -     Comprehensive Metabolic Panel  -     Hemoglobin A1c  -     Lipid Panel  -     Microalbumin / Creatinine Urine Ratio - Urine, Clean Catch  -     metFORMIN (GLUCOPHAGE) 1000 MG tablet; Take 1 tablet by mouth 2 (Two) Times a Day With Meals.  Dispense: 180 tablet; Refill: 1    2. Benign essential HTN  -     CBC (No Diff)  -     Comprehensive Metabolic Panel  -     Lipid Panel  -     TSH  -     losartan (COZAAR) 25 MG tablet; Take 1 tablet by mouth Daily.  Dispense: 90 tablet; Refill: 1    3. Mixed hyperlipidemia  -     Comprehensive Metabolic Panel  -     Lipid Panel  -     CK    4. Primary osteoarthritis involving multiple joints         Meds Ordered During Visit:  New Medications Ordered This Visit   Medications    losartan (COZAAR) 25 MG tablet     Sig: Take 1 tablet by mouth Daily.     Dispense:  90 tablet     Refill:  1    metFORMIN (GLUCOPHAGE) 1000 MG tablet     Sig: Take 1 tablet by mouth 2 (Two) Times a Day With Meals.     Dispense:  180 tablet     Refill:  1       I recommended decreasing losartan from 50 mg daily to 25 mg daily.  Patient has been taking the lowered dose at home more consistently due to hypotension.  We will continue other medicines as previously prescribed.  Refill sent in as above.  Will also update  labs today as above.  Patient declined foot exam today.  Voltaren gel was discussed for him to try over-the-counter for his hand pain.    Return in about 6 months (around 9/26/2025), or if symptoms worsen or fail to improve, for Recheck, Medicare Wellness.          Referring Provider (if known): No ref. provider found      This document has been electronically signed by Fannie Haynes DO  March 26, 2025 09:21 EDT    Fannie Haynes DO, [facility]  [address] [place], [place] [id number] [phone] (office)    Part of this note may be an electronic transcription/translation of spoken language to printed text using the Dragon Dictation System.

## 2025-03-26 NOTE — PROGRESS NOTES
Venipuncture Blood Specimen Collection  Venipuncture performed in left antecubital by Miguel Bland with good hemostasis. Patient tolerated the procedure well without complications.   03/26/25   Miguel Bland

## 2025-03-28 ENCOUNTER — OFFICE VISIT (OUTPATIENT)
Dept: CARDIOLOGY | Facility: CLINIC | Age: 80
End: 2025-03-28
Payer: MEDICARE

## 2025-03-28 VITALS
HEIGHT: 69 IN | WEIGHT: 146 LBS | DIASTOLIC BLOOD PRESSURE: 60 MMHG | OXYGEN SATURATION: 97 % | HEART RATE: 59 BPM | BODY MASS INDEX: 21.62 KG/M2 | SYSTOLIC BLOOD PRESSURE: 152 MMHG

## 2025-03-28 DIAGNOSIS — R55 VASOVAGAL SYNCOPE: Primary | ICD-10-CM

## 2025-03-28 PROCEDURE — 3078F DIAST BP <80 MM HG: CPT | Performed by: INTERNAL MEDICINE

## 2025-03-28 PROCEDURE — 99204 OFFICE O/P NEW MOD 45 MIN: CPT | Performed by: INTERNAL MEDICINE

## 2025-03-28 PROCEDURE — 3077F SYST BP >= 140 MM HG: CPT | Performed by: INTERNAL MEDICINE

## 2025-03-28 RX ORDER — CHLORHEXIDINE GLUCONATE 4 %
1 LIQUID (ML) TOPICAL NIGHTLY PRN
COMMUNITY

## 2025-03-28 NOTE — PROGRESS NOTES
Cardiac Electrophysiology Outpatient Consult Note            [facility]    Consult Note     Jean Noriega  [id number]  03/28/2025  [phone]     Primary Care Physician: Fannie Haynes DO    Referred By: Cata Molina AP*    Subjective     Chief Complaint:   Diagnoses and all orders for this visit:    1. Vasovagal syncope (Primary)      Chief Complaint   Patient presents with    SSS ( sick sinus syndrome )       History of Present Illness:   Jean Noriega is a 80 y.o. male who presents to my electrophysiology clinic for evaluation of above.    3 episodes of syncope.  All occurred after exerting himself in the [place]  He felt nauseated sweaty diaphoretic had to go to the bathroom had diarrhea.  All of these occasions were then after passing out followed by periods of exhaustion that lasted for a few hours.  He had not had much fluid or food intake prior to this.  No sudden loss of consciousness no physical injury.      Past Medical History:   Past Medical History:   Diagnosis Date    Anemia, recent acute blood loss 03/30/2017    ALB anemia in March after ureteral stent removed    Bladder cancer     Diabetes mellitus     Duodenal cancer     Dyspnea on exertion 02/01/2016    Foreign body (FB) in soft tissue 02/26/2019    Gallstones     History of recent left nephrolithiasis s/p ureteral stent placement  03/30/2017 2/17/17 - ureteral stent placed at [facility], s/p stent and stone removal 2/13/17    Hyperlipidemia     Hypertension     Kidney stone     Kidney stone on left side 02/2017    Nephrolithiasis     Pneumonia        Past Surgical History:   Past Surgical History:   Procedure Laterality Date    CARDIAC CATHETERIZATION  2025    CYSTOSCOPY W/ URETERAL STENT REMOVAL Left 03/13/2017    EYE MUSCLE SURGERY      HERNIA REPAIR      INTERVENTIONAL RADIOLOGY PROCEDURE Right 03/31/2017    Procedure: PTC & Drain placement;   Surgeon: Serjio Wall MD;  Location:  [facility];  Service:     JOINT ASPIRATION AND/OR INJECTION Right     right hand    LIVER SURGERY  06/10/2019    URETERAL STENT INSERTION Left     WHIPPLE PROCEDURE         Family History:   Family History   Problem Relation Age of Onset    Pneumonia Mother     Heart failure Father     Arthritis Sister     Lung cancer Sister     No Known Problems Sister     Hypertension Brother     Diabetes Brother        Social History:   Social History     Socioeconomic History    Marital status:    Tobacco Use    Smoking status: Former     Current packs/day: 0.00     Average packs/day: 1 pack/day for 15.0 years (15.0 ttl pk-yrs)     Types: Cigarettes, Pipe, Cigars     Start date: 1970     Quit date:      Years since quittin.2    Smokeless tobacco: Current     Types: Snuff   Vaping Use    Vaping status: Never Used   Substance and Sexual Activity    Alcohol use: Never    Drug use: Never    Sexual activity: Not Currently     Partners: Female     Birth control/protection: Abstinence       Medications:     Current Outpatient Medications:     acetaminophen (TYLENOL) 500 MG tablet, Take 1 tablet by mouth Every 6 (Six) Hours As Needed., Disp: , Rfl:     Alum & Mag Hydroxide-Simeth (ANTACID & ANTIGAS PO), Take 1 capsule by mouth As Needed., Disp: , Rfl:     aspirin 81 MG EC tablet, Take 1 tablet by mouth Daily., Disp: , Rfl:     atorvastatin (LIPITOR) 40 MG tablet, Take 1 tablet by mouth Daily. QOD, Disp: , Rfl:     Blood Glucose Monitoring Suppl (Blood Glucose Monitor System) w/Device kit, 1 Device Daily., Disp: 1 each, Rfl: 0    Creon 12933-492691 units capsule delayed-release particles capsule, TAKE 3 CAPSULES BY MOUTH BEFORE MEAL(S) AND 1 CAPSULE BEFORE SNACKS, Disp: , Rfl:     fluticasone (FLONASE) 50 MCG/ACT nasal spray, USE 2 SPRAY(S) IN EACH NOSTRIL DAILY AS DIRECTED, Disp: 16 g, Rfl: 3    glimepiride (AMARYL) 2 MG tablet, Take 1 tablet by mouth  "Every Morning Before Breakfast., Disp: 90 tablet, Rfl: 1    glucose blood (Accu-Chek Guide Test) test strip, USE 1 STRIP TO CHECK GLUCOSE ONCE DAILY, Disp: 100 each, Rfl: 5    glucose blood test strip, 1 each by Other route Daily. Use as instructed, Disp: 100 each, Rfl: 11    glucose blood test strip, Use as instructed, Disp: 100 each, Rfl: 12    losartan (COZAAR) 25 MG tablet, Take 1 tablet by mouth Daily., Disp: 90 tablet, Rfl: 1    Melatonin 12 MG tablet, Take 1 capsule by mouth At Night As Needed., Disp: , Rfl:     metFORMIN (GLUCOPHAGE) 1000 MG tablet, Take 1 tablet by mouth 2 (Two) Times a Day With Meals., Disp: 180 tablet, Rfl: 1    multivitamin (ONE DAILY MULTIPLE VITAMIN PO), Take 1 tablet by mouth Daily., Disp: , Rfl:     pantoprazole (PROTONIX) 40 MG EC tablet, Take 1 tablet by mouth 2 (Two) Times a Day., Disp: 180 tablet, Rfl: 1    ursodiol (ACTIGALL) 300 MG capsule, Take 2 capsules by mouth 2 (Two) Times a Day., Disp: , Rfl:     Allergies:   Allergies   Allergen Reactions    Percocet [Oxycodone-Acetaminophen] Itching     ALL pain medicines like Percocet       Objective   Vital Signs:   Vitals:    03/28/25 1050   BP: 152/60   BP Location: Right arm   Patient Position: Sitting   Pulse: 59   SpO2: 97%   Weight: 66.2 kg (146 lb)   Height: 175.3 cm (69\")       PHYSICAL EXAM  General appearance: Awake, alert, cooperative  Head: Normocephalic, without obvious abnormality, atraumatic  Eyes: Conjunctivae/corneas clear, EOMs intact  Neck: no adenopathy, no carotid bruit, no JVD, and thyroid: not enlarged  Lungs: clear to auscultation bilaterally and no rhonchi or crackles\", ' symmetric  Heart: regular rate and rhythm, S1, S2 normal, no murmur, click, rub or gallop  Abdomen: Soft, non-tender, bowel sounds normal,  no organomegaly  Extremities: extremities normal, atraumatic, no cyanosis or edema  Skin: Skin color, turgor normal, no rashes or lesions  Neurologic: Grossly normal     Lab Results   Component Value " Date    GLUCOSE 238 (H) 03/26/2025    CALCIUM 8.9 03/26/2025     03/26/2025    K 4.6 03/26/2025    CO2 24.8 03/26/2025     03/26/2025    BUN 15 03/26/2025    CREATININE 1.13 03/26/2025    EGFRIFNONA 73 02/14/2022    BCR 13.3 03/26/2025    ANIONGAP 9.2 03/26/2025     Lab Results   Component Value Date    WBC 9.55 03/26/2025    HGB 13.4 03/26/2025    HCT 41.2 03/26/2025    MCV 97.6 (H) 03/26/2025     03/26/2025     Lab Results   Component Value Date    INR 1.09 03/31/2017    INR 0.99 03/30/2017    INR 0.94 03/20/2017    PROTIME 11.9 (H) 03/31/2017    PROTIME 10.8 03/30/2017    PROTIME 10.4 03/20/2017     Lab Results   Component Value Date    TSH 1.510 03/26/2025       Cardiac Testing:      I personally viewed and interpreted the patient's EKG/Telemetry/lab data    Procedures    Tobacco Cessation: N/A  Obstructive Sleep Apnea Screening: Completed    Advance Care Planning   ACP discussion was declined by the patient. Patient does not have an advance directive, declines further assistance.       Assessment & Plan    Diagnoses and all orders for this visit:    1. Vasovagal syncope (Primary)         Diagnosis Plan   1. Vasovagal syncope  Very pleasant 80-year-old gentleman with structurally normal heart by cardiac catheterization and 3 events of syncope all highly consistent with vasovagal process.  No evidence of any significant arrhythmia.  While he is asleep he does have some periods of vagally mediated AV block.  There is also very short runs of nonsustained VT.  He has been evaluated very carefully by his cardiologist Dr. Lua whom he has known for some years.  His ejection fraction is normal.  He does not have obstructive coronary disease.  His EKG does demonstrate a first-degree AV block and a right bundle branch block but apart from this is unremarkable.    No further workup or testing is required.  His prognosis is excellent.  He requires no procedures or medications.    He will follow-up with  his cardiologist Dr. Lua.    Patient has my contact information and will call me if he has any questions concerns or new issues.        Body mass index is 21.56 kg/m².    I spent 48 minutes in consultation with this patient which included more than 65% of this time in direct face-to-face counseling, physical examination and discussion of my assessment and findings and this shared decision making with the patient.  The remainder of the time not spent face-to-face was performing one, some or all of the following actions: preparing to see the patient (e.g. reviewing tests, prior clinicians' notes, etc), ordering medications, tests or procedures, coordination of care, discussion of the plan with other healthcare providers, documenting clinical information in epic as well as independently interpreting results and communication of these results to the patient family and/or caregiver(s).  Please note that this explicitly excludes time spent on other separate billable services such as performing procedures or test interpretation, when applicable.    Follow Up:       Thank you for allowing me to participate in the care of your patient. Please to not hesitate to contact me with additional questions or concerns.      Guerrero Gan DO, FACC, RS  Cardiac Electrophysiologist  [facility] / [facility]

## 2025-04-03 DIAGNOSIS — E11.65 TYPE 2 DIABETES MELLITUS WITH HYPERGLYCEMIA, WITHOUT LONG-TERM CURRENT USE OF INSULIN: Chronic | ICD-10-CM

## 2025-04-03 RX ORDER — GLIMEPIRIDE 2 MG/1
2 TABLET ORAL
Qty: 90 TABLET | Refills: 1 | Status: SHIPPED | OUTPATIENT
Start: 2025-04-03

## 2025-04-03 NOTE — TELEPHONE ENCOUNTER
Caller: Jean Noriega    Relationship: Self    Best call back number: [phone]     Requested Prescriptions:   Requested Prescriptions     Pending Prescriptions Disp Refills    glimepiride (AMARYL) 2 MG tablet 90 tablet 1     Sig: Take 1 tablet by mouth Every Morning Before Breakfast.        Pharmacy where request should be sent: [facility] [address] [place] [address] [phone] [facility] [phone]      Last office visit with prescribing clinician: 3/26/2025   Last telemedicine visit with prescribing clinician: Visit date not found   Next office visit with prescribing clinician: 9/29/2025     Additional details provided by patient:     Does the patient have less than a 3 day supply:  [x] Yes  [] No    Would you like a call back once the refill request has been completed: [] Yes [x] No    If the office needs to give you a call back, can they leave a voicemail: [] Yes [x] No    Trish Mendes Rep   04/03/25 10:18 EDT

## 2025-04-28 NOTE — ASSESSMENT & PLAN NOTE
Lipid abnormalities are stable    Plan:  Continue same medication/s without change.      Discussed medication dosage, use, side effects, and goals of treatment in detail.    Counseled patient on lifestyle modifications to help control hyperlipidemia.   Advised patient to exercise for 150 minutes weekly. (30 minute brisk walk, 5 days a week for example)    Patient Treatment Goals:   LDL goal is less than 55    Followup in 3 months.  Continue Lipitor 40 mg once a day.

## 2025-04-28 NOTE — ASSESSMENT & PLAN NOTE
Hypertension is stable and controlled  Continue current treatment regimen.  Ambulatory blood pressure monitoring.  Blood pressure will be reassessed in 3 months.  Continue losartan 25 mg daily.  Discussed with patient American College of cardiology and American Heart Association provide detailed guidelines for accurate blood pressure measurement.  Key steps for proper blood pressure measurement include:    Recommend being fully relaxed sitting in chair with feet on the floor and back supported for at least 5 minutes.  Avoid caffeine, exercise and smoking for at least 30 minutes before management.  Ensure empty bladder, remove clothing covering the location of the cuff placement using proper blood pressure equipment and the blood pressure cuff size should be corrected with bladder encircling 80% of the arm.  Repeat blood pressure if blood pressure is extremely high.  The ideal blood pressure is less than 120/80 on the average blood pressure should be less than 130/80.

## 2025-04-28 NOTE — PROGRESS NOTES
Cardiology Follow-Up Note     Name: Jean Noriega  :   1945  PCP: Fannie Haynes,   Date:   2025  Department: [facility] [place] [facility] CARDIOLOGY  [address]  [facility] [address]  Fax [phone]  Phone [phone]    Chief Complaint   Patient presents with    Hyperlipidemia    Hypertension   Problem list:  Syncope  2024 tilt table test positive for orthostatic hypotension negative for vasodepressive cardioinhibitory syncope.  2025 chest pain abnormal stress study/EF 55% by heart cath normal coronaries with normal LV function.  2024 echocardiogram EF 63%, mildly calcified aortic valve annulus and leaflets.  Mild mitral and tricuspid regurgitation  MCT monitoring 2024-2024 predominantly sinus rhythm with sinus bradycardia first-degree AV block/Mobitz type I block.  Rare PACs and PVCs plus IVCD.  2.  Chronic venous insufficiency   a.  2024 venous Doppler examination revealing deep reflux present bilaterally pathological reflexes and left GSV segment is too short for any treatment.  3.  Diabetes mellitus type 2  4.  Hypertension benign essential  5.  Hyperlipidemia    Subjective     History of Present Illness  Jean Noriega is a 80 y.o. male who presents today for follow-up on chronic condition. Patient states doing well, no chest pain or shortness of breath.    Past Medical History:   Diagnosis Date    Anemia, recent acute blood loss 2017    ALB anemia in March after ureteral stent removed    Bladder cancer     Diabetes mellitus     Duodenal cancer     Dyspnea on exertion 2016    Foreign body (FB) in soft tissue 2019    Gallstones     History of recent left nephrolithiasis s/p ureteral stent placement  2017 - ureteral stent placed at [facility], s/p stent and stone removal 17    Hyperlipidemia     Hypertension     Kidney stone     Kidney stone on left side 2017     Nephrolithiasis     Pneumonia       Past Surgical History:   Procedure Laterality Date    CARDIAC CATHETERIZATION  2025    CYSTOSCOPY W/ URETERAL STENT REMOVAL Left 03/13/2017    EYE MUSCLE SURGERY      HERNIA REPAIR      INTERVENTIONAL RADIOLOGY PROCEDURE Right 03/31/2017    Procedure: PTC & Drain placement;  Surgeon: Serjio Wall MD;  Location: [facility] INVASIVE LOCATION;  Service:     JOINT ASPIRATION AND/OR INJECTION Right     right hand    LIVER SURGERY  06/10/2019    URETERAL STENT INSERTION Left     WHIPPLE PROCEDURE  2004       Current Outpatient Medications:     Accu-Chek Softclix Lancets lancets, 1 each by Other route Daily., Disp: , Rfl:     acetaminophen (TYLENOL) 500 MG tablet, Take 1 tablet by mouth Every 6 (Six) Hours As Needed., Disp: , Rfl:     aspirin 81 MG EC tablet, Take 1 tablet by mouth Daily., Disp: , Rfl:     atorvastatin (LIPITOR) 40 MG tablet, Take 1 tablet by mouth Daily. QOD, Disp: , Rfl:     Blood Glucose Monitoring Suppl (Blood Glucose Monitor System) w/Device kit, 1 Device Daily., Disp: 1 each, Rfl: 0    Creon 97281-035014 units capsule delayed-release particles capsule, TAKE 3 CAPSULES BY MOUTH BEFORE MEAL(S) AND 1 CAPSULE BEFORE SNACKS, Disp: , Rfl:     fluticasone (FLONASE) 50 MCG/ACT nasal spray, USE 2 SPRAY(S) IN EACH NOSTRIL DAILY AS DIRECTED, Disp: 16 g, Rfl: 3    glimepiride (AMARYL) 2 MG tablet, Take 1 tablet by mouth Every Morning Before Breakfast., Disp: 90 tablet, Rfl: 1    glucose blood (Accu-Chek Guide Test) test strip, USE 1 STRIP TO CHECK GLUCOSE ONCE DAILY, Disp: 100 each, Rfl: 5    glucose blood test strip, 1 each by Other route Daily. Use as instructed, Disp: 100 each, Rfl: 11    losartan (COZAAR) 25 MG tablet, Take 1 tablet by mouth Daily. (Patient taking differently: Take 1 tablet by mouth 2 (Two) Times a Day.), Disp: 90 tablet, Rfl: 1    Melatonin 12 MG tablet, Take 1 capsule by mouth At Night As Needed., Disp: , Rfl:     metFORMIN (GLUCOPHAGE) 1000 MG  "tablet, Take 1 tablet by mouth 2 (Two) Times a Day With Meals., Disp: 180 tablet, Rfl: 1    multivitamin (ONE DAILY MULTIPLE VITAMIN PO), Take 1 tablet by mouth Daily., Disp: , Rfl:     pantoprazole (PROTONIX) 40 MG EC tablet, Take 1 tablet by mouth 2 (Two) Times a Day., Disp: 180 tablet, Rfl: 1    ursodiol (ACTIGALL) 300 MG capsule, Take 2 capsules by mouth 2 (Two) Times a Day., Disp: , Rfl:     Alum & Mag Hydroxide-Simeth (ANTACID & ANTIGAS PO), Take 1 capsule by mouth As Needed. (Patient not taking: Reported on 4/29/2025), Disp: , Rfl:     glucose blood test strip, Use as instructed (Patient not taking: Reported on 4/29/2025), Disp: 100 each, Rfl: 12    Objective     Vital Signs:  /64 (BP Location: Left arm, Patient Position: Sitting)   Pulse 59   Ht 175.3 cm (69\")   Wt 68.6 kg (151 lb 4.8 oz)   BMI 22.34 kg/m²   Estimated body mass index is 22.34 kg/m² as calculated from the following:    Height as of this encounter: 175.3 cm (69\").    Weight as of this encounter: 68.6 kg (151 lb 4.8 oz).       BMI is within normal parameters. No other follow-up for BMI required.      Vitals reviewed.   Constitutional:       Appearance: Normal and healthy appearance.   Eyes:      Pupils: Pupils are equal, round, and reactive to light.   Pulmonary:      Effort: Pulmonary effort is normal.   Chest:      Chest wall: Not tender to palpatation.   Cardiovascular:      PMI at left midclavicular line. Normal rate. Regular rhythm.      No gallop.    Pulses:     Intact distal pulses.   Edema:     Peripheral edema absent.   Skin:     General: Skin is warm.   Psychiatric:         Behavior: Behavior is cooperative.              Data Review:   Lab Results   Component Value Date    GLUCOSE 238 (H) 03/26/2025    BUN 15 03/26/2025    CREATININE 1.13 03/26/2025    EGFRIFNONA 73 02/14/2022    BCR 13.3 03/26/2025    K 4.6 03/26/2025    CO2 24.8 03/26/2025    CALCIUM 8.9 03/26/2025    ALBUMIN 3.7 03/26/2025    AST 34 03/26/2025    ALT 26 " 03/26/2025     Lab Results   Component Value Date    CHOL 114 03/26/2025    CHLPL 159 09/17/2015    TRIG 78 03/26/2025    HDL 58 03/26/2025    LDL 40 03/26/2025      Lab Results   Component Value Date    WBC 9.55 03/26/2025    RBC 4.22 03/26/2025    HGB 13.4 03/26/2025    HCT 41.2 03/26/2025    MCV 97.6 (H) 03/26/2025     03/26/2025     Lab Results   Component Value Date    TSH 1.510 03/26/2025     Lab Results   Component Value Date    HGBA1C 8.10 (H) 03/26/2025     Lab Results   Component Value Date    INR 1.09 03/31/2017    INR 0.99 03/30/2017    INR 0.94 03/20/2017    PROTIME 11.9 (H) 03/31/2017    PROTIME 10.8 03/30/2017    PROTIME 10.4 03/20/2017           Assessment and Plan     Assessment & Plan  Mixed hyperlipidemia   Lipid abnormalities are stable    Plan:  Continue same medication/s without change.      Discussed medication dosage, use, side effects, and goals of treatment in detail.    Counseled patient on lifestyle modifications to help control hyperlipidemia.   Advised patient to exercise for 150 minutes weekly. (30 minute brisk walk, 5 days a week for example)    Patient Treatment Goals:   LDL goal is less than 55    Followup in 3 months.  Continue Lipitor 40 mg once a day.       Benign essential HTN  Hypertension is stable and controlled  Continue current treatment regimen.  Ambulatory blood pressure monitoring.  Blood pressure will be reassessed in 3 months.  Continue losartan 25 mg daily.  Discussed with patient American College of cardiology and American Heart Association provide detailed guidelines for accurate blood pressure measurement.  Key steps for proper blood pressure measurement include:    Recommend being fully relaxed sitting in chair with feet on the floor and back supported for at least 5 minutes.  Avoid caffeine, exercise and smoking for at least 30 minutes before management.  Ensure empty bladder, remove clothing covering the location of the cuff placement using proper blood  pressure equipment and the blood pressure cuff size should be corrected with bladder encircling 80% of the arm.  Repeat blood pressure if blood pressure is extremely high.  The ideal blood pressure is less than 120/80 on the average blood pressure should be less than 130/80.             Chronic venous insufficiency  Continue wearing compression socks 20 to 30 mmHg preferably thigh-high if not knee-high to reduce pressure of the superficial veins causing discomfort and swelling, when not wearing compression socks will recommend keep legs elevated above the waist line on pillow or resurface to reduce the pressure of the lower extremity venous system.  Recommend doing regular exercise and weight reduction weight reduction that will decrease the pressure of the deep system and help in venous return that reduces swelling and edema in the lower extremities.        Vasovagal syncope  Increase fluid intake 8 to 10 glasses/day, improve core muscles strength, wear long compression socks thigh-high as discussed 20 to 30 mm pressure to prevent drop in blood pressure.  Discussed precautions.         Advised to continue current cardiac medications. Please notify of any issues. Discussed with the patient compliance with medical management and follow-up.     Follow Up  Return in about 3 months (around 7/29/2025).    Call if you have any significant symptoms or go to the [facility] Emergency room if possible.     Glenn Lua MD, FACC,[facility]  [facility]    Part of this note may be an electronic transcription/translation of spoken language to printed text using the Dragon Dictation System.

## 2025-04-28 NOTE — ASSESSMENT & PLAN NOTE
Increase fluid intake 8 to 10 glasses/day, improve core muscles strength, wear long compression socks thigh-high as discussed 20 to 30 mm pressure to prevent drop in blood pressure.  Discussed precautions.

## 2025-04-29 ENCOUNTER — OFFICE VISIT (OUTPATIENT)
Age: 80
End: 2025-04-29
Payer: MEDICARE

## 2025-04-29 VITALS
HEART RATE: 59 BPM | WEIGHT: 151.3 LBS | BODY MASS INDEX: 22.41 KG/M2 | SYSTOLIC BLOOD PRESSURE: 143 MMHG | DIASTOLIC BLOOD PRESSURE: 64 MMHG | HEIGHT: 69 IN

## 2025-04-29 DIAGNOSIS — I87.2 CHRONIC VENOUS INSUFFICIENCY: ICD-10-CM

## 2025-04-29 DIAGNOSIS — R55 VASOVAGAL SYNCOPE: ICD-10-CM

## 2025-04-29 DIAGNOSIS — I10 BENIGN ESSENTIAL HTN: Chronic | ICD-10-CM

## 2025-04-29 DIAGNOSIS — E78.2 MIXED HYPERLIPIDEMIA: Primary | Chronic | ICD-10-CM

## 2025-04-29 RX ORDER — LANCETS
1 EACH MISCELLANEOUS DAILY
COMMUNITY
Start: 2025-03-29

## 2025-06-03 DIAGNOSIS — J30.9 ALLERGIC RHINITIS, UNSPECIFIED SEASONALITY, UNSPECIFIED TRIGGER: ICD-10-CM

## 2025-06-03 RX ORDER — FLUTICASONE PROPIONATE 50 MCG
2 SPRAY, SUSPENSION (ML) NASAL DAILY
Qty: 48 G | Refills: 3 | Status: SHIPPED | OUTPATIENT
Start: 2025-06-03

## 2025-06-03 NOTE — TELEPHONE ENCOUNTER
Caller: Jean Noriega    Relationship: Self    Best call back number: [phone]     Requested Prescriptions:   Requested Prescriptions     Pending Prescriptions Disp Refills    fluticasone (FLONASE) 50 MCG/ACT nasal spray 16 g 3        Pharmacy where request should be sent: [facility] [address] [place] [address] [phone] [facility] [phone] FX     Last office visit with prescribing clinician: 3/26/2025   Last telemedicine visit with prescribing clinician: Visit date not found   Next office visit with prescribing clinician: 9/29/2025     Additional details provided by patient: PATIENT WOULD LIKE A 90 SUPPLY, IF POSSIBLE. HE IS ABOUT OUT OF MEDICATION.    Does the patient have less than a 3 day supply:  [x] Yes  [] No    Would you like a call back once the refill request has been completed: [] Yes [x] No    If the office needs to give you a call back, can they leave a voicemail: [] Yes [x] No    Trish Franco Rep   06/03/25 09:23 EDT

## 2025-07-01 DIAGNOSIS — K21.9 GASTROESOPHAGEAL REFLUX DISEASE WITHOUT ESOPHAGITIS: Chronic | ICD-10-CM

## 2025-07-01 RX ORDER — PANTOPRAZOLE SODIUM 40 MG/1
40 TABLET, DELAYED RELEASE ORAL 2 TIMES DAILY
Qty: 180 TABLET | Refills: 1 | Status: SHIPPED | OUTPATIENT
Start: 2025-07-01

## 2025-07-15 DIAGNOSIS — I10 BENIGN ESSENTIAL HTN: Chronic | ICD-10-CM

## 2025-07-15 NOTE — TELEPHONE ENCOUNTER
Caller: Jean Noriega    Relationship: Self    Best call back number:     [phone]       Requested Prescriptions:   Requested Prescriptions     Pending Prescriptions Disp Refills    losartan (COZAAR) 25 MG tablet 90 tablet 1     Sig: Take 1 tablet by mouth Daily.      ** STATED THAT HE IS TAKING 50 MG!     Pharmacy where request should be sent: [facility] [address] [place] [address] [phone] [facility] [phone]      Last office visit with prescribing clinician: 3/26/2025   Last telemedicine visit with prescribing clinician: Visit date not found   Next office visit with prescribing clinician: 9/29/2025     Additional details provided by patient: ** STATED THAT HE IS TAKING 50 MG!   HAS 3 PILLS LEFT       Does the patient have less than a 3 day supply:  [x] Yes  [] No    Would you like a call back once the refill request has been completed: [] Yes [x] No    If the office needs to give you a call back, can they leave a voicemail: [x] Yes [] No    Trish Alfaro Rep   07/15/25 08:54 EDT

## 2025-07-16 RX ORDER — LOSARTAN POTASSIUM 25 MG/1
50 TABLET ORAL DAILY
Qty: 180 TABLET | Refills: 1 | Status: SHIPPED | OUTPATIENT
Start: 2025-07-16

## 2025-07-18 ENCOUNTER — OFFICE VISIT (OUTPATIENT)
Dept: FAMILY MEDICINE CLINIC | Facility: CLINIC | Age: 80
End: 2025-07-18
Payer: MEDICARE

## 2025-07-18 VITALS
DIASTOLIC BLOOD PRESSURE: 60 MMHG | OXYGEN SATURATION: 98 % | HEIGHT: 69 IN | BODY MASS INDEX: 21.39 KG/M2 | SYSTOLIC BLOOD PRESSURE: 110 MMHG | TEMPERATURE: 98.3 F | HEART RATE: 59 BPM | WEIGHT: 144.4 LBS

## 2025-07-18 DIAGNOSIS — J01.00 ACUTE NON-RECURRENT MAXILLARY SINUSITIS: Primary | ICD-10-CM

## 2025-07-18 PROCEDURE — 3074F SYST BP LT 130 MM HG: CPT | Performed by: NURSE PRACTITIONER

## 2025-07-18 PROCEDURE — 1126F AMNT PAIN NOTED NONE PRSNT: CPT | Performed by: NURSE PRACTITIONER

## 2025-07-18 PROCEDURE — 99213 OFFICE O/P EST LOW 20 MIN: CPT | Performed by: NURSE PRACTITIONER

## 2025-07-18 PROCEDURE — 3078F DIAST BP <80 MM HG: CPT | Performed by: NURSE PRACTITIONER

## 2025-07-18 NOTE — PROGRESS NOTES
Patient Name: Jean Noriega Today's Date: 2025   Patient MRN / CSN: [id number] / [id number] Date of Encounter: 2025   Patient Age / : 80 y.o. / 1945 Encounter Provider: JADE Pedraza   Referring Physician: No ref. provider found          Jean is a 80 y.o. male who is being seen today for Headache and Nasal Congestion (Pt complains of burning in his nostrils when he breathes. Pt states he has had these symptoms for a week. )      Sinusitis  This is a new problem. The current episode started in the past 7 days. The problem has been gradually worsening since onset. There has been no fever. Associated symptoms include congestion, coughing and sinus pressure. Past treatments include nothing. The treatment provided no relief.       Allergies include:Tramadol and Percocet [oxycodone-acetaminophen]  Current Outpatient Medications   Medication Sig Dispense Refill    Accu-Chek Softclix Lancets lancets 1 each by Other route Daily.      acetaminophen (TYLENOL) 500 MG tablet Take 1 tablet by mouth Every 6 (Six) Hours As Needed.      aspirin 81 MG EC tablet Take 1 tablet by mouth Daily.      atorvastatin (LIPITOR) 40 MG tablet Take 1 tablet by mouth Daily. QOD      Blood Glucose Monitoring Suppl (Blood Glucose Monitor System) w/Device kit 1 Device Daily. 1 each 0    Creon 59971-680745 units capsule delayed-release particles capsule TAKE 3 CAPSULES BY MOUTH BEFORE MEAL(S) AND 1 CAPSULE BEFORE SNACKS      fluticasone (FLONASE) 50 MCG/ACT nasal spray Administer 2 sprays into the nostril(s) as directed by provider Daily. 48 g 3    glimepiride (AMARYL) 2 MG tablet Take 1 tablet by mouth Every Morning Before Breakfast. 90 tablet 1    glucose blood (Accu-Chek Guide Test) test strip USE 1 STRIP TO CHECK GLUCOSE ONCE DAILY 100 each 5    losartan (COZAAR) 25 MG tablet Take 2 tablets by mouth Daily. 180 tablet 1    Melatonin 12 MG tablet Take 1 capsule by mouth At Night As Needed.      metFORMIN (GLUCOPHAGE) 1000 MG  tablet Take 1 tablet by mouth 2 (Two) Times a Day With Meals. 180 tablet 1    multivitamin (ONE DAILY MULTIPLE VITAMIN PO) Take 1 tablet by mouth Daily.      pantoprazole (PROTONIX) 40 MG EC tablet Take 1 tablet by mouth twice daily 180 tablet 1    ursodiol (ACTIGALL) 300 MG capsule Take 2 capsules by mouth 2 (Two) Times a Day.      Alum & Mag Hydroxide-Simeth (ANTACID & ANTIGAS PO) Take 1 capsule by mouth As Needed. (Patient not taking: Reported on 7/18/2025)      amoxicillin-clavulanate (AUGMENTIN) 875-125 MG per tablet Take 1 tablet by mouth 2 (Two) Times a Day. 20 tablet 0    glucose blood test strip 1 each by Other route Daily. Use as instructed (Patient not taking: Reported on 7/18/2025) 100 each 11    glucose blood test strip Use as instructed (Patient not taking: Reported on 7/18/2025) 100 each 12     No current facility-administered medications for this visit.     Past Medical History:   Diagnosis Date    Anemia, recent acute blood loss 03/30/2017    ALB anemia in March after ureteral stent removed    Bladder cancer     Diabetes mellitus     Duodenal cancer     Dyspnea on exertion 02/01/2016    Foreign body (FB) in soft tissue 02/26/2019    Gallstones     History of recent left nephrolithiasis s/p ureteral stent placement  03/30/2017 2/17/17 - ureteral stent placed at [facility], s/p stent and stone removal 2/13/17    Hyperlipidemia     Hypertension     Kidney stone     Kidney stone on left side 02/2017    Nephrolithiasis     Pneumonia      Family History   Problem Relation Age of Onset    Pneumonia Mother     Heart failure Father     Arthritis Sister     Lung cancer Sister     No Known Problems Sister     Hypertension Brother     Diabetes Brother      Past Surgical History:   Procedure Laterality Date    CARDIAC CATHETERIZATION  2025    CYSTOSCOPY W/ URETERAL STENT REMOVAL Left 03/13/2017    EYE MUSCLE SURGERY      HERNIA REPAIR      INTERVENTIONAL RADIOLOGY PROCEDURE Right 03/31/2017    Procedure:  "PTC & Drain placement;  Surgeon: Serjio Wall MD;  Location: [facility] CATH INVASIVE LOCATION;  Service:     JOINT ASPIRATION AND/OR INJECTION Right     right hand    LIVER SURGERY  06/10/2019    URETERAL STENT INSERTION Left     WHIPPLE PROCEDURE       Social History     Substance and Sexual Activity   Alcohol Use Never     Social History     Tobacco Use   Smoking Status Former    Current packs/day: 0.00    Average packs/day: 1 pack/day for 15.0 years (15.0 ttl pk-yrs)    Types: Cigarettes, Pipe, Cigars    Start date: 1970    Quit date:     Years since quittin.5   Smokeless Tobacco Current    Types: Snuff     Social History     Substance and Sexual Activity   Drug Use Never     Review of Systems   HENT:  Positive for congestion and sinus pressure.    Respiratory:  Positive for cough.         Depression Assessment Review:  PHQ-9 Total Score:    Vital Signs & Measurements Taken This Encounter  /60 (BP Location: Left arm, Patient Position: Sitting, Cuff Size: Adult)   Pulse 59   Temp 98.3 °F (36.8 °C) (Oral)   Ht 175.3 cm (69.02\")   Wt 65.5 kg (144 lb 6.4 oz)   SpO2 98%   BMI 21.31 kg/m²    SpO2 Percentage    25 1554   SpO2: 98%        BMI is within normal parameters. No other follow-up for BMI required.      Physical Exam  Constitutional:       Appearance: Normal appearance.   HENT:      Right Ear: External ear normal.      Left Ear: External ear normal.      Nose: Congestion present.      Right Sinus: Maxillary sinus tenderness present.      Left Sinus: Maxillary sinus tenderness present.   Eyes:      Pupils: Pupils are equal, round, and reactive to light.   Cardiovascular:      Rate and Rhythm: Normal rate and regular rhythm.   Pulmonary:      Effort: Pulmonary effort is normal.   Abdominal:      Palpations: Abdomen is soft.   Musculoskeletal:         General: Normal range of motion.   Skin:     General: Skin is warm.   Neurological:      General: No focal deficit present.      " "Mental Status: He is alert and oriented to person, place, and time.   Psychiatric:         Mood and Affect: Mood normal.         Behavior: Behavior normal.          Fall Risk Assessment:  Fall Risk Assessment was completed, and patient is at low risk for falls.    Assessment & Plan  Patient Active Problem List   Diagnosis    Epigastric pain    Gastroesophageal reflux disease    Chronic rhinitis    Type 2 diabetes mellitus with hyperglycemia, without long-term current use of insulin    Low back pain    Mixed hyperlipidemia    Hx of bladder cancer, s/p \"scraping\", 2004    History of recent left nephrolithiasis s/p ureteral stent placement     Benign essential HTN    Arthritis    History of duodenal cancer    Peptic ulcer    Taking multiple medications for chronic disease    Varicose veins of lower extremity with inflammation    Malignant neoplasm of urinary bladder    Degenerative joint disease (DJD) of lumbar spine    Spondylolisthesis of lumbar region    Spinal stenosis of lumbar region    Allergic rhinitis    Primary osteoarthritis involving multiple joints    Anxiety    Sacral pain    Vasovagal syncope       ICD-10-CM ICD-9-CM   1. Acute non-recurrent maxillary sinusitis  J01.00 461.0     Diagnoses and all orders for this visit:    1. Acute non-recurrent maxillary sinusitis (Primary)  -     amoxicillin-clavulanate (AUGMENTIN) 875-125 MG per tablet; Take 1 tablet by mouth 2 (Two) Times a Day.  Dispense: 20 tablet; Refill: 0       -- He presents today with cough, congestion, and sinus pain and pressure. COVID/FLU testing declined in office. I will start Augmentin for this sinusitis.       Follow up on file with Dr. Haynes.            This document has been electronically signed by JADE Pedraza  July 22, 2025 10:56 EDT    JADE Pedraza  [address] [place] [address]  [phone] (office)    Part of this note may be an electronic transcription/translation of spoken language to printed text " using the Dragon Dictation System.

## 2025-07-28 NOTE — ASSESSMENT & PLAN NOTE
Discussed to keep water intake to 8 to 10 glasses a day.  Avoid dehydration.  Precautions discussed in detail.  The patient has also element of autonomic dysfunction due to diabetes.  Recommend long compression stocking 20 to 30 mm thigh-high.

## 2025-07-28 NOTE — PROGRESS NOTES
Cardiology Follow-Up Note     Name: Jean Noriega  :   1945  PCP: Fannie Haynes,   Date:   2025  Department: [facility] [place] [facility] CARDIOLOGY  [address]  [facility] [address]  Fax [phone]  Phone [phone]    Chief Complaint   Patient presents with    Hyperlipidemia    Hypertension   Problem list:  Syncope  2024 tilt table test positive for orthostatic hypotension negative for vasodepressive cardioinhibitory syncope.  2025 chest pain abnormal stress study/EF 55% by heart cath normal coronaries with normal LV function.  2024 echocardiogram EF 63%, mildly calcified aortic valve annulus and leaflets.  Mild mitral and tricuspid regurgitation  MCT monitoring 2024-2024 predominantly sinus rhythm with sinus bradycardia first-degree AV block/Mobitz type I block.  Rare PACs and PVCs plus IVCD.  2.  Chronic venous insufficiency   a.  2024 venous Doppler examination revealing deep reflux present bilaterally pathological reflexes and left GSV segment is too short for any treatment.  3.  Diabetes mellitus type 2  4.  Hypertension benign essential  5.  Hyperlipidemia    Subjective     History of Present Illness  Jean Noriega is a 80 y.o. male who presents today for follow-up on chronic condition. Patient states doing well, no chest pain or shortness of breath.    Past Medical History:   Diagnosis Date    Anemia, recent acute blood loss 2017    ALB anemia in March after ureteral stent removed    Bladder cancer     Diabetes mellitus     Duodenal cancer     Dyspnea on exertion 2016    Foreign body (FB) in soft tissue 2019    Gallstones     History of recent left nephrolithiasis s/p ureteral stent placement  2017 - ureteral stent placed at [facility], s/p stent and stone removal 17    Hyperlipidemia     Hypertension     Kidney stone     Kidney stone on left side 2017     Nephrolithiasis     Pneumonia       Past Surgical History:   Procedure Laterality Date    CARDIAC CATHETERIZATION  2025    CYSTOSCOPY W/ URETERAL STENT REMOVAL Left 03/13/2017    EYE MUSCLE SURGERY      HERNIA REPAIR      INTERVENTIONAL RADIOLOGY PROCEDURE Right 03/31/2017    Procedure: PTC & Drain placement;  Surgeon: Serjio Wall MD;  Location: [facility] INVASIVE LOCATION;  Service:     JOINT ASPIRATION AND/OR INJECTION Right     right hand    LIVER SURGERY  06/10/2019    URETERAL STENT INSERTION Left     WHIPPLE PROCEDURE  2004       Current Outpatient Medications:     Accu-Chek Softclix Lancets lancets, 1 each by Other route Daily., Disp: , Rfl:     aspirin 81 MG EC tablet, Take 1 tablet by mouth Daily., Disp: , Rfl:     atorvastatin (LIPITOR) 40 MG tablet, Take 1 tablet by mouth Daily. QOD (Patient taking differently: Take 1 tablet by mouth. 1 TABLET EVERY OTHER DAY), Disp: , Rfl:     Blood Glucose Monitoring Suppl (Blood Glucose Monitor System) w/Device kit, 1 Device Daily., Disp: 1 each, Rfl: 0    Creon 64635-863288 units capsule delayed-release particles capsule, TAKE 3 CAPSULES BY MOUTH BEFORE MEAL(S) AND 1 CAPSULE BEFORE SNACKS, Disp: , Rfl:     fluticasone (FLONASE) 50 MCG/ACT nasal spray, Administer 2 sprays into the nostril(s) as directed by provider Daily., Disp: 48 g, Rfl: 3    glucose blood (Accu-Chek Guide Test) test strip, USE 1 STRIP TO CHECK GLUCOSE ONCE DAILY, Disp: 100 each, Rfl: 5    losartan (COZAAR) 25 MG tablet, Take 2 tablets by mouth Daily. (Patient taking differently: Take 4 tablets by mouth Daily.), Disp: 180 tablet, Rfl: 1    Melatonin 12 MG tablet, Take 1 capsule by mouth At Night As Needed., Disp: , Rfl:     metFORMIN (GLUCOPHAGE) 1000 MG tablet, Take 1 tablet by mouth 2 (Two) Times a Day With Meals., Disp: 180 tablet, Rfl: 1    multivitamin (ONE DAILY MULTIPLE VITAMIN PO), Take 1 tablet by mouth Daily., Disp: , Rfl:     pantoprazole (PROTONIX) 40 MG EC tablet, Take 1 tablet by  "mouth twice daily, Disp: 180 tablet, Rfl: 1    ursodiol (ACTIGALL) 300 MG capsule, Take 2 capsules by mouth 2 (Two) Times a Day., Disp: , Rfl:     Objective     Vital Signs:  /57 (BP Location: Right arm, Patient Position: Sitting)   Pulse 54   Ht 175 cm (68.9\")   Wt 65.3 kg (144 lb)   BMI 21.33 kg/m²   Estimated body mass index is 21.33 kg/m² as calculated from the following:    Height as of this encounter: 175 cm (68.9\").    Weight as of this encounter: 65.3 kg (144 lb).       BMI is within normal parameters. No other follow-up for BMI required.      Vitals reviewed.   Constitutional:       Appearance: Normal and healthy appearance.   Eyes:      Pupils: Pupils are equal, round, and reactive to light.   Pulmonary:      Effort: Pulmonary effort is normal.   Chest:      Chest wall: Not tender to palpatation.   Cardiovascular:      PMI at left midclavicular line. Normal rate. Regular rhythm.      No gallop.    Pulses:     Intact distal pulses.   Edema:     Peripheral edema absent.   Skin:     General: Skin is warm.   Psychiatric:         Behavior: Behavior is cooperative.              Data Review:   Lab Results   Component Value Date    GLUCOSE 238 (H) 03/26/2025    BUN 15 03/26/2025    CREATININE 1.13 03/26/2025    EGFRIFNONA 73 02/14/2022    BCR 13.3 03/26/2025    K 4.6 03/26/2025    CO2 24.8 03/26/2025    CALCIUM 8.9 03/26/2025    ALBUMIN 3.7 03/26/2025    AST 34 03/26/2025    ALT 26 03/26/2025     Lab Results   Component Value Date    CHOL 114 03/26/2025    CHLPL 159 09/17/2015    TRIG 78 03/26/2025    HDL 58 03/26/2025    LDL 40 03/26/2025      Lab Results   Component Value Date    WBC 9.55 03/26/2025    RBC 4.22 03/26/2025    HGB 13.4 03/26/2025    HCT 41.2 03/26/2025    MCV 97.6 (H) 03/26/2025     03/26/2025     Lab Results   Component Value Date    TSH 1.510 03/26/2025     Lab Results   Component Value Date    HGBA1C 8.10 (H) 03/26/2025     Lab Results   Component Value Date    INR 1.09 " 03/31/2017    INR 0.99 03/30/2017    INR 0.94 03/20/2017    PROTIME 11.9 (H) 03/31/2017    PROTIME 10.8 03/30/2017    PROTIME 10.4 03/20/2017         Reviewed EKG 3/28/2025 revealing sinus rhythm with right bundle branch block first-degree AV block.  Assessment and Plan     Assessment & Plan  Mixed hyperlipidemia   Lipid abnormalities are stable    Plan:  Continue same medication/s without change.      Discussed medication dosage, use, side effects, and goals of treatment in detail.    Counseled patient on lifestyle modifications to help control hyperlipidemia.   Advised patient to exercise for 150 minutes weekly. (30 minute brisk walk, 5 days a week for example)    Patient Treatment Goals:   LDL goal is less than 70    Followup in 3 months.  Continue atorvastatin 40 mg once a day.  Diet modification better control of diabetes to lower the triglycerides levels.  The goal is to keep the A1c less than 7.0.  LDL less than 55.  Triglyceride less than 150.       Benign essential HTN  Hypertension is stable and controlled  Continue current treatment regimen.  Blood pressure will be reassessed in 1 month.  Continue losartan 25 mg once a day.       Syncope and collapse  Discussed to keep water intake to 8 to 10 glasses a day.  Avoid dehydration.  Precautions discussed in detail.  The patient has also element of autonomic dysfunction due to diabetes.  Recommend long compression stocking 20 to 30 mm thigh-high.         Advised to continue current cardiac medications. Please notify of any issues. Discussed with the patient compliance with medical management and follow-up.     Follow Up  Return in about 3 months (around 10/29/2025).    Call if you have any significant symptoms or go to the [facility] Emergency room if possible.     Glenn Lua MD, FACC,[facility].  [facility]    Part of this note may be an electronic transcription/translation of spoken language to printed text using the Dragon  Dictation System.

## 2025-07-28 NOTE — ASSESSMENT & PLAN NOTE
Lipid abnormalities are stable    Plan:  Continue same medication/s without change.      Discussed medication dosage, use, side effects, and goals of treatment in detail.    Counseled patient on lifestyle modifications to help control hyperlipidemia.   Advised patient to exercise for 150 minutes weekly. (30 minute brisk walk, 5 days a week for example)    Patient Treatment Goals:   LDL goal is less than 70    Followup in 3 months.  Continue atorvastatin 40 mg once a day.  Diet modification better control of diabetes to lower the triglycerides levels.  The goal is to keep the A1c less than 7.0.  LDL less than 55.  Triglyceride less than 150.

## 2025-07-28 NOTE — ASSESSMENT & PLAN NOTE
Hypertension is stable and controlled  Continue current treatment regimen.  Blood pressure will be reassessed in 1 month.  Continue losartan 25 mg once a day.

## 2025-07-29 ENCOUNTER — OFFICE VISIT (OUTPATIENT)
Age: 80
End: 2025-07-29
Payer: MEDICARE

## 2025-07-29 ENCOUNTER — PATIENT ROUNDING (BHMG ONLY) (OUTPATIENT)
Age: 80
End: 2025-07-29

## 2025-07-29 VITALS
HEIGHT: 69 IN | SYSTOLIC BLOOD PRESSURE: 139 MMHG | HEART RATE: 54 BPM | WEIGHT: 144 LBS | DIASTOLIC BLOOD PRESSURE: 57 MMHG | BODY MASS INDEX: 21.33 KG/M2

## 2025-07-29 DIAGNOSIS — I10 BENIGN ESSENTIAL HTN: Chronic | ICD-10-CM

## 2025-07-29 DIAGNOSIS — E78.2 MIXED HYPERLIPIDEMIA: Primary | Chronic | ICD-10-CM

## 2025-07-29 DIAGNOSIS — R55 SYNCOPE AND COLLAPSE: ICD-10-CM

## 2025-07-29 PROCEDURE — 99214 OFFICE O/P EST MOD 30 MIN: CPT | Performed by: INTERNAL MEDICINE

## 2025-07-29 PROCEDURE — 3078F DIAST BP <80 MM HG: CPT | Performed by: INTERNAL MEDICINE

## 2025-07-29 PROCEDURE — 1159F MED LIST DOCD IN RCRD: CPT | Performed by: INTERNAL MEDICINE

## 2025-07-29 PROCEDURE — 3075F SYST BP GE 130 - 139MM HG: CPT | Performed by: INTERNAL MEDICINE

## 2025-07-29 PROCEDURE — 1160F RVW MEDS BY RX/DR IN RCRD: CPT | Performed by: INTERNAL MEDICINE

## 2025-07-29 NOTE — PROGRESS NOTES
My name is Nikole Maldonado, and I am the Practice Manager for [facility] [place].    I would like to thank you for being a loyal patient. If you do not mind, I would like to ask you some questions about your recent visit with us. Please feel free to reply if you wish to provide us with feedback on your visit with our practice.    First, could you tell me what went well with your recent visit?    Secondly, we are always looking for ways to make our patients' experiences even better. Do you have any recommendations on what we can do to improve your experience?    Finally, overall were you satisfied with your visit with us as a [facility] facility?    Over the next few days, you will be receiving a Patient Experience Survey. Please consider taking the survey, as it helps [facility] in improving their patient care.    Thank you for taking the time to answer our questions today.    I hope you have a good day.

## (undated) DEVICE — PINNACLE R/O II INTRODUCER SHEATH WITH RADIOPAQUE MARKER: Brand: PINNACLE

## (undated) DEVICE — INTRODUCER SYSTEM: Brand: ACCUSTICK™ II

## (undated) DEVICE — Device

## (undated) DEVICE — LEX NEURO ANGIOGRAPHY: Brand: MEDLINE INDUSTRIES, INC.

## (undated) DEVICE — CONNECTING TUBE FOR DRAINAGE BAG WITH FUNNEL ADAPTORS: Brand: CONNECTING TUBE

## (undated) DEVICE — RADIFOCUS GLIDEWIRE: Brand: GLIDEWIRE

## (undated) DEVICE — DRSNG SURESITE123 4X4.8IN

## (undated) DEVICE — BILIARY CATHETER SYSTEM: Brand: FLEXIMA™

## (undated) DEVICE — RADIFOCUS GLIDECATH: Brand: GLIDECATH